# Patient Record
Sex: MALE | Race: WHITE | NOT HISPANIC OR LATINO | Employment: OTHER | URBAN - METROPOLITAN AREA
[De-identification: names, ages, dates, MRNs, and addresses within clinical notes are randomized per-mention and may not be internally consistent; named-entity substitution may affect disease eponyms.]

---

## 2021-05-28 ENCOUNTER — RECORDS - HEALTHEAST (OUTPATIENT)
Dept: ADMINISTRATIVE | Facility: CLINIC | Age: 76
End: 2021-05-28

## 2021-10-18 ENCOUNTER — APPOINTMENT (OUTPATIENT)
Dept: ULTRASOUND IMAGING | Facility: CLINIC | Age: 76
End: 2021-10-18
Attending: EMERGENCY MEDICINE
Payer: MEDICARE

## 2021-10-18 ENCOUNTER — HOSPITAL ENCOUNTER (EMERGENCY)
Facility: CLINIC | Age: 76
Discharge: HOME | End: 2021-10-19
Attending: PHYSICIAN ASSISTANT | Admitting: PHYSICIAN ASSISTANT
Payer: MEDICARE

## 2021-10-18 DIAGNOSIS — I10 HYPERTENSION, UNSPECIFIED TYPE: ICD-10-CM

## 2021-10-18 DIAGNOSIS — N43.3 RIGHT HYDROCELE: ICD-10-CM

## 2021-10-18 DIAGNOSIS — R93.5 ABNORMAL CT OF THE ABDOMEN: ICD-10-CM

## 2021-10-18 PROBLEM — R97.20 ELEVATED PSA: Status: ACTIVE | Noted: 2017-10-19

## 2021-10-18 LAB
ALBUMIN SERPL-MCNC: 3.7 G/DL (ref 3.4–5)
ALP SERPL-CCNC: 52 U/L (ref 40–150)
ALT SERPL W P-5'-P-CCNC: 40 U/L (ref 0–70)
ANION GAP SERPL CALCULATED.3IONS-SCNC: 7 MMOL/L (ref 3–14)
AST SERPL W P-5'-P-CCNC: 22 U/L (ref 0–45)
BASOPHILS # BLD AUTO: 0 10E3/UL (ref 0–0.2)
BASOPHILS NFR BLD AUTO: 1 %
BILIRUB SERPL-MCNC: 0.3 MG/DL (ref 0.2–1.3)
BUN SERPL-MCNC: 16 MG/DL (ref 7–30)
CALCIUM SERPL-MCNC: 9 MG/DL (ref 8.5–10.1)
CHLORIDE BLD-SCNC: 105 MMOL/L (ref 94–109)
CO2 SERPL-SCNC: 27 MMOL/L (ref 20–32)
CREAT SERPL-MCNC: 0.89 MG/DL (ref 0.66–1.25)
EOSINOPHIL # BLD AUTO: 0.1 10E3/UL (ref 0–0.7)
EOSINOPHIL NFR BLD AUTO: 1 %
ERYTHROCYTE [DISTWIDTH] IN BLOOD BY AUTOMATED COUNT: 13.2 % (ref 10–15)
GFR SERPL CREATININE-BSD FRML MDRD: 82 ML/MIN/1.73M2
GLUCOSE BLD-MCNC: 93 MG/DL (ref 70–99)
HCT VFR BLD AUTO: 41.2 % (ref 40–53)
HGB BLD-MCNC: 13.8 G/DL (ref 13.3–17.7)
IMM GRANULOCYTES # BLD: 0 10E3/UL
IMM GRANULOCYTES NFR BLD: 0 %
LYMPHOCYTES # BLD AUTO: 2.2 10E3/UL (ref 0.8–5.3)
LYMPHOCYTES NFR BLD AUTO: 25 %
MCH RBC QN AUTO: 29.5 PG (ref 26.5–33)
MCHC RBC AUTO-ENTMCNC: 33.5 G/DL (ref 31.5–36.5)
MCV RBC AUTO: 88 FL (ref 78–100)
MONOCYTES # BLD AUTO: 0.9 10E3/UL (ref 0–1.3)
MONOCYTES NFR BLD AUTO: 10 %
NEUTROPHILS # BLD AUTO: 5.6 10E3/UL (ref 1.6–8.3)
NEUTROPHILS NFR BLD AUTO: 63 %
NRBC # BLD AUTO: 0 10E3/UL
NRBC BLD AUTO-RTO: 0 /100
PLATELET # BLD AUTO: 267 10E3/UL (ref 150–450)
POTASSIUM BLD-SCNC: 4 MMOL/L (ref 3.4–5.3)
PROT SERPL-MCNC: 7.6 G/DL (ref 6.8–8.8)
RBC # BLD AUTO: 4.68 10E6/UL (ref 4.4–5.9)
SODIUM SERPL-SCNC: 139 MMOL/L (ref 133–144)
TROPONIN I SERPL-MCNC: <0.015 UG/L (ref 0–0.04)
WBC # BLD AUTO: 8.8 10E3/UL (ref 4–11)

## 2021-10-18 PROCEDURE — 36415 COLL VENOUS BLD VENIPUNCTURE: CPT | Performed by: PHYSICIAN ASSISTANT

## 2021-10-18 PROCEDURE — 84484 ASSAY OF TROPONIN QUANT: CPT | Performed by: PHYSICIAN ASSISTANT

## 2021-10-18 PROCEDURE — 82247 BILIRUBIN TOTAL: CPT | Performed by: PHYSICIAN ASSISTANT

## 2021-10-18 PROCEDURE — 99284 EMERGENCY DEPT VISIT MOD MDM: CPT | Performed by: EMERGENCY MEDICINE

## 2021-10-18 PROCEDURE — 99285 EMERGENCY DEPT VISIT HI MDM: CPT | Mod: 25

## 2021-10-18 PROCEDURE — 81001 URINALYSIS AUTO W/SCOPE: CPT | Performed by: PHYSICIAN ASSISTANT

## 2021-10-18 PROCEDURE — 250N000013 HC RX MED GY IP 250 OP 250 PS 637: Performed by: EMERGENCY MEDICINE

## 2021-10-18 PROCEDURE — 250N000011 HC RX IP 250 OP 636: Performed by: EMERGENCY MEDICINE

## 2021-10-18 PROCEDURE — 250N000013 HC RX MED GY IP 250 OP 250 PS 637: Performed by: PHYSICIAN ASSISTANT

## 2021-10-18 PROCEDURE — 76870 US EXAM SCROTUM: CPT

## 2021-10-18 PROCEDURE — 96374 THER/PROPH/DIAG INJ IV PUSH: CPT | Mod: 59

## 2021-10-18 PROCEDURE — 96375 TX/PRO/DX INJ NEW DRUG ADDON: CPT

## 2021-10-18 PROCEDURE — 85025 COMPLETE CBC W/AUTO DIFF WBC: CPT | Performed by: PHYSICIAN ASSISTANT

## 2021-10-18 RX ORDER — ACETAMINOPHEN 325 MG/1
975 TABLET ORAL ONCE
Status: COMPLETED | OUTPATIENT
Start: 2021-10-18 | End: 2021-10-18

## 2021-10-18 RX ORDER — LISINOPRIL 10 MG/1
20 TABLET ORAL ONCE
Status: COMPLETED | OUTPATIENT
Start: 2021-10-18 | End: 2021-10-18

## 2021-10-18 RX ORDER — IOPAMIDOL 755 MG/ML
100 INJECTION, SOLUTION INTRAVASCULAR ONCE
Status: COMPLETED | OUTPATIENT
Start: 2021-10-18 | End: 2021-10-19

## 2021-10-18 RX ORDER — DOXAZOSIN 4 MG/1
4 TABLET ORAL
COMMUNITY
Start: 2020-12-02 | End: 2021-11-08

## 2021-10-18 RX ORDER — PRAVASTATIN SODIUM 20 MG
20 TABLET ORAL
COMMUNITY
Start: 2020-12-02 | End: 2021-11-08

## 2021-10-18 RX ORDER — ICOSAPENT ETHYL 1 G/1
2 CAPSULE ORAL
COMMUNITY
Start: 2020-11-30 | End: 2021-11-08

## 2021-10-18 RX ORDER — KETOROLAC TROMETHAMINE 15 MG/ML
10 INJECTION, SOLUTION INTRAMUSCULAR; INTRAVENOUS
Status: COMPLETED | OUTPATIENT
Start: 2021-10-18 | End: 2021-10-18

## 2021-10-18 RX ORDER — LISINOPRIL 20 MG/1
20 TABLET ORAL
COMMUNITY
Start: 2020-12-02 | End: 2021-11-08

## 2021-10-18 RX ADMIN — ACETAMINOPHEN 975 MG: 325 TABLET, FILM COATED ORAL at 21:54

## 2021-10-18 RX ADMIN — KETOROLAC TROMETHAMINE 10 MG: 15 INJECTION, SOLUTION INTRAMUSCULAR; INTRAVENOUS at 22:31

## 2021-10-18 RX ADMIN — LISINOPRIL 20 MG: 10 TABLET ORAL at 22:33

## 2021-10-18 ASSESSMENT — ENCOUNTER SYMPTOMS
MUSCULOSKELETAL NEGATIVE: 1
GASTROINTESTINAL NEGATIVE: 1
NUMBNESS: 0
SEIZURES: 0
CONSTITUTIONAL NEGATIVE: 1
HEADACHES: 1
DIARRHEA: 0
NAUSEA: 0
EYES NEGATIVE: 1
SPEECH DIFFICULTY: 0
RESPIRATORY NEGATIVE: 1
WEAKNESS: 0
FACIAL ASYMMETRY: 0
CARDIOVASCULAR NEGATIVE: 1
LIGHT-HEADEDNESS: 0
RECTAL PAIN: 0
DIZZINESS: 0
TREMORS: 0

## 2021-10-18 ASSESSMENT — MIFFLIN-ST. JEOR: SCORE: 1887.92

## 2021-10-19 ENCOUNTER — APPOINTMENT (OUTPATIENT)
Dept: CT IMAGING | Facility: CLINIC | Age: 76
End: 2021-10-19
Attending: EMERGENCY MEDICINE
Payer: MEDICARE

## 2021-10-19 VITALS
BODY MASS INDEX: 33.59 KG/M2 | WEIGHT: 248 LBS | DIASTOLIC BLOOD PRESSURE: 85 MMHG | HEIGHT: 72 IN | SYSTOLIC BLOOD PRESSURE: 125 MMHG | TEMPERATURE: 98.8 F | HEART RATE: 90 BPM | RESPIRATION RATE: 16 BRPM | OXYGEN SATURATION: 92 %

## 2021-10-19 LAB
ALBUMIN UR-MCNC: NEGATIVE MG/DL
APPEARANCE UR: CLEAR
BILIRUB UR QL STRIP: NEGATIVE
COLOR UR AUTO: NORMAL
GLUCOSE UR STRIP-MCNC: NEGATIVE MG/DL
HGB UR QL STRIP: NEGATIVE
KETONES UR STRIP-MCNC: NEGATIVE MG/DL
LEUKOCYTE ESTERASE UR QL STRIP: NEGATIVE
NITRATE UR QL: NEGATIVE
PH UR STRIP: 7 [PH] (ref 5–7)
RBC URINE: 2 /HPF
SP GR UR STRIP: 1.01 (ref 1–1.03)
UROBILINOGEN UR STRIP-MCNC: NORMAL MG/DL
WBC URINE: <1 /HPF

## 2021-10-19 PROCEDURE — 70450 CT HEAD/BRAIN W/O DYE: CPT | Mod: ME,76

## 2021-10-19 PROCEDURE — 250N000009 HC RX 250: Performed by: EMERGENCY MEDICINE

## 2021-10-19 PROCEDURE — 250N000013 HC RX MED GY IP 250 OP 250 PS 637: Performed by: EMERGENCY MEDICINE

## 2021-10-19 PROCEDURE — 250N000011 HC RX IP 250 OP 636: Performed by: EMERGENCY MEDICINE

## 2021-10-19 PROCEDURE — G1004 CDSM NDSC: HCPCS

## 2021-10-19 PROCEDURE — 70450 CT HEAD/BRAIN W/O DYE: CPT | Mod: ME

## 2021-10-19 RX ORDER — ONDANSETRON 2 MG/ML
4 INJECTION INTRAMUSCULAR; INTRAVENOUS ONCE
Status: COMPLETED | OUTPATIENT
Start: 2021-10-19 | End: 2021-10-19

## 2021-10-19 RX ORDER — LISINOPRIL 20 MG/1
20 TABLET ORAL DAILY
Qty: 30 TABLET | Refills: 1 | Status: SHIPPED | OUTPATIENT
Start: 2021-10-19 | End: 2021-11-08

## 2021-10-19 RX ORDER — CLONIDINE HYDROCHLORIDE 0.1 MG/1
0.1 TABLET ORAL ONCE
Status: COMPLETED | OUTPATIENT
Start: 2021-10-19 | End: 2021-10-19

## 2021-10-19 RX ADMIN — CLONIDINE HYDROCHLORIDE 0.1 MG: 0.1 TABLET ORAL at 02:48

## 2021-10-19 RX ADMIN — SODIUM CHLORIDE 70 ML: 9 INJECTION, SOLUTION INTRAVENOUS at 02:10

## 2021-10-19 RX ADMIN — ONDANSETRON 4 MG: 2 INJECTION INTRAMUSCULAR; INTRAVENOUS at 02:32

## 2021-10-19 RX ADMIN — IOPAMIDOL 100 ML: 755 INJECTION, SOLUTION INTRAVENOUS at 02:10

## 2021-10-19 NOTE — ED PROVIDER NOTES
"     Emergency Department Patient Sign-out       Brief HPI:  This is a 76 year old male signed out to me by Darron Up PA-C at shift change at 10.15pm.  Plan of care at handoff is that patient is awaiting treatment for his headache with concern for untreated hypertension.  Patient has a known history of hypertension and has been on lisinopril 20 mg a day.  Patient has not taken his lisinopril for the last 2 years because he ran out of his prescription..  Patient expressed concern about a mass in the right lower quadrant and a testicular mass.  The abdominal mass have been present for about 5 to 6 months by report. Testicular mass have been present for about 8 to 9 months by report.  Patient requested imaging for further follow-up with a history of \"prostate cancer -presumed after an elevated PSA in June 2019 although patient never had any further follow-up work-up.        Significant Events after my assuming care: Patient was seen and examined initially at 10:30 PM and serially during his ED course..  He confirmed history as reported to the initial treating provider prior to handoff.  Patient reported  about 6 to 8 months of swelling around the right lower quadrant and is concerned about metastatic prostate cancer.  He reports he was noted to have an elevated PSA but never had surveillance follow-up or referral to oncologist or urologist. Atraumatic right testicular swelling and is concerned about prostate cancer.  Patient requested imaging and further work-up.  He was offered therapies for his headache and his blood pressure was monitored.    Medical record-PSA of 3.6 on June 7, 2019.  CT imaging of the abdomen and pelvis with contrast was ordered by the initial treating provider. Ordered a comprehensive testicular ultrasound given asymmetric testicular swelling. On my Exam -circumcised male without penile discharge or lesions.  Asymmetric testicular swelling right greater than left without tenderness  " Suspicious for hydrocele. Non-tender mass lesion in the right lower quadrant.  No inguinal pain.  Abdomen soft with protuberance.    Comprehensive ultrasound of the scrotum and testicle revealed a very large right hydrocele which contains nonspecific low-level echogenicity with a small left varicocele there is no intratesticular masses and no evidence of epididymoorchitis or testicular torsion.  See additional  Details in the radiology report.    CT abdomen and pelvis with contrast revealed complex fluid collection in the right hemipelvis abutting the medial aspect of the cecum.  One fluid collection was 8.9 x 8.4 cm in axial dimensions.  The other fluid collection measures 17 cm in length by 7.5 cm in diameter.  The appendix was not visualized separate from the structures and very small amount of free fluid was noted in the pelvis.  One of the fluid collection suspicious for an abscess that could be ruptured appendicitis the other peripheral calcifications is nonspecific but could represent a large mucocele of the appendix.  See additional details in the radiology report.      With CT findings I reviewed presentation with general surgeon on-call. I spoke with Dr HERNÁN Patterson at  2.40am .  We discussed patient's history as reported and chronicity of symptoms. Dr Patterson advised that patient would likely benefit from drain placement to see what this fluid collection is.  Dr Patterson advised that patient establish care with a primary care provider and that a referral to general surgery clinic could be placed where patient could be seen for further interval follow-up and definitive management.  I placed a referral to general surgery clinic in addition to follow-up in urology clinic for his hydrocele and to establish primary care.    After CT imaging patient reported his occipital headache was returning.  He also reported  nausea and vomiting.  His blood pressure was 166/103. Patient did confirm that he currently does not have a  primary care provider and is in the process of moving from [facility] to the [facility].  His blood pressure remains somewhat elevated although was starting to trend downward.  I elected to obtain a head CT to exclude intracranial hemorrhage versus other acute process.    I spoke with Dr YOBANI Zamora- radiology at 3.25am-who reported apparent sulcal based  hyperdensity in the right frontal lobe that could potentially reflect a subarachnoid hemorrhage but could also be due artifact from retained IV contrast from recent CT abdomen and pelvis.  Dr Zamora recommended follow-up CT in 4 hours for reevaluation.See additional details in radiology report.     Patient's blood pressure remained improved during ED course.     At shift end at 6.45am Patient signed out to Dr EMELY Cruz.  Plan of care at shift end at handoff is to have a repeat head CT at 7 AM which would be about 4 hours after patient's initial head CT imaging.  If head CT had shows nothing to suggest a subarachnoid hemorrhage patient will be discharged as outlined below.  If CT shows a subarachnoid hemorrhage patient will need further expert consultation.      Exam:   Patient Vitals for the past 24 hrs:   BP Temp Temp src Pulse Resp SpO2 Height Weight   10/19/21 0545 -- -- -- -- -- 93 % -- --   10/19/21 0530 -- -- -- -- -- 94 % -- --   10/19/21 0515 -- -- -- -- -- 91 % -- --   10/19/21 0500 (!) 153/104 -- -- 81 -- 93 % -- --   10/19/21 0445 -- -- -- -- -- 92 % -- --   10/19/21 0430 (!) 174/103 -- -- 84 -- 92 % -- --   10/19/21 0415 (!) 172/109 -- -- 81 -- 95 % -- --   10/19/21 0338 (!) 154/93 -- -- 84 -- 98 % -- --   10/19/21 0330 -- -- -- 85 -- 96 % -- --   10/19/21 0325 (!) 154/102 -- -- 84 16 93 % -- --   10/19/21 0215 (!) 190/126 -- -- -- -- 95 % -- --   10/19/21 0145 -- -- -- -- -- 95 % -- --   10/19/21 0130 -- -- -- -- -- 95 % -- --   10/19/21 0115 -- -- -- -- -- 95 % -- --   10/19/21 0100 (!) 193/114 -- -- 83 -- 97 % -- --   10/18/21  2200 (!) 203/134 -- -- 87 -- 97 % -- --   10/18/21 2145 -- -- -- -- -- 97 % -- --   10/18/21 2100 (!) 178/120 -- -- 85 -- -- -- --   10/18/21 2045 -- -- -- -- -- 97 % -- --   10/18/21 2030 (!) 190/113 -- -- 84 -- -- -- --   10/18/21 1847 -- -- -- -- -- -- 1.829 m (6') 112.5 kg (248 lb)   10/18/21 1843 (!) 174/106 98.8  F (37.1  C) Oral 95 (!) 97 -- -- --           ED RESULTS:   Results for orders placed or performed during the hospital encounter of 10/18/21 (from the past 24 hour(s))   Comprehensive metabolic panel     Status: Normal    Collection Time: 10/18/21  9:12 PM   Result Value Ref Range    Sodium 139 133 - 144 mmol/L    Potassium 4.0 3.4 - 5.3 mmol/L    Chloride 105 94 - 109 mmol/L    Carbon Dioxide (CO2) 27 20 - 32 mmol/L    Anion Gap 7 3 - 14 mmol/L    Urea Nitrogen 16 7 - 30 mg/dL    Creatinine 0.89 0.66 - 1.25 mg/dL    Calcium 9.0 8.5 - 10.1 mg/dL    Glucose 93 70 - 99 mg/dL    Alkaline Phosphatase 52 40 - 150 U/L    AST 22 0 - 45 U/L    ALT 40 0 - 70 U/L    Protein Total 7.6 6.8 - 8.8 g/dL    Albumin 3.7 3.4 - 5.0 g/dL    Bilirubin Total 0.3 0.2 - 1.3 mg/dL    GFR Estimate 82 >60 mL/min/1.73m2   Troponin I     Status: Normal    Collection Time: 10/18/21  9:12 PM   Result Value Ref Range    Troponin I <0.015 0.000 - 0.045 ug/L   CBC with platelets differential     Status: None    Collection Time: 10/18/21  9:13 PM    Narrative    The following orders were created for panel order CBC with platelets differential.  Procedure                               Abnormality         Status                     ---------                               -----------         ------                     CBC with platelets and d...[[id number]]                      Final result                 Please view results for these tests on the individual orders.   CBC with platelets and differential     Status: None    Collection Time: 10/18/21  9:13 PM   Result Value Ref Range    WBC Count 8.8 4.0 - 11.0 10e3/uL    RBC Count 4.68  4.40 - 5.90 10e6/uL    Hemoglobin 13.8 13.3 - 17.7 g/dL    Hematocrit 41.2 40.0 - 53.0 %    MCV 88 78 - 100 fL    MCH 29.5 26.5 - 33.0 pg    MCHC 33.5 31.5 - 36.5 g/dL    RDW 13.2 10.0 - 15.0 %    Platelet Count 267 150 - 450 10e3/uL    % Neutrophils 63 %    % Lymphocytes 25 %    % Monocytes 10 %    % Eosinophils 1 %    % Basophils 1 %    % Immature Granulocytes 0 %    NRBCs per 100 WBC 0 <1 /100    Absolute Neutrophils 5.6 1.6 - 8.3 10e3/uL    Absolute Lymphocytes 2.2 0.8 - 5.3 10e3/uL    Absolute Monocytes 0.9 0.0 - 1.3 10e3/uL    Absolute Eosinophils 0.1 0.0 - 0.7 10e3/uL    Absolute Basophils 0.0 0.0 - 0.2 10e3/uL    Absolute Immature Granulocytes 0.0 <=0.0 10e3/uL    Absolute NRBCs 0.0 10e3/uL   UA with Microscopic reflex to Culture     Status: Normal    Collection Time: 10/18/21 10:34 PM    Specimen: Urine, Midstream   Result Value Ref Range    Color Urine Straw Colorless, Straw, Light Yellow, Yellow    Appearance Urine Clear Clear    Glucose Urine Negative Negative mg/dL    Bilirubin Urine Negative Negative    Ketones Urine Negative Negative mg/dL    Specific Gravity Urine 1.011 1.003 - 1.035    Blood Urine Negative Negative    pH Urine 7.0 5.0 - 7.0    Protein Albumin Urine Negative Negative mg/dL    Urobilinogen Urine Normal Normal, 2.0 mg/dL    Nitrite Urine Negative Negative    Leukocyte Esterase Urine Negative Negative    RBC Urine 2 <=2 /HPF    WBC Urine <1 <=5 /HPF    Narrative    Urine Culture not indicated   US Testicular & Scrotum w Doppler Ltd     Status: None    Collection Time: 10/19/21 12:00 AM    Narrative    EXAM: ULTRASOUND SCROTUM WITH DOPPLER  LOCATION: [facility]  DATE/TIME: 10/18/2021 11:26 PM    INDICATION: Right testicular swelling.  COMPARISON: None.    TECHNIQUE: Ultrasound of scrotum with color flow and spectral Doppler with waveform analysis performed.    FINDINGS:    RIGHT: The testicle measures 4.8 x 3.0 x 2.8 cm. Homogeneous testicular echotexture.  Blood flow is visualized in the testicle. Unremarkable Doppler waveform evaluation of the testicle. Very large hydrocele containing low-level echogenicities. No   varicocele.    LEFT: The testicle measures 4.6 x 3.2 x 2.8 cm. No intratesticular masses. Blood flow is visualized in the testicle. Unremarkable Doppler waveform evaluation of the testicle. Small hydrocele. No varicocele.      Impression    IMPRESSION:   1. Very large right hydrocele which contains nonspecific low-level echogenicities.  2. Small left hydrocele.  3. No intratesticular masses.  4. No evidence of epididymoorchitis or testicular torsion.   CT Abdomen Pelvis w Contrast     Status: None (Preliminary result)    Collection Time: 10/19/21  2:10 AM    Narrative    EXAM: CT ABDOMEN AND PELVIS WITH CONTRAST  LOCATION: [facility]  DATE/TIME: 10/19/2021 1:55 AM    INDICATION: Right lower quadrant abdominal pain.  COMPARISON: None.    TECHNIQUE: CT scan of the abdomen and pelvis was performed following injection of IV contrast. Multiplanar reformats were obtained. Dose reduction techniques were used.  CONTRAST: 100 mL Isovue-370.    FINDINGS:    LOWER CHEST: Unremarkable.    HEPATOBILIARY: Mild diffuse fatty infiltration of liver. A few small low-attenuation lesions scattered within the liver, too small to characterize.    SPLEEN: Unremarkable.    PANCREAS: Unremarkable.    ADRENAL GLANDS: Unremarkable.    KIDNEYS/BLADDER: No suspicious renal lesions.    BOWEL: No dilatation of the small or large bowel. One or two large complex peripheral enhancing fluid collections in the right hemipelvis, abutting the medial aspect of the cecum. No gas within the fluid collections. The largest is superior and lateral   and measures 8.9 x 8.4 cm in axial dimensions (series 5 image 142). Slight stranding is present within the fat about this fluid collection. The other fluid collection is curvilinear in shape and has a few calcifications in its  peripheral aspect. It   measures 17 cm in length and 7.5 cm in diameter (series 5 image 165). The appendix is not visualized as separate from these structures. A very small amount of free fluid in the pelvis.    LYMPH NODES: Unremarkable.    PELVIC ORGANS: Marked enlargement of the prostate gland.    MUSCULOSKELETAL: Surgical hardware in the left acetabulum.    OTHER: Atherosclerotic calcification in the abdominal aorta.      Impression    IMPRESSION:   1. One or two peripherally enhancing fluid collections in the right hemipelvis, abutting the medial aspect of the cecum. The appendix is not visualized as separate from these structures. One of the fluid collections has calcification in its periphery.   These findings are nonspecific, but one of the fluid collections is suspicious for an abscess that could be related to ruptured appendicitis. The other with peripheral calcifications is nonspecific, but could possibly represent a large mucocele of the   appendix.  2. A very small amount of free fluid in the pelvis.  3. No other acute abnormality identified in the abdomen or pelvis.   Head CT w/o contrast     Status: None    Collection Time: 10/19/21  3:09 AM    Narrative    EXAM: CT HEAD W/O CONTRAST  LOCATION: [facility]  DATE/TIME: 10/19/2021 3:04 AM    INDICATION: Headache. Intracranial hemorrhage.  COMPARISON: None.  TECHNIQUE: Routine CT Head without IV contrast. Multiplanar reformats. Dose reduction techniques were used.    FINDINGS: Examination is degraded for evaluation of subtle hemorrhage due to retained IV contrast from the patient's recent contrast-enhanced CT abdomen/pelvis.    INTRACRANIAL CONTENTS: Apparent sulcal based hyperdensity is noted involving the high right anterior frontal lobe (image 24 series 2 and image 19 series 4 and image 15 series 6). No mass, mass effect or abnormal extra-axial fluid collection. No acute   large territory infarction. Mild presumed chronic  small vessel ischemic changes. Mild generalized volume loss. No hydrocephalus. 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    VISUALIZED ORBITS/SINUSES/MASTOIDS: No intraorbital abnormality. No paranasal sinus mucosal disease. No middle ear or mastoid effusion.    BONES/SOFT TISSUES: No acute abnormality.      Impression    IMPRESSION:  1.  Apparent sulcal based hyperdensity is identified involving the high right frontal lobe, possibly reflecting subarachnoid hemorrhage or artifact from the retained IV contrast from recent CT abdomen/pelvis. Recommend short-term imaging follow-up in   approximately 4 hours for further assessment.   2.  Chronic senescent and presumed microvascular ischemic changes, as above.          Dr. Lane Zamora discussed results with Dr. NUBIA LEWIS on 10/19/2021 at 3:25 AM.       ED MEDICATIONS:   Medications   acetaminophen (TYLENOL) tablet 975 mg (975 mg Oral Given 10/18/21 2154)   ketorolac (TORADOL) injection 10 mg (10 mg Intravenous Given 10/18/21 2231)   lisinopril (ZESTRIL) tablet 20 mg (20 mg Oral Given 10/18/21 2233)   iopamidol (ISOVUE-370) solution 100 mL (100 mLs Intravenous Given 10/19/21 0210)   sodium chloride 0.9 % bag 500mL for CT scan flush use (70 mLs As instructed Given 10/19/21 0210)   cloNIDine (CATAPRES) tablet 0.1 mg (0.1 mg Oral Given 10/19/21 0248)   ondansetron (ZOFRAN) injection 4 mg (4 mg Intravenous Given 10/19/21 0232)         Impression:    ICD-10-CM    1. Hypertension, unspecified type  I10 Primary Care Referral   2. Right hydrocele  N43.3 Adult Urology Referral    Large right hydrocele   3. Abnormal CT of the abdomen  R93.5 Adult General Surg Referral    2 large fluid collections abutting the right hemipelvis. ?  Ruptured appendix or abscess versus mucocele       Plan:    At shift end patient was awaiting repeat head CT (4 hour follow-up as advised by radiology) if no acute findings on  follow-up head CT patient may be discharged with plan for outpatient follow-up to  1) establish primary care to follow-up on hypertension with reinitiating lisinopril.  2)Follow-up with urology if desired for reported history of elevated PSA with a large right hydrocele for further definitive care.  3) Follow-up with General surgery for abnormal large fluid collection noted in the right hemipelvis that could be due to a ruptured appendix/abscess and or a mucocele for further definitive care.      MD Vance Hayes Ebenezer Tope, MD  10/19/21 0691

## 2021-10-19 NOTE — DISCHARGE INSTRUCTIONS
1) Your evaluation today confirmed that your blood pressure is not well controlled.  Your blood pressure readings were elevated and you have been given a new prescription for lisinopril to begin taking since he reported you have not taken this for the last 2 years.  Primary care clinic referral was placed as report you are moving from [facility] to [place] and they would like to live in the local area and establish primary care locally here at the hospital.    2) You quested imaging to follow-up given that you have had testicular swelling for about 8 months with a prior history of an abnormal PSA test.  He also requested CT imaging given that you have had a mass growing the right lower quadrant.  Ultrasound revealed that you have a right hydrocele but no testicular mass and CT revealed

## 2021-10-19 NOTE — ED PROVIDER NOTES
Emergency Department Patient Sign-out       Brief HPI:  This is a 76 year old male signed out to me by Dr. Mccoy. For follow up of CT scan of head. Patient presented with headache and htn .  See initial ED Provider note for details of the presentation.   CT scan of abdomen and pelvis with pelvic fluid collection; could be handled as an out patient. CT scan of head obtained and revealed hyperdensity in the R frontal lobe could potentially reflect a subarachnoid hemorrhage but could represent follow up. Patient is awaiting repeat 4 hour CT . If negative patient can be discharged.   Results for orders placed or performed during the hospital encounter of 10/18/21   CT Abdomen Pelvis w Contrast    Narrative    EXAM: CT ABDOMEN AND PELVIS WITH CONTRAST  LOCATION: [facility]  DATE/TIME: 10/19/2021 1:55 AM    INDICATION: Right lower quadrant abdominal pain.  COMPARISON: None.    TECHNIQUE: CT scan of the abdomen and pelvis was performed following injection of IV contrast. Multiplanar reformats were obtained. Dose reduction techniques were used.  CONTRAST: 100 mL Isovue-370.    FINDINGS:    LOWER CHEST: Unremarkable.    HEPATOBILIARY: Mild diffuse fatty infiltration of liver. A few small low-attenuation lesions scattered within the liver, too small to characterize.    SPLEEN: Unremarkable.    PANCREAS: Unremarkable.    ADRENAL GLANDS: Unremarkable.    KIDNEYS/BLADDER: No suspicious renal lesions.    BOWEL: No dilatation of the small or large bowel. Two large peripherally-enhancing fluid collections in the right hemipelvis, abutting the medial aspect of the cecum. No gas within the fluid collections. The superior and right lateral fluid collection   measures 8.9 x 8.4 cm in axial dimensions (series 5 image 142). Slight stranding is present within the fat about this fluid collection. This could represent an abscess. The other fluid collection is more inferior and elongated an irregular in  shape and   has a few calcifications in its peripheral aspect. It measures 17 cm in length and 7.5 cm in diameter (series 5 image 165). The appendix is not visualized as separate from these structures. A very small amount of free fluid in the pelvis.    LYMPH NODES: Unremarkable.    PELVIC ORGANS: Marked enlargement of the prostate gland.    MUSCULOSKELETAL: Surgical hardware in the left acetabulum.    OTHER: Atherosclerotic calcification in the abdominal aorta.      Impression    IMPRESSION:   1. Two peripherally-enhancing fluid collections in the right hemipelvis, abutting the medial aspect of the cecum. The appendix is not visualized as separate from these structures. One of the fluid collections has calcification in its periphery. These   findings are nonspecific, but one of the fluid collections that measures 9 cm in diameter is suspicious for an abscess and could possible be related to ruptured appendicitis. The other with peripheral calcificationsis elongated and more irregular in   shape and measures 17 x 8 cm. This is nonspecific, but could represent a large mucocele of the appendix. Neoplasm cannot be excluded.  2. A very small amount of free fluid in the pelvis.  3. No other acute abnormality identified in the abdomen or pelvis.   US Testicular & Scrotum w Doppler Ltd    Narrative    EXAM: ULTRASOUND SCROTUM WITH DOPPLER  LOCATION: [facility]  DATE/TIME: 10/18/2021 11:26 PM    INDICATION: Right testicular swelling.  COMPARISON: None.    TECHNIQUE: Ultrasound of scrotum with color flow and spectral Doppler with waveform analysis performed.    FINDINGS:    RIGHT: The testicle measures 4.8 x 3.0 x 2.8 cm. Homogeneous testicular echotexture. Blood flow is visualized in the testicle. Unremarkable Doppler waveform evaluation of the testicle. Very large hydrocele containing low-level echogenicities. No   varicocele.    LEFT: The testicle measures 4.6 x 3.2 x 2.8 cm. No intratesticular  masses. Blood flow is visualized in the testicle. Unremarkable Doppler waveform evaluation of the testicle. Small hydrocele. No varicocele.      Impression    IMPRESSION:   1. Very large right hydrocele which contains nonspecific low-level echogenicities.  2. Small left hydrocele.  3. No intratesticular masses.  4. No evidence of epididymoorchitis or testicular torsion.   Head CT w/o contrast    Narrative    EXAM: CT HEAD W/O CONTRAST  LOCATION: [facility]  DATE/TIME: 10/19/2021 3:04 AM    INDICATION: Headache. Intracranial hemorrhage.  COMPARISON: None.  TECHNIQUE: Routine CT Head without IV contrast. Multiplanar reformats. Dose reduction techniques were used.    FINDINGS: Examination is degraded for evaluation of subtle hemorrhage due to retained IV contrast from the patient's recent contrast-enhanced CT abdomen/pelvis.    INTRACRANIAL CONTENTS: Apparent sulcal based hyperdensity is noted involving the high right anterior frontal lobe (image 24 series 2 and image 19 series 4 and image 15 series 6). No mass, mass effect or abnormal extra-axial fluid collection. No acute   large territory infarction. Mild presumed chronic small vessel ischemic changes. Mild generalized volume loss. No hydrocephalus. 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    VISUALIZED ORBITS/SINUSES/MASTOIDS: No intraorbital abnormality. No paranasal sinus mucosal disease. No middle ear or mastoid effusion.    BONES/SOFT TISSUES: No acute abnormality.      Impression    IMPRESSION:  1.  Apparent sulcal based hyperdensity is identified involving the high right frontal lobe, possibly reflecting subarachnoid hemorrhage or artifact from the retained IV contrast from recent CT abdomen/pelvis. Recommend short-term imaging follow-up in   approximately 4 hours for further assessment.   2.  Chronic senescent and presumed microvascular ischemic changes, as  above.          Dr. Lane Zamora discussed results with Dr. NUBIA LEWIS on 10/19/2021 at 3:25 AM.   Head CT w/o contrast    Narrative    CT SCAN OF THE HEAD WITHOUT CONTRAST   10/19/2021 7:14 AM     HISTORY: Headache, intracranial hemorrhage suspected. Follow-up  imaging for possible subarachnoid hemorrhage.  Compared with CT  obtained at 3.09 AM.    TECHNIQUE:  Axial images of the head and coronal reformations without  IV contrast material. Radiation dose for this scan was reduced using  automated exposure control, adjustment of the mA and/or kV according  to patient size, or iterative reconstruction technique.    COMPARISON: Head CT 10/19/2021.    FINDINGS: Previous questioned sulcal hyperdensity is not definitively  appreciated on this study. No new intracranial hemorrhage identified.  No mass effect or midline shift. Ventricular size is within normal  limits without evidence of hydrocephalus. Mild patchy periventricular  white matter hypodensities are nonspecific, but most likely related to  chronic microvascular ischemic disease. No abnormal extra-axial fluid  collection.    The visualized portions of the sinuses and mastoids appear normal. The  bony calvarium and bones of the skull base appear intact.       Impression    IMPRESSION:     1. Previous questioned sulcal hyperdensity is not definitively  appreciated on this study. This could be due to resolving subarachnoid  hemorrhage versus previous artifact from intracranial contrast. No new  intracranial hemorrhage. No midline shift or herniation.  2. Mild nonspecific white matter changes most likely due to chronic  microvascular ischemic disease.    There was a marked delay in the dictation given delay in sending  images to PACS.    Results discussed with Dr. Cruz at 9:07 AM on 10/19/2021.    ESTEBAN ROGERS MD         SYSTEM ID:  [id number]          Repeat CT scan was completed and revealed previously questioned sulcal hyperdensity not definitely  appreciated on this study more than likely artifact from previous study.  No new interim cranial abnormality noted.  Case was discussed with in detail with radiologist.  Findings discussed with patient and as per Dr. Guerinmendairam patient will be discharged for close follow-up with primary care.      Exam:   Patient Vitals for the past 24 hrs:   BP Temp Temp src Pulse Resp SpO2 Height Weight   10/19/21 0545 -- -- -- -- -- 93 % -- --   10/19/21 0530 -- -- -- -- -- 94 % -- --   10/19/21 0515 -- -- -- -- -- 91 % -- --   10/19/21 0500 (!) 153/104 -- -- 81 -- 93 % -- --   10/19/21 0445 -- -- -- -- -- 92 % -- --   10/19/21 0430 (!) 174/103 -- -- 84 -- 92 % -- --   10/19/21 0415 (!) 172/109 -- -- 81 -- 95 % -- --   10/19/21 0338 (!) 154/93 -- -- 84 -- 98 % -- --   10/19/21 0330 -- -- -- 85 -- 96 % -- --   10/19/21 0325 (!) 154/102 -- -- 84 16 93 % -- --   10/19/21 0215 (!) 190/126 -- -- -- -- 95 % -- --   10/19/21 0145 -- -- -- -- -- 95 % -- --   10/19/21 0130 -- -- -- -- -- 95 % -- --   10/19/21 0115 -- -- -- -- -- 95 % -- --   10/19/21 0100 (!) 193/114 -- -- 83 -- 97 % -- --   10/18/21 2200 (!) 203/134 -- -- 87 -- 97 % -- --   10/18/21 2145 -- -- -- -- -- 97 % -- --   10/18/21 2100 (!) 178/120 -- -- 85 -- -- -- --   10/18/21 2045 -- -- -- -- -- 97 % -- --   10/18/21 2030 (!) 190/113 -- -- 84 -- -- -- --   10/18/21 1847 -- -- -- -- -- -- 1.829 m (6') 112.5 kg (248 lb)   10/18/21 1843 (!) 174/106 98.8  F (37.1  C) Oral 95 (!) 97 -- -- --           ED RESULTS:   Results for orders placed or performed during the hospital encounter of 10/18/21 (from the past 24 hour(s))   Comprehensive metabolic panel     Status: Normal    Collection Time: 10/18/21  9:12 PM   Result Value Ref Range    Sodium 139 133 - 144 mmol/L    Potassium 4.0 3.4 - 5.3 mmol/L    Chloride 105 94 - 109 mmol/L    Carbon Dioxide (CO2) 27 20 - 32 mmol/L    Anion Gap 7 3 - 14 mmol/L    Urea Nitrogen 16 7 - 30 mg/dL    Creatinine 0.89 0.66 - 1.25 mg/dL     Calcium 9.0 8.5 - 10.1 mg/dL    Glucose 93 70 - 99 mg/dL    Alkaline Phosphatase 52 40 - 150 U/L    AST 22 0 - 45 U/L    ALT 40 0 - 70 U/L    Protein Total 7.6 6.8 - 8.8 g/dL    Albumin 3.7 3.4 - 5.0 g/dL    Bilirubin Total 0.3 0.2 - 1.3 mg/dL    GFR Estimate 82 >60 mL/min/1.73m2   Troponin I     Status: Normal    Collection Time: 10/18/21  9:12 PM   Result Value Ref Range    Troponin I <0.015 0.000 - 0.045 ug/L   CBC with platelets differential     Status: None    Collection Time: 10/18/21  9:13 PM    Narrative    The following orders were created for panel order CBC with platelets differential.  Procedure                               Abnormality         Status                     ---------                               -----------         ------                     CBC with platelets and d...[[id number]]                      Final result                 Please view results for these tests on the individual orders.   CBC with platelets and differential     Status: None    Collection Time: 10/18/21  9:13 PM   Result Value Ref Range    WBC Count 8.8 4.0 - 11.0 10e3/uL    RBC Count 4.68 4.40 - 5.90 10e6/uL    Hemoglobin 13.8 13.3 - 17.7 g/dL    Hematocrit 41.2 40.0 - 53.0 %    MCV 88 78 - 100 fL    MCH 29.5 26.5 - 33.0 pg    MCHC 33.5 31.5 - 36.5 g/dL    RDW 13.2 10.0 - 15.0 %    Platelet Count 267 150 - 450 10e3/uL    % Neutrophils 63 %    % Lymphocytes 25 %    % Monocytes 10 %    % Eosinophils 1 %    % Basophils 1 %    % Immature Granulocytes 0 %    NRBCs per 100 WBC 0 <1 /100    Absolute Neutrophils 5.6 1.6 - 8.3 10e3/uL    Absolute Lymphocytes 2.2 0.8 - 5.3 10e3/uL    Absolute Monocytes 0.9 0.0 - 1.3 10e3/uL    Absolute Eosinophils 0.1 0.0 - 0.7 10e3/uL    Absolute Basophils 0.0 0.0 - 0.2 10e3/uL    Absolute Immature Granulocytes 0.0 <=0.0 10e3/uL    Absolute NRBCs 0.0 10e3/uL   UA with Microscopic reflex to Culture     Status: Normal    Collection Time: 10/18/21 10:34 PM    Specimen: Urine, Midstream   Result  Value Ref Range    Color Urine Straw Colorless, Straw, Light Yellow, Yellow    Appearance Urine Clear Clear    Glucose Urine Negative Negative mg/dL    Bilirubin Urine Negative Negative    Ketones Urine Negative Negative mg/dL    Specific Gravity Urine 1.011 1.003 - 1.035    Blood Urine Negative Negative    pH Urine 7.0 5.0 - 7.0    Protein Albumin Urine Negative Negative mg/dL    Urobilinogen Urine Normal Normal, 2.0 mg/dL    Nitrite Urine Negative Negative    Leukocyte Esterase Urine Negative Negative    RBC Urine 2 <=2 /HPF    WBC Urine <1 <=5 /HPF    Narrative    Urine Culture not indicated   US Testicular & Scrotum w Doppler Ltd     Status: None    Collection Time: 10/19/21 12:00 AM    Narrative    EXAM: ULTRASOUND SCROTUM WITH DOPPLER  LOCATION: [facility]  DATE/TIME: 10/18/2021 11:26 PM    INDICATION: Right testicular swelling.  COMPARISON: None.    TECHNIQUE: Ultrasound of scrotum with color flow and spectral Doppler with waveform analysis performed.    FINDINGS:    RIGHT: The testicle measures 4.8 x 3.0 x 2.8 cm. Homogeneous testicular echotexture. Blood flow is visualized in the testicle. Unremarkable Doppler waveform evaluation of the testicle. Very large hydrocele containing low-level echogenicities. No   varicocele.    LEFT: The testicle measures 4.6 x 3.2 x 2.8 cm. No intratesticular masses. Blood flow is visualized in the testicle. Unremarkable Doppler waveform evaluation of the testicle. Small hydrocele. No varicocele.      Impression    IMPRESSION:   1. Very large right hydrocele which contains nonspecific low-level echogenicities.  2. Small left hydrocele.  3. No intratesticular masses.  4. No evidence of epididymoorchitis or testicular torsion.   CT Abdomen Pelvis w Contrast     Status: None    Collection Time: 10/19/21  2:10 AM    Narrative    EXAM: CT ABDOMEN AND PELVIS WITH CONTRAST  LOCATION: [facility]  DATE/TIME: 10/19/2021 1:55  AM    INDICATION: Right lower quadrant abdominal pain.  COMPARISON: None.    TECHNIQUE: CT scan of the abdomen and pelvis was performed following injection of IV contrast. Multiplanar reformats were obtained. Dose reduction techniques were used.  CONTRAST: 100 mL Isovue-370.    FINDINGS:    LOWER CHEST: Unremarkable.    HEPATOBILIARY: Mild diffuse fatty infiltration of liver. A few small low-attenuation lesions scattered within the liver, too small to characterize.    SPLEEN: Unremarkable.    PANCREAS: Unremarkable.    ADRENAL GLANDS: Unremarkable.    KIDNEYS/BLADDER: No suspicious renal lesions.    BOWEL: No dilatation of the small or large bowel. Two large peripherally-enhancing fluid collections in the right hemipelvis, abutting the medial aspect of the cecum. No gas within the fluid collections. The superior and right lateral fluid collection   measures 8.9 x 8.4 cm in axial dimensions (series 5 image 142). Slight stranding is present within the fat about this fluid collection. This could represent an abscess. The other fluid collection is more inferior and elongated an irregular in shape and   has a few calcifications in its peripheral aspect. It measures 17 cm in length and 7.5 cm in diameter (series 5 image 165). The appendix is not visualized as separate from these structures. A very small amount of free fluid in the pelvis.    LYMPH NODES: Unremarkable.    PELVIC ORGANS: Marked enlargement of the prostate gland.    MUSCULOSKELETAL: Surgical hardware in the left acetabulum.    OTHER: Atherosclerotic calcification in the abdominal aorta.      Impression    IMPRESSION:   1. Two peripherally-enhancing fluid collections in the right hemipelvis, abutting the medial aspect of the cecum. The appendix is not visualized as separate from these structures. One of the fluid collections has calcification in its periphery. These   findings are nonspecific, but one of the fluid collections that measures 9 cm in diameter is  suspicious for an abscess and could possible be related to ruptured appendicitis. The other with peripheral calcificationsis elongated and more irregular in   shape and measures 17 x 8 cm. This is nonspecific, but could represent a large mucocele of the appendix. Neoplasm cannot be excluded.  2. A very small amount of free fluid in the pelvis.  3. No other acute abnormality identified in the abdomen or pelvis.   Head CT w/o contrast     Status: None    Collection Time: 10/19/21  3:09 AM    Narrative    EXAM: CT HEAD W/O CONTRAST  LOCATION: [facility]  DATE/TIME: 10/19/2021 3:04 AM    INDICATION: Headache. Intracranial hemorrhage.  COMPARISON: None.  TECHNIQUE: Routine CT Head without IV contrast. Multiplanar reformats. Dose reduction techniques were used.    FINDINGS: Examination is degraded for evaluation of subtle hemorrhage due to retained IV contrast from the patient's recent contrast-enhanced CT abdomen/pelvis.    INTRACRANIAL CONTENTS: Apparent sulcal based hyperdensity is noted involving the high right anterior frontal lobe (image 24 series 2 and image 19 series 4 and image 15 series 6). No mass, mass effect or abnormal extra-axial fluid collection. No acute   large territory infarction. Mild presumed chronic small vessel ischemic changes. Mild generalized volume loss. No hydrocephalus. 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    VISUALIZED ORBITS/SINUSES/MASTOIDS: No intraorbital abnormality. No paranasal sinus mucosal disease. No middle ear or mastoid effusion.    BONES/SOFT TISSUES: No acute abnormality.      Impression    IMPRESSION:  1.  Apparent sulcal based hyperdensity is identified involving the high right frontal lobe, possibly reflecting subarachnoid hemorrhage or artifact from the retained IV contrast from recent CT abdomen/pelvis. Recommend short-term imaging follow-up in   approximately 4 hours for  further assessment.   2.  Chronic senescent and presumed microvascular ischemic changes, as above.          Dr. Lane Zamora discussed results with Dr. NUBIA LEWIS on 10/19/2021 at 3:25 AM.       ED MEDICATIONS:   Medications   acetaminophen (TYLENOL) tablet 975 mg (975 mg Oral Given 10/18/21 2154)   ketorolac (TORADOL) injection 10 mg (10 mg Intravenous Given 10/18/21 2231)   lisinopril (ZESTRIL) tablet 20 mg (20 mg Oral Given 10/18/21 2233)   iopamidol (ISOVUE-370) solution 100 mL (100 mLs Intravenous Given 10/19/21 0210)   sodium chloride 0.9 % bag 500mL for CT scan flush use (70 mLs As instructed Given 10/19/21 0210)   cloNIDine (CATAPRES) tablet 0.1 mg (0.1 mg Oral Given 10/19/21 0248)   ondansetron (ZOFRAN) injection 4 mg (4 mg Intravenous Given 10/19/21 0232)         Impression:    ICD-10-CM    1. Hypertension, unspecified type  I10 Primary Care Referral   2. Right hydrocele  N43.3 Adult Urology Referral    Large right hydrocele   3. Abnormal CT of the abdomen  R93.5 Adult General Surg Referral    2 large fluid collections abutting the right hemipelvis. ?  Ruptured appendix or abscess versus mucocele       Plan:    Discharge to home with close follow-up.      MD Nancy Haywood David Charles, MD  10/25/21 [id number]

## 2021-10-19 NOTE — ED PROVIDER NOTES
History     Chief Complaint   Patient presents with     Hypertension     elevated Blood pressure     HPI  Rod Olson is a 76 year old male with a past medical history of elevated PSA, hypertension, hyperlipidemia who presents with elevated blood pressure which began 2 months ago. The patient has been previously diagnosed with high blood pressure and has been taking Cardura. He was previously taking lisinopril but stopped taking it about 2 years ago per his report. He has not been checking his blood pressure that often but blood pressure has been ranging 170s to 190s over 100s when checked over the past few months. States that he has had occasional tension headaches the past 2-month and worsening vision over the past 5 years. States that he has a lot of stress at work and attributes his headaches to work stress. Today he has a headache felt in the back of his neck, no nausea or vomiting. No URI symptoms today. The patient has had more incidence of fatigue over the past 2 months, especially when going up stairs. No acute vision changes, no numbness or tingling, no palpitations, no chest pain or shortness of breath, no difficulties with breathing or swallowing, no abdominal pain currently. The patient does have a lump in his right lower abdomen began 6 months ago and swelling in his right testicle which began about 8 months ago.    Allergies:  Allergies   Allergen Reactions     Clindamycin Other (See Comments)     PN: psychotic       Problem List:    Patient Active Problem List    Diagnosis Date Noted     Elevated PSA 10/19/2017     Priority: Medium     Chronic insomnia 04/22/2014     Priority: Medium     Allergic rhinitis 04/22/2014     Priority: Medium     Hyperlipidemia 04/22/2014     Priority: Medium     Formatting of this note might be different from the original.  Other and unspecified hyperlipidemia       Fatigue 04/22/2014     Priority: Medium     Chronic cough 01/10/2012     Priority: Medium     HTN  (hypertension) 01/10/2012     Priority: Medium        Past Medical History:    No past medical history on file.    Past Surgical History:    No past surgical history on file.    Family History:    No family history on file.    Social History:  Marital Status:  Single [1]  Social History     Tobacco Use     Smoking status: Not on file   Substance Use Topics     Alcohol use: Not on file     Drug use: Not on file        Medications:    doxazosin (CARDURA) 4 MG tablet  Icosapent Ethyl (VASCEPA) 1 g CAPS  lisinopril (ZESTRIL) 20 MG tablet  pravastatin (PRAVACHOL) 20 MG tablet          Review of Systems   Constitutional: Negative.    HENT: Negative.    Eyes: Negative.    Respiratory: Negative.    Cardiovascular: Negative.    Gastrointestinal: Negative.  Negative for diarrhea, nausea and rectal pain.   Genitourinary: Positive for scrotal swelling. Negative for testicular pain.   Musculoskeletal: Negative.    Neurological: Positive for headaches. Negative for dizziness, tremors, seizures, syncope, facial asymmetry, speech difficulty, weakness, light-headedness and numbness.       Physical Exam   BP: (!) 174/106  Pulse: 95  Temp: 98.8  F (37.1  C)  Resp: (!) 97  Height: 182.9 cm (6')  Weight: 112.5 kg (248 lb)      Physical Exam  Constitutional:       General: He is not in acute distress.     Appearance: Normal appearance. He is not ill-appearing, toxic-appearing or diaphoretic.   HENT:      Head: Normocephalic and atraumatic.      Nose: Nose normal. No congestion or rhinorrhea.      Mouth/Throat:      Mouth: Mucous membranes are moist.      Pharynx: Oropharynx is clear. No oropharyngeal exudate or posterior oropharyngeal erythema.   Eyes:      General:         Right eye: No discharge.         Left eye: No discharge.      Extraocular Movements: Extraocular movements intact.      Conjunctiva/sclera: Conjunctivae normal.      Pupils: Pupils are equal, round, and reactive to light.   Cardiovascular:      Rate and Rhythm: Normal  rate and regular rhythm.      Pulses: Normal pulses.      Heart sounds: Normal heart sounds. No murmur heard.     Pulmonary:      Effort: Pulmonary effort is normal. No respiratory distress.      Breath sounds: Normal breath sounds. No stridor. No wheezing, rhonchi or rales.   Chest:      Chest wall: No tenderness.   Abdominal:      General: Abdomen is flat. Bowel sounds are normal. There is no distension.      Palpations: Abdomen is soft. There is mass. There is no fluid wave, hepatomegaly or splenomegaly.      Tenderness: There is abdominal tenderness in the right lower quadrant. There is no guarding or rebound. Negative signs include Roberts's sign, McBurney's sign and psoas sign.      Comments: Has mild tenderness in the right lower quadrant. Has a mass about 5 cm in diameter in the right lower quadrant, mildly painful to palpation   Genitourinary:     Penis: Normal and circumcised.       Testes:         Right: Swelling present. Mass, tenderness, testicular hydrocele or varicocele not present. Right testis is descended. Cremasteric reflex is present.          Left: Mass, tenderness, swelling, testicular hydrocele or varicocele not present. Left testis is descended. Cremasteric reflex is present.       Epididymis:      Right: Normal.      Left: Normal.   Musculoskeletal:         General: No swelling or tenderness. Normal range of motion.      Cervical back: Normal range of motion and neck supple. No rigidity. No muscular tenderness.   Lymphadenopathy:      Cervical: No cervical adenopathy.   Skin:     General: Skin is warm and dry.      Findings: No erythema or rash.   Neurological:      General: No focal deficit present.      Mental Status: He is alert and oriented to person, place, and time.      Sensory: No sensory deficit.      Motor: No weakness.      Coordination: Coordination normal.      Gait: Gait normal.   Psychiatric:         Mood and Affect: Mood normal.         Behavior: Behavior normal.          Thought Content: Thought content normal.         Judgment: Judgment normal.         ED Course        Procedures                No results found for this or any previous visit (from the past 24 hour(s)).    Medications - No data to display    Assessments & Plan (with Medical Decision Making)   The patient is a 76 year old male with a past medical history of elevated PSA, hypertension, hyperlipidemia who presents with elevated blood pressure which began 2 months ago. The patient has been previously diagnosed with high blood pressure and has been taking Cardura. He was previously taking lisinopril but stopped taking it about 2 years ago per his report.  States that he has not been taking his lisinopril as prescribed over the past 2 years.  He has had occasional headaches over the past 2 months as well.  Also has a lower abdominal mass and scrotal swelling which began about 8 months ago, concern for cancer.    No concerns identified on CBC or CMP today.  Low concern for intracranial hemorrhage today given the patient has baseline visual acuity, no balance issues, known numbness or tingling, normal eye exam.  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  No acute concerns identified on EKG today.  Provided the patient with Tylenol and Toradol for pain control today.  Starting the patient on oral lisinopril while in the emergency room.    Completing a CT abdomen pelvis as well as a testicular ultrasound for further evaluation and management of right lower quadrant mass and scrotal swelling on the right side.    Discussed with Dr. Woodard at [facility] emergency department who will be assuming patient's care tonight.       I have reviewed the nursing notes.    I have reviewed the findings, diagnosis, plan and need for follow up with the patient.    New Prescriptions    No medications on file       Final diagnoses:   None       10/18/2021   [facility] EMERGENCY DEPT     Annel  Darron Arellano PA-C  10/18/21 0557

## 2021-11-08 ENCOUNTER — OFFICE VISIT (OUTPATIENT)
Dept: FAMILY MEDICINE | Facility: CLINIC | Age: 76
End: 2021-11-08
Payer: MEDICARE

## 2021-11-08 VITALS
BODY MASS INDEX: 35.03 KG/M2 | WEIGHT: 250.2 LBS | TEMPERATURE: 98 F | SYSTOLIC BLOOD PRESSURE: 134 MMHG | HEIGHT: 71 IN | DIASTOLIC BLOOD PRESSURE: 90 MMHG | HEART RATE: 92 BPM | OXYGEN SATURATION: 98 % | RESPIRATION RATE: 16 BRPM

## 2021-11-08 DIAGNOSIS — I10 ESSENTIAL HYPERTENSION: ICD-10-CM

## 2021-11-08 DIAGNOSIS — Z23 HIGH PRIORITY FOR 2019-NCOV VACCINE: Primary | ICD-10-CM

## 2021-11-08 DIAGNOSIS — Z13.6 SCREENING FOR CARDIOVASCULAR CONDITION: ICD-10-CM

## 2021-11-08 DIAGNOSIS — N43.3 HYDROCELE, UNSPECIFIED HYDROCELE TYPE: ICD-10-CM

## 2021-11-08 PROCEDURE — 0002A COVID-19,PF,PFIZER (12+ YRS): CPT | Performed by: NURSE PRACTITIONER

## 2021-11-08 PROCEDURE — 91300 COVID-19,PF,PFIZER (12+ YRS): CPT | Performed by: NURSE PRACTITIONER

## 2021-11-08 PROCEDURE — 99204 OFFICE O/P NEW MOD 45 MIN: CPT | Performed by: NURSE PRACTITIONER

## 2021-11-08 RX ORDER — LISINOPRIL 40 MG/1
40 TABLET ORAL DAILY
Qty: 90 TABLET | Refills: 3 | Status: SHIPPED | OUTPATIENT
Start: 2021-11-08 | End: 2022-07-19

## 2021-11-08 ASSESSMENT — MIFFLIN-ST. JEOR: SCORE: 1882.03

## 2021-11-08 NOTE — PROGRESS NOTES
"  Assessment & Plan     Essential hypertension  Uncontrolled- increase lisinopril from 20 mg to 40 mg  Schedule RN visit nausea 1 week to recheck blood pressure   - lisinopril (ZESTRIL) 40 MG tablet; Take 1 tablet (40 mg) by mouth daily    Screening for cardiovascular condition    - Lipid panel reflex to direct LDL Fasting; Future    High priority for 2019-nCoV vaccine    - COVID-19,PF,PFIZER (12+ Yrs PURPLE LABEL)    Hydrocele, unspecified hydrocele type  Patient has appointment with Surgery in 3 days.         No follow-ups on file.    BARRIE Osorio CNP  [facility] YENY Velasco is a 76 year old who presents for the following health issues  accompanied by his daughter.    Est care: history of BPH, hydrocele- patient has upcoming appointment with Urology. History of hypertension- recently was restarted on medication of lisinopril ,   History of hyperlipidemia- not sure what medication he was on.   Chief Complaint   Patient presents with     ER F/U     Pt being seen for post e/r f/u.     Establish Care     Pt moved here for [facility], records in system.       HPI     ED/UC Followup:    Facility:  [facility]  Date of visit: 10/18/21  Reason for visit: b/p was elevated  Current Status: pt had been off lisinopril for about 8-9 months, was restarted in e/r           Review of Systems   Constitutional, HEENT, cardiovascular, pulmonary, GI, , musculoskeletal, neuro, skin, endocrine and psych systems are negative, except as otherwise noted.      Objective    BP (!) 142/100   Pulse 92   Temp 98  F (36.7  C) (Tympanic)   Resp 16   Ht 1.803 m (5' 11\")   Wt 113.5 kg (250 lb 3.2 oz)   SpO2 98%   BMI 34.90 kg/m    Body mass index is 34.9 kg/m .  Physical Exam   GENERAL: healthy, alert and no distress  RESP: lungs clear to auscultation - no rales, rhonchi or wheezes  CV: regular rate and rhythm, normal S1 S2, no S3 or S4, no murmur, click or rub, no peripheral edema and " peripheral pulses strong  ABDOMEN: soft, nontender, no hepatosplenomegaly, no masses and bowel sounds normal  MS: no gross musculoskeletal defects noted, no edema

## 2021-11-12 ENCOUNTER — OFFICE VISIT (OUTPATIENT)
Dept: SURGERY | Facility: CLINIC | Age: 76
End: 2021-11-12
Payer: MEDICARE

## 2021-11-12 DIAGNOSIS — R93.5 ABNORMAL CT OF THE ABDOMEN: ICD-10-CM

## 2021-11-12 DIAGNOSIS — D12.1 MUCINOUS CYSTADENOMA OF APPENDIX: Primary | ICD-10-CM

## 2021-11-12 PROCEDURE — 99207 PR NO CHARGE LOS: CPT | Performed by: SURGERY

## 2021-11-12 NOTE — LETTER
11/12/2021         RE: Rod Olson  [address]  [place] [address]        Dear Colleague,    Thank you for referring your patient, oRd Olson, to the [facility]. Please see a copy of my visit note below.    Spoke briefly with patient.  We called up his CT scan.  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  Given its size and complexity, I recommend removal at a tertiary care facility such as the [facility].  I placed a referral to the surgical oncologist at the [facility] and he will follow up with them.  He is welcome to call this clinic at any time if he has any questions about surgery or his appointment downtown.    Octavio Patterson MD       Again, thank you for allowing me to participate in the care of your patient.        Sincerely,        Octavio Patterson MD

## 2021-11-13 NOTE — PROGRESS NOTES
Spoke briefly with patient.  We called up his CT scan.  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  Given its size and complexity, I recommend removal at a tertiary care facility such as the [facility].  I placed a referral to the surgical oncologist at the [facility] and he will follow up with them.  He is welcome to call this clinic at any time if he has any questions about surgery or his appointment downtown.    Octavio Patterson MD

## 2021-11-16 ENCOUNTER — PATIENT OUTREACH (OUTPATIENT)
Dept: SURGERY | Facility: CLINIC | Age: 76
End: 2021-11-16
Payer: MEDICARE

## 2021-11-17 NOTE — PROGRESS NOTES
New Patient Oncology Nurse Navigator Note     Referring provider: Dr. Patterson     Referring Clinic/Organization: [facility]     Referred to: Surgical Oncology - Soft Tissue      Requested provider (if applicable): Dr. Peng Donald    Referral Received: 11/16/21       Evaluation for : Appendix mass      Clinical History (per Nurse review of records provided):      See book marked documents:       Referring MD office note    CT scan      No past medical history on file.    No past surgical history on file.    Current Outpatient Medications   Medication Sig Dispense Refill     lisinopril (ZESTRIL) 40 MG tablet Take 1 tablet (40 mg) by mouth daily 90 tablet 3           Allergies   Allergen Reactions     Clindamycin Other (See Comments)     PN: psychotic          Clinical Assessment / Barriers to Care (Per Nurse):    None at this time.     Records Location:     [facility]     Records Needed:     NONE AT THIS TIME      Additional testing needed prior to consult:     NONE AT THIS TIME    Referral updates and Plan:       Consult with Surgical Oncology       aCrol Womack, RN, BSN   Surgical Oncology New Patient Nurse Navigator  [facility]  [phone]

## 2021-11-18 NOTE — TELEPHONE ENCOUNTER
RECORDS STATUS - ALL OTHER DIAGNOSIS      RECORDS RECEIVED FROM: Epic, [facility]   DATE RECEIVED: 11/18/21   NOTES STATUS DETAILS   OFFICE NOTE from referring provider Tomás 11/12/21: Dr. Octavio Patterson   OFFICE NOTE from medical oncologist     DISCHARGE SUMMARY from hospital     DISCHARGE REPORT from the ER Epic, [facility]- [facility] 10/18/21: [facility]  10/18/21: [facility]   OPERATIVE REPORT     MEDICATION LIST [facility]    CLINICAL TRIAL TREATMENTS TO DATE N/A    LABS     PATHOLOGY REPORTS     ANYTHING RELATED TO DIAGNOSIS Epic Most recent lab 11/08/21   GENONOMIC TESTING     TYPE:     IMAGING (NEED IMAGES & REPORT)     CT SCANS PACS 10/19/21: CT Head  10/19/21: CT Abd   MRI     MAMMO     ULTRASOUND PACS 10/18/21: US Testicular   PET

## 2021-11-23 ENCOUNTER — PRE VISIT (OUTPATIENT)
Dept: ONCOLOGY | Facility: CLINIC | Age: 76
End: 2021-11-23
Payer: MEDICARE

## 2021-11-23 ENCOUNTER — ONCOLOGY VISIT (OUTPATIENT)
Dept: ONCOLOGY | Facility: CLINIC | Age: 76
End: 2021-11-23
Attending: SURGERY
Payer: MEDICARE

## 2021-11-23 VITALS
DIASTOLIC BLOOD PRESSURE: 102 MMHG | TEMPERATURE: 97.7 F | OXYGEN SATURATION: 98 % | HEART RATE: 79 BPM | BODY MASS INDEX: 34.93 KG/M2 | HEIGHT: 71 IN | SYSTOLIC BLOOD PRESSURE: 182 MMHG | RESPIRATION RATE: 16 BRPM | WEIGHT: 249.5 LBS

## 2021-11-23 DIAGNOSIS — N43.3 HYDROCELE: ICD-10-CM

## 2021-11-23 DIAGNOSIS — D12.1 MUCINOUS CYSTADENOMA OF APPENDIX: ICD-10-CM

## 2021-11-23 PROCEDURE — 99203 OFFICE O/P NEW LOW 30 MIN: CPT | Performed by: SURGERY

## 2021-11-23 PROCEDURE — G0463 HOSPITAL OUTPT CLINIC VISIT: HCPCS

## 2021-11-23 ASSESSMENT — MIFFLIN-ST. JEOR: SCORE: 1878.59

## 2021-11-23 ASSESSMENT — PAIN SCALES - GENERAL: PAINLEVEL: MODERATE PAIN (4)

## 2021-11-23 NOTE — NURSING NOTE
"Oncology Rooming Note    November 23, 2021 8:46 AM   Rod Olson is a 76 year old male who presents for:    Chief Complaint   Patient presents with     Oncology Clinic Visit     MUCINOUS CYSTADENOMA OF APPENDIX     Initial Vitals: BP (!) 182/102 (BP Location: Right arm, Patient Position: Sitting, Cuff Size: Adult Large)   Pulse 79   Temp 97.7  F (36.5  C) (Oral)   Resp 16   Ht 1.803 m (5' 10.98\")   Wt 113.2 kg (249 lb 8 oz)   SpO2 98%   BMI 34.81 kg/m   Estimated body mass index is 34.81 kg/m  as calculated from the following:    Height as of this encounter: 1.803 m (5' 10.98\").    Weight as of this encounter: 113.2 kg (249 lb 8 oz). Body surface area is 2.38 meters squared.  Moderate Pain (4) Comment: Data Unavailable   No LMP for male patient.  Allergies reviewed: Yes  Medications reviewed: Yes    Medications: Medication refills not needed today.  Pharmacy name entered into [facility]:    [id number] - [place], [place] - [address] AT [id number] - [place], [place] - [address] AT [address]    Clinical concerns: None.        [facility]            "

## 2021-11-23 NOTE — PROGRESS NOTES
HISTORY OF PRESENT ILLNESS:  Rod Olson is a 76-year-old man I was asked to see at the request of Dr. Octavio Patterson for evaluation of a cystic mass in the right lower quadrant.      The patient states that he has had right lower quadrant pain for about 8 months.  This has been associated with enlargement of his right scrotum.  He underwent a CT scan of the abdomen and pelvis on 10/19/2021 for this pain that showed 2 large fluid collections in his right hemipelvis 1 was 8.9 cm and the other was 17 cm.  The appendix was not visualized.  Because of his testicular swelling, he had an ultrasound, which demonstrated a right hydrocele.  He has not had any prior abdominal surgery.  He has not had any nausea or vomiting or weight loss.  His bowel habits have been regular.    PAST MEDICAL HISTORY:  Hypertension.  He has no known cardiac disease.  His last colonoscopy was more than 5 years ago.    PHYSICAL EXAMINATION:  He is a well-appearing man in no apparent distress.  He does have a palpable mass in the right lower quadrant.  This is tender.    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    PLAN:  I have recommended Urology consultation to evaluate his presumed hydrocele.  I have also recommended preoperative colonoscopy and finally I have recommended an exploratory laparotomy, appendectomy and removal of any other abnormalities.  If urology concurs, we will do this with a combined right hydrocelectomy.     He understands and wishes to proceed.  We will schedule this procedure in the next few weeks and to give us enough time to have Urology see him and to get a colonoscopy.    Total time 40 minutes, which included in-person visit, review of his imaging and coordinating his care.    cc:    Octavio Patterson MD  [facility]  [address] [place] [address]

## 2021-11-23 NOTE — PATIENT INSTRUCTIONS
Surgical Oncology RN Care Coordination Note:     - Referral to Urology     - Pre operative colonoscopy - our office will contact you to schedule.     - we will work to schedule surgery ASAP after the additional work up and the consult with urology.     - Due to the current Covid 19 situation our options for surgery dates are limited. At this time our surgery schedulers will schedule surgery for the first available date and call you with the procedure date and information. If the date and time does not work for you they can work to schedule you for the next available date. This new date will likely be weeks later. These limitations are due to hospital bed availability, hospital staffing and availability in the operating room.     It may take up to 1 week for our  to contact you to finalized pre surgery appointments, surgery date and post op visit. If you do not hear from the schedule after 1 week please contact our office.     We appreciate your understanding and patience during these difficult times.     Carol Womack, RN, BSN  Care Coordinator

## 2021-11-23 NOTE — LETTER
11/23/2021         RE: Rod Olson  [address]  [place] [address]        Dear Colleague,    Thank you for referring your patient, Rod Olson, to the [facility]. Please see a copy of my visit note below.    HISTORY OF PRESENT ILLNESS:  Rod Olson is a 76-year-old man I was asked to see at the request of Dr. Octavio Patterson for evaluation of a cystic mass in the right lower quadrant.      The patient states that he has had right lower quadrant pain for about 8 months.  This has been associated with enlargement of his right scrotum.  He underwent a CT scan of the abdomen and pelvis on 10/19/2021 for this pain that showed 2 large fluid collections in his right hemipelvis 1 was 8.9 cm and the other was 17 cm.  The appendix was not visualized.  Because of his testicular swelling, he had an ultrasound, which demonstrated a right hydrocele.  He has not had any prior abdominal surgery.  He has not had any nausea or vomiting or weight loss.  His bowel habits have been regular.    PAST MEDICAL HISTORY:  Hypertension.  He has no known cardiac disease.  His last colonoscopy was more than 5 years ago.    PHYSICAL EXAMINATION:  He is a well-appearing man in no apparent distress.  He does have a palpable mass in the right lower quadrant.  This is tender.    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    PLAN:  I have recommended Urology consultation to evaluate his presumed hydrocele.  I have also recommended preoperative colonoscopy and finally I have recommended an exploratory laparotomy, appendectomy and removal of any other abnormalities.  If urology concurs, we will do this with a combined right hydrocelectomy.     He understands and wishes to proceed.  We will schedule this procedure in the next few weeks and to give us enough time to have Urology see him and to get a colonoscopy.    Total time 40 minutes, which included in-person  visit, review of his imaging and coordinating his care.    cc:    Octavio Patterson MD  [facility]  [address] [place] [address]       Peng Donald MD

## 2021-11-24 ENCOUNTER — TELEPHONE (OUTPATIENT)
Dept: GASTROENTEROLOGY | Facility: CLINIC | Age: 76
End: 2021-11-24
Payer: MEDICARE

## 2021-11-24 DIAGNOSIS — K59.00 CONSTIPATION, UNSPECIFIED CONSTIPATION TYPE: Primary | ICD-10-CM

## 2021-11-24 RX ORDER — BISACODYL 5 MG
TABLET, DELAYED RELEASE (ENTERIC COATED) ORAL
Qty: 2 TABLET | Refills: 0 | Status: ON HOLD | OUTPATIENT
Start: 2021-11-24 | End: 2021-12-03

## 2021-11-24 NOTE — TELEPHONE ENCOUNTER
Screening Questions  1. Are you active on mychart? no    2. What insurance is in the chart? medicare    2.  Ordering/Referring Provider: Peng Donald MD    3. BMI 34.81, If greater than 40 review exclusion criteria    4.  Respiratory Screening (If yes to any of the following Hospital setting only):     Do you use daily home oxygen? N  Do you have mod to severe Obstructive Sleep Apnea? N   Do you have Pulmonary Hypertension? N   Do you have UNCONTROLLED asthma? N    5. Have you had a heart or lung transplant (If yes, please review exclusion criteria) ? N    6. Are you currently on dialysis or have chronic kidney disease? N    7. Have you had a stroke or Transient ischemic attack (TIA) within 6 months? N    8. In the past 6 months, have you had any heart related issues including cardiomyopathy or heart attack? N                 If yes, did it require cardiac stenting or other implantable device?N      9. Do you have any implantable devices in your body (pacemaker, defib, LVAD)? N    10. Do you take nitroglycerin? If yes, how often? N    11. Are you currently taking any blood thinners?N    12. Are you a diabetic? N    13. (Females) Are you currently pregnant? N  If yes, how many weeks?      15. Are you taking any prescription pain medications on a routine schedule? N If yes, MAC sedation.    16. Do you have any chemical dependencies such as alcohol, street drugs, or methadone? N If yes, MAC sedation.    17. Do you have any history of post-traumatic stress syndrome, severe anxiety or history of psychosis? N    18. Do you transfer independently? Y    19.  Do you have any issues with constipation? Y    20. Preferred Pharmacy for Pre Prescription [id number] - [place], [place] - [address] [place] [address] RD [address] AT [facility]    Scheduling Details    Which Colonoscopy Prep was Sent?: extended  Procedure Scheduled: colon  Surgeon: carmela  Date of Procedure: 12/03 920am  Location: [facility]  Caller  (Please ask for phone number if not scheduled by patient):   SAVANA BHATT, JOSÉ MIGUEL-  [phone] [daughter will communicate to patient. Pt is [facility]]    Sedation Type: cs  Conscious Sedation- Needs  for 6 hours after the procedure  MAC/General-Needs  for 24 hours after procedure    Pre-op Required at [facility] [place], [place] and OR for MAC sedation:   (if yes advise patient they will need a pre-op prior to procedure)      Is patient on blood thinners? -n (If yes- inform patient to follow up with PCP or provider for follow up instructions)     Informed patient they will need an adult  y  Cannot take any type of public or medical transportation alone    Pre-Procedure Covid test to be completed at [facility] or Externally: josias 11/30 200pm    Confirmed Nurse will call to complete assessment y    Additional comments:       [facility]ita [facility] Endoscopy Scheduling Pool  Hello,     Can someone please assist me in assisting schedule an URGENT colonoscopy for this patient?     Have a great week!     Margarita RANGEL, Clinic Coordinator   [facility]   [phone] Option 5 Option 2             Previous Messages       ----- Message -----   From: Carol Womack, RN   Sent: 11/23/2021   9:15 AM CST   To: Carol Womack RN, [facility]   Subject: URGENT CHECK OUT ORDERS                           Hey team - please see check out notes on this patient. We need colonoscopy next week and ideally urology by next week if possible so we can plan for surgery.     Thanks!  KEMAL

## 2021-11-24 NOTE — TELEPHONE ENCOUNTER
Attempted to contact patient's daughter, Eowyn regarding upcoming colonoscopy procedure on 12.3.2021 for pre assessment questions. No answer.     Left message to return call to [phone] #2    Covid test scheduled: 11.30.2021    Arrival time: 0915    Facility location: [facility]    Sedation type: CS    Indication for procedure: screening    Bowel prep recommendation: extended prep d/t constipation    Extended prep script sent to [id number] - [place], [place] - [address] AT [facility]. Prep instructions sent via Harriet Luna RN

## 2021-11-25 DIAGNOSIS — Z11.59 ENCOUNTER FOR SCREENING FOR OTHER VIRAL DISEASES: ICD-10-CM

## 2021-11-29 ENCOUNTER — LAB (OUTPATIENT)
Dept: URGENT CARE | Facility: URGENT CARE | Age: 76
End: 2021-11-29
Attending: INTERNAL MEDICINE
Payer: MEDICARE

## 2021-11-29 DIAGNOSIS — Z11.59 ENCOUNTER FOR SCREENING FOR OTHER VIRAL DISEASES: ICD-10-CM

## 2021-11-29 PROCEDURE — U0003 INFECTIOUS AGENT DETECTION BY NUCLEIC ACID (DNA OR RNA); SEVERE ACUTE RESPIRATORY SYNDROME CORONAVIRUS 2 (SARS-COV-2) (CORONAVIRUS DISEASE [COVID-19]), AMPLIFIED PROBE TECHNIQUE, MAKING USE OF HIGH THROUGHPUT TECHNOLOGIES AS DESCRIBED BY CMS-2020-01-R: HCPCS

## 2021-11-29 PROCEDURE — U0005 INFEC AGEN DETEC AMPLI PROBE: HCPCS

## 2021-11-30 LAB — SARS-COV-2 RNA RESP QL NAA+PROBE: NEGATIVE

## 2021-11-30 NOTE — TELEPHONE ENCOUNTER
Pre assessment questions completed for upcoming colonoscopy procedure scheduled on 12/3/21 with daughter.     COVID test scheduled 11/29/21- in process    Reviewed procedural arrival time 0915 and facility location [facility].    Designated  policy reviewed. Instructed to have someone stay 6 hours post procedure.     Bowel prep recommendation: Extended prep.    Reviewed Extended prep instructions with patient's daughter.. No fiber/iron supplements or foods that contain nuts/seeds 7 days prior to procedure.     Anticoagulation/blood thinners? no    Electronic implanted devices? no    Patient's daughter verbalized understanding and had no questions or concerns at this time.    Angelica Castro RN

## 2021-12-01 ENCOUNTER — TELEPHONE (OUTPATIENT)
Dept: ONCOLOGY | Facility: CLINIC | Age: 76
End: 2021-12-01
Payer: MEDICARE

## 2021-12-01 DIAGNOSIS — Z11.59 ENCOUNTER FOR SCREENING FOR OTHER VIRAL DISEASES: ICD-10-CM

## 2021-12-01 NOTE — TELEPHONE ENCOUNTER
FUTURE VISIT INFORMATION      SURGERY INFORMATION:    H&P - DOS 12/7 - Dr. Donald    Consult: ov 11/23    RECORDS REQUESTED FROM:       Primary Care Provider: Rober Hankins MD- [facility]    Pertinent Medical History: hypertension    Most recent EKG+ Tracing: 10/18/21    Most recent Cardiac Stress Test: 9/2/10- [facility]

## 2021-12-01 NOTE — TELEPHONE ENCOUNTER
Contacted the patient to confirm the scheduled dates and provide the following information:     Surgeon/surgery date/location:  Dr. Donald on 12/7 at [facility].  Arrival:   6:30 AM   Pre-op physical with:   PAC on 12/6.  COVID-19 test:   12/4.  Post-op:   12/27.    The surgery packet was provided via [employer].

## 2021-12-02 ENCOUNTER — PRE VISIT (OUTPATIENT)
Dept: UROLOGY | Facility: CLINIC | Age: 76
End: 2021-12-02

## 2021-12-02 ENCOUNTER — VIRTUAL VISIT (OUTPATIENT)
Dept: UROLOGY | Facility: CLINIC | Age: 76
End: 2021-12-02
Payer: MEDICARE

## 2021-12-02 VITALS — BODY MASS INDEX: 32.64 KG/M2 | HEIGHT: 72 IN | WEIGHT: 241 LBS

## 2021-12-02 DIAGNOSIS — N43.2 OTHER HYDROCELE: Primary | ICD-10-CM

## 2021-12-02 PROCEDURE — 99203 OFFICE O/P NEW LOW 30 MIN: CPT | Mod: 95 | Performed by: UROLOGY

## 2021-12-02 ASSESSMENT — MIFFLIN-ST. JEOR: SCORE: 1856.17

## 2021-12-02 ASSESSMENT — PAIN SCALES - GENERAL: PAINLEVEL: MILD PAIN (2)

## 2021-12-02 NOTE — PROGRESS NOTES
Rod is a 76 year old who is being evaluated via a billable video visit.      Video Visit Technology for this patient: Kyrie not working, patient has smart device, please try Prospex Medicality Video with patient    How would you like to obtain your AVS? Mail a copy  Send MetaCertity video to: [phone]  Will anyone else be joining your video visit? No      Video Start Time: 11:55 AM  Video-Visit Details    Type of service:  Video Visit    Video End Time:12:53    Originating Location (pt. Location): Home    Distant Location (provider location):  [facility] [place]     Platform used for Video Visit: Metconnex.      HPI:  Rod Olson is a 76 year old male being seen for right hydrocele.  He also has a right lower quadrant mass which is being extirpated 12/7/21 by Dr. Donald.  Dr. Donald was hoping urology could come address his hydrocele at the time of the abdominal surgery, but that day is urology dept residency interviews so no staff are available for a joint procedure.    Exam:  General- Alert, oriented, nad.  Pleasant and conversant.  Eyes- anicteric, EOMI.  Resps- normal, non-labored.  No cough  Abdomen-  nondistended.   exam- deferred.   Neurological - no tremors  Skin - no discoloration/ lesions noted  Psychiatric - no anxiety, alert & oriented.      The rest of a comprehensive physical examination is deferred due to video visit restrictions.  Review of Imaging:  The following imaging exams were independently viewed and interpreted by me and discussed with patient:  I reviewed the scrotal ultrasound 10/18/21 - shows a simple right hydrocele.    Review of Labs:  The following labs were reviewed by me and discussed with the patient:  Recent Results (from the past 720 hour(s))   Asymptomatic COVID-19 Virus (Coronavirus) by PCR Nose    Collection Time: 11/29/21  2:21 PM    Specimen: Nose; Swab   Result Value Ref Range    SARS CoV2 PCR Negative Negative, Testing sent to reference lab.  Results will be returned via unsolicited result     Assessment & Plan   a. Right hydrocele.  Incidental finding/ may be reactive to the intra-abdominal mass.  b. Return to urology clinic Jan 2022 for aspiration and sclerosis of right hydrocele, as scheduled.  Discussed this has a ~80% fix rate for hydroceles, and we should be able to avoid an OR procedure for him.  2. Abdomen mass  a. surg next week with surgical oncology    Glenna Fung MD  [facility] [place]      ==========================      Additional Coding Information:    Problems:  3 -- one acute uncomplicated illness or injury    Data Reviewed  Review of the result(s) of each unique test - scrotal US    Tests ordered: N/A       Level of risk:  3 -- low risk (e.g., OTC medication or observation, minor surgery without risks)    Time spent:  20 minutes spent on the date of the encounter doing chart review, history and exam, documentation and further activities per the note. This includes the video call time.      Glenna PORTILLO

## 2021-12-02 NOTE — NURSING NOTE
Chief Complaint   Patient presents with     Consult     discuss surgery       Height 1.829 m (6'), weight 109.3 kg (241 lb). Body mass index is 32.69 kg/m .    Patient Active Problem List   Diagnosis     Elevated PSA     Chronic insomnia     Chronic cough     Allergic rhinitis     HTN (hypertension)     Hyperlipidemia     Fatigue       Allergies   Allergen Reactions     Clindamycin Other (See Comments)     PN: psychotic       Current Outpatient Medications   Medication Sig Dispense Refill     bisacodyl (DULCOLAX) 5 MG EC tablet Take 2 tablets by mouth at 10am the day before procedure. 2 tablet 0     lisinopril (ZESTRIL) 40 MG tablet Take 1 tablet (40 mg) by mouth daily 90 tablet 3     magnesium citrate solution Drink entire bottle at 4pm two days prior to procedure 296 mL 0     polyethylene glycol (GOLYTELY) 236 g suspension Take as directed. At 3:00pm drink one 8oz glass every 10-15 minutes until half of the first container is empty. Store the remainder in the refrigerator. At 8:00pm drink the second half of the first container until it is gone. Before you go to bed mix the second container with water and put in refrigerator. Six hours before your check in time drink one 8oz glass every 10-15 minutes until half of container is empty. Discard the remainder of solution. e. 8000 mL 0       Social History     Tobacco Use     Smoking status: Former Smoker     Years: 3.00     Types: Cigars, Cigarettes     Smokeless tobacco: Never Used   Vaping Use     Vaping Use: Never used   Substance Use Topics     Alcohol use: Not Currently     Drug use: Never       Lane Juarez EMT  12/2/2021  11:45 AM

## 2021-12-02 NOTE — LETTER
12/2/2021       RE: Rod Olson  [address]  [place] [address]     Dear Colleague,    Thank you for referring your patient, Rod Olson, to the [facility] [place] at [facility]. Please see a copy of my visit note below.    Rod is a 76 year old who is being evaluated via a billable video visit.      Video Visit Technology for this patient: AmWell not working, patient has smart device, please try Kiko Video with patient    How would you like to obtain your AVS? Mail a copy  Send Goblinworks video to: [phone]  Will anyone else be joining your video visit? No      Video Start Time: 11:55 AM  Video-Visit Details    Type of service:  Video Visit    Video End Time:12:53    Originating Location (pt. Location): Home    Distant Location (provider location):  [facility] [place]     Platform used for Video Visit: Goblinworks.      HPI:  Rod Olson is a 76 year old male being seen for right hydrocele.  He also has a right lower quadrant mass which is being extirpated 12/7/21 by Dr. Donald.  Dr. Donald was hoping urology could come address his hydrocele at the time of the abdominal surgery, but that day is urology dept residency interviews so no staff are available for a joint procedure.    Exam:  General- Alert, oriented, nad.  Pleasant and conversant.  Eyes- anicteric, EOMI.  Resps- normal, non-labored.  No cough  Abdomen-  nondistended.   exam- deferred.   Neurological - no tremors  Skin - no discoloration/ lesions noted  Psychiatric - no anxiety, alert & oriented.      The rest of a comprehensive physical examination is deferred due to video visit restrictions.  Review of Imaging:  The following imaging exams were independently viewed and interpreted by me and discussed with patient:  I reviewed the scrotal ultrasound 10/18/21 - shows a simple right hydrocele.    Review of Labs:  The  following labs were reviewed by me and discussed with the patient:  Recent Results (from the past 720 hour(s))   Asymptomatic COVID-19 Virus (Coronavirus) by PCR Nose    Collection Time: 11/29/21  2:21 PM    Specimen: Nose; Swab   Result Value Ref Range    SARS CoV2 PCR Negative Negative, Testing sent to reference lab. Results will be returned via unsolicited result     Assessment & Plan   a. Right hydrocele.  Incidental finding/ may be reactive to the intra-abdominal mass.  b. Return to urology clinic Jan 2022 for aspiration and sclerosis of right hydrocele, as scheduled.  Discussed this has a ~80% fix rate for hydroceles, and we should be able to avoid an OR procedure for him.  2. Abdomen mass  a. surg next week with surgical oncology    Glenna Fung MD  [facility] [place]      ==========================      Additional Coding Information:    Problems:  3 -- one acute uncomplicated illness or injury    Data Reviewed  Review of the result(s) of each unique test - scrotal US    Tests ordered: N/A       Level of risk:  3 -- low risk (e.g., OTC medication or observation, minor surgery without risks)    Time spent:  20 minutes spent on the date of the encounter doing chart review, history and exam, documentation and further activities per the note. This includes the video call time.      Glenna PORTILLO

## 2021-12-02 NOTE — PATIENT INSTRUCTIONS
Please follow up in January 2022 in clinic for right hydrocele aspiration and sclerosis.    It was a pleasure meeting with you today.  Thank you for allowing me and my team the privilege of caring for you today.  YOU are the reason we are here, and I truly hope we provided you with the excellent service you deserve.  Please let us know if there is anything else we can do for you so that we can be sure you are leaving completely satisfied with your care experience.

## 2021-12-02 NOTE — TELEPHONE ENCOUNTER
MEDICAL RECORDS REQUEST   [facility]  Urology Clinic  [address] [place] [address]  PHONE: [phone]  Fax: [phone]        FUTURE VISIT INFORMATION                                                   Rod Olson, : 1945 scheduled for future visit at [facility]    APPOINTMENT INFORMATION:    Date: 21    Provider:  [place]    Reason for Visit/Diagnosis: discuss surgery, Hydrocele    REFERRAL INFORMATION:    Referring provider:  Dr. Peng Donald    Specialty: Surgery    Referring providers clinic:  [facility] contact number:  NA    RECORDS REQUESTED FOR VISIT                                                     NOTES  STATUS/DETAILS   OFFICE NOTE from referring provider  yes   OFFICE NOTE from other specialist  no   DISCHARGE SUMMARY from hospital  no   DISCHARGE REPORT from the ER  no   OPERATIVE REPORT  no   MEDICATION LIST  yes   LABS     URINALYSIS (UA)  yes   URINE CYTOLOGY  no     PRE-VISIT CHECKLIST      Record collection complete Yes   Appointment appropriately scheduled           (right time/right provider) Yes   Joint diagnostic appointment coordinated correctly          (ensure right order & amount of time) Yes   MyChart activation NA   Questionnaire complete NA

## 2021-12-03 ENCOUNTER — LAB (OUTPATIENT)
Dept: LAB | Facility: CLINIC | Age: 76
End: 2021-12-03
Attending: SURGERY

## 2021-12-03 ENCOUNTER — HOSPITAL ENCOUNTER (OUTPATIENT)
Facility: CLINIC | Age: 76
Discharge: HOME | End: 2021-12-03
Attending: INTERNAL MEDICINE | Admitting: INTERNAL MEDICINE
Payer: MEDICARE

## 2021-12-03 VITALS
TEMPERATURE: 98.6 F | SYSTOLIC BLOOD PRESSURE: 132 MMHG | BODY MASS INDEX: 33.77 KG/M2 | HEART RATE: 70 BPM | DIASTOLIC BLOOD PRESSURE: 89 MMHG | HEIGHT: 72 IN | WEIGHT: 249.34 LBS | RESPIRATION RATE: 14 BRPM | OXYGEN SATURATION: 96 %

## 2021-12-03 DIAGNOSIS — R93.3 ABNORMAL FINDING ON GI TRACT IMAGING: Primary | ICD-10-CM

## 2021-12-03 DIAGNOSIS — Z11.59 ENCOUNTER FOR SCREENING FOR OTHER VIRAL DISEASES: ICD-10-CM

## 2021-12-03 LAB — COLONOSCOPY: NORMAL

## 2021-12-03 PROCEDURE — 99153 MOD SED SAME PHYS/QHP EA: CPT | Performed by: INTERNAL MEDICINE

## 2021-12-03 PROCEDURE — U0005 INFEC AGEN DETEC AMPLI PROBE: HCPCS | Performed by: SURGERY

## 2021-12-03 PROCEDURE — 250N000013 HC RX MED GY IP 250 OP 250 PS 637: Performed by: INTERNAL MEDICINE

## 2021-12-03 PROCEDURE — 250N000011 HC RX IP 250 OP 636: Performed by: INTERNAL MEDICINE

## 2021-12-03 PROCEDURE — G0500 MOD SEDAT ENDO SERVICE >5YRS: HCPCS | Performed by: INTERNAL MEDICINE

## 2021-12-03 PROCEDURE — 999N000099 HC STATISTIC MODERATE SEDATION < 10 MIN: Performed by: INTERNAL MEDICINE

## 2021-12-03 PROCEDURE — 45378 DIAGNOSTIC COLONOSCOPY: CPT | Performed by: INTERNAL MEDICINE

## 2021-12-03 RX ORDER — ONDANSETRON 4 MG/1
4 TABLET, ORALLY DISINTEGRATING ORAL EVERY 6 HOURS PRN
Status: CANCELLED | OUTPATIENT
Start: 2021-12-03

## 2021-12-03 RX ORDER — NALOXONE HYDROCHLORIDE 0.4 MG/ML
0.4 INJECTION, SOLUTION INTRAMUSCULAR; INTRAVENOUS; SUBCUTANEOUS
Status: CANCELLED | OUTPATIENT
Start: 2021-12-03

## 2021-12-03 RX ORDER — FENTANYL CITRATE 50 UG/ML
INJECTION, SOLUTION INTRAMUSCULAR; INTRAVENOUS PRN
Status: COMPLETED | OUTPATIENT
Start: 2021-12-03 | End: 2021-12-03

## 2021-12-03 RX ORDER — FLUMAZENIL 0.1 MG/ML
0.2 INJECTION, SOLUTION INTRAVENOUS
Status: CANCELLED | OUTPATIENT
Start: 2021-12-03 | End: 2021-12-03

## 2021-12-03 RX ORDER — SIMETHICONE 40MG/0.6ML
SUSPENSION, DROPS(FINAL DOSAGE FORM)(ML) ORAL PRN
Status: COMPLETED | OUTPATIENT
Start: 2021-12-03 | End: 2021-12-03

## 2021-12-03 RX ORDER — NALOXONE HYDROCHLORIDE 0.4 MG/ML
0.2 INJECTION, SOLUTION INTRAMUSCULAR; INTRAVENOUS; SUBCUTANEOUS
Status: CANCELLED | OUTPATIENT
Start: 2021-12-03

## 2021-12-03 RX ORDER — PROCHLORPERAZINE MALEATE 5 MG
5 TABLET ORAL EVERY 6 HOURS PRN
Status: CANCELLED | OUTPATIENT
Start: 2021-12-03

## 2021-12-03 RX ORDER — ONDANSETRON 2 MG/ML
4 INJECTION INTRAMUSCULAR; INTRAVENOUS EVERY 6 HOURS PRN
Status: CANCELLED | OUTPATIENT
Start: 2021-12-03

## 2021-12-03 RX ADMIN — FENTANYL CITRATE 25 MCG: 50 INJECTION, SOLUTION INTRAMUSCULAR; INTRAVENOUS at 10:39

## 2021-12-03 RX ADMIN — SIMETHICONE 133 MG: 20 SUSPENSION/ DROPS ORAL at 09:59

## 2021-12-03 RX ADMIN — MIDAZOLAM 1 MG: 1 INJECTION INTRAMUSCULAR; INTRAVENOUS at 10:48

## 2021-12-03 RX ADMIN — FENTANYL CITRATE 50 MCG: 50 INJECTION, SOLUTION INTRAMUSCULAR; INTRAVENOUS at 10:55

## 2021-12-03 RX ADMIN — MIDAZOLAM 2 MG: 1 INJECTION INTRAMUSCULAR; INTRAVENOUS at 10:33

## 2021-12-03 RX ADMIN — MIDAZOLAM 1 MG: 1 INJECTION INTRAMUSCULAR; INTRAVENOUS at 10:39

## 2021-12-03 RX ADMIN — FENTANYL CITRATE 100 MCG: 50 INJECTION, SOLUTION INTRAMUSCULAR; INTRAVENOUS at 10:33

## 2021-12-03 ASSESSMENT — MIFFLIN-ST. JEOR
SCORE: 1894
SCORE: 1856.17

## 2021-12-03 NOTE — H&P
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 No pertinent surgical history.    Past Medical History: No past medical history on file.    Social History:   Social History     Tobacco Use     Smoking status: Former Smoker     Years: 3.00     Types: Cigars, Cigarettes     Smokeless tobacco: Never Used   Substance Use Topics     Alcohol use: Not Currently       Family History:   Family History   Problem Relation Age of Onset     Cataracts Mother      Hypertension Father      Prostate Cancer Brother      Lung Cancer Brother      Myocardial Infarction Brother        Allergies:   Allergies   Allergen Reactions     Clindamycin Other (See Comments)     PN: psychotic       Active Medications:   No current outpatient medications on file.       Systemic Review:   CONSTITUTIONAL: NEGATIVE for fever, chills, change in weight  ENT/MOUTH: NEGATIVE for ear, mouth and throat problems  RESP: NEGATIVE for significant cough or SOB  CV: NEGATIVE for chest pain, palpitations or peripheral edema    Physical Examination:   Vital Signs: BP (!) 159/113   Temp 98.6  F (37  C) (Oral)   Resp 12   Ht 1.829 m (6')   Wt 113.1 kg (249 lb 5.4 oz)   SpO2 99%   BMI 33.82 kg/m    GENERAL: healthy, alert and no distress  NECK: no adenopathy, no asymmetry, masses, or scars  RESP: lungs clear to auscultation - no rales, rhonchi or wheezes  CV: regular rate and rhythm, normal S1 S2, no S3 or S4, no murmur, click or rub, no peripheral edema and peripheral pulses strong  ABDOMEN: soft, nontender, no hepatosplenomegaly, no masses and bowel sounds normal  MS: no gross musculoskeletal defects noted, no edema    Plan: Appropriate to proceed as  scheduled.      Harley Garcia MD  12/3/2021    PCP:  [place]

## 2021-12-03 NOTE — OR NURSING
Procedure: Colonoscopy without intervention  Sedation: Conscious sedation   Specimens: NA  O2: 2L NC    Patient tolerated procedure well. Patient stable on transfer to .

## 2021-12-04 LAB — SARS-COV-2 RNA RESP QL NAA+PROBE: NEGATIVE

## 2021-12-06 ENCOUNTER — LAB (OUTPATIENT)
Dept: LAB | Facility: CLINIC | Age: 76
End: 2021-12-06
Payer: MEDICARE

## 2021-12-06 ENCOUNTER — OFFICE VISIT (OUTPATIENT)
Dept: SURGERY | Facility: CLINIC | Age: 76
End: 2021-12-06
Payer: MEDICARE

## 2021-12-06 ENCOUNTER — PRE VISIT (OUTPATIENT)
Dept: SURGERY | Facility: CLINIC | Age: 76
End: 2021-12-06

## 2021-12-06 ENCOUNTER — ANESTHESIA EVENT (OUTPATIENT)
Dept: SURGERY | Facility: CLINIC | Age: 76
DRG: 330 | End: 2021-12-06
Payer: MEDICARE

## 2021-12-06 ENCOUNTER — MYC MEDICAL ADVICE (OUTPATIENT)
Dept: SURGERY | Facility: CLINIC | Age: 76
End: 2021-12-06

## 2021-12-06 VITALS
HEIGHT: 72 IN | BODY MASS INDEX: 33.72 KG/M2 | WEIGHT: 249 LBS | TEMPERATURE: 97.5 F | HEART RATE: 76 BPM | OXYGEN SATURATION: 98 % | SYSTOLIC BLOOD PRESSURE: 173 MMHG | RESPIRATION RATE: 20 BRPM | DIASTOLIC BLOOD PRESSURE: 100 MMHG

## 2021-12-06 DIAGNOSIS — D12.1 MUCINOUS CYSTADENOMA OF APPENDIX: ICD-10-CM

## 2021-12-06 DIAGNOSIS — Z01.818 PRE-OP EVALUATION: Primary | ICD-10-CM

## 2021-12-06 DIAGNOSIS — I10 PRIMARY HYPERTENSION: ICD-10-CM

## 2021-12-06 LAB
ABO/RH(D): NORMAL
ALBUMIN SERPL-MCNC: 3.8 G/DL (ref 3.4–5)
ALP SERPL-CCNC: 58 U/L (ref 40–150)
ALT SERPL W P-5'-P-CCNC: 30 U/L (ref 0–70)
ANION GAP SERPL CALCULATED.3IONS-SCNC: 6 MMOL/L (ref 3–14)
ANTIBODY SCREEN: NEGATIVE
AST SERPL W P-5'-P-CCNC: 25 U/L (ref 0–45)
BILIRUB SERPL-MCNC: 0.5 MG/DL (ref 0.2–1.3)
BUN SERPL-MCNC: 13 MG/DL (ref 7–30)
CALCIUM SERPL-MCNC: 9.4 MG/DL (ref 8.5–10.1)
CHLORIDE BLD-SCNC: 106 MMOL/L (ref 94–109)
CO2 SERPL-SCNC: 28 MMOL/L (ref 20–32)
CREAT SERPL-MCNC: 0.91 MG/DL (ref 0.66–1.25)
ERYTHROCYTE [DISTWIDTH] IN BLOOD BY AUTOMATED COUNT: 13.1 % (ref 10–15)
GFR SERPL CREATININE-BSD FRML MDRD: 81 ML/MIN/1.73M2
GLUCOSE BLD-MCNC: 105 MG/DL (ref 70–99)
HCT VFR BLD AUTO: 42 % (ref 40–53)
HGB BLD-MCNC: 13.9 G/DL (ref 13.3–17.7)
MCH RBC QN AUTO: 28.8 PG (ref 26.5–33)
MCHC RBC AUTO-ENTMCNC: 33.1 G/DL (ref 31.5–36.5)
MCV RBC AUTO: 87 FL (ref 78–100)
PLATELET # BLD AUTO: 257 10E3/UL (ref 150–450)
POTASSIUM BLD-SCNC: 4.5 MMOL/L (ref 3.4–5.3)
PROT SERPL-MCNC: 8 G/DL (ref 6.8–8.8)
RBC # BLD AUTO: 4.83 10E6/UL (ref 4.4–5.9)
SODIUM SERPL-SCNC: 140 MMOL/L (ref 133–144)
SPECIMEN EXPIRATION DATE: NORMAL
WBC # BLD AUTO: 8.2 10E3/UL (ref 4–11)

## 2021-12-06 PROCEDURE — 99204 OFFICE O/P NEW MOD 45 MIN: CPT | Performed by: NURSE PRACTITIONER

## 2021-12-06 PROCEDURE — 80053 COMPREHEN METABOLIC PANEL: CPT | Performed by: PATHOLOGY

## 2021-12-06 PROCEDURE — 93000 ELECTROCARDIOGRAM COMPLETE: CPT | Performed by: INTERNAL MEDICINE

## 2021-12-06 PROCEDURE — 36415 COLL VENOUS BLD VENIPUNCTURE: CPT | Performed by: PATHOLOGY

## 2021-12-06 PROCEDURE — 85027 COMPLETE CBC AUTOMATED: CPT | Performed by: PATHOLOGY

## 2021-12-06 PROCEDURE — 86900 BLOOD TYPING SEROLOGIC ABO: CPT | Performed by: NURSE PRACTITIONER

## 2021-12-06 ASSESSMENT — MIFFLIN-ST. JEOR: SCORE: 1892.46

## 2021-12-06 ASSESSMENT — LIFESTYLE VARIABLES: TOBACCO_USE: 1

## 2021-12-06 ASSESSMENT — PAIN SCALES - GENERAL: PAINLEVEL: MILD PAIN (2)
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
## 2021-12-06 NOTE — PATIENT INSTRUCTIONS
Preparing for Your Surgery      Name:  Rod Olson   MRN:  [id number]   :  1945   Today's Date:  2021       Arriving for surgery:  Surgery date:  21  Arrival time:  6AM    Restrictions due to COVID 19:       One visitor is allowed in the Pre Op area.       When you go into surgery, one visitor is allowed to wait in the Surgery Waiting Room       (provided there is enough space to social distance).         In hospital patients are allowed 1 visitor per day       The visitor may change daily     Visiting Hours: 8 am - 8:30 pm   No ill visitors.   All visitors must wear face mask.    [employer] parking is available for anyone with mobility limitations or disabilities.  ([place]  24 hours/ 7 days a week; [place]  7 am- 3:30 pm, Mon- Fri)    Please come to:     [facility] [address]  [address] [place] [address]    When entering the hospital you will be asked COVID screening questions, you will then be directed to Registration.  Registration will direct you to the 3rd floor Surgery waiting room. Please ask if you need an escort or a wheelchair to the Surgery Waiting Room.  Preop number- [phone]    What can I eat or drink?  -  You may eat and drink normally for up to 8 hours before your surgery. (Until Midnight)  -  You may have clear liquids until 2 1/2 hours before surgery. (Until 21, 6AM)    Examples of clear liquids:  Water  Clear broth  Juices (apple, white grape, white cranberry  and cider) without pulp  Noncarbonated, powder based beverages  (lemonade and Bryan-Aid)  Sodas (Sprite, 7-Up, ginger ale and seltzer)  Coffee or tea (without milk or cream)  Gatorade    -  No Alcohol for at least 24 hours before surgery     Which medicines can I take?    Hold Aspirin for 7 days before surgery.   Hold Multivitamins for 7 days before surgery.  Hold Supplements for 7 days before surgery.  Hold Ibuprofen (Advil, Motrin) for 1  day before surgery--unless otherwise directed by surgeon.  Hold Naproxen (Aleve) for 4 days before surgery.    -  DO NOT take these medications the day of surgery:    Lisinopril-you may take it the evening prior to surgery      How do I prepare myself?  - Please take 2 showers before surgery using Scrubcare or Hibiclens soap.    Use this soap only from the neck to your toes.     Leave the soap on your skin for one minute--then rinse thoroughly.      You may use your own shampoo and conditioner; no other hair products.   - Please remove all jewelry and body piercings.  - No lotions, deodorants or fragrance.  - Bring your ID and insurance card.    -If you have a Deep Brain Stimulator, Spinal Cord Stimulator or any neuro stimulator device---you must bring the remote control to the hospital     - All patients are required to have a Covid-19 test within 4 days of surgery/procedure.      -Patients will be contacted by the [facility] scheduling team within 1 week of surgery to make an appointment.      - Patients may call the Scheduling team at [phone] if they have not been scheduled within 4 days of  surgery.          Questions or Concerns:    - For any questions regarding the day of surgery or your hospital stay, please contact the Pre Admission Nursing Office at [phone].       - If you have health changes between today and your surgery please call your surgeon.       For questions after surgery please call your surgeons office.

## 2021-12-07 ENCOUNTER — HOSPITAL ENCOUNTER (INPATIENT)
Facility: CLINIC | Age: 76
LOS: 4 days | Discharge: HOME | DRG: 330 | End: 2021-12-11
Attending: SURGERY | Admitting: SURGERY
Payer: MEDICARE

## 2021-12-07 ENCOUNTER — ANESTHESIA (OUTPATIENT)
Dept: SURGERY | Facility: CLINIC | Age: 76
DRG: 330 | End: 2021-12-07
Payer: MEDICARE

## 2021-12-07 ENCOUNTER — APPOINTMENT (OUTPATIENT)
Dept: GENERAL RADIOLOGY | Facility: CLINIC | Age: 76
DRG: 330 | End: 2021-12-07
Attending: SURGERY
Payer: MEDICARE

## 2021-12-07 DIAGNOSIS — D37.3 LOW GRADE MUCINOUS NEOPLASM OF APPENDIX: Primary | ICD-10-CM

## 2021-12-07 LAB
ANION GAP SERPL CALCULATED.3IONS-SCNC: 9 MMOL/L (ref 3–14)
ATRIAL RATE - MUSE: 71 BPM
BUN SERPL-MCNC: 14 MG/DL (ref 7–30)
CALCIUM SERPL-MCNC: 8.2 MG/DL (ref 8.5–10.1)
CHLORIDE BLD-SCNC: 104 MMOL/L (ref 94–109)
CO2 SERPL-SCNC: 23 MMOL/L (ref 20–32)
CREAT SERPL-MCNC: 0.75 MG/DL (ref 0.66–1.25)
DIASTOLIC BLOOD PRESSURE - MUSE: NORMAL MMHG
ERYTHROCYTE [DISTWIDTH] IN BLOOD BY AUTOMATED COUNT: 13.4 % (ref 10–15)
GFR SERPL CREATININE-BSD FRML MDRD: 88 ML/MIN/1.73M2
GLUCOSE BLD-MCNC: 195 MG/DL (ref 70–99)
GLUCOSE BLDC GLUCOMTR-MCNC: 107 MG/DL (ref 70–99)
HCT VFR BLD AUTO: 37.1 % (ref 40–53)
HGB BLD-MCNC: 12.5 G/DL (ref 13.3–17.7)
INTERPRETATION ECG - MUSE: NORMAL
LACTATE SERPL-SCNC: 1.4 MMOL/L (ref 0.7–2)
MCH RBC QN AUTO: 29.3 PG (ref 26.5–33)
MCHC RBC AUTO-ENTMCNC: 33.7 G/DL (ref 31.5–36.5)
MCV RBC AUTO: 87 FL (ref 78–100)
P AXIS - MUSE: 28 DEGREES
PLATELET # BLD AUTO: 224 10E3/UL (ref 150–450)
POTASSIUM BLD-SCNC: 3.6 MMOL/L (ref 3.4–5.3)
PR INTERVAL - MUSE: 178 MS
QRS DURATION - MUSE: 94 MS
QT - MUSE: 418 MS
QTC - MUSE: 454 MS
R AXIS - MUSE: -19 DEGREES
RBC # BLD AUTO: 4.26 10E6/UL (ref 4.4–5.9)
SODIUM SERPL-SCNC: 136 MMOL/L (ref 133–144)
SYSTOLIC BLOOD PRESSURE - MUSE: NORMAL MMHG
T AXIS - MUSE: 16 DEGREES
VENTRICULAR RATE- MUSE: 71 BPM
WBC # BLD AUTO: 15.4 10E3/UL (ref 4–11)

## 2021-12-07 PROCEDURE — 250N000013 HC RX MED GY IP 250 OP 250 PS 637: Performed by: STUDENT IN AN ORGANIZED HEALTH CARE EDUCATION/TRAINING PROGRAM

## 2021-12-07 PROCEDURE — 82310 ASSAY OF CALCIUM: CPT | Performed by: STUDENT IN AN ORGANIZED HEALTH CARE EDUCATION/TRAINING PROGRAM

## 2021-12-07 PROCEDURE — 36415 COLL VENOUS BLD VENIPUNCTURE: CPT | Performed by: STUDENT IN AN ORGANIZED HEALTH CARE EDUCATION/TRAINING PROGRAM

## 2021-12-07 PROCEDURE — 999N000063 XR ABDOMEN PORT 1 VIEWS

## 2021-12-07 PROCEDURE — 74018 RADEX ABDOMEN 1 VIEW: CPT | Mod: 26 | Performed by: RADIOLOGY

## 2021-12-07 PROCEDURE — 83605 ASSAY OF LACTIC ACID: CPT | Performed by: SURGERY

## 2021-12-07 PROCEDURE — 250N000011 HC RX IP 250 OP 636: Performed by: SURGERY

## 2021-12-07 PROCEDURE — 250N000009 HC RX 250

## 2021-12-07 PROCEDURE — 250N000011 HC RX IP 250 OP 636

## 2021-12-07 PROCEDURE — 250N000011 HC RX IP 250 OP 636: Performed by: ANESTHESIOLOGY

## 2021-12-07 PROCEDURE — 250N000025 HC SEVOFLURANE, PER MIN: Performed by: SURGERY

## 2021-12-07 PROCEDURE — C9290 INJ, BUPIVACAINE LIPOSOME: HCPCS | Performed by: ANESTHESIOLOGY

## 2021-12-07 PROCEDURE — 258N000003 HC RX IP 258 OP 636

## 2021-12-07 PROCEDURE — 272N000001 HC OR GENERAL SUPPLY STERILE: Performed by: SURGERY

## 2021-12-07 PROCEDURE — 120N000002 HC R&B MED SURG/OB UMMC

## 2021-12-07 PROCEDURE — 250N000011 HC RX IP 250 OP 636: Performed by: STUDENT IN AN ORGANIZED HEALTH CARE EDUCATION/TRAINING PROGRAM

## 2021-12-07 PROCEDURE — 999N000141 HC STATISTIC PRE-PROCEDURE NURSING ASSESSMENT: Performed by: SURGERY

## 2021-12-07 PROCEDURE — P9041 ALBUMIN (HUMAN),5%, 50ML: HCPCS

## 2021-12-07 PROCEDURE — 710N000009 HC RECOVERY PHASE 1, LEVEL 1, PER MIN: Performed by: SURGERY

## 2021-12-07 PROCEDURE — 88305 TISSUE EXAM BY PATHOLOGIST: CPT | Mod: TC | Performed by: SURGERY

## 2021-12-07 PROCEDURE — 258N000003 HC RX IP 258 OP 636: Performed by: STUDENT IN AN ORGANIZED HEALTH CARE EDUCATION/TRAINING PROGRAM

## 2021-12-07 PROCEDURE — 370N000017 HC ANESTHESIA TECHNICAL FEE, PER MIN: Performed by: SURGERY

## 2021-12-07 PROCEDURE — 85027 COMPLETE CBC AUTOMATED: CPT | Performed by: STUDENT IN AN ORGANIZED HEALTH CARE EDUCATION/TRAINING PROGRAM

## 2021-12-07 PROCEDURE — 49204 PR EXCISION/DESTRUCTION OPEN ABDOMINAL TUMORS 5.1-10.0 CM: CPT | Mod: GC | Performed by: SURGERY

## 2021-12-07 PROCEDURE — 36415 COLL VENOUS BLD VENIPUNCTURE: CPT | Performed by: SURGERY

## 2021-12-07 PROCEDURE — 0DTF0ZZ RESECTION OF RIGHT LARGE INTESTINE, OPEN APPROACH: ICD-10-PCS | Performed by: SURGERY

## 2021-12-07 PROCEDURE — 360N000077 HC SURGERY LEVEL 4, PER MIN: Performed by: SURGERY

## 2021-12-07 RX ORDER — ONDANSETRON 2 MG/ML
4 INJECTION INTRAMUSCULAR; INTRAVENOUS EVERY 6 HOURS PRN
Status: DISCONTINUED | OUTPATIENT
Start: 2021-12-07 | End: 2021-12-11 | Stop reason: HOSPADM

## 2021-12-07 RX ORDER — BUPIVACAINE HYDROCHLORIDE 2.5 MG/ML
INJECTION, SOLUTION EPIDURAL; INFILTRATION; INTRACAUDAL
Status: COMPLETED | OUTPATIENT
Start: 2021-12-07 | End: 2021-12-07

## 2021-12-07 RX ORDER — NALOXONE HYDROCHLORIDE 0.4 MG/ML
0.2 INJECTION, SOLUTION INTRAMUSCULAR; INTRAVENOUS; SUBCUTANEOUS
Status: DISCONTINUED | OUTPATIENT
Start: 2021-12-07 | End: 2021-12-11 | Stop reason: HOSPADM

## 2021-12-07 RX ORDER — ACETAMINOPHEN 325 MG/1
650 TABLET ORAL EVERY 4 HOURS PRN
Status: DISCONTINUED | OUTPATIENT
Start: 2021-12-10 | End: 2021-12-11 | Stop reason: HOSPADM

## 2021-12-07 RX ORDER — ACETAMINOPHEN 325 MG/1
975 TABLET ORAL EVERY 8 HOURS
Status: DISPENSED | OUTPATIENT
Start: 2021-12-07 | End: 2021-12-10

## 2021-12-07 RX ORDER — ONDANSETRON 2 MG/ML
4 INJECTION INTRAMUSCULAR; INTRAVENOUS EVERY 30 MIN PRN
Status: DISCONTINUED | OUTPATIENT
Start: 2021-12-07 | End: 2021-12-07 | Stop reason: HOSPADM

## 2021-12-07 RX ORDER — ONDANSETRON 4 MG/1
4 TABLET, ORALLY DISINTEGRATING ORAL EVERY 6 HOURS PRN
Status: DISCONTINUED | OUTPATIENT
Start: 2021-12-07 | End: 2021-12-11 | Stop reason: HOSPADM

## 2021-12-07 RX ORDER — CALCIUM CARBONATE 500 MG/1
500 TABLET, CHEWABLE ORAL 4 TIMES DAILY PRN
Status: DISCONTINUED | OUTPATIENT
Start: 2021-12-07 | End: 2021-12-11 | Stop reason: HOSPADM

## 2021-12-07 RX ORDER — DEXAMETHASONE SODIUM PHOSPHATE 4 MG/ML
INJECTION, SOLUTION INTRA-ARTICULAR; INTRALESIONAL; INTRAMUSCULAR; INTRAVENOUS; SOFT TISSUE PRN
Status: DISCONTINUED | OUTPATIENT
Start: 2021-12-07 | End: 2021-12-07

## 2021-12-07 RX ORDER — LIDOCAINE 40 MG/G
CREAM TOPICAL
Status: DISCONTINUED | OUTPATIENT
Start: 2021-12-07 | End: 2021-12-11 | Stop reason: HOSPADM

## 2021-12-07 RX ORDER — HYDROMORPHONE HCL IN WATER/PF 6 MG/30 ML
0.2 PATIENT CONTROLLED ANALGESIA SYRINGE INTRAVENOUS EVERY 5 MIN PRN
Status: DISCONTINUED | OUTPATIENT
Start: 2021-12-07 | End: 2021-12-07 | Stop reason: HOSPADM

## 2021-12-07 RX ORDER — ONDANSETRON 2 MG/ML
INJECTION INTRAMUSCULAR; INTRAVENOUS PRN
Status: DISCONTINUED | OUTPATIENT
Start: 2021-12-07 | End: 2021-12-07

## 2021-12-07 RX ORDER — HYDRALAZINE HYDROCHLORIDE 20 MG/ML
INJECTION INTRAMUSCULAR; INTRAVENOUS PRN
Status: DISCONTINUED | OUTPATIENT
Start: 2021-12-07 | End: 2021-12-07

## 2021-12-07 RX ORDER — FENTANYL CITRATE 50 UG/ML
25 INJECTION, SOLUTION INTRAMUSCULAR; INTRAVENOUS EVERY 5 MIN PRN
Status: DISCONTINUED | OUTPATIENT
Start: 2021-12-07 | End: 2021-12-07 | Stop reason: HOSPADM

## 2021-12-07 RX ORDER — SODIUM CHLORIDE, SODIUM LACTATE, POTASSIUM CHLORIDE, CALCIUM CHLORIDE 600; 310; 30; 20 MG/100ML; MG/100ML; MG/100ML; MG/100ML
INJECTION, SOLUTION INTRAVENOUS CONTINUOUS PRN
Status: DISCONTINUED | OUTPATIENT
Start: 2021-12-07 | End: 2021-12-07

## 2021-12-07 RX ORDER — FENTANYL CITRATE 50 UG/ML
INJECTION, SOLUTION INTRAMUSCULAR; INTRAVENOUS PRN
Status: DISCONTINUED | OUTPATIENT
Start: 2021-12-07 | End: 2021-12-07

## 2021-12-07 RX ORDER — LABETALOL 20 MG/4 ML (5 MG/ML) INTRAVENOUS SYRINGE
PRN
Status: DISCONTINUED | OUTPATIENT
Start: 2021-12-07 | End: 2021-12-07

## 2021-12-07 RX ORDER — SODIUM CHLORIDE, SODIUM LACTATE, POTASSIUM CHLORIDE, CALCIUM CHLORIDE 600; 310; 30; 20 MG/100ML; MG/100ML; MG/100ML; MG/100ML
INJECTION, SOLUTION INTRAVENOUS CONTINUOUS
Status: DISCONTINUED | OUTPATIENT
Start: 2021-12-07 | End: 2021-12-07 | Stop reason: HOSPADM

## 2021-12-07 RX ORDER — LABETALOL HYDROCHLORIDE 5 MG/ML
10 INJECTION, SOLUTION INTRAVENOUS
Status: DISCONTINUED | OUTPATIENT
Start: 2021-12-07 | End: 2021-12-07 | Stop reason: HOSPADM

## 2021-12-07 RX ORDER — SODIUM CHLORIDE, SODIUM LACTATE, POTASSIUM CHLORIDE, CALCIUM CHLORIDE 600; 310; 30; 20 MG/100ML; MG/100ML; MG/100ML; MG/100ML
INJECTION, SOLUTION INTRAVENOUS CONTINUOUS
Status: DISCONTINUED | OUTPATIENT
Start: 2021-12-07 | End: 2021-12-08

## 2021-12-07 RX ORDER — PROPOFOL 10 MG/ML
INJECTION, EMULSION INTRAVENOUS PRN
Status: DISCONTINUED | OUTPATIENT
Start: 2021-12-07 | End: 2021-12-07

## 2021-12-07 RX ORDER — CEFAZOLIN SODIUM 1 G/50ML
2 SOLUTION INTRAVENOUS SEE ADMIN INSTRUCTIONS
Status: DISCONTINUED | OUTPATIENT
Start: 2021-12-07 | End: 2021-12-07 | Stop reason: HOSPADM

## 2021-12-07 RX ORDER — PROCHLORPERAZINE MALEATE 5 MG
5 TABLET ORAL EVERY 6 HOURS PRN
Status: DISCONTINUED | OUTPATIENT
Start: 2021-12-07 | End: 2021-12-11 | Stop reason: HOSPADM

## 2021-12-07 RX ORDER — FENTANYL CITRATE 50 UG/ML
25-50 INJECTION, SOLUTION INTRAMUSCULAR; INTRAVENOUS
Status: DISCONTINUED | OUTPATIENT
Start: 2021-12-07 | End: 2021-12-07 | Stop reason: HOSPADM

## 2021-12-07 RX ORDER — CEFAZOLIN SODIUM 1 G/50ML
2 SOLUTION INTRAVENOUS
Status: COMPLETED | OUTPATIENT
Start: 2021-12-07 | End: 2021-12-07

## 2021-12-07 RX ORDER — FLUMAZENIL 0.1 MG/ML
0.2 INJECTION, SOLUTION INTRAVENOUS
Status: DISCONTINUED | OUTPATIENT
Start: 2021-12-07 | End: 2021-12-07 | Stop reason: HOSPADM

## 2021-12-07 RX ORDER — LIDOCAINE HYDROCHLORIDE 20 MG/ML
INJECTION, SOLUTION INFILTRATION; PERINEURAL PRN
Status: DISCONTINUED | OUTPATIENT
Start: 2021-12-07 | End: 2021-12-07

## 2021-12-07 RX ORDER — ALBUMIN, HUMAN INJ 5% 5 %
SOLUTION INTRAVENOUS CONTINUOUS PRN
Status: DISCONTINUED | OUTPATIENT
Start: 2021-12-07 | End: 2021-12-07

## 2021-12-07 RX ORDER — NALOXONE HYDROCHLORIDE 0.4 MG/ML
0.4 INJECTION, SOLUTION INTRAMUSCULAR; INTRAVENOUS; SUBCUTANEOUS
Status: DISCONTINUED | OUTPATIENT
Start: 2021-12-07 | End: 2021-12-11 | Stop reason: HOSPADM

## 2021-12-07 RX ORDER — ONDANSETRON 4 MG/1
4 TABLET, ORALLY DISINTEGRATING ORAL EVERY 30 MIN PRN
Status: DISCONTINUED | OUTPATIENT
Start: 2021-12-07 | End: 2021-12-07 | Stop reason: HOSPADM

## 2021-12-07 RX ADMIN — SODIUM CHLORIDE, POTASSIUM CHLORIDE, SODIUM LACTATE AND CALCIUM CHLORIDE: 600; 310; 30; 20 INJECTION, SOLUTION INTRAVENOUS at 12:46

## 2021-12-07 RX ADMIN — HYDROMORPHONE HYDROCHLORIDE 0.5 MG: 1 INJECTION, SOLUTION INTRAMUSCULAR; INTRAVENOUS; SUBCUTANEOUS at 12:50

## 2021-12-07 RX ADMIN — ONDANSETRON 4 MG: 2 INJECTION INTRAMUSCULAR; INTRAVENOUS at 12:42

## 2021-12-07 RX ADMIN — LABETALOL 20 MG/4 ML (5 MG/ML) INTRAVENOUS SYRINGE 7.5 MG: at 10:19

## 2021-12-07 RX ADMIN — FENTANYL CITRATE 50 MCG: 50 INJECTION, SOLUTION INTRAMUSCULAR; INTRAVENOUS at 12:07

## 2021-12-07 RX ADMIN — FENTANYL CITRATE 50 MCG: 50 INJECTION, SOLUTION INTRAMUSCULAR; INTRAVENOUS at 11:53

## 2021-12-07 RX ADMIN — PHENYLEPHRINE HYDROCHLORIDE 200 MCG: 10 INJECTION INTRAVENOUS at 12:47

## 2021-12-07 RX ADMIN — PHENYLEPHRINE HYDROCHLORIDE 200 MCG: 10 INJECTION INTRAVENOUS at 09:24

## 2021-12-07 RX ADMIN — SODIUM CHLORIDE, POTASSIUM CHLORIDE, SODIUM LACTATE AND CALCIUM CHLORIDE: 600; 310; 30; 20 INJECTION, SOLUTION INTRAVENOUS at 08:33

## 2021-12-07 RX ADMIN — PHENYLEPHRINE HYDROCHLORIDE 150 MCG: 10 INJECTION INTRAVENOUS at 12:41

## 2021-12-07 RX ADMIN — FENTANYL CITRATE 50 MCG: 50 INJECTION, SOLUTION INTRAMUSCULAR; INTRAVENOUS at 10:59

## 2021-12-07 RX ADMIN — METRONIDAZOLE 500 MG: 500 INJECTION, SOLUTION INTRAVENOUS at 12:23

## 2021-12-07 RX ADMIN — SODIUM CHLORIDE, POTASSIUM CHLORIDE, SODIUM LACTATE AND CALCIUM CHLORIDE: 600; 310; 30; 20 INJECTION, SOLUTION INTRAVENOUS at 10:39

## 2021-12-07 RX ADMIN — PHENYLEPHRINE HYDROCHLORIDE 200 MCG: 10 INJECTION INTRAVENOUS at 10:02

## 2021-12-07 RX ADMIN — ACETAMINOPHEN 975 MG: 325 TABLET, FILM COATED ORAL at 17:50

## 2021-12-07 RX ADMIN — BUPIVACAINE 20 ML: 13.3 INJECTION, SUSPENSION, LIPOSOMAL INFILTRATION at 08:20

## 2021-12-07 RX ADMIN — FENTANYL CITRATE 25 MCG: 50 INJECTION INTRAMUSCULAR; INTRAVENOUS at 13:59

## 2021-12-07 RX ADMIN — SUGAMMADEX 200 MG: 100 INJECTION, SOLUTION INTRAVENOUS at 12:56

## 2021-12-07 RX ADMIN — LIDOCAINE HYDROCHLORIDE 40 MG: 20 INJECTION, SOLUTION INFILTRATION; PERINEURAL at 08:40

## 2021-12-07 RX ADMIN — Medication 2 G: at 09:05

## 2021-12-07 RX ADMIN — PHENYLEPHRINE HYDROCHLORIDE 100 MCG: 10 INJECTION INTRAVENOUS at 09:21

## 2021-12-07 RX ADMIN — ALBUMIN (HUMAN): 12.5 SOLUTION INTRAVENOUS at 11:54

## 2021-12-07 RX ADMIN — ALBUMIN (HUMAN): 12.5 SOLUTION INTRAVENOUS at 11:45

## 2021-12-07 RX ADMIN — PHENYLEPHRINE HYDROCHLORIDE 100 MCG: 10 INJECTION INTRAVENOUS at 09:11

## 2021-12-07 RX ADMIN — FENTANYL CITRATE 25 MCG: 50 INJECTION INTRAMUSCULAR; INTRAVENOUS at 14:22

## 2021-12-07 RX ADMIN — ROCURONIUM BROMIDE 5 MG: 50 INJECTION, SOLUTION INTRAVENOUS at 12:31

## 2021-12-07 RX ADMIN — PHENYLEPHRINE HYDROCHLORIDE 100 MCG: 10 INJECTION INTRAVENOUS at 12:36

## 2021-12-07 RX ADMIN — ROCURONIUM BROMIDE 20 MG: 50 INJECTION, SOLUTION INTRAVENOUS at 09:31

## 2021-12-07 RX ADMIN — FENTANYL CITRATE 50 MCG: 50 INJECTION, SOLUTION INTRAMUSCULAR; INTRAVENOUS at 09:47

## 2021-12-07 RX ADMIN — SODIUM CHLORIDE, POTASSIUM CHLORIDE, SODIUM LACTATE AND CALCIUM CHLORIDE: 600; 310; 30; 20 INJECTION, SOLUTION INTRAVENOUS at 14:01

## 2021-12-07 RX ADMIN — ONDANSETRON 4 MG: 2 INJECTION INTRAMUSCULAR; INTRAVENOUS at 15:12

## 2021-12-07 RX ADMIN — PHENYLEPHRINE HYDROCHLORIDE 100 MCG: 10 INJECTION INTRAVENOUS at 13:23

## 2021-12-07 RX ADMIN — FENTANYL CITRATE 50 MCG: 50 INJECTION, SOLUTION INTRAMUSCULAR; INTRAVENOUS at 09:41

## 2021-12-07 RX ADMIN — PHENYLEPHRINE HYDROCHLORIDE 100 MCG: 10 INJECTION INTRAVENOUS at 11:50

## 2021-12-07 RX ADMIN — SODIUM CHLORIDE, SODIUM LACTATE, POTASSIUM CHLORIDE, CALCIUM CHLORIDE: 600; 310; 30; 20 INJECTION, SOLUTION INTRAVENOUS at 10:35

## 2021-12-07 RX ADMIN — Medication: at 14:06

## 2021-12-07 RX ADMIN — PHENYLEPHRINE HYDROCHLORIDE 100 MCG: 10 INJECTION INTRAVENOUS at 12:26

## 2021-12-07 RX ADMIN — PHENYLEPHRINE HYDROCHLORIDE 100 MCG: 10 INJECTION INTRAVENOUS at 09:36

## 2021-12-07 RX ADMIN — PHENYLEPHRINE HYDROCHLORIDE 50 MCG: 10 INJECTION INTRAVENOUS at 12:19

## 2021-12-07 RX ADMIN — ENOXAPARIN SODIUM 40 MG: 40 INJECTION SUBCUTANEOUS at 08:24

## 2021-12-07 RX ADMIN — ROCURONIUM BROMIDE 20 MG: 50 INJECTION, SOLUTION INTRAVENOUS at 10:59

## 2021-12-07 RX ADMIN — HYDRALAZINE HYDROCHLORIDE 7.5 MG: 20 INJECTION INTRAMUSCULAR; INTRAVENOUS at 11:07

## 2021-12-07 RX ADMIN — SODIUM CHLORIDE, POTASSIUM CHLORIDE, SODIUM LACTATE AND CALCIUM CHLORIDE: 600; 310; 30; 20 INJECTION, SOLUTION INTRAVENOUS at 18:01

## 2021-12-07 RX ADMIN — FENTANYL CITRATE 150 MCG: 50 INJECTION, SOLUTION INTRAMUSCULAR; INTRAVENOUS at 08:40

## 2021-12-07 RX ADMIN — PHENYLEPHRINE HYDROCHLORIDE 100 MCG: 10 INJECTION INTRAVENOUS at 13:16

## 2021-12-07 RX ADMIN — PHENYLEPHRINE HYDROCHLORIDE 100 MCG: 10 INJECTION INTRAVENOUS at 10:15

## 2021-12-07 RX ADMIN — HYDROMORPHONE HYDROCHLORIDE 0.5 MG: 1 INJECTION, SOLUTION INTRAMUSCULAR; INTRAVENOUS; SUBCUTANEOUS at 12:48

## 2021-12-07 RX ADMIN — ROCURONIUM BROMIDE 60 MG: 50 INJECTION, SOLUTION INTRAVENOUS at 08:42

## 2021-12-07 RX ADMIN — DEXAMETHASONE SODIUM PHOSPHATE 10 MG: 4 INJECTION, SOLUTION INTRA-ARTICULAR; INTRALESIONAL; INTRAMUSCULAR; INTRAVENOUS; SOFT TISSUE at 08:42

## 2021-12-07 RX ADMIN — ROCURONIUM BROMIDE 20 MG: 50 INJECTION, SOLUTION INTRAVENOUS at 10:20

## 2021-12-07 RX ADMIN — FENTANYL CITRATE 50 MCG: 50 INJECTION, SOLUTION INTRAMUSCULAR; INTRAVENOUS at 08:24

## 2021-12-07 RX ADMIN — ROCURONIUM BROMIDE 10 MG: 50 INJECTION, SOLUTION INTRAVENOUS at 11:50

## 2021-12-07 RX ADMIN — PHENYLEPHRINE HYDROCHLORIDE 100 MCG: 10 INJECTION INTRAVENOUS at 12:54

## 2021-12-07 RX ADMIN — BUPIVACAINE HYDROCHLORIDE 20 ML: 2.5 INJECTION, SOLUTION EPIDURAL; INFILTRATION; INTRACAUDAL; PERINEURAL at 08:20

## 2021-12-07 RX ADMIN — PHENYLEPHRINE HYDROCHLORIDE 100 MCG: 10 INJECTION INTRAVENOUS at 09:15

## 2021-12-07 RX ADMIN — PROPOFOL 180 MG: 10 INJECTION, EMULSION INTRAVENOUS at 08:40

## 2021-12-07 RX ADMIN — PROCHLORPERAZINE EDISYLATE 5 MG: 5 INJECTION INTRAMUSCULAR; INTRAVENOUS at 18:00

## 2021-12-07 ASSESSMENT — ACTIVITIES OF DAILY LIVING (ADL)
ADLS_ACUITY_SCORE: 2
ADLS_ACUITY_SCORE: 6

## 2021-12-07 ASSESSMENT — MIFFLIN-ST. JEOR
SCORE: 1889
SCORE: 1914

## 2021-12-07 NOTE — ANESTHESIA CARE TRANSFER NOTE
Patient: Rod Olson    Procedure: Procedure(s):  EXPLORATORY LAPAROTOMY  Right Hemicolectomy open       Diagnosis: Mucinous cystadenoma of appendix [D12.1]  Diagnosis Additional Information: No value filed.    Anesthesia Type:   General     Note:    Oropharynx: oropharynx clear of all foreign objects  Level of Consciousness: awake  Oxygen Supplementation: face mask  Level of Supplemental Oxygen (L/min / FiO2): 6          Patient transferred to: PACU          Vitals:  Vitals Value Taken Time   BP     Temp     Pulse 93 12/07/21 1339   Resp     SpO2 99 % 12/07/21 1339   Vitals shown include unvalidated device data.    Electronically Signed By: BARRIE Villavicencio CRNA  December 7, 2021  1:40 PM

## 2021-12-07 NOTE — ANESTHESIA PROCEDURE NOTES
Airway       Patient location during procedure: OR       Procedure Start/Stop Times: 12/7/2021 8:52 AM  Staff -        Anesthesiologist:  Darlene Manuel MD       CRNA: Ricardo Hardwick APRN CRNA       Performed By: CRNAIndications and Patient Condition       Indications for airway management: heath-procedural       Induction type:intravenous       Mask difficulty assessment: 2 - vent by mask + OA or adjuvant +/- NMBA    Final Airway Details       Final airway type: endotracheal airway       Successful airway: ETT - single  Endotracheal Airway Details        ETT size (mm): 8.0       Cuffed: yes       Successful intubation technique: video laryngoscopy       VL Blade Size: MAC D Blade       Grade View of Cords: 1       Adjucts: stylet       Position: Right       Measured from: gums/teeth       Secured at (cm): 24       Bite block used: None    Post intubation assessment        Placement verified by: capnometry, equal breath sounds and chest rise        Number of attempts at approach: 1       Secured with: other (comment) (Tube Tamer)       Ease of procedure: easy       Dentition: Unchanged and Intact

## 2021-12-07 NOTE — ANESTHESIA PROCEDURE NOTES
TAP Procedure Note    Pre-Procedure   Staff -        Anesthesiologist:  Willie Green MD       Resident/Fellow: Alban Elizabeth MD       Performed By: resident       Procedure Start/Stop Times: 12/7/2021 8:10 AM and 12/7/2021 8:20 AM       Pre-Anesthestic Checklist: patient identified, IV checked, site marked, risks and benefits discussed, informed consent, monitors and equipment checked, pre-op evaluation, at physician/surgeon's request and post-op pain management  Timeout:       Correct Patient: Yes        Correct Procedure: Yes        Correct Site: Yes        Correct Position: Yes        Correct Laterality: Yes        Site Marked: Yes  Procedure Documentation  Procedure: TAP       Laterality: bilateral       Patient Position: sitting       Skin prep: Chloraprep       Needle Type: insulated       Needle Gauge: 21.        Needle Length (millimeters): 110        Ultrasound guided       1. Ultrasound was used to identify targeted nerve, plexus, vascular marker, or fascial plane and place a needle adjacent to it in real-time.       2. Ultrasound was used to visualize the spread of anesthetic in close proximity to the above referenced structure.       3. A permanent image is entered into the patient's record.    Assessment/Narrative         The placement was negative for: blood aspirated, painful injection and site bleeding       Paresthesias: No.     Bolus given via needle..        Secured via.        Insertion/Infusion Method: Single Shot       Complications: none    Medication(s) Administered   Bupivacaine 0.25% PF (Infiltration), 20 mL  Bupivacaine liposome (Exparel) 1.3% LA inj susp (Infiltration), 20 mL  Medication Administration Time: 12/7/2021 8:20 AM

## 2021-12-07 NOTE — ANESTHESIA POSTPROCEDURE EVALUATION
Patient: Rod Olson    Procedure: Procedure(s):  EXPLORATORY LAPAROTOMY  Right Hemicolectomy open       Diagnosis:Mucinous cystadenoma of appendix [D12.1]  Diagnosis Additional Information: No value filed.    Anesthesia Type:  General    Note:  Disposition: Inpatient   Postop Pain Control: Uneventful            Sign Out: Well controlled pain   PONV: No   Neuro/Psych: Uneventful            Sign Out: Acceptable/Baseline neuro status   Airway/Respiratory: Uneventful            Sign Out: Acceptable/Baseline resp. status   CV/Hemodynamics: Uneventful            Sign Out: Acceptable CV status; No obvious hypovolemia; No obvious fluid overload   Other NRE: NONE   DID A NON-ROUTINE EVENT OCCUR? No           Last vitals:  Vitals Value Taken Time   /87 12/07/21 1500   Temp 36.7  C (98.1  F) 12/07/21 1445   Pulse 103 12/07/21 1503   Resp 16 12/07/21 1445   SpO2 96 % 12/07/21 1503   Vitals shown include unvalidated device data.    Electronically Signed By: Ayse Navarro MD  December 7, 2021  3:04 PM

## 2021-12-08 ENCOUNTER — APPOINTMENT (OUTPATIENT)
Dept: PHYSICAL THERAPY | Facility: CLINIC | Age: 76
DRG: 330 | End: 2021-12-08
Attending: SURGERY
Payer: MEDICARE

## 2021-12-08 LAB
ANION GAP SERPL CALCULATED.3IONS-SCNC: 4 MMOL/L (ref 3–14)
BUN SERPL-MCNC: 13 MG/DL (ref 7–30)
CALCIUM SERPL-MCNC: 8.6 MG/DL (ref 8.5–10.1)
CHLORIDE BLD-SCNC: 106 MMOL/L (ref 94–109)
CO2 SERPL-SCNC: 28 MMOL/L (ref 20–32)
CREAT SERPL-MCNC: 0.88 MG/DL (ref 0.66–1.25)
ERYTHROCYTE [DISTWIDTH] IN BLOOD BY AUTOMATED COUNT: 13.4 % (ref 10–15)
GFR SERPL CREATININE-BSD FRML MDRD: 83 ML/MIN/1.73M2
GLUCOSE BLD-MCNC: 127 MG/DL (ref 70–99)
HCT VFR BLD AUTO: 34.6 % (ref 40–53)
HGB BLD-MCNC: 11.5 G/DL (ref 13.3–17.7)
MAGNESIUM SERPL-MCNC: 2.1 MG/DL (ref 1.6–2.3)
MCH RBC QN AUTO: 28.9 PG (ref 26.5–33)
MCHC RBC AUTO-ENTMCNC: 33.2 G/DL (ref 31.5–36.5)
MCV RBC AUTO: 87 FL (ref 78–100)
PLATELET # BLD AUTO: 232 10E3/UL (ref 150–450)
POTASSIUM BLD-SCNC: 4.1 MMOL/L (ref 3.4–5.3)
RBC # BLD AUTO: 3.98 10E6/UL (ref 4.4–5.9)
SODIUM SERPL-SCNC: 138 MMOL/L (ref 133–144)
WBC # BLD AUTO: 11 10E3/UL (ref 4–11)

## 2021-12-08 PROCEDURE — 85027 COMPLETE CBC AUTOMATED: CPT | Performed by: STUDENT IN AN ORGANIZED HEALTH CARE EDUCATION/TRAINING PROGRAM

## 2021-12-08 PROCEDURE — 97116 GAIT TRAINING THERAPY: CPT | Mod: GP

## 2021-12-08 PROCEDURE — 83735 ASSAY OF MAGNESIUM: CPT | Performed by: STUDENT IN AN ORGANIZED HEALTH CARE EDUCATION/TRAINING PROGRAM

## 2021-12-08 PROCEDURE — 250N000011 HC RX IP 250 OP 636: Performed by: PHYSICIAN ASSISTANT

## 2021-12-08 PROCEDURE — 99024 POSTOP FOLLOW-UP VISIT: CPT | Performed by: PHYSICIAN ASSISTANT

## 2021-12-08 PROCEDURE — 82310 ASSAY OF CALCIUM: CPT | Performed by: STUDENT IN AN ORGANIZED HEALTH CARE EDUCATION/TRAINING PROGRAM

## 2021-12-08 PROCEDURE — 97530 THERAPEUTIC ACTIVITIES: CPT | Mod: GP

## 2021-12-08 PROCEDURE — 36415 COLL VENOUS BLD VENIPUNCTURE: CPT | Performed by: STUDENT IN AN ORGANIZED HEALTH CARE EDUCATION/TRAINING PROGRAM

## 2021-12-08 PROCEDURE — 97161 PT EVAL LOW COMPLEX 20 MIN: CPT | Mod: GP

## 2021-12-08 PROCEDURE — 258N000003 HC RX IP 258 OP 636: Performed by: STUDENT IN AN ORGANIZED HEALTH CARE EDUCATION/TRAINING PROGRAM

## 2021-12-08 PROCEDURE — 120N000002 HC R&B MED SURG/OB UMMC

## 2021-12-08 RX ORDER — KETOROLAC TROMETHAMINE 15 MG/ML
15 INJECTION, SOLUTION INTRAMUSCULAR; INTRAVENOUS EVERY 6 HOURS PRN
Status: ACTIVE | OUTPATIENT
Start: 2021-12-08 | End: 2021-12-11

## 2021-12-08 RX ORDER — HYDRALAZINE HYDROCHLORIDE 20 MG/ML
10 INJECTION INTRAMUSCULAR; INTRAVENOUS EVERY 6 HOURS PRN
Status: DISCONTINUED | OUTPATIENT
Start: 2021-12-08 | End: 2021-12-11 | Stop reason: HOSPADM

## 2021-12-08 RX ORDER — DEXTROSE MONOHYDRATE, SODIUM CHLORIDE, AND POTASSIUM CHLORIDE 50; 1.49; 4.5 G/1000ML; G/1000ML; G/1000ML
INJECTION, SOLUTION INTRAVENOUS CONTINUOUS
Status: DISCONTINUED | OUTPATIENT
Start: 2021-12-08 | End: 2021-12-09

## 2021-12-08 RX ADMIN — ENOXAPARIN SODIUM 40 MG: 40 INJECTION SUBCUTANEOUS at 11:53

## 2021-12-08 RX ADMIN — POTASSIUM CHLORIDE, DEXTROSE MONOHYDRATE AND SODIUM CHLORIDE: 150; 5; 450 INJECTION, SOLUTION INTRAVENOUS at 08:05

## 2021-12-08 RX ADMIN — SODIUM CHLORIDE, POTASSIUM CHLORIDE, SODIUM LACTATE AND CALCIUM CHLORIDE: 600; 310; 30; 20 INJECTION, SOLUTION INTRAVENOUS at 00:07

## 2021-12-08 RX ADMIN — POTASSIUM CHLORIDE, DEXTROSE MONOHYDRATE AND SODIUM CHLORIDE: 150; 5; 450 INJECTION, SOLUTION INTRAVENOUS at 21:29

## 2021-12-08 ASSESSMENT — ACTIVITIES OF DAILY LIVING (ADL)
ADLS_ACUITY_SCORE: 6
ADLS_ACUITY_SCORE: 7
ADLS_ACUITY_SCORE: 6
ADLS_ACUITY_SCORE: 8
WALKING_OR_CLIMBING_STAIRS_DIFFICULTY: NO
DOING_ERRANDS_INDEPENDENTLY_DIFFICULTY: NO
ADLS_ACUITY_SCORE: 6
ADLS_ACUITY_SCORE: 7
ADLS_ACUITY_SCORE: 6
ADLS_ACUITY_SCORE: 6
DRESSING/BATHING_DIFFICULTY: NO
ADLS_ACUITY_SCORE: 6
ADLS_ACUITY_SCORE: 6
TOILETING_ISSUES: NO
ADLS_ACUITY_SCORE: 8
FALL_HISTORY_WITHIN_LAST_SIX_MONTHS: NO
ADLS_ACUITY_SCORE: 8
ADLS_ACUITY_SCORE: 7
ADLS_ACUITY_SCORE: 6
ADLS_ACUITY_SCORE: 7
ADLS_ACUITY_SCORE: 8
ADLS_ACUITY_SCORE: 8
ADLS_ACUITY_SCORE: 7
WEAR_GLASSES_OR_BLIND: NO
ADLS_ACUITY_SCORE: 7
ADLS_ACUITY_SCORE: 6
ADLS_ACUITY_SCORE: 8
DIFFICULTY_EATING/SWALLOWING: NO
CONCENTRATING,_REMEMBERING_OR_MAKING_DECISIONS_DIFFICULTY: NO
DIFFICULTY_COMMUNICATING: NO
ADLS_ACUITY_SCORE: 8

## 2021-12-08 ASSESSMENT — MIFFLIN-ST. JEOR: SCORE: 1854.35

## 2021-12-08 NOTE — PROGRESS NOTES
Surgical Oncology Progress Note    Interval History:  No acute events overnight. Pain controlled. Denies nausea but is burping. No flatus or bowel movement yet.     Physical Exam:   Temp:  [97.4  F (36.3  C)-99.7  F (37.6  C)] 97.4  F (36.3  C)  Pulse:  [] 89  Resp:  [15-27] 25  BP: (114-180)/() 148/88  SpO2:  [90 %-98 %] 96 %  General: Alert, oriented, appears comfortable, NAD.  Respiratory: breathing non labored  Abdomen: Abdomen is soft, non-tender, non-distended. Incision is clean, dry and intact.     Data:   All laboratory and imaging data in the past 24 hours reviewed  I/O last 3 completed shifts:  In: 3201 [I.V.:2701]  Out: 3325 [Urine:3200; Emesis/NG output:25; Blood:100]  Recent Labs   Lab Test 12/07/21  1641 12/06/21  1010 10/18/21  2113   WBC 15.4* 8.2 8.8   HGB 12.5* 13.9 13.8    257 267      Recent Labs   Lab Test 12/07/21  1641 12/07/21  0705 12/06/21  1010 10/18/21  2112     --  140 139   POTASSIUM 3.6  --  4.5 4.0   CHLORIDE 104  --  106 105   CO2 23  --  28 27   BUN 14  --  13 16   CR 0.75  --  0.91 0.89   ANIONGAP 9  --  6 7   NAMAN 8.2*  --  9.4 9.0   * 107* 105* 93      Recent Labs   Lab Test 12/06/21  1010   PROTTOTAL 8.0   ALBUMIN 3.8   BILITOTAL 0.5   ALKPHOS 58   AST 25   ALT 30     Assessment and Plan:     Rod Olson is a 76 year old male with appendiceal carcinoma POD 1 s/p ex lap, right hemicolectomy, and resection of 6 cm pelvic tumor and right sided ureterolysis.     - Available for pain is scheduled acetaminophen and dilaudid PCA  - Wean oxygen as able, IS, out of bed and ambulation.   - Advance to clear liquid diet, mIVF 75  - Discontinue wolff catheter  - Lovenox pending morning labs  - PT/OT    Seen with Chief resident who will discuss with Staff.     Nichole Lynn PAJonaC   Surgical Oncology

## 2021-12-08 NOTE — PLAN OF CARE
Assumed cares from 3135-6307: POD #1. Hypertensive, MD aware. BP improving throughout shift. Tachy in the low 100's. Otherwise vitally stable on 3L NC. IS education reinforced and encouraged.  Intermittent nausea, relieved with compazine. NPO with ice chips. Minimal pain in abdomen, PCA available and follow up education given but declined scheduled tylenol. Headache improving as BP trend down. Midline abdominal incision with primapore dressing and marked shadowing. Beauchamp with adequate urine output. No BM, -gas. CAPNO in place till today at 4pm. PIV infusing IV maintenance fluids with PCA. Pt able to dangle and walk in room this am. Pt resting between cares. Continue plan of care.

## 2021-12-08 NOTE — PROGRESS NOTES
Admitted/transferred from: PACU  2 RN full   skin assessment completed by Liliam Izquierdo RN and Irasema Liriano RN  Skin assessment finding: issues found few scratches (Abdomen), red, flat rashes (whole back), bump/scab (back of the neck)   Interventions/actions: other : No intervention needed as of this time.     Will continue to monitor.

## 2021-12-08 NOTE — PLAN OF CARE
POD# 0 XLAP, opwn appendectomy, right hydrocelectomy    Alert and oriented, lethargic. C/o HA. Stated that he usually have headache with high blood pressure. Post op VS initiated per protocol. BP was running high 140's-160's/ . Tachycardic on low 100's. O2 increased to 3 lpm via NC to keep his sats above 92%. RN to slowly wean off pt as needed overnight. The rest of the VSS, afebrile, still in capno x 24 hours per hospital policy. PIV x 3, MIVF at 125 ml/hr via PIV. Mid-abdomen surgical incision dressing with shadowing that extend upon arrival in the floor, marked by this RN. Right scrotum swollen, wolff in place with adequate urine volume. C/o of intermittent nausea, prn IV compazine given.IS teaching started needs reinforcement. PCA at 0.2 mg every 15 minutes.     PLAN: Continue to monitor post-op status. Pain and nausea management. Dangled at bedside later.     ADDENDUM: Triggered SIRS protocol. Lactic acid 1.4.

## 2021-12-08 NOTE — OP NOTE
Procedure Date: 12/07/2021    PREOPERATIVE DIAGNOSIS:  Appendiceal mass.    POSTOPERATIVE DIAGNOSIS:  Appendiceal carcinoma.    PROCEDURES PERFORMED:  Exploratory laparotomy, right hemicolectomy, resection of 6 cm pelvic tumor and right-sided ureterolysis.    ATTENDING SURGEON:  Peng Donald MD    RESIDENT SURGEON:  Keisha Pedersen MD    ANESTHESIA:  General with endotracheal tube intubation.    INDICATIONS FOR PROCEDURE:  The patient is a 76-year-old man who was diagnosed with a complex right lower quadrant fluid collection and a right-sided hernia or hydrocele.  The imaging appearance had the suggestion of a primary appendiceal malignancy.  He had a colonoscopy, which was normal.  He now presents for surgical treatment.    DESCRIPTION OF PROCEDURE:  After informed consent, the patient was brought to the operating room, given a general anesthetic, and orotracheally intubated.  He was prepped and draped in the usual fashion.  I made a lower midline incision from the umbilicus and extended above the umbilicus.  The Bovie cautery was used to incise subcutaneous tissues.  The fascia was incised and the peritoneal cavity was entered.  Self-retaining retractors were placed.  We identified a mass in the right lower quadrant.  The mass appeared to be involving the cecum and the ileum.  The mass measured approximately 10 cm.  We dissected circumferentially around the mass.  Care was taken to avoid the ureter.  We did identify the ureter proximally and dissected it down and made sure it was not injured.  When we came posterior to the mass, there was thick mucus material that came out of the mass; this was all aspirated.  I then mobilized to the small bowel and the hard mass seemed to be contiguous with the small bowel and the right colon.  We mobilized the hepatic flexure and the entire ascending colon and were able to really able to see the ureter well now.  At this point, we decided to perform a right hemicolectomy, even  though there was still tumor involvement in the retroperitoneum.  We divided the distal ileum with the KAMALJIT stapler.  Similarly, we divided the transverse colon with a KAMALJIT stapler.  The mesentery was divided with the Harmonic scalpel.  The specimen was removed and sent to Pathology.  There was still a large amount of tumor stuck to the retroperitoneum.  It was clear that this tumor that was in the appendix had ruptured posteriorly.  We excised roughly 6 cm of tumor in the retroperitoneum.  We did this off for frozen section, which was high-grade neoplasm consistent with an adenocarcinoma.  It was not feasible to excise all tumor and it would not have improved his outcome anyway, so we removed the majority of the tumor and removed all of the mucinous fluid.  He still had a fairly large right inguinal hernia, which was likely a hydrocele.  We could not get into the internal ring and drain the fluid out.  He did have Dr. Lopez evaluate the patient as well and he recommended an open repair, but would not recommend a mesh at the same time because of his right hemicolectomy, so we decided that we would wait on his hernia or hydrocele repair.  We then irrigated the peritoneal cavity well with warm saline.  The fascia was closed with running 2-0 PDS using small bites.  The dermis was closed with interrupted 3-0 Vicryl suture.  The skin was closed with running 4-0 PDS subcuticular stitch.  Dermabond was placed and the patient was taken to the recovery room in stable condition.    Peng Donald MD        D: 2021   T: 2021   MT: [id number]    Name:     ALIA SAWANTSummer  MRN:      [id number]        Account:        [id number]   :      1945           Procedure Date: 2021     Document: [id number]

## 2021-12-08 NOTE — PLAN OF CARE
POD# 1 Xlap, open appendectomy, right hydrocelectomy    Alert and oriented x 4. Ambulating  well this morning with SBA of 1. Pt stated his pain is good and is not using his PCA and decline his scheduled tylenol. MIVF at 75 ml/hr via PIV.Has 2 more PIV SL. Bowel sounds audible. Not passing gas yet. Clear liquid diet. Denies nausea. Mid incision with dried drainage, abd binder on. OVSS, afebrile, RA. Lovenox teaching started with pt and daughter. Beauchamp removed this morning and able to void adequately.    PLAN: Continue monitoring post op status. Pain management, awaiting for the return of bowel function.

## 2021-12-08 NOTE — PROGRESS NOTES
"   12/08/21 1305   Quick Adds   Type of Visit Initial PT Evaluation   Living Environment   People in home alone   Current Living Arrangements house   Home Accessibility no concerns   Living Environment Comments Patient in process of moving from apartment to home at admission. Patient currently does not know if he would discharge home versus living with daughter for additional help due to recent cancer diagnosis.    Self-Care   Usual Activity Tolerance good   Current Activity Tolerance moderate   Regular Exercise No   Equipment Currently Used at Home none   Activity/Exercise/Self-Care Comment Patient reports being retired from many active jobs and that he was highly active up to 14 years ago but does not currently regularly exercise. Patient is open to obtaining a more active lifestyle. Patient is IND in ADLs in home, recently been able to carry boxes as he is currently in the process of moving.   Disability/Function   Hearing Difficulty or Deaf yes   Patient's preferred means of communication verbal   Describe hearing loss bilateral hearing loss   Use of hearing assistive devices none   Were auxiliary aids offered? no   Walking or Climbing Stairs Difficulty no   Dressing/Bathing Difficulty no   Toileting issues no   Doing Errands Independently Difficulty (such as shopping) no   Fall history within last six months no   Change in Functional Status Since Onset of Current Illness/Injury yes   General Information   Onset of Illness/Injury or Date of Surgery 12/07/21   Referring Physician Keisha Pedersen MD   Patient/Family Therapy Goals Statement (PT) return to PLOF   Pertinent History of Current Problem (include personal factors and/or comorbidities that impact the POC) Per chart, \"Rod Olson is a 76 year old male with appendiceal carcinoma POD 1 s/p ex lap, right hemicolectomy, and resection of 6 cm pelvic tumor and right sided ureterolysis.\"   Existing Precautions/Restrictions abdominal   General " Observations Activity: up with assist   Cognition   Follows Commands (Cognition) follows multi-step commands   Pain Assessment   Patient Currently in Pain Yes, see Vital Sign flowsheet   Posture    Posture Forward head position;Protracted shoulders   Strength   Manual Muscle Testing Quick Adds Able to perform R SLR;Able to perform L SLR;No deficits observed during functional mobility   Bed Mobility   Comment (Bed Mobility) supine<>sit Mod Ax1 with LEs   Transfers   Transfer Safety Comments sit<>stand with MinAx1    Gait/Stairs (Locomotion)   Comment (Gait/Stairs) Patient amb 250 feet with signle UE support on IV pole and CGA, patient ambulates with decreased step length, heavy R LE initial contact, and decreased arm swing   Balance   Balance Comments Rhomberg stance eyes open 23 seconds, eyes closed 25 seconds   Sensory Examination   Sensory Perception patient reports no sensory changes   Coordination   Coordination no deficits were identified   Muscle Tone   Muscle Tone no deficits were identified   Clinical Impression   Criteria for Skilled Therapeutic Intervention yes, treatment indicated   PT Diagnosis (PT) Impaired functional mobility   Influenced by the following impairments pain, balance impairments   Functional limitations due to impairments bed mobility, gait   Clinical Presentation Stable/Uncomplicated   Clinical Presentation Rationale Clinical judgement   Clinical Decision Making (Complexity) low complexity   Therapy Frequency (PT) 5x/week   Predicted Duration of Therapy Intervention (days/wks) 1 week   Planned Therapy Interventions (PT) balance training;bed mobility training;gait training;home exercise program;ROM (range of motion);patient/family education;strengthening   Anticipated Equipment Needs at Discharge (PT)   (none)   Risk & Benefits of therapy have been explained evaluation/treatment results reviewed;care plan/treatment goals reviewed;risks/benefits reviewed;current/potential barriers  reviewed;patient   PT Discharge Planning    PT Discharge Recommendation (DC Rec) home with assist   PT Rationale for DC Rec Patient is mobilizing well for post-op day one, primarily limited by pain. Possible additional support from daughter upon discharge that will need to be confirmed. Pending progress in therapy, anticipate a safe discharge home with assist from daughter.   PT Brief overview of current status  Ax1 with standing and ambulation, encourage frequent ambulation   Total Evaluation Time   Total Evaluation Time (Minutes) 5

## 2021-12-08 NOTE — PROVIDER NOTIFICATION
Notified Resident at 2042 PM regarding changes in vital signs.  Pt /102. Has been fluctuating due to pt position in bed. Tachy in the low 100's. Pt just complaining of headache due to high BP.    Did not hear back from provider, BP improving. Will continue to monitor.

## 2021-12-09 ENCOUNTER — APPOINTMENT (OUTPATIENT)
Dept: PHYSICAL THERAPY | Facility: CLINIC | Age: 76
DRG: 330 | End: 2021-12-09
Attending: SURGERY
Payer: MEDICARE

## 2021-12-09 LAB
ANION GAP SERPL CALCULATED.3IONS-SCNC: 7 MMOL/L (ref 3–14)
BUN SERPL-MCNC: 10 MG/DL (ref 7–30)
CALCIUM SERPL-MCNC: 8.5 MG/DL (ref 8.5–10.1)
CHLORIDE BLD-SCNC: 104 MMOL/L (ref 94–109)
CO2 SERPL-SCNC: 27 MMOL/L (ref 20–32)
CREAT SERPL-MCNC: 0.81 MG/DL (ref 0.66–1.25)
ERYTHROCYTE [DISTWIDTH] IN BLOOD BY AUTOMATED COUNT: 13.6 % (ref 10–15)
GFR SERPL CREATININE-BSD FRML MDRD: 86 ML/MIN/1.73M2
GLUCOSE BLD-MCNC: 135 MG/DL (ref 70–99)
HCT VFR BLD AUTO: 34.4 % (ref 40–53)
HGB BLD-MCNC: 11.2 G/DL (ref 13.3–17.7)
MAGNESIUM SERPL-MCNC: 2.3 MG/DL (ref 1.6–2.3)
MCH RBC QN AUTO: 28.6 PG (ref 26.5–33)
MCHC RBC AUTO-ENTMCNC: 32.6 G/DL (ref 31.5–36.5)
MCV RBC AUTO: 88 FL (ref 78–100)
PLATELET # BLD AUTO: 221 10E3/UL (ref 150–450)
POTASSIUM BLD-SCNC: 4.1 MMOL/L (ref 3.4–5.3)
RBC # BLD AUTO: 3.91 10E6/UL (ref 4.4–5.9)
SODIUM SERPL-SCNC: 138 MMOL/L (ref 133–144)
WBC # BLD AUTO: 9.2 10E3/UL (ref 4–11)

## 2021-12-09 PROCEDURE — 36415 COLL VENOUS BLD VENIPUNCTURE: CPT | Performed by: STUDENT IN AN ORGANIZED HEALTH CARE EDUCATION/TRAINING PROGRAM

## 2021-12-09 PROCEDURE — 80048 BASIC METABOLIC PNL TOTAL CA: CPT | Performed by: STUDENT IN AN ORGANIZED HEALTH CARE EDUCATION/TRAINING PROGRAM

## 2021-12-09 PROCEDURE — 97530 THERAPEUTIC ACTIVITIES: CPT | Mod: GP

## 2021-12-09 PROCEDURE — 250N000013 HC RX MED GY IP 250 OP 250 PS 637: Performed by: STUDENT IN AN ORGANIZED HEALTH CARE EDUCATION/TRAINING PROGRAM

## 2021-12-09 PROCEDURE — 250N000011 HC RX IP 250 OP 636: Performed by: STUDENT IN AN ORGANIZED HEALTH CARE EDUCATION/TRAINING PROGRAM

## 2021-12-09 PROCEDURE — 999N000111 HC STATISTIC OT IP EVAL DEFER

## 2021-12-09 PROCEDURE — 120N000002 HC R&B MED SURG/OB UMMC

## 2021-12-09 PROCEDURE — 250N000013 HC RX MED GY IP 250 OP 250 PS 637

## 2021-12-09 PROCEDURE — 85014 HEMATOCRIT: CPT | Performed by: STUDENT IN AN ORGANIZED HEALTH CARE EDUCATION/TRAINING PROGRAM

## 2021-12-09 PROCEDURE — 83735 ASSAY OF MAGNESIUM: CPT | Performed by: STUDENT IN AN ORGANIZED HEALTH CARE EDUCATION/TRAINING PROGRAM

## 2021-12-09 PROCEDURE — 99024 POSTOP FOLLOW-UP VISIT: CPT | Performed by: PHYSICIAN ASSISTANT

## 2021-12-09 PROCEDURE — 97116 GAIT TRAINING THERAPY: CPT | Mod: GP

## 2021-12-09 PROCEDURE — 250N000011 HC RX IP 250 OP 636: Performed by: PHYSICIAN ASSISTANT

## 2021-12-09 RX ORDER — HYDROMORPHONE HCL IN WATER/PF 6 MG/30 ML
0.2 PATIENT CONTROLLED ANALGESIA SYRINGE INTRAVENOUS
Status: DISCONTINUED | OUTPATIENT
Start: 2021-12-09 | End: 2021-12-10

## 2021-12-09 RX ADMIN — BENZOCAINE, MENTHOL 1 LOZENGE: 15; 3.6 LOZENGE ORAL at 02:35

## 2021-12-09 RX ADMIN — ACETAMINOPHEN 975 MG: 325 TABLET, FILM COATED ORAL at 18:24

## 2021-12-09 RX ADMIN — BENZOCAINE, MENTHOL 1 LOZENGE: 15; 3.6 LOZENGE ORAL at 04:35

## 2021-12-09 RX ADMIN — HYDROMORPHONE HYDROCHLORIDE 0.2 MG: 0.2 INJECTION, SOLUTION INTRAMUSCULAR; INTRAVENOUS; SUBCUTANEOUS at 22:34

## 2021-12-09 RX ADMIN — ENOXAPARIN SODIUM 40 MG: 40 INJECTION SUBCUTANEOUS at 09:24

## 2021-12-09 RX ADMIN — ACETAMINOPHEN 975 MG: 325 TABLET, FILM COATED ORAL at 09:24

## 2021-12-09 ASSESSMENT — ACTIVITIES OF DAILY LIVING (ADL)
ADLS_ACUITY_SCORE: 8
ADLS_ACUITY_SCORE: 7
ADLS_ACUITY_SCORE: 8
ADLS_ACUITY_SCORE: 8

## 2021-12-09 NOTE — PLAN OF CARE
OT/7C: OT defer. Per chart review and discussion with PT, pt mobilizing well with SBA, mostly limited by activity tolerance at this time. Per PT, pt able to complete g/h at sink with SBA. Pt's daughter will be available to assist prn upon discharge. No acute OT needs at this time, PT to follow to address IP needs. OT will complete orders. Please re-consult if additional needs arise.

## 2021-12-09 NOTE — PLAN OF CARE
A&O, elevated BP and tachy with PM vitals (provider notified), afebrile. Pain managed with dilaudid PCA (0.2mg every 10 minutes), pt denying scheduled tylenol. Ambulating with SBA. Tolerating clear liquids w/o c/o nausea. Voiding spontaneously. Bowel sounds active, -flatus, -BM. Midline incision with shadow drainage, unchanged. Rash on back unchanged. IVF per MAR. Resting between cares.

## 2021-12-09 NOTE — PROGRESS NOTES
[id number] progress note. Care from: 07:00 - 15:00     BP (!) 153/90 (BP Location: Left arm, Patient Position: Sitting)   Pulse 96   Temp 98.5  F (36.9  C) (Oral)   Resp 18   Ht 1.829 m (6')   Wt 109.1 kg (240 lb 9.6 oz)   SpO2 97%   BMI 32.63 kg/m         Vitals: VSS     Neuro: WDL   o Pain: Intermittent pain, pt weaned off PCA, pain well controlled without. PRN options available.   o Mood/Behavior: Calm, cooperative     Activity: Up ambulating this AM, otherwise resting comfortably in bed. Pt's brother Brian visited this afternoon.     Cardiovascular: WDL ex intermittent htn     Respiratory: WDL, weaned off O2     GI & Nutrition: Continues on CL diet, no bowel movement. Bowel sounds active in all quadrants, and did start passing flatus this afternoon. Pt has a fair appetite and has been enjoying vegetable broth today  o Nausea: Denies   o Bowel Movement: None since surgery     : WDL     Skin, Wounds & LDAs: Skin pale, dry, intact throughout ex scrotal edema. Abdominal incision WDL with abdominal binder covering.     Events & Plan Updates: Continue to monitor post-operative status, manage pain as needed.

## 2021-12-09 NOTE — PLAN OF CARE
POD #2. Hypertensive but improving. Otherwise vitally stable on 1L nasal cannula. Clear liquid diet, tolerating well. Denies nausea. Pain managed with PCA dilaudid. Throat lozenge given for sore throat. Abdominal incision with primapore dressing. Shadowing marked and not extended. Voiding adequate urine output. -gas, -BM, + bowel sounds. PIV x2, Left saline locked, Right infusing IV maintenance fluids. Went for walk this AM. Stand by assist. Pt resting between cares. Continue plan of care.

## 2021-12-09 NOTE — PROGRESS NOTES
Bed: 17  Expected date:   Expected time:   Means of arrival:   Comments:  35F CP  124/78  98  18  96% Surgical Oncology Progress Note    Interval History:  No acute events overnight. Pain controlled. Denies nausea. No flatus or bowel movement yet. Burping some. Rash on back x3 years.     Physical Exam:   Temp:  [97.4  F (36.3  C)-98.6  F (37  C)] 98.5  F (36.9  C)  Pulse:  [89-98] 90  Resp:  [17-25] 18  BP: (123-164)/() 145/89  SpO2:  [94 %-97 %] 96 %  General: Alert, oriented, appears comfortable, NAD.  Respiratory: breathing non labored  Abdomen: Abdomen is soft, non-tender, mildly distended. Incision is clean, dry and intact.   Low extremities without edema.   Skin: 2 skin abrasions on upper back.    Data:   All laboratory and imaging data in the past 24 hours reviewed  I/O last 3 completed shifts:  In: 3075.25 [P.O.:480; I.V.:2595.25]  Out: 3155 [Urine:3155]  Recent Labs   Lab Test 12/08/21  0741 12/07/21  1641 12/06/21  1010   WBC 11.0 15.4* 8.2   HGB 11.5* 12.5* 13.9    224 257      Recent Labs   Lab Test 12/08/21  0742 12/07/21  1641 12/07/21  0705 12/06/21  1010    136  --  140   POTASSIUM 4.1 3.6  --  4.5   CHLORIDE 106 104  --  106   CO2 28 23  --  28   BUN 13 14  --  13   CR 0.88 0.75  --  0.91   ANIONGAP 4 9  --  6   NAMAN 8.6 8.2*  --  9.4   * 195* 107* 105*     Recent Labs   Lab Test 12/06/21  1010   PROTTOTAL 8.0   ALBUMIN 3.8   BILITOTAL 0.5   ALKPHOS 58   AST 25   ALT 30       Assessment and Plan:       Rod Olson is a 76 year old male with appendiceal carcinoma POD 2 s/p ex lap, right hemicolectomy, and resection of 6 cm pelvic tumor and right sided ureterolysis. Awaiting return of bowel function.      - Available for pain is scheduled acetaminophen, dilaudid PCA, Toradol prn  - Wean oxygen as able, IS, out of bed and ambulation.   - Clear liquid diet, TKO IVF  - Lovenox pending morning labs  - PT/OT     Seen with Chief resident who will discuss with Staff.     ABRAHAM Richmond-C   Surgical Oncology

## 2021-12-10 ENCOUNTER — APPOINTMENT (OUTPATIENT)
Dept: PHYSICAL THERAPY | Facility: CLINIC | Age: 76
DRG: 330 | End: 2021-12-10
Attending: SURGERY
Payer: MEDICARE

## 2021-12-10 LAB
ANION GAP SERPL CALCULATED.3IONS-SCNC: 5 MMOL/L (ref 3–14)
BUN SERPL-MCNC: 13 MG/DL (ref 7–30)
CALCIUM SERPL-MCNC: 8.5 MG/DL (ref 8.5–10.1)
CHLORIDE BLD-SCNC: 106 MMOL/L (ref 94–109)
CO2 SERPL-SCNC: 26 MMOL/L (ref 20–32)
CREAT SERPL-MCNC: 0.79 MG/DL (ref 0.66–1.25)
ERYTHROCYTE [DISTWIDTH] IN BLOOD BY AUTOMATED COUNT: 13.3 % (ref 10–15)
GFR SERPL CREATININE-BSD FRML MDRD: 87 ML/MIN/1.73M2
GLUCOSE BLD-MCNC: 107 MG/DL (ref 70–99)
HCT VFR BLD AUTO: 34.2 % (ref 40–53)
HGB BLD-MCNC: 11.1 G/DL (ref 13.3–17.7)
MAGNESIUM SERPL-MCNC: 2.3 MG/DL (ref 1.6–2.3)
MCH RBC QN AUTO: 28.6 PG (ref 26.5–33)
MCHC RBC AUTO-ENTMCNC: 32.5 G/DL (ref 31.5–36.5)
MCV RBC AUTO: 88 FL (ref 78–100)
PLATELET # BLD AUTO: 240 10E3/UL (ref 150–450)
POTASSIUM BLD-SCNC: 4 MMOL/L (ref 3.4–5.3)
RBC # BLD AUTO: 3.88 10E6/UL (ref 4.4–5.9)
SODIUM SERPL-SCNC: 137 MMOL/L (ref 133–144)
WBC # BLD AUTO: 7.9 10E3/UL (ref 4–11)

## 2021-12-10 PROCEDURE — 250N000013 HC RX MED GY IP 250 OP 250 PS 637

## 2021-12-10 PROCEDURE — 82310 ASSAY OF CALCIUM: CPT | Performed by: STUDENT IN AN ORGANIZED HEALTH CARE EDUCATION/TRAINING PROGRAM

## 2021-12-10 PROCEDURE — 36415 COLL VENOUS BLD VENIPUNCTURE: CPT | Performed by: STUDENT IN AN ORGANIZED HEALTH CARE EDUCATION/TRAINING PROGRAM

## 2021-12-10 PROCEDURE — 250N000011 HC RX IP 250 OP 636: Performed by: PHYSICIAN ASSISTANT

## 2021-12-10 PROCEDURE — 250N000013 HC RX MED GY IP 250 OP 250 PS 637: Performed by: STUDENT IN AN ORGANIZED HEALTH CARE EDUCATION/TRAINING PROGRAM

## 2021-12-10 PROCEDURE — 120N000002 HC R&B MED SURG/OB UMMC

## 2021-12-10 PROCEDURE — 83735 ASSAY OF MAGNESIUM: CPT | Performed by: STUDENT IN AN ORGANIZED HEALTH CARE EDUCATION/TRAINING PROGRAM

## 2021-12-10 PROCEDURE — 85027 COMPLETE CBC AUTOMATED: CPT | Performed by: STUDENT IN AN ORGANIZED HEALTH CARE EDUCATION/TRAINING PROGRAM

## 2021-12-10 PROCEDURE — 97116 GAIT TRAINING THERAPY: CPT | Mod: GP

## 2021-12-10 RX ORDER — POLYETHYLENE GLYCOL 3350 17 G/17G
17 POWDER, FOR SOLUTION ORAL DAILY
Status: DISCONTINUED | OUTPATIENT
Start: 2021-12-10 | End: 2021-12-10

## 2021-12-10 RX ORDER — POLYETHYLENE GLYCOL 3350 17 G/17G
17 POWDER, FOR SOLUTION ORAL DAILY
Qty: 510 G | Refills: 0 | Status: ON HOLD | OUTPATIENT
Start: 2021-12-10 | End: 2022-01-18

## 2021-12-10 RX ORDER — POLYETHYLENE GLYCOL 3350 17 G/17G
17 POWDER, FOR SOLUTION ORAL DAILY PRN
Status: DISCONTINUED | OUTPATIENT
Start: 2021-12-10 | End: 2021-12-11 | Stop reason: HOSPADM

## 2021-12-10 RX ORDER — BISACODYL 10 MG
10 SUPPOSITORY, RECTAL RECTAL DAILY PRN
Status: DISCONTINUED | OUTPATIENT
Start: 2021-12-10 | End: 2021-12-11 | Stop reason: HOSPADM

## 2021-12-10 RX ORDER — ACETAMINOPHEN 500 MG
1000 TABLET ORAL EVERY 8 HOURS
Qty: 50 TABLET | Refills: 0 | Status: SHIPPED | OUTPATIENT
Start: 2021-12-10 | End: 2022-07-19

## 2021-12-10 RX ORDER — OXYCODONE HYDROCHLORIDE 5 MG/1
5 TABLET ORAL EVERY 4 HOURS PRN
Status: DISCONTINUED | OUTPATIENT
Start: 2021-12-10 | End: 2021-12-11 | Stop reason: HOSPADM

## 2021-12-10 RX ORDER — ACETAMINOPHEN 500 MG
1000 TABLET ORAL EVERY 8 HOURS
Status: DISCONTINUED | OUTPATIENT
Start: 2021-12-10 | End: 2021-12-11 | Stop reason: HOSPADM

## 2021-12-10 RX ADMIN — ENOXAPARIN SODIUM 40 MG: 40 INJECTION SUBCUTANEOUS at 09:28

## 2021-12-10 RX ADMIN — CALCIUM CARBONATE (ANTACID) CHEW TAB 500 MG 500 MG: 500 CHEW TAB at 23:47

## 2021-12-10 RX ADMIN — BENZOCAINE, MENTHOL 1 LOZENGE: 15; 3.6 LOZENGE ORAL at 23:51

## 2021-12-10 RX ADMIN — ACETAMINOPHEN 975 MG: 325 TABLET, FILM COATED ORAL at 09:28

## 2021-12-10 ASSESSMENT — ACTIVITIES OF DAILY LIVING (ADL)
ADLS_ACUITY_SCORE: 8

## 2021-12-10 NOTE — PLAN OF CARE
Physical Therapy Discharge Summary    Reason for therapy discharge:    All goals and outcomes met, no further needs identified.    Progress towards therapy goal(s). See goals on Care Plan in [facility] electronic health record for goal details.  Goals met    Therapy recommendation(s):    Continue home exercise program.  Ambulation program.

## 2021-12-10 NOTE — PLAN OF CARE
BP (!) 141/91 (BP Location: Right arm)   Pulse 89   Temp 98.3  F (36.8  C) (Temporal)   Resp 18   Ht 1.829 m (6')   Wt 109.1 kg (240 lb 9.6 oz)   SpO2 95%   BMI 32.63 kg/m      Time: 0805-9927  Status:POD#4 s/p Exploratory laparotomy, right hemicolectomy, resection of pelvic tumor and right-sided ureterolysis for carcinoma of appendix.    Neuro/Pain:  A&Ox4, denied pain. Abdominal binder in place.   Activity: up ad job in the room.   Cardiac/vs: Stable BP, denied chest pain, afebrile.   Respiratory: room air sating > 92%, denied SOB at this time, but reported SOB at times at baseline. LS clear, but diminished to bases.   GI/: medium BM this shift, passing gas. Voiding spontaneously using bedside urinal.   Diet: regular, denied nausea.   Skin: midline abdominal incision staples and OSMAR without s/s of infection. Abdominal binder in place. Scrotum edema, no change and its not new.   LDAs: PIV saline locked.   Labs/Imaging: no lab/imaging this shift.   Change this shift: none   Plan: continue to monitor.

## 2021-12-10 NOTE — PROGRESS NOTES
Surgical Oncology Progress Note    Interval History:  NAEO. Pain adequately controlled. Has been ambulating. +gas, no bowel movement yet. No nausea or bloating. Patient reports persistent post-nasal drip which makes it difficult to take a deep breath, states is chronic issue pre-hospitalization.    Physical Exam:   Temp:  [98.2  F (36.8  C)-99.1  F (37.3  C)] 99.1  F (37.3  C)  Pulse:  [87-92] 87  Resp:  [18-20] 18  BP: (139-156)/(84-97) 139/91  SpO2:  [93 %-94 %] 93 %  General: NAD, sitting comfortable in bed  Respiratory: NLB on RA  Abdomen: Abdomen is soft, non-tender, non-distended. Incision w/ staples, c/d/i, no drainage.  No LE edema    Data:   All laboratory and imaging data in the past 24 hours reviewed  I/O last 3 completed shifts:  In: 603 [P.O.:600; I.V.:3]  Out: 1350 [Urine:1350]  Recent Labs   Lab Test 12/10/21  0727 12/09/21  0634 12/08/21  0741   WBC 7.9 9.2 11.0   HGB 11.1* 11.2* 11.5*    221 232      Recent Labs   Lab Test 12/10/21  0727 12/09/21  0634 12/08/21  0742    138 138   POTASSIUM 4.0 4.1 4.1   CHLORIDE 106 104 106   CO2 26 27 28   BUN 13 10 13   CR 0.79 0.81 0.88   ANIONGAP 5 7 4   NAMAN 8.5 8.5 8.6   * 135* 127*     Assessment and Plan:   Rod Olson is a 76 year old male with appendiceal carcinoma s/p ex lap, right hemicolectomy, and resection of 6 cm pelvic tumor and right sided ureterolysis on 12/7/21. He is doing well post-operatively. Passing gas, though still no bowel movement. He is tolerating regular diet.     - S Tylenol, PRN oxycodone, PRN Toradol  - Wean oxygen as able, IS, out of bed and ambulation.   - Regular diet + ensure  - PRN miralax and suppository  - Lovenox- will be discharged home on this, needs teaching  - PT/OT   - likely discharge tomorrow    Discussed w/ staff.    Keisha Pedersen MD   Surgical Oncology

## 2021-12-10 NOTE — PLAN OF CARE
Vitally stable on room air. Up ad job. Regular diet, tolerating well. Delvin nausea. Pain well controlled without medication. Abdominal incision with staples, open to air. Voiding adeqaute urine output. +gas, -BM. PIV x2 saline locked.  Pt resting between cares. Continue plan of care.

## 2021-12-10 NOTE — PLAN OF CARE
POD2. BPs 140-150s, but stable. All other VSS. Pt reports abdominal pain, controlled with IV Dil x1, Tylenol x1, & splinting. Incision with agueda OSMAR. Reg diet, fair appetite, BS hypoactive, pt reports passing gas. Pt up independently, amb halls x1, voiding adequately.

## 2021-12-10 NOTE — CONSULTS
"Care Management Follow Up    Length of Stay (days): 3  Expected Discharge Date: 12/10/2021     Concerns to be Addressed:  SW consult received for \"Switching services (SNAP, EBT, & housing assistance) from [facility] to [facility] - pt recently moved (Monday) - Daughter has been helping patient as of late, but feeling like she could use some help getting things switched. She said it's fine to call her for more info too.\"    Additional Information:  SW called Eowyn (Daughter) and left a vm asking for a call back to discuss the above.      If needs arise over the weekend, please have weekend SW paged.    MIKE Sesay, [facility]  Phone:  [phone]   Pager:  [phone]          "

## 2021-12-11 ENCOUNTER — HEALTH MAINTENANCE LETTER (OUTPATIENT)
Age: 76
End: 2021-12-11

## 2021-12-11 VITALS
TEMPERATURE: 97.4 F | HEIGHT: 72 IN | WEIGHT: 240.6 LBS | SYSTOLIC BLOOD PRESSURE: 158 MMHG | BODY MASS INDEX: 32.59 KG/M2 | HEART RATE: 78 BPM | RESPIRATION RATE: 20 BRPM | OXYGEN SATURATION: 93 % | DIASTOLIC BLOOD PRESSURE: 101 MMHG

## 2021-12-11 PROCEDURE — 250N000011 HC RX IP 250 OP 636: Performed by: PHYSICIAN ASSISTANT

## 2021-12-11 PROCEDURE — 250N000013 HC RX MED GY IP 250 OP 250 PS 637: Performed by: STUDENT IN AN ORGANIZED HEALTH CARE EDUCATION/TRAINING PROGRAM

## 2021-12-11 RX ORDER — OXYCODONE HYDROCHLORIDE 5 MG/1
5 TABLET ORAL EVERY 4 HOURS PRN
Qty: 10 TABLET | Refills: 0 | Status: SHIPPED | OUTPATIENT
Start: 2021-12-11 | End: 2022-01-03

## 2021-12-11 RX ADMIN — ENOXAPARIN SODIUM 40 MG: 40 INJECTION SUBCUTANEOUS at 08:42

## 2021-12-11 RX ADMIN — ACETAMINOPHEN 1000 MG: 500 TABLET, FILM COATED ORAL at 01:22

## 2021-12-11 ASSESSMENT — ACTIVITIES OF DAILY LIVING (ADL)
ADLS_ACUITY_SCORE: 8

## 2021-12-11 NOTE — PLAN OF CARE
Patient is ready to discharge, tolerating diet, minimal pain, up ad job. Demonstrated giving self lovenox injection. MD aware of elevated BP, patient will restart home lisinopril. Reviewed discharge instructions, will follow up as ordered.

## 2021-12-11 NOTE — DISCHARGE SUMMARY
[facility] Discharge Summary    Rod Olson MRN: [id number]   YOB: 1945 Age: 76 year old     Date of Admission:  12/7/2021  Date of Discharge::  12/11/2021  Admitting Physician:  Peng Donald MD  Discharge Physician:  Dr. Donald  Primary Care Physician:         [facility]          Discharge Diagnosis:   Appendiceal carcinoma         Procedures:   12/7/21- Exploratory laparotomy, right hemicolectomy, resection of 6 cm pelvic tumor and right-sided ureterolysis (Dr. Donald)          Consultations:   PHYSICAL THERAPY ADULT IP CONSULT  OCCUPATIONAL THERAPY ADULT IP CONSULT  SOCIAL WORK IP CONSULT          HPI (from H&P):   Mr. Olson reported an eight-month history of right lower quadrant pain with associated right scrotum enlargement. CT of abdomen and pelvis on 10/19/21 demonstrated two large fluid collections in his right hemipelvis one was 8.9 cm and the other was 17 cm.  The appendix was not visualized. Mr. Olson had an ultrasound of his testicle which showed a right hydrocele. He denied any associated nausea, vomiting, weight loss or change in bowel habits.  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  Also evaluated by Urology pre-operatively and plan for possible hydrocele aspiration and sclerosis of right hydrocele in Jan 2022.            Hospital Course:   The patient underwent listed procedure above. Intra-operatively called in Dr. Lopez for evaluation of right inguinal hernia, though no repair recommended due to right hemicolectomy. He tolerated procedure without complications.     Overall unremarkable hospitalization. Diet was advanced and pain controlled without IV pain medications and minimal PRNs. At the time of discharge, he was tolerating PO intake, ambulating, voiding spontaneously without difficulty. The patient was discharged home in stable and improved condition.    Social Work worked with patient to switch his services (SNAP, EBT,  housing assistance) to [facility] (from [facility]).          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         Pertinent Imaging Results:   N/A         Medications Prior to Admission:     Medications Prior to Admission   Medication Sig Dispense Refill Last Dose     lisinopril (ZESTRIL) 40 MG tablet Take 1 tablet (40 mg) by mouth daily (Patient taking differently: Take 40 mg by mouth every evening ) 90 tablet 3 12/6/2021 at 2000            Discharge Medications:     Current Discharge Medication List      START taking these medications    Details   acetaminophen (TYLENOL) 500 MG tablet Take 2 tablets (1,000 mg) by mouth every 8 hours  Qty: 50 tablet, Refills: 0    Associated Diagnoses: Low grade mucinous neoplasm of appendix      enoxaparin ANTICOAGULANT (LOVENOX) 40 MG/0.4ML syringe Inject 0.4 mLs (40 mg) Subcutaneous every 24 hours for 26 days  Qty: 10.4 mL, Refills: 0    Associated Diagnoses: Low grade mucinous neoplasm of appendix      oxyCODONE (ROXICODONE) 5 MG tablet Take 1 tablet (5 mg) by mouth every 4 hours as needed for moderate to severe pain  Qty: 10 tablet, Refills: 0    Associated Diagnoses: Low grade mucinous neoplasm of appendix      polyethylene glycol (MIRALAX) 17 GM/Dose powder Take 17 g by mouth daily  Qty: 510 g, Refills: 0    Associated Diagnoses: Low grade mucinous neoplasm of appendix         CONTINUE these medications which have NOT CHANGED    Details   lisinopril (ZESTRIL) 40 MG tablet Take 1 tablet (40 mg) by mouth daily  Qty: 90 tablet, Refills: 3    Associated Diagnoses: Essential hypertension                  Day of Discharge Physical Exam:   Temp:  [97.4  F (36.3  C)-98.3  F (36.8  C)] 97.4  F (36.3  C)  Pulse:  [78-94] 78  Resp:  [18-20] 20  BP: (136-158)/() 158/101  SpO2:  [93 %-95 %] 93 %  General: A&O, NAD, lying comfortably in bed  Chest: NLB on RA  Abd: Soft, ND, NT. Incision  closed w/ staples, c/d/i.  : R scrotal swelling- unchanged from pre-op  Extremities: WWP, No LE edema  Neuro: Moving all extremities spontaneously without apparent deficit         Discharge Instructions and Follow-Up:        Reason for your hospital stay    Right hemicolectomy, resection of 6 cm pelvic tumor.     Follow Up ([facility])    Follow up with Dr. Lopez in four weeks to discuss right sided inguinal hernia/scrotal edema.    Appointments on [facility] and/or [facility] (with [facility] or [facility] provider or service). Call [phone] if you haven't heard regarding these appointments within 7 days of discharge.     Discharge Instructions    If your travel plans upon discharge include prolonged periods of sitting (a lengthy car or plane ride), it is highly beneficial to get up and walk at least once per hour to help prevent swelling and blood clots.     You may shower after operation, let warm soapy water run over incision and pat dry. Do not submerge, soak, or scrub incision.    No heavy lifting/pushing/pulling (>15 lbs) for four weeks after surgery. Otherwise activity as tolerated, if it hurts, don't do it.     Resume prior to arrival diet.    Take incentive spirometer home for continued frequent use.    You will be discharged with narcotic pain medications. Please take them only as needed and do not operate a car or heavy machinery for 24 hours after taking them.  Narcotic pain medications like oxycodone are constipating. Please ensure that you are drinking adequate amounts of fluids and taking stool softeners while you are taking narcotics. Take Miralax as needed for constipation.   You are discharged home with Lovenox to help prevent blood clots. Please take this as directed.    Do not drive until you can with stand pressing the brake pedal quickly and fully without pain and you are not distracted by pain.     Call for fever greater than 101.5, chills, red skin around incision, drainage from incision,  "increased swelling from the incision, bleeding from the incision that is not controlled with light pressure, inability to tolerate diet,new nausea/vomiting or other new/worsening symptoms.   You may also call the surgical oncology nurse care coordinator from 8:00am-4:30pm SAMMIJonaROXANNE Carol Vu at [phone]. For after hours questions or concerns you can contact the on-call surgical oncology resident (nights and weekends [phone] and ask \"I would like to page the Surgical Oncology Resident on call.\"). In emergencies, call [phone]    Follow Up:  Follow up in clinic for staple removal within 1-2 weeks. Follow up with Dr. Donald on 12/27/21. You should be called to make an appointment within 3 business days. If you are not contacted, call [phone] to make an appointment  Follow up with Dr. Lopez in 4 weeks to for evaluation of right sided scrotal edema/hernia.       Condition at discharge: Stable    Seen w/ Dr. Donald.    Keisha Pedersen MD  Surgery, PGY3  [phone]  "

## 2021-12-13 ENCOUNTER — PATIENT OUTREACH (OUTPATIENT)
Dept: CARE COORDINATION | Facility: CLINIC | Age: 76
End: 2021-12-13
Payer: MEDICARE

## 2021-12-13 ENCOUNTER — PATIENT OUTREACH (OUTPATIENT)
Dept: SURGERY | Facility: CLINIC | Age: 76
End: 2021-12-13
Payer: MEDICARE

## 2021-12-13 DIAGNOSIS — Z71.89 OTHER SPECIFIED COUNSELING: ICD-10-CM

## 2021-12-13 LAB
PATH REPORT.COMMENTS IMP SPEC: ABNORMAL
PATH REPORT.COMMENTS IMP SPEC: YES
PATH REPORT.FINAL DX SPEC: ABNORMAL
PATH REPORT.GROSS SPEC: ABNORMAL
PATH REPORT.INTRAOP OBS SPEC DOC: ABNORMAL
PATH REPORT.MICROSCOPIC SPEC OTHER STN: ABNORMAL
PATH REPORT.RELEVANT HX SPEC: ABNORMAL
PATHOLOGY SYNOPTIC REPORT: ABNORMAL
PHOTO IMAGE: ABNORMAL

## 2021-12-13 PROCEDURE — 88307 TISSUE EXAM BY PATHOLOGIST: CPT | Mod: 26 | Performed by: PATHOLOGY

## 2021-12-13 PROCEDURE — 88332 PATH CONSLTJ SURG EA ADD BLK: CPT | Mod: 26 | Performed by: PATHOLOGY

## 2021-12-13 PROCEDURE — 88305 TISSUE EXAM BY PATHOLOGIST: CPT | Mod: 26 | Performed by: PATHOLOGY

## 2021-12-13 PROCEDURE — 88331 PATH CONSLTJ SURG 1 BLK 1SPC: CPT | Mod: 26 | Performed by: PATHOLOGY

## 2021-12-13 PROCEDURE — 88309 TISSUE EXAM BY PATHOLOGIST: CPT | Mod: 26 | Performed by: PATHOLOGY

## 2021-12-13 NOTE — PROGRESS NOTES
Clinic Care Coordination Contact  [facility]: Post-Discharge Note  SITUATION                                                      Admission:    Admission Date: 12/07/21   Reason for Admission: Right hemicolectomy, resection of 6 cm pelvic tumor.  Discharge:   Discharge Date: 12/11/21  Discharge Diagnosis: Right hemicolectomy, resection of 6 cm pelvic tumor.    BACKGROUND                                                      Mr. Olson reported an eight-month history of right lower quadrant pain with associated right scrotum enlargement. CT of abdomen and pelvis on 10/19/21 demonstrated two large fluid collections in his right hemipelvis one was 8.9 cm and the other was 17 cm.  The appendix was not visualized. Mr. Olson had an ultrasound of his testicle which showed a right hydrocele. He denied any associated nausea, vomiting, weight loss or change in bowel habits.  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  Also evaluated by Urology pre-operatively and plan for possible hydrocele aspiration and sclerosis of right hydrocele in Jan 2022.     ASSESSMENT      Enrollment  Primary Care Care Coordination Status: Declined    Discharge Assessment  How are you doing now that you are home?: Doing better, stiches hurt a little  How are your symptoms? (Red Flag symptoms escalate to triage hotline per guidelines): Unchanged  Do you feel your condition is stable enough to be safe at home until your provider visit?: Yes  Does the patient have their discharge instructions? : Yes  Does the patient have questions regarding their discharge instructions? : No  Were you started on any new medications or were there changes to any of your previous medications? : Yes  Does the patient have all of their medications?: Yes  Do you have questions regarding any of your medications? : No  Do you have all of your needed medical supplies or equipment (DME)?  (i.e. oxygen tank, CPAP, cane, etc.): Yes  Discharge follow-up  appointment scheduled within 14 calendar days? : Yes  Discharge Follow Up Appointment Date: 12/27/21  Discharge Follow Up Appointment Scheduled with?: Specialty Care Provider                  PLAN                                                      Outpatient Plan: Follow up in clinic for staple removal within 1-2 weeks. Follow up with Dr. Donald on 12/27/21. You should be  called to make an appointment within 3 business days. If you are not contacted, call [phone] to make  an appointment  Follow up with Dr. Lopez in 4 weeks to for evaluation of right sided scrotal edema/hernia.    Future Appointments   Date Time Provider Department Center   12/27/2021  5:50 PM Peng Donald MD [facility]   1/7/2022 10:00 AM Darnell Lopez MD [facility]   1/13/2022 10:40 AM Mo Fung MD [facility]         For any urgent concerns, please contact our 24 hour nurse triage line: [phone] ([phone])         JORGE Lerner  [phone]  [facility]

## 2021-12-16 ENCOUNTER — PRE VISIT (OUTPATIENT)
Dept: UROLOGY | Facility: CLINIC | Age: 76
End: 2021-12-16
Payer: MEDICARE

## 2021-12-16 NOTE — TELEPHONE ENCOUNTER
MEDICAL RECORDS REQUEST   [facility]  Urology Clinic  [address] [place] [address]  PHONE: [phone]  Fax: [phone]        FUTURE VISIT INFORMATION                                                   Rod SHEEHAN Whitney, : 1945 scheduled for future visit at [facility]    APPOINTMENT INFORMATION:    Date: 2022    Provider:  Dr. Mo Fung MD    Reason for Visit/Diagnosis: HYDROCELE, COMBO CASE WITH DR. BUTT      REFERRAL INFORMATION:    Referring provider:  Ariel Woodard MD    Specialty: N/A    Referring providers clinic:  N/A    Clinic contact number:  N/A    RECORDS REQUESTED FOR VISIT                                                     NOTES  STATUS/DETAILS   OFFICE NOTE from referring provider  no   OFFICE NOTE from other specialist   yes, 2021 -- Coretta Wiseman APRN CNP -- [facility]   more in EPIC   DISCHARGE SUMMARY from hospital   yes, 2021 -- Peng Butt MD   DISCHARGE REPORT from the ER   yes, 10/18/2021 -- Kody Cruz MD -- [facility] ED   OPERATIVE REPORT  no   MEDICATION LIST  yes   LABS     URINALYSIS (UA)  yes, 10/18/2021   URINE CYTOLOGY  no     PRE-VISIT CHECKLIST      Record collection complete Yes   Appointment appropriately scheduled           (right time/right provider) Yes   Joint diagnostic appointment coordinated correctly          (ensure right order & amount of time) Yes   MyChart activation Yes   Questionnaire complete If no, please explain pending

## 2021-12-20 ENCOUNTER — PATIENT OUTREACH (OUTPATIENT)
Dept: ONCOLOGY | Facility: CLINIC | Age: 76
End: 2021-12-20
Payer: MEDICARE

## 2021-12-20 NOTE — PROGRESS NOTES
Review of Oncology referral for patient with       12/7/21 Surgery with Dr Donald s/p R hemicolectomy & pelvic tumor resection, path confirms   Final Diagnosis   A.  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  MESENTERY, MESENTERIC MASS, EXCISION:  - Positive for mucinous adenocarcinoma    C.  RETROPERITONEAL TUMOR, EXCISION:  - Positive for mucinous adenocarcinoma    D.  RETROPERITONEAL MASS, EXCISION:  - Positive for mucinous adenocarcinoma     CT AP & CT head completed. Pt needs consult with GI Med Onc for further recommendations. Will call to offer appts to establish care.

## 2021-12-22 NOTE — TELEPHONE ENCOUNTER
RECORDS STATUS - ALL OTHER DIAGNOSIS      RECORDS RECEIVED FROM: [facility]   DATE RECEIVED: 12/22   NOTES STATUS DETAILS   OFFICE NOTE from referring provider Peng Lopez MD in  [facility]   OFFICE NOTE from medical oncologist     DISCHARGE SUMMARY from hospital [facility] 12/7/21, 12/3/21   DISCHARGE REPORT from the ER     OPERATIVE REPORT Epic 12/7/21: Hemicolectomy  12/3/21: Colonoscopy   MEDICATION LIST [facility] 12/11/21   CLINICAL TRIAL TREATMENTS TO DATE     LABS     PATHOLOGY REPORTS [facility] 12/7/21: Surg Path   ANYTHING RELATED TO DIAGNOSIS Epic 12/10/21   GENONOMIC TESTING     TYPE:     IMAGING (NEED IMAGES & REPORT)     CT SCANS PACS [facility]   MRI     MAMMO     ULTRASOUND PACS [facility]   PET

## 2021-12-27 ENCOUNTER — ONCOLOGY VISIT (OUTPATIENT)
Dept: ONCOLOGY | Facility: CLINIC | Age: 76
End: 2021-12-27
Attending: SURGERY
Payer: MEDICARE

## 2021-12-27 ENCOUNTER — PRE VISIT (OUTPATIENT)
Dept: UROLOGY | Facility: CLINIC | Age: 76
End: 2021-12-27
Payer: MEDICARE

## 2021-12-27 VITALS
SYSTOLIC BLOOD PRESSURE: 172 MMHG | HEIGHT: 72 IN | HEART RATE: 88 BPM | RESPIRATION RATE: 20 BRPM | OXYGEN SATURATION: 98 % | DIASTOLIC BLOOD PRESSURE: 90 MMHG | WEIGHT: 238.7 LBS | TEMPERATURE: 97.8 F | BODY MASS INDEX: 32.33 KG/M2

## 2021-12-27 DIAGNOSIS — C18.1 MALIGNANT NEOPLASM OF APPENDIX (H): Primary | ICD-10-CM

## 2021-12-27 PROCEDURE — G0463 HOSPITAL OUTPT CLINIC VISIT: HCPCS

## 2021-12-27 ASSESSMENT — MIFFLIN-ST. JEOR: SCORE: 1845.87

## 2021-12-27 ASSESSMENT — PAIN SCALES - GENERAL: PAINLEVEL: NO PAIN (1)

## 2021-12-27 NOTE — LETTER
12/27/2021         RE: Rod Olson  [address]  [place] [address]        Dear Colleague,    Thank you for referring your patient, Rod Olson, to the [facility]. Please see a copy of my visit note below.    HISTORY OF PRESENT ILLNESS:  Today, I had a postoperative in-person visit with Rod Olson.  He underwent an attempted cytoreductive surgery on 12/07/2021 with  an exploratory laparotomy, right hemicolectomy, partial resection of pelvic peritoneal metastasis.  His disease was not completely resectable.  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  He had 0 of 16 lymph nodes that were positive.  He has been doing relatively well since surgery.  He has been eating well and his appetite is not as good as it used to be.  His bowel habits have been regular.  He is not having any pain.    PHYSICAL EXAMINATION:  His incision is healing well with no evidence of infection.    IMPRESSION:  Postoperative check.    PLAN:  He has got an appointment next week with Oncology, an appointment later this week with Urology for his swollen testicle.  I will see him in the future if any problems arise.      Peng Donald MD

## 2021-12-27 NOTE — PROGRESS NOTES
HISTORY OF PRESENT ILLNESS:  Today, I had a postoperative in-person visit with Rod Olson.  He underwent an attempted cytoreductive surgery on 12/07/2021 with  an exploratory laparotomy, right hemicolectomy, partial resection of pelvic peritoneal metastasis.  His disease was not completely resectable.  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  He had 0 of 16 lymph nodes that were positive.  He has been doing relatively well since surgery.  He has been eating well and his appetite is not as good as it used to be.  His bowel habits have been regular.  He is not having any pain.    PHYSICAL EXAMINATION:  His incision is healing well with no evidence of infection.    IMPRESSION:  Postoperative check.    PLAN:  He has got an appointment next week with Oncology, an appointment later this week with Urology for his swollen testicle.  I will see him in the future if any problems arise.

## 2021-12-27 NOTE — NURSING NOTE
"Oncology Rooming Note    December 27, 2021 5:35 PM   Rod Olson is a 76 year old male who presents for:    Chief Complaint   Patient presents with     Oncology Clinic Visit     LOW GRADE MCINOUS NEOPLASM OF APPENDIX; POST OP     Initial Vitals: BP (!) 172/90 (BP Location: Right arm, Patient Position: Sitting, Cuff Size: Adult Large)   Pulse 88   Temp 97.8  F (36.6  C) (Oral)   Resp 20   Ht 1.829 m (6' 0.01\")   Wt 108.3 kg (238 lb 11.2 oz)   SpO2 98%   BMI 32.37 kg/m   Estimated body mass index is 32.37 kg/m  as calculated from the following:    Height as of this encounter: 1.829 m (6' 0.01\").    Weight as of this encounter: 108.3 kg (238 lb 11.2 oz). Body surface area is 2.35 meters squared.  No Pain (1) Comment: Data Unavailable   No LMP for male patient.  Allergies reviewed: Yes  Medications reviewed: Yes    Medications: Medication refills not needed today.  Pharmacy name entered into [facility]:    [id number] - [place], [place] - [address] AT [facility][id number] - [place], [place] - [address] AT [address]    Clinical concerns: None.        Verona Woodson [facility]            "

## 2021-12-27 NOTE — TELEPHONE ENCOUNTER
Reason for visit: Consult     Relevant information: hydrocele    Records/imaging/labs/orders: in EPIC    Pt called: no    At Rooming: normal

## 2021-12-28 ENCOUNTER — E-VISIT (OUTPATIENT)
Dept: URGENT CARE | Facility: URGENT CARE | Age: 76
End: 2021-12-28
Payer: MEDICARE

## 2021-12-28 DIAGNOSIS — B96.89 ACUTE BACTERIAL SINUSITIS: Primary | ICD-10-CM

## 2021-12-28 DIAGNOSIS — J01.90 ACUTE BACTERIAL SINUSITIS: Primary | ICD-10-CM

## 2021-12-28 PROCEDURE — 99207 PR NON-BILLABLE SERV PER CHARTING: CPT | Performed by: FAMILY MEDICINE

## 2021-12-29 NOTE — PATIENT INSTRUCTIONS
Dear oRd Olson    After reviewing your responses, I've been able to diagnose you with?a sinus infection caused by bacteria.?     Based on your responses and diagnosis, I have prescribed Augmentin to treat your symptoms. I have sent this to your pharmacy.?     It is also important to stay well hydrated, get lots of rest and take over-the-counter decongestants,?tylenol?or ibuprofen if you?are able to?take those medications per your primary care provider to help relieve discomfort.?     It is important that you take?all of?your prescribed medication even if your symptoms are improving after a few doses.? Taking?all of?your medicine helps prevent the symptoms from returning.?     If your symptoms worsen, you develop severe headache, vomiting, high fever (>102), or are not improving in 7 days, please contact your primary care provider for an appointment or visit any of our convenient Walk-in Care or Urgent Care Centers to be seen which can be found on our website?here.?     Thanks again for choosing?us?as your health care partner,?   ?  Guzman Damon, DO?

## 2022-01-01 ENCOUNTER — VIRTUAL VISIT (OUTPATIENT)
Dept: ONCOLOGY | Facility: CLINIC | Age: 77
End: 2022-01-01
Attending: STUDENT IN AN ORGANIZED HEALTH CARE EDUCATION/TRAINING PROGRAM
Payer: MEDICARE

## 2022-01-01 ENCOUNTER — TELEPHONE (OUTPATIENT)
Dept: ONCOLOGY | Facility: CLINIC | Age: 77
End: 2022-01-01

## 2022-01-01 ENCOUNTER — PATIENT OUTREACH (OUTPATIENT)
Dept: ONCOLOGY | Facility: CLINIC | Age: 77
End: 2022-01-01

## 2022-01-01 ENCOUNTER — LAB (OUTPATIENT)
Dept: LAB | Facility: CLINIC | Age: 77
End: 2022-01-01
Attending: STUDENT IN AN ORGANIZED HEALTH CARE EDUCATION/TRAINING PROGRAM
Payer: MEDICARE

## 2022-01-01 ENCOUNTER — LAB (OUTPATIENT)
Dept: LAB | Facility: CLINIC | Age: 77
End: 2022-01-01
Payer: MEDICARE

## 2022-01-01 ENCOUNTER — DOCUMENTATION ONLY (OUTPATIENT)
Dept: ONCOLOGY | Facility: CLINIC | Age: 77
End: 2022-01-01

## 2022-01-01 ENCOUNTER — HOSPITAL ENCOUNTER (OUTPATIENT)
Dept: CT IMAGING | Facility: CLINIC | Age: 77
Discharge: HOME | End: 2022-10-21
Attending: STUDENT IN AN ORGANIZED HEALTH CARE EDUCATION/TRAINING PROGRAM | Admitting: STUDENT IN AN ORGANIZED HEALTH CARE EDUCATION/TRAINING PROGRAM
Payer: MEDICARE

## 2022-01-01 VITALS
WEIGHT: 231 LBS | OXYGEN SATURATION: 99 % | SYSTOLIC BLOOD PRESSURE: 126 MMHG | HEART RATE: 87 BPM | TEMPERATURE: 98.1 F | DIASTOLIC BLOOD PRESSURE: 92 MMHG | RESPIRATION RATE: 16 BRPM | BODY MASS INDEX: 32.22 KG/M2

## 2022-01-01 DIAGNOSIS — C18.1 MALIGNANT NEOPLASM OF APPENDIX (H): Primary | ICD-10-CM

## 2022-01-01 DIAGNOSIS — C78.7 MALIGNANT NEOPLASM OF COLON METASTATIC TO LIVER (H): ICD-10-CM

## 2022-01-01 DIAGNOSIS — C18.1 MALIGNANT NEOPLASM OF APPENDIX (H): ICD-10-CM

## 2022-01-01 DIAGNOSIS — C18.9 MALIGNANT NEOPLASM OF COLON METASTATIC TO LIVER (H): ICD-10-CM

## 2022-01-01 LAB
ALBUMIN SERPL BCG-MCNC: 4.2 G/DL (ref 3.5–5.2)
ALBUMIN SERPL BCG-MCNC: 4.2 G/DL (ref 3.5–5.2)
ALBUMIN SERPL BCG-MCNC: 4.4 G/DL (ref 3.5–5.2)
ALP SERPL-CCNC: 76 U/L (ref 40–129)
ALP SERPL-CCNC: 81 U/L (ref 40–129)
ALP SERPL-CCNC: 87 U/L (ref 40–129)
ALT SERPL W P-5'-P-CCNC: 18 U/L (ref 10–50)
ALT SERPL W P-5'-P-CCNC: 21 U/L (ref 10–50)
ALT SERPL W P-5'-P-CCNC: 21 U/L (ref 10–50)
ANION GAP SERPL CALCULATED.3IONS-SCNC: 11 MMOL/L (ref 7–15)
ANION GAP SERPL CALCULATED.3IONS-SCNC: 7 MMOL/L (ref 7–15)
ANION GAP SERPL CALCULATED.3IONS-SCNC: 9 MMOL/L (ref 7–15)
AST SERPL W P-5'-P-CCNC: 25 U/L (ref 10–50)
BASOPHILS # BLD AUTO: 0 10E3/UL (ref 0–0.2)
BASOPHILS NFR BLD AUTO: 0 %
BILIRUB SERPL-MCNC: 0.5 MG/DL
BILIRUB SERPL-MCNC: 0.7 MG/DL
BILIRUB SERPL-MCNC: 0.7 MG/DL
BUN SERPL-MCNC: 16.2 MG/DL (ref 8–23)
BUN SERPL-MCNC: 18.2 MG/DL (ref 8–23)
BUN SERPL-MCNC: 19.7 MG/DL (ref 8–23)
CALCIUM SERPL-MCNC: 9.4 MG/DL (ref 8.8–10.2)
CALCIUM SERPL-MCNC: 9.5 MG/DL (ref 8.8–10.2)
CALCIUM SERPL-MCNC: 9.5 MG/DL (ref 8.8–10.2)
CEA SERPL-MCNC: 3.3 NG/ML
CEA SERPL-MCNC: 3.4 NG/ML
CEA SERPL-MCNC: 3.6 NG/ML
CHLORIDE SERPL-SCNC: 102 MMOL/L (ref 98–107)
CHLORIDE SERPL-SCNC: 103 MMOL/L (ref 98–107)
CHLORIDE SERPL-SCNC: 104 MMOL/L (ref 98–107)
CREAT BLD-MCNC: 0.9 MG/DL (ref 0.7–1.3)
CREAT SERPL-MCNC: 0.86 MG/DL (ref 0.67–1.17)
CREAT SERPL-MCNC: 0.9 MG/DL (ref 0.67–1.17)
CREAT SERPL-MCNC: 0.91 MG/DL (ref 0.67–1.17)
DEPRECATED HCO3 PLAS-SCNC: 24 MMOL/L (ref 22–29)
DEPRECATED HCO3 PLAS-SCNC: 27 MMOL/L (ref 22–29)
DEPRECATED HCO3 PLAS-SCNC: 29 MMOL/L (ref 22–29)
EOSINOPHIL # BLD AUTO: 0.1 10E3/UL (ref 0–0.7)
EOSINOPHIL NFR BLD AUTO: 1 %
EOSINOPHIL NFR BLD AUTO: 1 %
EOSINOPHIL NFR BLD AUTO: 2 %
ERYTHROCYTE [DISTWIDTH] IN BLOOD BY AUTOMATED COUNT: 16 % (ref 10–15)
ERYTHROCYTE [DISTWIDTH] IN BLOOD BY AUTOMATED COUNT: 16.2 % (ref 10–15)
ERYTHROCYTE [DISTWIDTH] IN BLOOD BY AUTOMATED COUNT: 16.9 % (ref 10–15)
GFR SERPL CREATININE-BSD FRML MDRD: 86 ML/MIN/1.73M2
GFR SERPL CREATININE-BSD FRML MDRD: 87 ML/MIN/1.73M2
GFR SERPL CREATININE-BSD FRML MDRD: 89 ML/MIN/1.73M2
GFR SERPL CREATININE-BSD FRML MDRD: >60 ML/MIN/1.73M2
GLUCOSE SERPL-MCNC: 105 MG/DL (ref 70–99)
GLUCOSE SERPL-MCNC: 107 MG/DL (ref 70–99)
GLUCOSE SERPL-MCNC: 95 MG/DL (ref 70–99)
HCT VFR BLD AUTO: 38 % (ref 40–53)
HCT VFR BLD AUTO: 38.7 % (ref 40–53)
HCT VFR BLD AUTO: 39.1 % (ref 40–53)
HGB BLD-MCNC: 13 G/DL (ref 13.3–17.7)
HGB BLD-MCNC: 13.2 G/DL (ref 13.3–17.7)
HGB BLD-MCNC: 13.2 G/DL (ref 13.3–17.7)
IMM GRANULOCYTES # BLD: 0 10E3/UL
IMM GRANULOCYTES NFR BLD: 1 %
LYMPHOCYTES # BLD AUTO: 1.1 10E3/UL (ref 0.8–5.3)
LYMPHOCYTES # BLD AUTO: 1.2 10E3/UL (ref 0.8–5.3)
LYMPHOCYTES # BLD AUTO: 1.2 10E3/UL (ref 0.8–5.3)
LYMPHOCYTES NFR BLD AUTO: 18 %
LYMPHOCYTES NFR BLD AUTO: 20 %
LYMPHOCYTES NFR BLD AUTO: 23 %
MAGNESIUM SERPL-MCNC: 2.1 MG/DL (ref 1.7–2.3)
MCH RBC QN AUTO: 32.3 PG (ref 26.5–33)
MCH RBC QN AUTO: 32.4 PG (ref 26.5–33)
MCH RBC QN AUTO: 33.2 PG (ref 26.5–33)
MCHC RBC AUTO-ENTMCNC: 33.6 G/DL (ref 31.5–36.5)
MCHC RBC AUTO-ENTMCNC: 33.8 G/DL (ref 31.5–36.5)
MCHC RBC AUTO-ENTMCNC: 34.7 G/DL (ref 31.5–36.5)
MCV RBC AUTO: 93 FL (ref 78–100)
MCV RBC AUTO: 96 FL (ref 78–100)
MCV RBC AUTO: 99 FL (ref 78–100)
MONOCYTES # BLD AUTO: 0.7 10E3/UL (ref 0–1.3)
MONOCYTES # BLD AUTO: 0.8 10E3/UL (ref 0–1.3)
MONOCYTES # BLD AUTO: 0.8 10E3/UL (ref 0–1.3)
MONOCYTES NFR BLD AUTO: 12 %
MONOCYTES NFR BLD AUTO: 12 %
MONOCYTES NFR BLD AUTO: 15 %
NEUTROPHILS # BLD AUTO: 3.2 10E3/UL (ref 1.6–8.3)
NEUTROPHILS # BLD AUTO: 3.7 10E3/UL (ref 1.6–8.3)
NEUTROPHILS # BLD AUTO: 4.3 10E3/UL (ref 1.6–8.3)
NEUTROPHILS NFR BLD AUTO: 61 %
NEUTROPHILS NFR BLD AUTO: 66 %
NEUTROPHILS NFR BLD AUTO: 68 %
NRBC # BLD AUTO: 0 10E3/UL
NRBC BLD AUTO-RTO: 0 /100
PLATELET # BLD AUTO: 149 10E3/UL (ref 150–450)
PLATELET # BLD AUTO: 151 10E3/UL (ref 150–450)
PLATELET # BLD AUTO: 166 10E3/UL (ref 150–450)
POTASSIUM SERPL-SCNC: 4.1 MMOL/L (ref 3.4–5.3)
POTASSIUM SERPL-SCNC: 4.3 MMOL/L (ref 3.4–5.3)
POTASSIUM SERPL-SCNC: 4.4 MMOL/L (ref 3.4–5.3)
PROT SERPL-MCNC: 7 G/DL (ref 6.4–8.3)
PROT SERPL-MCNC: 7.2 G/DL (ref 6.4–8.3)
PROT SERPL-MCNC: 7.4 G/DL (ref 6.4–8.3)
RBC # BLD AUTO: 3.97 10E6/UL (ref 4.4–5.9)
RBC # BLD AUTO: 4.02 10E6/UL (ref 4.4–5.9)
RBC # BLD AUTO: 4.08 10E6/UL (ref 4.4–5.9)
SODIUM SERPL-SCNC: 138 MMOL/L (ref 136–145)
SODIUM SERPL-SCNC: 139 MMOL/L (ref 136–145)
SODIUM SERPL-SCNC: 139 MMOL/L (ref 136–145)
WBC # BLD AUTO: 5.2 10E3/UL (ref 4–11)
WBC # BLD AUTO: 5.6 10E3/UL (ref 4–11)
WBC # BLD AUTO: 6.3 10E3/UL (ref 4–11)

## 2022-01-01 PROCEDURE — 82378 CARCINOEMBRYONIC ANTIGEN: CPT

## 2022-01-01 PROCEDURE — 250N000009 HC RX 250: Performed by: RADIOLOGY

## 2022-01-01 PROCEDURE — 85004 AUTOMATED DIFF WBC COUNT: CPT | Performed by: STUDENT IN AN ORGANIZED HEALTH CARE EDUCATION/TRAINING PROGRAM

## 2022-01-01 PROCEDURE — G0463 HOSPITAL OUTPT CLINIC VISIT: HCPCS

## 2022-01-01 PROCEDURE — 82565 ASSAY OF CREATININE: CPT

## 2022-01-01 PROCEDURE — 36415 COLL VENOUS BLD VENIPUNCTURE: CPT | Performed by: STUDENT IN AN ORGANIZED HEALTH CARE EDUCATION/TRAINING PROGRAM

## 2022-01-01 PROCEDURE — 74177 CT ABD & PELVIS W/CONTRAST: CPT | Mod: MG

## 2022-01-01 PROCEDURE — 83735 ASSAY OF MAGNESIUM: CPT | Performed by: STUDENT IN AN ORGANIZED HEALTH CARE EDUCATION/TRAINING PROGRAM

## 2022-01-01 PROCEDURE — G1010 CDSM STANSON: HCPCS

## 2022-01-01 PROCEDURE — 80053 COMPREHEN METABOLIC PANEL: CPT | Performed by: STUDENT IN AN ORGANIZED HEALTH CARE EDUCATION/TRAINING PROGRAM

## 2022-01-01 PROCEDURE — 85025 COMPLETE CBC W/AUTO DIFF WBC: CPT

## 2022-01-01 PROCEDURE — G0463 HOSPITAL OUTPT CLINIC VISIT: HCPCS | Mod: PN,GT | Performed by: PHYSICIAN ASSISTANT

## 2022-01-01 PROCEDURE — 250N000011 HC RX IP 250 OP 636: Performed by: RADIOLOGY

## 2022-01-01 PROCEDURE — 99215 OFFICE O/P EST HI 40 MIN: CPT | Mod: 95 | Performed by: PHYSICIAN ASSISTANT

## 2022-01-01 PROCEDURE — 99214 OFFICE O/P EST MOD 30 MIN: CPT | Mod: 95 | Performed by: PHYSICIAN ASSISTANT

## 2022-01-01 PROCEDURE — 85025 COMPLETE CBC W/AUTO DIFF WBC: CPT | Performed by: STUDENT IN AN ORGANIZED HEALTH CARE EDUCATION/TRAINING PROGRAM

## 2022-01-01 PROCEDURE — 82378 CARCINOEMBRYONIC ANTIGEN: CPT | Performed by: STUDENT IN AN ORGANIZED HEALTH CARE EDUCATION/TRAINING PROGRAM

## 2022-01-01 PROCEDURE — 99215 OFFICE O/P EST HI 40 MIN: CPT | Performed by: STUDENT IN AN ORGANIZED HEALTH CARE EDUCATION/TRAINING PROGRAM

## 2022-01-01 PROCEDURE — 36415 COLL VENOUS BLD VENIPUNCTURE: CPT

## 2022-01-01 PROCEDURE — 80053 COMPREHEN METABOLIC PANEL: CPT

## 2022-01-01 RX ORDER — LORAZEPAM 2 MG/ML
0.5 INJECTION INTRAMUSCULAR EVERY 4 HOURS PRN
Status: CANCELLED | OUTPATIENT
Start: 2022-01-01

## 2022-01-01 RX ORDER — ONDANSETRON 2 MG/ML
8 INJECTION INTRAMUSCULAR; INTRAVENOUS ONCE
Status: CANCELLED | OUTPATIENT
Start: 2022-01-01

## 2022-01-01 RX ORDER — METHYLPREDNISOLONE SODIUM SUCCINATE 125 MG/2ML
125 INJECTION, POWDER, LYOPHILIZED, FOR SOLUTION INTRAMUSCULAR; INTRAVENOUS
Status: CANCELLED
Start: 2022-01-01

## 2022-01-01 RX ORDER — EPINEPHRINE 1 MG/ML
0.3 INJECTION, SOLUTION INTRAMUSCULAR; SUBCUTANEOUS EVERY 5 MIN PRN
Status: CANCELLED | OUTPATIENT
Start: 2022-01-01

## 2022-01-01 RX ORDER — DIPHENHYDRAMINE HYDROCHLORIDE 50 MG/ML
50 INJECTION INTRAMUSCULAR; INTRAVENOUS
Status: CANCELLED
Start: 2022-01-01

## 2022-01-01 RX ORDER — ALBUTEROL SULFATE 90 UG/1
1-2 AEROSOL, METERED RESPIRATORY (INHALATION)
Status: CANCELLED
Start: 2022-01-01

## 2022-01-01 RX ORDER — NALOXONE HYDROCHLORIDE 0.4 MG/ML
0.2 INJECTION, SOLUTION INTRAMUSCULAR; INTRAVENOUS; SUBCUTANEOUS
Status: CANCELLED | OUTPATIENT
Start: 2022-01-01

## 2022-01-01 RX ORDER — CAPECITABINE 500 MG/1
TABLET, FILM COATED ORAL
Qty: 112 TABLET | Refills: 0 | Status: SHIPPED | OUTPATIENT
Start: 2022-01-01 | End: 2023-01-01

## 2022-01-01 RX ORDER — ALBUTEROL SULFATE 0.83 MG/ML
2.5 SOLUTION RESPIRATORY (INHALATION)
Status: CANCELLED | OUTPATIENT
Start: 2022-01-01

## 2022-01-01 RX ORDER — MEPERIDINE HYDROCHLORIDE 25 MG/ML
25 INJECTION INTRAMUSCULAR; INTRAVENOUS; SUBCUTANEOUS EVERY 30 MIN PRN
Status: CANCELLED | OUTPATIENT
Start: 2022-01-01

## 2022-01-01 RX ORDER — IOPAMIDOL 755 MG/ML
100 INJECTION, SOLUTION INTRAVASCULAR ONCE
Status: COMPLETED | OUTPATIENT
Start: 2022-01-01 | End: 2022-01-01

## 2022-01-01 RX ADMIN — SODIUM CHLORIDE 67 ML: 9 INJECTION, SOLUTION INTRAVENOUS at 16:23

## 2022-01-01 RX ADMIN — IOPAMIDOL 100 ML: 755 INJECTION, SOLUTION INTRAVENOUS at 16:23

## 2022-01-01 ASSESSMENT — PAIN SCALES - GENERAL: PAINLEVEL: NO PAIN (0)

## 2022-01-03 ENCOUNTER — LAB (OUTPATIENT)
Dept: LAB | Facility: CLINIC | Age: 77
End: 2022-01-03
Attending: STUDENT IN AN ORGANIZED HEALTH CARE EDUCATION/TRAINING PROGRAM
Payer: MEDICARE

## 2022-01-03 ENCOUNTER — PRE VISIT (OUTPATIENT)
Dept: ONCOLOGY | Facility: CLINIC | Age: 77
End: 2022-01-03
Payer: MEDICARE

## 2022-01-03 ENCOUNTER — ONCOLOGY VISIT (OUTPATIENT)
Dept: ONCOLOGY | Facility: CLINIC | Age: 77
End: 2022-01-03
Attending: SURGERY
Payer: MEDICARE

## 2022-01-03 VITALS
DIASTOLIC BLOOD PRESSURE: 82 MMHG | HEIGHT: 72 IN | SYSTOLIC BLOOD PRESSURE: 120 MMHG | HEART RATE: 94 BPM | OXYGEN SATURATION: 97 % | WEIGHT: 236.5 LBS | BODY MASS INDEX: 32.03 KG/M2

## 2022-01-03 DIAGNOSIS — C78.89 SECONDARY MALIGNANT NEOPLASM OF OTHER DIGESTIVE ORGANS (H): ICD-10-CM

## 2022-01-03 DIAGNOSIS — C78.7 MALIGNANT NEOPLASM OF COLON METASTATIC TO LIVER (H): ICD-10-CM

## 2022-01-03 DIAGNOSIS — C18.9 MALIGNANT NEOPLASM OF COLON METASTATIC TO LIVER (H): ICD-10-CM

## 2022-01-03 DIAGNOSIS — C18.1 MALIGNANT NEOPLASM OF APPENDIX (H): ICD-10-CM

## 2022-01-03 DIAGNOSIS — R39.11 URINARY HESITANCY: ICD-10-CM

## 2022-01-03 LAB
ALBUMIN SERPL-MCNC: 3.9 G/DL (ref 3.4–5)
ALP SERPL-CCNC: 51 U/L (ref 40–150)
ALT SERPL W P-5'-P-CCNC: 37 U/L (ref 0–70)
ANION GAP SERPL CALCULATED.3IONS-SCNC: 8 MMOL/L (ref 3–14)
AST SERPL W P-5'-P-CCNC: 21 U/L (ref 0–45)
BASOPHILS # BLD AUTO: 0 10E3/UL (ref 0–0.2)
BASOPHILS NFR BLD AUTO: 0 %
BILIRUB SERPL-MCNC: 0.4 MG/DL (ref 0.2–1.3)
BUN SERPL-MCNC: 16 MG/DL (ref 7–30)
CALCIUM SERPL-MCNC: 9.2 MG/DL (ref 8.5–10.1)
CEA SERPL-MCNC: 10.9 UG/L (ref 0–2.5)
CHLORIDE BLD-SCNC: 105 MMOL/L (ref 94–109)
CO2 SERPL-SCNC: 27 MMOL/L (ref 20–32)
CREAT SERPL-MCNC: 0.88 MG/DL (ref 0.66–1.25)
EOSINOPHIL # BLD AUTO: 0.1 10E3/UL (ref 0–0.7)
EOSINOPHIL NFR BLD AUTO: 1 %
ERYTHROCYTE [DISTWIDTH] IN BLOOD BY AUTOMATED COUNT: 13.3 % (ref 10–15)
GFR SERPL CREATININE-BSD FRML MDRD: 89 ML/MIN/1.73M2
GLUCOSE BLD-MCNC: 98 MG/DL (ref 70–99)
HCT VFR BLD AUTO: 39.6 % (ref 40–53)
HGB BLD-MCNC: 12.8 G/DL (ref 13.3–17.7)
IMM GRANULOCYTES # BLD: 0 10E3/UL
IMM GRANULOCYTES NFR BLD: 0 %
INR PPP: 1.02 (ref 0.85–1.15)
LYMPHOCYTES # BLD AUTO: 1.6 10E3/UL (ref 0.8–5.3)
LYMPHOCYTES NFR BLD AUTO: 25 %
MCH RBC QN AUTO: 27.6 PG (ref 26.5–33)
MCHC RBC AUTO-ENTMCNC: 32.3 G/DL (ref 31.5–36.5)
MCV RBC AUTO: 86 FL (ref 78–100)
MONOCYTES # BLD AUTO: 0.5 10E3/UL (ref 0–1.3)
MONOCYTES NFR BLD AUTO: 8 %
NEUTROPHILS # BLD AUTO: 4.2 10E3/UL (ref 1.6–8.3)
NEUTROPHILS NFR BLD AUTO: 66 %
NRBC # BLD AUTO: 0 10E3/UL
NRBC BLD AUTO-RTO: 0 /100
PLATELET # BLD AUTO: 241 10E3/UL (ref 150–450)
POTASSIUM BLD-SCNC: 4.2 MMOL/L (ref 3.4–5.3)
PROT SERPL-MCNC: 7.7 G/DL (ref 6.8–8.8)
PSA SERPL-MCNC: 5.45 UG/L (ref 0–4)
RBC # BLD AUTO: 4.63 10E6/UL (ref 4.4–5.9)
SODIUM SERPL-SCNC: 140 MMOL/L (ref 133–144)
WBC # BLD AUTO: 6.4 10E3/UL (ref 4–11)

## 2022-01-03 PROCEDURE — 85610 PROTHROMBIN TIME: CPT | Performed by: PATHOLOGY

## 2022-01-03 PROCEDURE — 99205 OFFICE O/P NEW HI 60 MIN: CPT | Performed by: STUDENT IN AN ORGANIZED HEALTH CARE EDUCATION/TRAINING PROGRAM

## 2022-01-03 PROCEDURE — G0463 HOSPITAL OUTPT CLINIC VISIT: HCPCS

## 2022-01-03 PROCEDURE — 85025 COMPLETE CBC W/AUTO DIFF WBC: CPT | Performed by: PATHOLOGY

## 2022-01-03 PROCEDURE — 86301 IMMUNOASSAY TUMOR CA 19-9: CPT | Mod: 90 | Performed by: PATHOLOGY

## 2022-01-03 PROCEDURE — 84153 ASSAY OF PSA TOTAL: CPT | Performed by: PATHOLOGY

## 2022-01-03 PROCEDURE — 36415 COLL VENOUS BLD VENIPUNCTURE: CPT | Performed by: PATHOLOGY

## 2022-01-03 PROCEDURE — 99000 SPECIMEN HANDLING OFFICE-LAB: CPT | Performed by: PATHOLOGY

## 2022-01-03 PROCEDURE — 82378 CARCINOEMBRYONIC ANTIGEN: CPT | Performed by: STUDENT IN AN ORGANIZED HEALTH CARE EDUCATION/TRAINING PROGRAM

## 2022-01-03 PROCEDURE — 80053 COMPREHEN METABOLIC PANEL: CPT | Performed by: PATHOLOGY

## 2022-01-03 ASSESSMENT — PAIN SCALES - GENERAL: PAINLEVEL: NO PAIN (1)

## 2022-01-03 ASSESSMENT — MIFFLIN-ST. JEOR: SCORE: 1835.76

## 2022-01-03 NOTE — PROGRESS NOTES
[facility] - Medical Oncology New Outpatient Consult Note  1/3/2022    Patient Identifiers     Name: Rod Olson  Preferred Address: [place]  Preferred Language: English  : 1945  Gender: male    Assessment and Plan     Mr. 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    Following multi-disciplinary discussion, we will plan for palliative chemotherapy without further surgical or intraperitoneal therapy. Optimal regimen for appendiceal signet ring adenocarcinoma is not well established and response rates in small series studies are poor, but FOLFOX (or CapeOx) is a well established standard therapy. We may consider a more 'adjuvant style' therapy with capecitabine monotherapy if pt's treatment tolerance proves to be poor. However, pt's persistent disease and aggressive pathology suggests an even poorer response with this regimen. Given these factors of poor response and sparse survival data, we recommend that pt have his urgent needs of symptomatic hydrocele and anal prolapse addressed prior to treatment initiation, as long as this does not delay treatment by more than 4-6 weeks.     Pt to be evaluated by Urology for hydrocele, which may represent mucinous deposition in testes from metastatic appendix cancer. Palliative drainage is likely appropriate with subsequent control relying primarily on cancer-directed therapy.    Plan:  appendix CA  - defer treatment initiation until acute issues re: hydrocele and anal prolapse are resolved  - c/s IR re: port placement following hydrocele resolution  - infusion appt for FOLFOX after port placement  - pre-treatment staging with CT Chest and MR Abdomen/Pelvis  - RTC in 2-3 weeks to ensure above workup is complete    anal prolapse  - CRS referral to consider reduction of anal prolapse     diarrhea  -  trial psyllium powder PRN    The patient and family asked numerous excellent questions which I answered to the best of my abilities. At the completion of our consultation, the patient and accompanying caregivers had a strong understanding of the plan. They have our contact information for any further questions or concerns, and know to reach out for any urgent matters. Patient and family aware that they should call 911 for emergencies.       60 minutes spent on the date of the encounter doing chart review, review of test results, interpretation of tests, patient visit and documentation       Larry Bal MD, PhD   of Medicine  Division of Hematology, Oncology and Transplantation  [facility]    -----------------------------------    Oncology Summary and HPI     Cancer Staging  Malignant neoplasm of appendix (H)  Staging form: Appendix Carcinoma, AJCC 8th Edition  - Clinical stage from 12/7/2021: Stage IVC (cT4b, cN1c, pM1c, G3) - Signed by Larry Bal MD on 1/5/2022    Current treatment(s):  - FOLFOX (planned)  Treatment intent:  - palliative    Oncology History Overview Note   Pt in his USOH until November 2021 when he presented to care at the [facility] of family with eight months of abdominal pain. CT showed large, mucinous collections in R hemipelvis, prompting referral to surgery. Pt thought to have likely a low-grade mucinous neoplasm and underwent cytoreductive surgery, with incomplete (R2) resection. 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    On establishment with Med Onc, pt recommended to initiate FOLFOX therapy.      Malignant neoplasm of appendix (H)   12/7/2021 Initial Diagnosis    Malignant neoplasm of appendix (H)     12/7/2021 -  Cancer Staged    Staging form: Appendix Carcinoma, AJCC 8th Edition  - Clinical stage from 12/7/2021: Stage IVC (cT4b, cN1c, pM1c,  G3)         Subjective/Interval Events     Pt has been having nearly a year of mild/moderate RLQ abdominal pain, which he initially thought was simply digestion related. Eventually was prompted by daughter to present to medical care. Since workup and resection (outlined above) pt has been feeling relatively well. He has some post-surgical pain/discomfort in RLQ. He also notes an increasingly watery stool. No nausea or vomiting.    Review of Systems: 14 point ROS negative other than the symptoms noted above in the HPI.    Physical Exam     Vital signs: /82   Pulse 94   Ht 1.829 m (6')   Wt 107.3 kg (236 lb 8 oz)   SpO2 97%   BMI 32.08 kg/m      ECOG performance status:  1  Vascular access:  none    GENERAL: Somewhat ill appearing man,  in chair, no acute distress.   HEENT: No icterus, no pallor. Moist mucous membranes.   LUNGS: Clear to ausculation bilaterally, normal work of breathing.   CARDIOVASCULAR: Regular rate and rhythm, no murmurs, gallops or rubs.   ABDOMEN: Soft, nontender and nondistended.  EXTREMITIES: No cyanosis, no clubbing, no edema.   NEUROLOGIC: No focal deficits, alert/ oriented  LYMPH NODE EXAM: No palpable adenopathy.    Objective Data     Lab data:  I have personally reviewed the lab data, notable for:    - mild anemia; no other notable abnormalities    Radiology data:  I have personally reviewed the radiology data, notable for:  - two mucinous fluid collections in the R hemipelvis measuring 17 and 9 cm    Pathology and other data:  I have personally reviewed and interpreted the pathology data, notable for:    - poorly differentiated appendiceal adenocarcinoma w/ signet ring features    Medical/Surgical History     Past medical history:  Active Ambulatory Problems     Diagnosis Date Noted     Elevated PSA 10/19/2017     Chronic insomnia 04/22/2014     Chronic cough 01/10/2012     Allergic rhinitis 04/22/2014     HTN (hypertension) 01/10/2012     Hyperlipidemia 04/22/2014     Fatigue  04/22/2014     Low grade mucinous neoplasm of appendix 12/07/2021     Resolved Ambulatory Problems     Diagnosis Date Noted     No Resolved Ambulatory Problems     Past Medical History:   Diagnosis Date     Former smoker      Hypertension      Mucinous cystadenoma of appendix      Obese      Obesity (BMI 30.0-34.9)        Past surgical history:  Past Surgical History:   Procedure Laterality Date     COLECTOMY RIGHT N/A 12/7/2021    Procedure: Right Hemicolectomy open;  Surgeon: Peng Donald MD;  Location: [facility]     COLONOSCOPY N/A 12/3/2021    Procedure: COLONOSCOPY;  Surgeon: Gia Victoria MD;  Location: [facility] GI     LAPAROTOMY EXPLORATORY N/A 12/7/2021    Procedure: EXPLORATORY LAPAROTOMY;  Surgeon: Peng Donald MD;  Location: [facility] OR        Social history:   Social History     Tobacco Use     Smoking status: Former Smoker     Years: 3.00     Types: Cigars, Cigarettes     Smokeless tobacco: Never Used   Vaping Use     Vaping Use: Never used   Substance Use Topics     Alcohol use: Not Currently     Drug use: Never       Family history:  Family History   Problem Relation Age of Onset     Cataracts Mother      Hypertension Father      Prostate Cancer Brother      Lung Cancer Brother      Myocardial Infarction Brother        Allergies:   Allergies   Allergen Reactions     Clindamycin Other (See Comments)     PN: psychotic       Outpatient medications:     Current Outpatient Medications:      acetaminophen (TYLENOL) 500 MG tablet, Take 2 tablets (1,000 mg) by mouth every 8 hours, Disp: 50 tablet, Rfl: 0     amoxicillin-clavulanate (AUGMENTIN) 875-125 MG tablet, Take 1 tablet by mouth 2 times daily for 7 days, Disp: 14 tablet, Rfl: 0     enoxaparin ANTICOAGULANT (LOVENOX) 40 MG/0.4ML syringe, Inject 0.4 mLs (40 mg) Subcutaneous every 24 hours for 26 days, Disp: 10.4 mL, Rfl: 0     lisinopril (ZESTRIL) 40 MG tablet, Take 1 tablet (40 mg) by mouth daily (Patient taking differently: Take 40 mg by mouth  every evening ), Disp: 90 tablet, Rfl: 3     polyethylene glycol (MIRALAX) 17 GM/Dose powder, Take 17 g by mouth daily (Patient not taking: Reported on 1/3/2022), Disp: 510 g, Rfl: 0

## 2022-01-03 NOTE — LETTER
1/3/2022         RE: Rod Olson  [address]  [place] [address]        Dear Colleague,    Thank you for referring your patient, Rod Olson, to the [facility]. Please see a copy of my visit note below.    [facility] - Medical Oncology New Outpatient Consult Note  1/3/2022    Patient Identifiers     Name: Rod Olson  Preferred Address: [place]  Preferred Language: English  : 1945  Gender: male    Assessment and Plan     Mr. 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    Following multi-disciplinary discussion, we will plan for palliative chemotherapy without further surgical or intraperitoneal therapy. Optimal regimen for appendiceal signet ring adenocarcinoma is not well established and response rates in small series studies are poor, but FOLFOX (or CapeOx) is a well established standard therapy. We may consider a more 'adjuvant style' therapy with capecitabine monotherapy if pt's treatment tolerance proves to be poor. However, pt's persistent disease and aggressive pathology suggests an even poorer response with this regimen. Given these factors of poor response and sparse survival data, we recommend that pt have his urgent needs of symptomatic hydrocele and anal prolapse addressed prior to treatment initiation, as long as this does not delay treatment by more than 4-6 weeks.     Pt to be evaluated by Urology for hydrocele, which may represent mucinous deposition in testes from metastatic appendix cancer. Palliative drainage is likely appropriate with subsequent control relying primarily on cancer-directed therapy.    Plan:  appendix CA  - defer treatment initiation until acute issues re: hydrocele and anal prolapse are resolved  - c/s IR re: port placement following hydrocele  resolution  - infusion appt for FOLFOX after port placement  - pre-treatment staging with CT Chest and MR Abdomen/Pelvis  - RTC in 2-3 weeks to ensure above workup is complete    anal prolapse  - CRS referral to consider reduction of anal prolapse     diarrhea  - trial psyllium powder PRN    The patient and family asked numerous excellent questions which I answered to the best of my abilities. At the completion of our consultation, the patient and accompanying caregivers had a strong understanding of the plan. They have our contact information for any further questions or concerns, and know to reach out for any urgent matters. Patient and family aware that they should call 911 for emergencies.       60 minutes spent on the date of the encounter doing chart review, review of test results, interpretation of tests, patient visit and documentation       Larry Bal MD, PhD   of Medicine  Division of Hematology, Oncology and Transplantation  [facility]    -----------------------------------    Oncology Summary and HPI     Cancer Staging  Malignant neoplasm of appendix (H)  Staging form: Appendix Carcinoma, AJCC 8th Edition  - Clinical stage from 12/7/2021: Stage IVC (cT4b, cN1c, pM1c, G3) - Signed by Larry Bal MD on 1/5/2022    Current treatment(s):  - FOLFOX (planned)  Treatment intent:  - palliative    Oncology History Overview Note   Pt in his USOH until November 2021 when he presented to care at the [facility] of family with eight months of abdominal pain. CT showed large, mucinous collections in R hemipelvis, prompting referral to surgery. Pt thought to have likely a low-grade mucinous neoplasm and underwent cytoreductive surgery, with incomplete (R2) resection. 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    On establishment with Med Onc, pt recommended to initiate FOLFOX  therapy.      Malignant neoplasm of appendix (H)   12/7/2021 Initial Diagnosis    Malignant neoplasm of appendix (H)     12/7/2021 -  Cancer Staged    Staging form: Appendix Carcinoma, AJCC 8th Edition  - Clinical stage from 12/7/2021: Stage IVC (cT4b, cN1c, pM1c, G3)         Subjective/Interval Events     Pt has been having nearly a year of mild/moderate RLQ abdominal pain, which he initially thought was simply digestion related. Eventually was prompted by daughter to present to medical care. Since workup and resection (outlined above) pt has been feeling relatively well. He has some post-surgical pain/discomfort in RLQ. He also notes an increasingly watery stool. No nausea or vomiting.    Review of Systems: 14 point ROS negative other than the symptoms noted above in the HPI.    Physical Exam     Vital signs: /82   Pulse 94   Ht 1.829 m (6')   Wt 107.3 kg (236 lb 8 oz)   SpO2 97%   BMI 32.08 kg/m      ECOG performance status:  1  Vascular access:  none    GENERAL: Somewhat ill appearing man,  in chair, no acute distress.   HEENT: No icterus, no pallor. Moist mucous membranes.   LUNGS: Clear to ausculation bilaterally, normal work of breathing.   CARDIOVASCULAR: Regular rate and rhythm, no murmurs, gallops or rubs.   ABDOMEN: Soft, nontender and nondistended.  EXTREMITIES: No cyanosis, no clubbing, no edema.   NEUROLOGIC: No focal deficits, alert/ oriented  LYMPH NODE EXAM: No palpable adenopathy.    Objective Data     Lab data:  I have personally reviewed the lab data, notable for:    - mild anemia; no other notable abnormalities    Radiology data:  I have personally reviewed the radiology data, notable for:  - two mucinous fluid collections in the R hemipelvis measuring 17 and 9 cm    Pathology and other data:  I have personally reviewed and interpreted the pathology data, notable for:    - poorly differentiated appendiceal adenocarcinoma w/ signet ring features    Medical/Surgical History     Past  medical history:  Active Ambulatory Problems     Diagnosis Date Noted     Elevated PSA 10/19/2017     Chronic insomnia 04/22/2014     Chronic cough 01/10/2012     Allergic rhinitis 04/22/2014     HTN (hypertension) 01/10/2012     Hyperlipidemia 04/22/2014     Fatigue 04/22/2014     Low grade mucinous neoplasm of appendix 12/07/2021     Resolved Ambulatory Problems     Diagnosis Date Noted     No Resolved Ambulatory Problems     Past Medical History:   Diagnosis Date     Former smoker      Hypertension      Mucinous cystadenoma of appendix      Obese      Obesity (BMI 30.0-34.9)        Past surgical history:  Past Surgical History:   Procedure Laterality Date     COLECTOMY RIGHT N/A 12/7/2021    Procedure: Right Hemicolectomy open;  Surgeon: Peng Donald MD;  Location: [facility]     COLONOSCOPY N/A 12/3/2021    Procedure: COLONOSCOPY;  Surgeon: Gia Victoria MD;  Location: [facility]     LAPAROTOMY EXPLORATORY N/A 12/7/2021    Procedure: EXPLORATORY LAPAROTOMY;  Surgeon: Peng Donald MD;  Location: [facility] OR        Social history:   Social History     Tobacco Use     Smoking status: Former Smoker     Years: 3.00     Types: Cigars, Cigarettes     Smokeless tobacco: Never Used   Vaping Use     Vaping Use: Never used   Substance Use Topics     Alcohol use: Not Currently     Drug use: Never       Family history:  Family History   Problem Relation Age of Onset     Cataracts Mother      Hypertension Father      Prostate Cancer Brother      Lung Cancer Brother      Myocardial Infarction Brother        Allergies:   Allergies   Allergen Reactions     Clindamycin Other (See Comments)     PN: psychotic     Outpatient medications:     Current Outpatient Medications:      acetaminophen (TYLENOL) 500 MG tablet, Take 2 tablets (1,000 mg) by mouth every 8 hours, Disp: 50 tablet, Rfl: 0     amoxicillin-clavulanate (AUGMENTIN) 875-125 MG tablet, Take 1 tablet by mouth 2 times daily for 7 days, Disp: 14 tablet, Rfl: 0      enoxaparin ANTICOAGULANT (LOVENOX) 40 MG/0.4ML syringe, Inject 0.4 mLs (40 mg) Subcutaneous every 24 hours for 26 days, Disp: 10.4 mL, Rfl: 0     lisinopril (ZESTRIL) 40 MG tablet, Take 1 tablet (40 mg) by mouth daily (Patient taking differently: Take 40 mg by mouth every evening ), Disp: 90 tablet, Rfl: 3     polyethylene glycol (MIRALAX) 17 GM/Dose powder, Take 17 g by mouth daily (Patient not taking: Reported on 1/3/2022), Disp: 510 g, Rfl: 0        Again, thank you for allowing me to participate in the care of your patient.      Sincerely,    Larry Bal MD

## 2022-01-03 NOTE — NURSING NOTE
Oncology Rooming Note    January 3, 2022 2:48 PM   Rod Olson is a 76 year old male who presents for:    Chief Complaint   Patient presents with     Oncology Clinic Visit     malignant neoplasm of appendix     Initial Vitals: Ht 1.829 m (6')   Wt 107.3 kg (236 lb 8 oz)   BMI 32.08 kg/m   Estimated body mass index is 32.08 kg/m  as calculated from the following:    Height as of this encounter: 1.829 m (6').    Weight as of this encounter: 107.3 kg (236 lb 8 oz). Body surface area is 2.33 meters squared.  No Pain (1) Comment: Data Unavailable   No LMP for male patient.  Allergies reviewed: Yes  Medications reviewed: Yes    Medications: Medication refills not needed today.  Pharmacy name entered into [facility]:    [id number] - [place], [place] - [address] AT [id number] - [place], [place] - [address] AT [address]    Clinical concerns: NONE       Radha Pinon CMA

## 2022-01-05 ENCOUNTER — TELEPHONE (OUTPATIENT)
Dept: SURGERY | Facility: CLINIC | Age: 77
End: 2022-01-05
Payer: MEDICARE

## 2022-01-05 ENCOUNTER — MYC MEDICAL ADVICE (OUTPATIENT)
Dept: ONCOLOGY | Facility: CLINIC | Age: 77
End: 2022-01-05
Payer: MEDICARE

## 2022-01-05 PROBLEM — C18.1 MALIGNANT NEOPLASM OF APPENDIX (H): Status: ACTIVE | Noted: 2021-12-07

## 2022-01-05 PROBLEM — D37.3 LOW GRADE MUCINOUS NEOPLASM OF APPENDIX: Status: RESOLVED | Noted: 2021-12-07 | Resolved: 2022-01-05

## 2022-01-05 LAB — CANCER AG19-9 SERPL IA-ACNC: 240 U/ML

## 2022-01-05 NOTE — TELEPHONE ENCOUNTER
Diagnostic wire inserted. [facility]    Phone Message    May a detailed message be left on voicemail: yes     Reason for Call: Appointment Intake      Referring Provider Name: Larry Bal MD in  ONCOLOGY ADULT    Diagnosis and/or Symptoms: Malignant neoplasm of appendix     Action Taken: Message routed to:  [facility] ([facility] Colorectal    Travel Screening: Not Applicable

## 2022-01-07 ENCOUNTER — OFFICE VISIT (OUTPATIENT)
Dept: SURGERY | Facility: CLINIC | Age: 77
End: 2022-01-07
Payer: MEDICARE

## 2022-01-07 VITALS
DIASTOLIC BLOOD PRESSURE: 96 MMHG | BODY MASS INDEX: 32.23 KG/M2 | WEIGHT: 238 LBS | HEART RATE: 75 BPM | SYSTOLIC BLOOD PRESSURE: 148 MMHG | HEIGHT: 72 IN | OXYGEN SATURATION: 98 %

## 2022-01-07 DIAGNOSIS — N43.3 CHRONIC HYDROCELE: Primary | ICD-10-CM

## 2022-01-07 PROCEDURE — 99024 POSTOP FOLLOW-UP VISIT: CPT | Performed by: SURGERY

## 2022-01-07 ASSESSMENT — PAIN SCALES - GENERAL: PAINLEVEL: NO PAIN (0)

## 2022-01-07 ASSESSMENT — MIFFLIN-ST. JEOR: SCORE: 1842.56

## 2022-01-07 NOTE — NURSING NOTE
Chief Complaint   Patient presents with     Consult     Consultation  Appendiceal Cancer had prvious surgery here with some abominal pain       Vitals:    01/07/22 1008   BP: (!) 148/96   BP Location: Left arm   Patient Position: Chair   Cuff Size: Adult Large   Pulse: 75   SpO2: 98%   Weight: 108 kg (238 lb)   Height: 1.829 m (6')       Body mass index is 32.28 kg/m .

## 2022-01-07 NOTE — TELEPHONE ENCOUNTER
Diagnosis, Referred by & from: Reduction of Anal Prolapse, referred by Dr. Bal   Appt date: 2022   NOTES STATUS DETAILS   OFFICE NOTE from referring provider  Internal [facility]:  1/3/22 - ONC OV with Dr. Bal   OFFICE NOTE from other specialist   Care Everywhere / Internal [facility]:  22 - GEN SURG OV with Dr. Lopez  21 - ONC OV with Dr. Donald  21 [facility]  [facility] - P[facility] OV with Dr. [facility]:  19 - PCC OV with Dr. Hankins   DISCHARGE SUMMARY from hospital  N/A    DISCHARGE REPORT from the ER Internal [facility]:  10/18/21 - ED OV with Dr. Cruz   OPERATIVE REPORT  N/A [facility]:  21 - OP Note for EXPLORATORY LAPAROTOMY, RIGHT HEMICOLECTOMY with Dr. Donald   MEDICATION LIST Internal    LABS     ANAL PAP N/A    Tumor Marker [facility]:  1/3/22 - PSA Tumor Marker   BIOPSIES/PATHOLOGY RELATED TO DIAGNOSIS [facility]:  21 - Colon Biopsy (Case: [id number])   DIAGNOSTIC PROCEDURES     PFC TESTING (from the Pelvic floor center includes Manometry, PDNL, EMG, etc.) N/A    COLONOSCOPY [facility]:  12/3/21 - Colonoscopy   UPPER ENDOSCOPY (EGD) N/A    FLEX SIGMOIDOSCOPY  N/A    ERCP N/A    IMAGING (DISC & REPORT)      CT  Internal [facility]:  10/19/21 - CT Abd/Pelvis   MRI [facility]:  ([facility]) 22 - MRI Abdomen  ([facility]) 22 - MRI Pelvis   XRAY [facility]:  21 - XR Abdomen   ULTRASOUND (ENDOANAL/ENDORECTAL) [facility]:  10/18/21 - US Testicular Scrotum

## 2022-01-07 NOTE — PATIENT INSTRUCTIONS
You have been offered surgical repair of a Right inguinal hydrocele by Dr Lopez. Please call John Mcgraw to schedule [phone]. She will also help you arrange pre-anesthesia clinic visit and COVID tests.     -hydrocele  -inguinal hernia  -open repair of inguinal hernia and hydrocelectomy

## 2022-01-07 NOTE — PROGRESS NOTES
New Hernia Consultation Note      Rod Olson  [id number]  1945    Requesting Provider: No ref. provider found    Dear Center, [facility],    I was asked by No ref. provider found to see this patient for the following problem: Rod Olson is a 76 year old male who presents to clinic today for the following health issues       CHIEF COMPLAINT:  R inguinal bulge    ASSESSMENT/PLAN:  V Inguinal hydrocele with ostensible hernia component  Hernia size is less than 5cm in size.    Hydrocelectomy will be approached with inguinal incision as a hernia. Fluid will be suctioned and hernia will be repaired. Fluid communication will be closed.     Assessment & Plan   Problem List Items Addressed This Visit     None      Visit Diagnoses     Chronic hydrocele    -  Primary    Relevant Orders    Asymptomatic COVID-19 Virus (Coronavirus) by PCR    PAC Visit Referral (For [facility] Only)    Case Request: HERNIORRHAPHY, INGUINAL, OPEN       Tier 2, as this operation may delay chemo      The risks of hernia repair were reviewed with the patient.    These risks combine the risks of abdominal surgery and the risks of hernia repair, including mesh implantation, and were described to the patient as follows:    Abdominal surgery risks:    These include, but are not limited to, death, myocardial infarction, pneumonia, urinary tract infection, deep venous thrombosis with or without pulmonary embolus, abdominal infection from bowel injury or abscess, bowel obstruction, wound infection, and bleeding.    Hernia repair risks:    Food and Drug Administration Comments on Hernia Repair Surgery and Mesh Implantation.    http://www.fda.gov/MedicalDevices/ProductsandMedicalProcedures/ImplantsandProsthetics/HerniaSurgicalMesh/default.htm      Hernia Repair Complications    Based on FDA s analysis of medical device adverse event reports and of peer-reviewed, scientific literature, the most common adverse events for all surgical  repair of hernias--with or without mesh--are pain, infection, hernia recurrence, scar-like tissue that sticks tissues together (adhesion), blockage of the large or small intestine (obstruction), bleeding, abnormal connection between organs, vessels, or intestines (fistula), fluid build-up at the surgical site (seroma), and a hole in neighboring tissues or organs (perforation).    The most common adverse events following hernia repair with mesh are pain, infection, hernia recurrence, adhesion, and bowel obstruction. Some other potential adverse events that can occur following hernia repair with mesh are mesh migration and mesh shrinkage (contraction).    Many complications related to hernia repair with surgical mesh that have been reported to the FDA have been associated with recalled mesh products that are no longer on the market. Pain, infection, recurrence, adhesion, obstruction, and perforation are the most common complications associated with recalled mesh. In the FDA s analysis of medical adverse event reports to the FDA, recalled mesh products were the main cause of bowel perforation and obstruction complications.    Please refer to the recall notices here and here for more information if you have recalled mesh. For more information on the recalled products, please visit the FDA Medical Device Recall website. Please visit the Medical & Radiation Emitting Device Database to search a specific type of surgical mesh.    If you are unsure about the specific mesh  and brand used in your surgery and have questions about your hernia repair, contact your surgeon or the facility where your surgery was performed to obtain the information from your medical record.     Specifically, risk of persistents/recurrence of hydrocele/hernia with fluid reaccumulation is possible. Risk of mesh infection also discussed. I explained the scrotal fluid may also be cancerous, and that drainage would be palliative.    30  minutes spent on the date of the encounter doing chart review, history and exam, documentation and further activities per the note    HISTORY OF PRESENT ILLNESS:  Location: right inguinal  Severity: Moderate    Pt with R inguinal hydrocele, large, symptomatic and was present prior to cytoreductive surgery (Exploratory laparotomy, right hemicolectomy, resection of 6 cm pelvic tumor and right-sided ureterolysis.- 12/21, [facility]) for signet cell HGAMN. He is pending palliative chemoTx.    I was consulted intraop but could nopt safely perform a mesh repair due to colon resection.    The fluid in scrotum may be gelatinous or simple. He is no longer pursuing further surgical Tx    NUTRITIONAL STATUS:  Lab Results   Component Value Date    ALBUMIN 3.9 01/03/2022       Body mass index is 32.28 kg/m .    Patient is not immunosuppressed.    Patient is not a current smoker.    Past Medical History:   Diagnosis Date     Former smoker      Hypertension      Mucinous cystadenoma of appendix      Obese      Obesity (BMI 30.0-34.9)        Patient Active Problem List   Diagnosis     Elevated PSA     Chronic insomnia     Chronic cough     Allergic rhinitis     HTN (hypertension)     Hyperlipidemia     Fatigue     Malignant neoplasm of appendix (H)       Past Surgical History:   Procedure Laterality Date     COLECTOMY RIGHT N/A 12/7/2021    Procedure: Right Hemicolectomy open;  Surgeon: Peng Donald MD;  Location: [facility]     COLONOSCOPY N/A 12/3/2021    Procedure: COLONOSCOPY;  Surgeon: Gia Victoria MD;  Location: [facility] GI     LAPAROTOMY EXPLORATORY N/A 12/7/2021    Procedure: EXPLORATORY LAPAROTOMY;  Surgeon: Peng Donald MD;  Location: [facility] OR       MEDICATIONS:  Current Outpatient Medications   Medication     acetaminophen (TYLENOL) 500 MG tablet     lisinopril (ZESTRIL) 40 MG tablet     polyethylene glycol (MIRALAX) 17 GM/Dose powder     No current facility-administered medications for this visit.        ALLERGIES:  Allergies   Allergen Reactions     Clindamycin Other (See Comments)     PN: psychotic       Social History     Socioeconomic History     Marital status: Single     Spouse name: Not on file     Number of children: Not on file     Years of education: Not on file     Highest education level: Not on file   Occupational History     Not on file   Tobacco Use     Smoking status: Former Smoker     Years: 3.00     Types: Cigars, Cigarettes     Smokeless tobacco: Never Used   Vaping Use     Vaping Use: Never used   Substance and Sexual Activity     Alcohol use: Not Currently     Drug use: Never     Sexual activity: Not on file   Other Topics Concern     Not on file   Social History Narrative     Not on file     Social Determinants of Health     Financial Resource Strain: Not on file   Food Insecurity: Not on file   Transportation Needs: Not on file   Physical Activity: Not on file   Stress: Not on file   Social Connections: Not on file   Intimate Partner Violence: Not on file   Housing Stability: Not on file       Family History   Problem Relation Age of Onset     Cataracts Mother      Hypertension Father      Prostate Cancer Brother      Lung Cancer Brother      Myocardial Infarction Brother        ROS    CT and US reviewed    PHYSICAL EXAM:  Objective    BP (!) 148/96 (BP Location: Left arm, Patient Position: Chair, Cuff Size: Adult Large)   Pulse 75   Ht 1.829 m (6')   Wt 108 kg (238 lb)   SpO2 98%   BMI 32.28 kg/m    BP (!) 148/96 (BP Location: Left arm, Patient Position: Chair, Cuff Size: Adult Large)   Pulse 75   Ht 1.829 m (6')   Wt 108 kg (238 lb)   SpO2 98%   BMI 32.28 kg/m    Body mass index is 32.28 kg/m .  Physical Exam   GENERAL: healthy, alert and no distress  EYES: Eyes grossly normal to inspection, PERRL and conjunctivae and sclerae normal  HENT: ear canals and TM's normal, nose and mouth without ulcers or lesions  NECK: no adenopathy, no asymmetry, masses, or scars and thyroid  normal to palpation  RESP: unlabored  CV: regular rate ABDOMEN: soft, nontender, no hepatosplenomegaly, no masses and bowel sounds normal  +midline incision, well healed laparotomy  +alarge R hydrocele (fluid in groin and scrotum)  MS: no gross musculoskeletal defects noted, no edema  NEURO: Normal strength and tone, mentation intact and speech normal  PSYCH: mentation appears normal, affect normal/bright        IMAGING:  CT scan reviewed: reviewed      DISCUSSION OF RISKS:  I discussed the alternatives, benefits, risks and possible complications of hernia repair with the patient. The risks of hernia surgery with and without mesh are described below.    Based on FDA s analysis of medical device adverse event reports and of peer-reviewed, scientific literature, the most common adverse events for all surgical repair of hernias--with or without mesh--are pain, infection, hernia recurrence, scar-like tissue that sticks tissues together (adhesion), blockage of the large or small intestine (obstruction), bleeding, abnormal connection between organs, vessels, or intestines (fistula), fluid build-up at the surgical site (seroma), and a hole in neighboring tissues or organs (perforation).  Some other potential adverse events that can occur following hernia repair with mesh are mesh migration and mesh shrinkage (contraction).    http://www.fda.gov/MedicalDevices/ProductsandMedicalProcedures/ImplantsandProsthetics/HerniaSurgicalMesh/default.htm        Sincerely,    Darnell Lopez MD

## 2022-01-07 NOTE — LETTER
1/7/2022       RE: Rod Olson  [address]  [place] [place] [facility] [address]     Dear Colleague,    Thank you for referring your patient, Rod Olson, to the [facility] [place] [facility]. Please see a copy of my visit note below.    New Hernia Consultation Note      Rod Olson  [id number]  1945      Dear Center, [facility],    I was asked to see this patient for the following problem: Rod Olson is a 76 year old male who presents to clinic today for the following health issues       CHIEF COMPLAINT:  R inguinal bulge    ASSESSMENT/PLAN:  V Inguinal hydrocele with ostensible hernia component  Hernia size is less than 5cm in size.    Hydrocelectomy will be approached with inguinal incision as a hernia. Fluid will be suctioned and hernia will be repaired. Fluid communication will be closed.     Assessment & Plan   Problem List Items Addressed This Visit     None      Visit Diagnoses     Chronic hydrocele    -  Primary    Relevant Orders    Asymptomatic COVID-19 Virus (Coronavirus) by PCR    PAC Visit Referral (For [facility] Only)    Case Request: HERNIORRHAPHY, INGUINAL, OPEN       Tier 2, as this operation may delay chemo      The risks of hernia repair were reviewed with the patient.    These risks combine the risks of abdominal surgery and the risks of hernia repair, including mesh implantation, and were described to the patient as follows:    Abdominal surgery risks:    These include, but are not limited to, death, myocardial infarction, pneumonia, urinary tract infection, deep venous thrombosis with or without pulmonary embolus, abdominal infection from bowel injury or abscess, bowel obstruction, wound infection, and bleeding.    Hernia repair risks:    Food and Drug Administration Comments on Hernia Repair Surgery and Mesh  Implantation.    http://www.fda.gov/MedicalDevices/ProductsandMedicalProcedures/ImplantsandProsthetics/HerniaSurgicalMesh/default.htm      Hernia Repair Complications    Based on FDA s analysis of medical device adverse event reports and of peer-reviewed, scientific literature, the most common adverse events for all surgical repair of hernias--with or without mesh--are pain, infection, hernia recurrence, scar-like tissue that sticks tissues together (adhesion), blockage of the large or small intestine (obstruction), bleeding, abnormal connection between organs, vessels, or intestines (fistula), fluid build-up at the surgical site (seroma), and a hole in neighboring tissues or organs (perforation).    The most common adverse events following hernia repair with mesh are pain, infection, hernia recurrence, adhesion, and bowel obstruction. Some other potential adverse events that can occur following hernia repair with mesh are mesh migration and mesh shrinkage (contraction).    Many complications related to hernia repair with surgical mesh that have been reported to the FDA have been associated with recalled mesh products that are no longer on the market. Pain, infection, recurrence, adhesion, obstruction, and perforation are the most common complications associated with recalled mesh. In the FDA s analysis of medical adverse event reports to the FDA, recalled mesh products were the main cause of bowel perforation and obstruction complications.    Please refer to the recall notices here and here for more information if you have recalled mesh. For more information on the recalled products, please visit the FDA Medical Device Recall website. Please visit the Medical & Radiation Emitting Device Database to search a specific type of surgical mesh.    If you are unsure about the specific mesh  and brand used in your surgery and have questions about your hernia repair, contact your surgeon or the facility where  your surgery was performed to obtain the information from your medical record.     Specifically, risk of persistents/recurrence of hydrocele/hernia with fluid reaccumulation is possible. Risk of mesh infection also discussed. I explained the scrotal fluid may also be cancerous, and that drainage would be palliative.    30 minutes spent on the date of the encounter doing chart review, history and exam, documentation and further activities per the note    HISTORY OF PRESENT ILLNESS:  Location: right inguinal  Severity: Moderate    Pt with R inguinal hydrocele, large, symptomatic and was present prior to cytoreductive surgery (Exploratory laparotomy, right hemicolectomy, resection of 6 cm pelvic tumor and right-sided ureterolysis.- 12/21, [facility]) for signet cell HGAMN. He is pending palliative chemoTx.    I was consulted intraop but could nopt safely perform a mesh repair due to colon resection.    The fluid in scrotum may be gelatinous or simple. He is no longer pursuing further surgical Tx    NUTRITIONAL STATUS:  Lab Results   Component Value Date    ALBUMIN 3.9 01/03/2022       Body mass index is 32.28 kg/m .    Patient is not immunosuppressed.    Patient is not a current smoker.    Past Medical History:   Diagnosis Date     Former smoker      Hypertension      Mucinous cystadenoma of appendix      Obese      Obesity (BMI 30.0-34.9)        Patient Active Problem List   Diagnosis     Elevated PSA     Chronic insomnia     Chronic cough     Allergic rhinitis     HTN (hypertension)     Hyperlipidemia     Fatigue     Malignant neoplasm of appendix (H)       Past Surgical History:   Procedure Laterality Date     COLECTOMY RIGHT N/A 12/7/2021    Procedure: Right Hemicolectomy open;  Surgeon: Peng Donald MD;  Location: [facility]     COLONOSCOPY N/A 12/3/2021    Procedure: COLONOSCOPY;  Surgeon: Gia Victoria MD;  Location: [facility]     LAPAROTOMY EXPLORATORY N/A 12/7/2021    Procedure: EXPLORATORY LAPAROTOMY;   Surgeon: Peng Donald MD;  Location:  OR       MEDICATIONS:  Current Outpatient Medications   Medication     acetaminophen (TYLENOL) 500 MG tablet     lisinopril (ZESTRIL) 40 MG tablet     polyethylene glycol (MIRALAX) 17 GM/Dose powder     No current facility-administered medications for this visit.       ALLERGIES:  Allergies   Allergen Reactions     Clindamycin Other (See Comments)     PN: psychotic       Social History     Socioeconomic History     Marital status: Single     Spouse name: Not on file     Number of children: Not on file     Years of education: Not on file     Highest education level: Not on file   Occupational History     Not on file   Tobacco Use     Smoking status: Former Smoker     Years: 3.00     Types: Cigars, Cigarettes     Smokeless tobacco: Never Used   Vaping Use     Vaping Use: Never used   Substance and Sexual Activity     Alcohol use: Not Currently     Drug use: Never     Sexual activity: Not on file   Other Topics Concern     Not on file   Social History Narrative     Not on file     Social Determinants of Health     Financial Resource Strain: Not on file   Food Insecurity: Not on file   Transportation Needs: Not on file   Physical Activity: Not on file   Stress: Not on file   Social Connections: Not on file   Intimate Partner Violence: Not on file   Housing Stability: Not on file       Family History   Problem Relation Age of Onset     Cataracts Mother      Hypertension Father      Prostate Cancer Brother      Lung Cancer Brother      Myocardial Infarction Brother        ROS    CT and US reviewed    PHYSICAL EXAM:  Objective    BP (!) 148/96 (BP Location: Left arm, Patient Position: Chair, Cuff Size: Adult Large)   Pulse 75   Ht 1.829 m (6')   Wt 108 kg (238 lb)   SpO2 98%   BMI 32.28 kg/m    BP (!) 148/96 (BP Location: Left arm, Patient Position: Chair, Cuff Size: Adult Large)   Pulse 75   Ht 1.829 m (6')   Wt 108 kg (238 lb)   SpO2 98%   BMI 32.28 kg/m    Body mass  index is 32.28 kg/m .  Physical Exam   GENERAL: healthy, alert and no distress  EYES: Eyes grossly normal to inspection, PERRL and conjunctivae and sclerae normal  HENT: ear canals and TM's normal, nose and mouth without ulcers or lesions  NECK: no adenopathy, no asymmetry, masses, or scars and thyroid normal to palpation  RESP: unlabored  CV: regular rate ABDOMEN: soft, nontender, no hepatosplenomegaly, no masses and bowel sounds normal  +midline incision, well healed laparotomy  +alarge R hydrocele (fluid in groin and scrotum)  MS: no gross musculoskeletal defects noted, no edema  NEURO: Normal strength and tone, mentation intact and speech normal  PSYCH: mentation appears normal, affect normal/bright        IMAGING:  CT scan reviewed: reviewed      DISCUSSION OF RISKS:  I discussed the alternatives, benefits, risks and possible complications of hernia repair with the patient. The risks of hernia surgery with and without mesh are described below.    Based on FDA s analysis of medical device adverse event reports and of peer-reviewed, scientific literature, the most common adverse events for all surgical repair of hernias--with or without mesh--are pain, infection, hernia recurrence, scar-like tissue that sticks tissues together (adhesion), blockage of the large or small intestine (obstruction), bleeding, abnormal connection between organs, vessels, or intestines (fistula), fluid build-up at the surgical site (seroma), and a hole in neighboring tissues or organs (perforation).  Some other potential adverse events that can occur following hernia repair with mesh are mesh migration and mesh shrinkage (contraction).    http://www.fda.gov/MedicalDevices/ProductsandMedicalProcedures/ImplantsandProsthetics/HerniaSurgicalMesh/default.htm        Darnell Lopez MD

## 2022-01-10 DIAGNOSIS — Z11.59 ENCOUNTER FOR SCREENING FOR OTHER VIRAL DISEASES: Primary | ICD-10-CM

## 2022-01-10 NOTE — PROGRESS NOTES
Colon and Rectal Surgery Clinic Note    RE: Rod SHEEHAN Whitney.  : 1945.  CHRISTIAN: 2022.    Reason for visit: Anal protrusion.       HPI: 76 year old male with recent history of mucinous adenocarcinoma of the appendix, status post attempted cytoreduction and right colectomy per Dr. Donald.  Has a longstanding history of hemorrhoids and over the past 6 months he has noticed increased anal protrusion.  Denies hematochezia.  Associated symptoms include anal pruritus, fecal seepage and incontinence to liquid stool.  He does not wear a pad for this.  He says that he has undergone at least 2 previous hemorrhoid banding sessions with good results.  He was on Lovenox after his abdominal operation but currently is not taking any NSAIDs or aspirin.      CT a/p (10/19/21):   IMPRESSION:   1. Two peripherally-enhancing fluid collections in the right hemipelvis, abutting the medial aspect of the cecum. The appendix is not visualized as separate from these structures. One of the fluid collections has calcification in its periphery. These   findings are nonspecific, but one of the fluid collections that measures 9 cm in diameter is suspicious for an abscess and could possible be related to ruptured appendicitis. The other with peripheral calcificationsis elongated and more irregular in   shape and measures 17 x 8 cm. This is nonspecific, but could represent a large mucocele of the appendix. Neoplasm cannot be excluded.  2. A very small amount of free fluid in the pelvis.  3. No other acute abnormality identified in the abdomen or pelvis      Colonoscopy (12/3/21):   Findings:        Hemorrhoids were found on perianal exam.       The entire examined colon appeared normal on direct and retroflexion        view. A few small-mouthed diverticula were found in the descending colon.        There is no endoscopic evidence of polyps in the appendiceal orifice.              On 2021 underwent exploratory laparotomy, right  hemicolectomy, partial resection of pelvic peritoneal metastasis.     Pathology:        He then saw Dr. Bal on 1/3/22 who recommended palliative chemotherapy without further surgical or intraperitoneal therapy.     CEA : 10.9    Ca 19-9: 240     CT Chest (1/14/22):  IMPRESSION:   1.  Scarlike pulmonary nodules in the left upper and lower lobes.  Recommend follow-up chest CT in three months to assess for stability.   2.  Small tree-in-bud opacity in the right lower lobe, favoring an  infectious or inflammatory bronchiolitis. Recommend attention on  follow-up.  3.  Minimal linear branching nodularity in the right middle lobe and  right lower lobe, suggestive of minimal endobronchial debris.  4.  Additional nonacute findings as above.      MRI Abdomen/Pelvis (1/14/22):          Currently planning a surgical repair of a Right inguinal hydrocele by Dr. Darnell Lopez on 1/18/22.    Medical history:  Past Medical History:   Diagnosis Date     Former smoker      Hypertension      Mucinous cystadenoma of appendix      Obese      Obesity (BMI 30.0-34.9)        Surgical history:  Past Surgical History:   Procedure Laterality Date     COLECTOMY RIGHT N/A 12/7/2021    Procedure: Right Hemicolectomy open;  Surgeon: Peng Donald MD;  Location: [facility]     COLONOSCOPY N/A 12/3/2021    Procedure: COLONOSCOPY;  Surgeon: iGa Victoria MD;  Location: [facility]     LAPAROTOMY EXPLORATORY N/A 12/7/2021    Procedure: EXPLORATORY LAPAROTOMY;  Surgeon: Peng Donald MD;  Location: [facility]       Family history:  Family History   Problem Relation Age of Onset     Cataracts Mother      Hypertension Father      Prostate Cancer Brother      Lung Cancer Brother      Myocardial Infarction Brother        Medications:  Current Outpatient Medications   Medication Sig Dispense Refill     acetaminophen (TYLENOL) 500 MG tablet Take 2 tablets (1,000 mg) by mouth every 8 hours (Patient taking differently: Take 1,000 mg by mouth every 4 hours  as needed ) 50 tablet 0     lisinopril (ZESTRIL) 40 MG tablet Take 1 tablet (40 mg) by mouth daily (Patient taking differently: Take 40 mg by mouth daily (with lunch) ) 90 tablet 3     polyethylene glycol (MIRALAX) 17 GM/Dose powder Take 17 g by mouth daily (Patient not taking: Reported on 1/14/2022) 510 g 0       Allergies:  Allergies   Allergen Reactions     Clindamycin Other (See Comments)     PN: psychotic       Social history:   Social History     Tobacco Use     Smoking status: Former Smoker     Years: 3.00     Types: Cigars, Cigarettes     Smokeless tobacco: Never Used   Substance Use Topics     Alcohol use: Not Currently     Marital status: single.    Physical Examination:  BP (!) 141/92 (BP Location: Left arm, Patient Position: Sitting, Cuff Size: Adult Large)   Pulse 73   Ht 6'   Wt 235 lb   SpO2 100%   BMI 31.87 kg/m    General: Well hydrated. No acute distress.  Perianal external examination:  Perianal skin: Moist, with superficial fissuring mainly at the left aspect.  Lesions: No.  Eversion of buttocks: There was not evidence of an anal fissure. Details: N/A.  Skin tags or external hemorrhoids: Yes: Large left lateral mixed hemorrhoid column with no evidence of thrombosis or active bleeding.  Digital rectal examination: Was performed.   Sphincter tone: Poor.  Palpable lesions: No.  Other: None.  Bimanual examination: was not performed.    Anoscopy: Was performed.   Hemorrhoids: Yes.  Large mixed left lateral hemorrhoid column.  Internal hemorrhoid grade 3.  No evidence of thrombosis or bleeding.  Grade 1-2 internal hemorrhoids at the right posterior and right anterior aspects of the anus.  Lesions: No    Investigations:  None.    Procedures:  After discussing the risks and benefits, the patient agreed to proceed with internal hemorrhoidal banding.    Prior to the start of the procedure and with procedural staff participation, I verbally confirmed the patient s identity using two indicators,  "relevant allergies, that the procedure was appropriate and matched the consent, and that the correct equipment/implants were available. Immediately prior to starting the procedure I conducted the \"time out\" with the procedural staff and re-confirmed the patient s name, procedure, and site/side. The Joint Commission universal protocol was followed:  Yes.    Sedation (Moderate or Deep): None    A suction hemorrhoidal  was used to place a total of 2 band(s) in the left posterior and left anterior position(s).    There was no significant bleeding. The patient tolerated the procedure well.    ASSESSMENT  76-year-old male with complex past medical and surgical history, mainly recently diagnosed widely metastatic and high-grade appendiceal adenocarcinoma.  Presents with anal protrusion, most likely related to prolapsing internal hemorrhoids.  No clear evidence of full-thickness rectal prolapse.    PLAN  1.  High-fiber diet.  Okay to start Metamucil or Citrucel 1 tablespoon daily.  2.  Avoid aspirin or NSAIDs for 7 days.  3.  Follow-up as needed.    30 minutes spent on the date of the encounter doing chart review, history and exam, documentation and further activities as noted above.      Jefe Dos Santos M.D., M.Sc.     Division of Colon and Rectal Surgery  [facility]    Referring Provider:  No referring provider defined for this encounter.     Primary Care Provider:  [facility]    CC:  Peng Donald MD.  Larry Bal MD.  Darnell Lopez MD.  "

## 2022-01-11 ENCOUNTER — TELEPHONE (OUTPATIENT)
Dept: SURGERY | Facility: CLINIC | Age: 77
End: 2022-01-11
Payer: MEDICARE

## 2022-01-11 NOTE — TELEPHONE ENCOUNTER
Left VM message with daughter to let her know patient's start time for hernia repair has been moved up to 1:20 p.m. from 3:30 p.m., to arrive at 11:20 a.m.  Also scheduled COVID test at [facility] on 1/14 at 4 p.m. Asked her to call me back to let me know she received my message and COVID date okay.

## 2022-01-11 NOTE — TELEPHONE ENCOUNTER
FUTURE VISIT INFORMATION      SURGERY INFORMATION:    Date: 22    [facility] or    Surgeon:  Darnell Lopez MD    Anesthesia Type:  general    Procedure: HERNIORRHAPHY, INGUINAL, OPEN    Consult: ov 22    RECORDS REQUESTED FROM:       Primary Care Provider: Rober Hankins MD - [facility]    Most recent EKG+ Tracin21    Most recent Cardiac Stress Test: 9/2/10- [facility]

## 2022-01-14 ENCOUNTER — HOSPITAL ENCOUNTER (OUTPATIENT)
Dept: MRI IMAGING | Facility: CLINIC | Age: 77
End: 2022-01-14
Attending: STUDENT IN AN ORGANIZED HEALTH CARE EDUCATION/TRAINING PROGRAM
Payer: MEDICARE

## 2022-01-14 ENCOUNTER — HOSPITAL ENCOUNTER (OUTPATIENT)
Dept: CT IMAGING | Facility: CLINIC | Age: 77
End: 2022-01-14
Attending: STUDENT IN AN ORGANIZED HEALTH CARE EDUCATION/TRAINING PROGRAM
Payer: MEDICARE

## 2022-01-14 ENCOUNTER — PRE VISIT (OUTPATIENT)
Dept: SURGERY | Facility: CLINIC | Age: 77
End: 2022-01-14

## 2022-01-14 ENCOUNTER — LAB (OUTPATIENT)
Dept: LAB | Facility: CLINIC | Age: 77
End: 2022-01-14
Attending: SURGERY

## 2022-01-14 ENCOUNTER — VIRTUAL VISIT (OUTPATIENT)
Dept: SURGERY | Facility: CLINIC | Age: 77
End: 2022-01-14
Payer: MEDICARE

## 2022-01-14 ENCOUNTER — PATIENT OUTREACH (OUTPATIENT)
Dept: ONCOLOGY | Facility: CLINIC | Age: 77
End: 2022-01-14

## 2022-01-14 ENCOUNTER — ANESTHESIA EVENT (OUTPATIENT)
Dept: SURGERY | Facility: CLINIC | Age: 77
End: 2022-01-14
Payer: MEDICARE

## 2022-01-14 DIAGNOSIS — C18.1 MALIGNANT NEOPLASM OF APPENDIX (H): Primary | ICD-10-CM

## 2022-01-14 DIAGNOSIS — Z11.59 ENCOUNTER FOR SCREENING FOR OTHER VIRAL DISEASES: ICD-10-CM

## 2022-01-14 DIAGNOSIS — Z01.818 PRE-OP EXAMINATION: Primary | ICD-10-CM

## 2022-01-14 DIAGNOSIS — C18.1 MALIGNANT NEOPLASM OF APPENDIX (H): ICD-10-CM

## 2022-01-14 DIAGNOSIS — N43.3 CHRONIC HYDROCELE: ICD-10-CM

## 2022-01-14 PROCEDURE — 74183 MRI ABD W/O CNTR FLWD CNTR: CPT | Mod: MG

## 2022-01-14 PROCEDURE — 250N000009 HC RX 250: Performed by: RADIOLOGY

## 2022-01-14 PROCEDURE — A9585 GADOBUTROL INJECTION: HCPCS | Performed by: STUDENT IN AN ORGANIZED HEALTH CARE EDUCATION/TRAINING PROGRAM

## 2022-01-14 PROCEDURE — 71260 CT THORAX DX C+: CPT

## 2022-01-14 PROCEDURE — 250N000011 HC RX IP 250 OP 636: Performed by: RADIOLOGY

## 2022-01-14 PROCEDURE — 99214 OFFICE O/P EST MOD 30 MIN: CPT | Mod: 95 | Performed by: NURSE PRACTITIONER

## 2022-01-14 PROCEDURE — U0003 INFECTIOUS AGENT DETECTION BY NUCLEIC ACID (DNA OR RNA); SEVERE ACUTE RESPIRATORY SYNDROME CORONAVIRUS 2 (SARS-COV-2) (CORONAVIRUS DISEASE [COVID-19]), AMPLIFIED PROBE TECHNIQUE, MAKING USE OF HIGH THROUGHPUT TECHNOLOGIES AS DESCRIBED BY CMS-2020-01-R: HCPCS

## 2022-01-14 PROCEDURE — 255N000002 HC RX 255 OP 636: Performed by: STUDENT IN AN ORGANIZED HEALTH CARE EDUCATION/TRAINING PROGRAM

## 2022-01-14 PROCEDURE — 72197 MRI PELVIS W/O & W/DYE: CPT

## 2022-01-14 PROCEDURE — 258N000003 HC RX IP 258 OP 636: Performed by: STUDENT IN AN ORGANIZED HEALTH CARE EDUCATION/TRAINING PROGRAM

## 2022-01-14 PROCEDURE — U0005 INFEC AGEN DETEC AMPLI PROBE: HCPCS

## 2022-01-14 RX ORDER — IOPAMIDOL 755 MG/ML
100 INJECTION, SOLUTION INTRAVASCULAR ONCE
Status: COMPLETED | OUTPATIENT
Start: 2022-01-14 | End: 2022-01-14

## 2022-01-14 RX ORDER — GADOBUTROL 604.72 MG/ML
10 INJECTION INTRAVENOUS ONCE
Status: COMPLETED | OUTPATIENT
Start: 2022-01-14 | End: 2022-01-14

## 2022-01-14 RX ADMIN — IOPAMIDOL 100 ML: 755 INJECTION, SOLUTION INTRAVENOUS at 11:43

## 2022-01-14 RX ADMIN — SODIUM CHLORIDE 50 ML: 9 INJECTION, SOLUTION INTRAVENOUS at 10:13

## 2022-01-14 RX ADMIN — SODIUM CHLORIDE 69 ML: 9 INJECTION, SOLUTION INTRAVENOUS at 11:43

## 2022-01-14 RX ADMIN — GADOBUTROL 10 ML: 604.72 INJECTION INTRAVENOUS at 10:12

## 2022-01-14 ASSESSMENT — PAIN SCALES - GENERAL: PAINLEVEL: NO PAIN (0)

## 2022-01-14 ASSESSMENT — LIFESTYLE VARIABLES: TOBACCO_USE: 1
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
## 2022-01-14 NOTE — PATIENT INSTRUCTIONS
Preparing for Your Surgery      Name:  Rod Olson   MRN:  [id number]   :  1945   Today's Date:  2022       Arriving for surgery:  Surgery date:  2022  Arrival time:  11:15 am    Restrictions due to COVID 19       Effective 22 [facility] is implementing the following visitor policy:     1 person may accompany the patient through the registration process      Then that person will be asked to leave the building.        There will be no visitors in the Surgery Waiting Room.        Inpatients are allowed 1 consistent visitor for the duration of their stay.      Visitors must wear a mask      Visitors must not be ill      Visiting hours are 8 am to 8 pm            parking is available for anyone with mobility limitations or disabilities.  ([place]  24 hours/ 7 days a week; [facility]  7 am- 3:30 pm, Mon- Fri)    Please come to:     [facility] [address]  [address] [place] [address]       -    Please proceed to [address] on the 3rd floor. [phone]?     - ?If you are in need of directions, wheelchair or escort please stop at the Information Desk in the lobby.      What can I eat or drink?  -  You may eat and drink normally for up to 8 hours before your surgery. (Until 5 am)  -  You may have clear liquids until 2 hours before surgery. (Until 11:15 am arrival time)    Examples of clear liquids:  Water  Clear broth  Juices (apple, white grape, white cranberry  and cider) without pulp  Noncarbonated, powder based beverages  (lemonade and Bryan-Aid)  Sodas (Sprite, 7-Up, ginger ale and seltzer)  Coffee or tea (without milk or cream)  Gatorade    -  No Alcohol for at least 24 hours before surgery     Which medicines can I take?    Hold Aspirin for 7 days before surgery.   Hold Multivitamins for 7 days before surgery.  Hold Supplements for 7 days before surgery.  Hold Ibuprofen (Advil, Motrin) for 1 day before  surgery.  Hold Naproxen (Aleve) for 4 days before surgery.    -  DO NOT take these medications the day of surgery:  Lisinopril      -  PLEASE TAKE these medications the day of surgery:  NONE      How do I prepare myself?  - Please take 2 showers before surgery using Scrubcare or Hibiclens soap.    Use this soap only from the neck to your toes.     Leave the soap on your skin for one minute--then rinse thoroughly.      You may use your own shampoo and conditioner; no other hair products.   - Please remove all jewelry and body piercings.  - No lotions, deodorants or fragrance.  - No makeup or fingernail polish.   - Bring your ID and insurance card.    -If you have a Deep Brain Stimulator, Spinal Cord Stimulator or any neuro stimulator device---you must bring the remote control to the hospital     - All patients are required to have a Covid-19 test within 4 days of surgery/procedure.      -Patients will be contacted by the [facility] scheduling team within 1 week of surgery to make an appointment.      - Patients may call the Scheduling team at [phone] if they have not been scheduled within 4 days of  surgery.      ALL PATIENTS GOING HOME THE SAME DAY OF SURGERY ARE REQUIRED TO HAVE A RESPONSIBLE ADULT TO DRIVE AND BE IN ATTENDANCE WITH THEM FOR 24 HOURS FOLLOWING SURGERY.      Questions or Concerns:    - For any questions regarding the day of surgery or your hospital stay, please contact the Pre Admission Nursing Office at [phone].       - If you have health changes between today and your surgery please call your surgeon.       For questions after surgery please call your surgeons office.

## 2022-01-14 NOTE — PROGRESS NOTES
Rod is a 76 year old who is being evaluated via a billable video visit.      How would you like to obtain your AVS? MyChart  HPI         Review of Systems         Objective    Vitals - Patient Reported  Pain Score: No Pain (0)        Physical Exam     SUE Ramirez LPN

## 2022-01-15 LAB — SARS-COV-2 RNA RESP QL NAA+PROBE: NEGATIVE

## 2022-01-17 ENCOUNTER — OFFICE VISIT (OUTPATIENT)
Dept: SURGERY | Facility: CLINIC | Age: 77
End: 2022-01-17
Payer: MEDICARE

## 2022-01-17 ENCOUNTER — PRE VISIT (OUTPATIENT)
Dept: SURGERY | Facility: CLINIC | Age: 77
End: 2022-01-17
Payer: MEDICARE

## 2022-01-17 VITALS
BODY MASS INDEX: 31.83 KG/M2 | DIASTOLIC BLOOD PRESSURE: 92 MMHG | WEIGHT: 235 LBS | HEIGHT: 72 IN | HEART RATE: 73 BPM | SYSTOLIC BLOOD PRESSURE: 141 MMHG | OXYGEN SATURATION: 100 %

## 2022-01-17 DIAGNOSIS — K64.8 INTERNAL HEMORRHOID: Primary | ICD-10-CM

## 2022-01-17 PROCEDURE — 99203 OFFICE O/P NEW LOW 30 MIN: CPT | Mod: 25 | Performed by: COLON & RECTAL SURGERY

## 2022-01-17 PROCEDURE — 46221 LIGATION OF HEMORRHOID(S): CPT | Performed by: COLON & RECTAL SURGERY

## 2022-01-17 ASSESSMENT — ENCOUNTER SYMPTOMS
VOMITING: 0
JAUNDICE: 0
NAUSEA: 0
HEARTBURN: 0
BLOATING: 0
BOWEL INCONTINENCE: 0
CONSTIPATION: 0
ABDOMINAL PAIN: 1
DIARRHEA: 1
BLOOD IN STOOL: 0
RECTAL PAIN: 1

## 2022-01-17 ASSESSMENT — MIFFLIN-ST. JEOR: SCORE: 1828.95

## 2022-01-17 ASSESSMENT — PAIN SCALES - GENERAL: PAINLEVEL: NO PAIN (0)

## 2022-01-17 NOTE — LETTER
2022       RE: Rod Olson  [address]  [place] [place] [address]     Dear Colleague,    Thank you for referring your patient, Rod Olson, to the [facility] COLON AND RECTAL [facility]GERY [facility]IN[facility] [place] [facility]. Please see a copy of my visit note below.    Colon and Rectal Surgery Clinic Note    RE: Rod Olson.  : 1945.  CHRISTIAN: 2022.    Reason for visit: Anal protrusion.       HPI: 76 year old male with recent history of mucinous adenocarcinoma of the appendix, status post attempted cytoreduction and right colectomy per Dr. Donald.  Has a longstanding history of hemorrhoids and over the past 6 months he has noticed increased anal protrusion.  Denies hematochezia.  Associated symptoms include anal pruritus, fecal seepage and incontinence to liquid stool.  He does not wear a pad for this.  He says that he has undergone at least 2 previous hemorrhoid banding sessions with good results.  He was on Lovenox after his abdominal operation but currently is not taking any NSAIDs or aspirin.      CT a/p (10/19/21):   IMPRESSION:   1. Two peripherally-enhancing fluid collections in the right hemipelvis, abutting the medial aspect of the cecum. The appendix is not visualized as separate from these structures. One of the fluid collections has calcification in its periphery. These   findings are nonspecific, but one of the fluid collections that measures 9 cm in diameter is suspicious for an abscess and could possible be related to ruptured appendicitis. The other with peripheral calcificationsis elongated and more irregular in   shape and measures 17 x 8 cm. This is nonspecific, but could represent a large mucocele of the appendix. Neoplasm cannot be excluded.  2. A very small amount of free fluid in the pelvis.  3. No other acute abnormality identified in the abdomen or pelvis      Colonoscopy (12/3/21):   Findings:        Hemorrhoids  were found on perianal exam.       The entire examined colon appeared normal on direct and retroflexion        view. A few small-mouthed diverticula were found in the descending colon.        There is no endoscopic evidence of polyps in the appendiceal orifice.              On 12/7/2021 underwent exploratory laparotomy, right hemicolectomy, partial resection of pelvic peritoneal metastasis.     Pathology:        He then saw Dr. Bal on 1/3/22 who recommended palliative chemotherapy without further surgical or intraperitoneal therapy.     CEA : 10.9    Ca 19-9: 240     CT Chest (1/14/22):  IMPRESSION:   1.  Scarlike pulmonary nodules in the left upper and lower lobes.  Recommend follow-up chest CT in three months to assess for stability.   2.  Small tree-in-bud opacity in the right lower lobe, favoring an  infectious or inflammatory bronchiolitis. Recommend attention on  follow-up.  3.  Minimal linear branching nodularity in the right middle lobe and  right lower lobe, suggestive of minimal endobronchial debris.  4.  Additional nonacute findings as above.      MRI Abdomen/Pelvis (1/14/22):          Currently planning a surgical repair of a Right inguinal hydrocele by Dr. Darnell Lopez on 1/18/22.    Medical history:  Past Medical History:   Diagnosis Date     Former smoker      Hypertension      Mucinous cystadenoma of appendix      Obese      Obesity (BMI 30.0-34.9)        Surgical history:  Past Surgical History:   Procedure Laterality Date     COLECTOMY RIGHT N/A 12/7/2021    Procedure: Right Hemicolectomy open;  Surgeon: Peng Donald MD;  Location: [facility] OR     COLONOSCOPY N/A 12/3/2021    Procedure: COLONOSCOPY;  Surgeon: Gia Victoria MD;  Location: [facility] GI     LAPAROTOMY EXPLORATORY N/A 12/7/2021    Procedure: EXPLORATORY LAPAROTOMY;  Surgeon: Peng Donald MD;  Location: [facility] OR       Family history:  Family History   Problem Relation Age of Onset     Cataracts Mother      Hypertension Father       Prostate Cancer Brother      Lung Cancer Brother      Myocardial Infarction Brother        Medications:  Current Outpatient Medications   Medication Sig Dispense Refill     acetaminophen (TYLENOL) 500 MG tablet Take 2 tablets (1,000 mg) by mouth every 8 hours (Patient taking differently: Take 1,000 mg by mouth every 4 hours as needed ) 50 tablet 0     lisinopril (ZESTRIL) 40 MG tablet Take 1 tablet (40 mg) by mouth daily (Patient taking differently: Take 40 mg by mouth daily (with lunch) ) 90 tablet 3     polyethylene glycol (MIRALAX) 17 GM/Dose powder Take 17 g by mouth daily (Patient not taking: Reported on 1/14/2022) 510 g 0       Allergies:  Allergies   Allergen Reactions     Clindamycin Other (See Comments)     PN: psychotic       Social history:   Social History     Tobacco Use     Smoking status: Former Smoker     Years: 3.00     Types: Cigars, Cigarettes     Smokeless tobacco: Never Used   Substance Use Topics     Alcohol use: Not Currently     Marital status: single.    Physical Examination:  BP (!) 141/92 (BP Location: Left arm, Patient Position: Sitting, Cuff Size: Adult Large)   Pulse 73   Ht 6'   Wt 235 lb   SpO2 100%   BMI 31.87 kg/m    General: Well hydrated. No acute distress.  Perianal external examination:  Perianal skin: Moist, with superficial fissuring mainly at the left aspect.  Lesions: No.  Eversion of buttocks: There was not evidence of an anal fissure. Details: N/A.  Skin tags or external hemorrhoids: Yes: Large left lateral mixed hemorrhoid column with no evidence of thrombosis or active bleeding.  Digital rectal examination: Was performed.   Sphincter tone: Poor.  Palpable lesions: No.  Other: None.  Bimanual examination: was not performed.    Anoscopy: Was performed.   Hemorrhoids: Yes.  Large mixed left lateral hemorrhoid column.  Internal hemorrhoid grade 3.  No evidence of thrombosis or bleeding.  Grade 1-2 internal hemorrhoids at the right posterior and right anterior  "aspects of the anus.  Lesions: No    Investigations:  None.    Procedures:  After discussing the risks and benefits, the patient agreed to proceed with internal hemorrhoidal banding.    Prior to the start of the procedure and with procedural staff participation, I verbally confirmed the patient s identity using two indicators, relevant allergies, that the procedure was appropriate and matched the consent, and that the correct equipment/implants were available. Immediately prior to starting the procedure I conducted the \"time out\" with the procedural staff and re-confirmed the patient s name, procedure, and site/side. The Joint Commission universal protocol was followed:  Yes.    Sedation (Moderate or Deep): None    A suction hemorrhoidal  was used to place a total of 2 band(s) in the left posterior and left anterior position(s).    There was no significant bleeding. The patient tolerated the procedure well.    ASSESSMENT  76-year-old male with complex past medical and surgical history, mainly recently diagnosed widely metastatic and high-grade appendiceal adenocarcinoma.  Presents with anal protrusion, most likely related to prolapsing internal hemorrhoids.  No clear evidence of full-thickness rectal prolapse.    PLAN  1.  High-fiber diet.  Okay to start Metamucil or Citrucel 1 tablespoon daily.  2.  Avoid aspirin or NSAIDs for 7 days.  3.  Follow-up as needed.    30 minutes spent on the date of the encounter doing chart review, history and exam, documentation and further activities as noted above.      Jefe Dos Santos M.D., M.Sc.     Division of Colon and Rectal Surgery  [facility]    Referring Provider:  No referring provider defined for this encounter.     Primary Care Provider:  [facility]    CC:  Peng Donald MD.  Larry Bal MD.  Darnell Lopez MD.            "

## 2022-01-18 ENCOUNTER — HOSPITAL ENCOUNTER (OUTPATIENT)
Facility: CLINIC | Age: 77
Discharge: HOME | End: 2022-01-18
Attending: SURGERY | Admitting: SURGERY
Payer: MEDICARE

## 2022-01-18 ENCOUNTER — ANESTHESIA (OUTPATIENT)
Dept: SURGERY | Facility: CLINIC | Age: 77
End: 2022-01-18
Payer: MEDICARE

## 2022-01-18 VITALS
SYSTOLIC BLOOD PRESSURE: 147 MMHG | BODY MASS INDEX: 32.69 KG/M2 | RESPIRATION RATE: 16 BRPM | OXYGEN SATURATION: 98 % | TEMPERATURE: 98.4 F | WEIGHT: 233.47 LBS | HEART RATE: 72 BPM | DIASTOLIC BLOOD PRESSURE: 93 MMHG | HEIGHT: 71 IN

## 2022-01-18 DIAGNOSIS — R97.20 ELEVATED PSA: ICD-10-CM

## 2022-01-18 DIAGNOSIS — J30.89 SEASONAL ALLERGIC RHINITIS DUE TO OTHER ALLERGIC TRIGGER: ICD-10-CM

## 2022-01-18 DIAGNOSIS — C18.1 MALIGNANT NEOPLASM OF APPENDIX (H): Primary | ICD-10-CM

## 2022-01-18 DIAGNOSIS — R05.3 CHRONIC COUGH: ICD-10-CM

## 2022-01-18 DIAGNOSIS — F51.04 CHRONIC INSOMNIA: ICD-10-CM

## 2022-01-18 LAB — GLUCOSE BLDC GLUCOMTR-MCNC: 88 MG/DL (ref 70–99)

## 2022-01-18 PROCEDURE — 250N000024 HC ISOFLURANE, PER MIN: Performed by: SURGERY

## 2022-01-18 PROCEDURE — 272N000001 HC OR GENERAL SUPPLY STERILE: Performed by: SURGERY

## 2022-01-18 PROCEDURE — 250N000011 HC RX IP 250 OP 636: Performed by: SURGERY

## 2022-01-18 PROCEDURE — 82962 GLUCOSE BLOOD TEST: CPT

## 2022-01-18 PROCEDURE — 250N000011 HC RX IP 250 OP 636: Performed by: NURSE ANESTHETIST, CERTIFIED REGISTERED

## 2022-01-18 PROCEDURE — 88302 TISSUE EXAM BY PATHOLOGIST: CPT | Mod: TC | Performed by: SURGERY

## 2022-01-18 PROCEDURE — 88305 TISSUE EXAM BY PATHOLOGIST: CPT | Mod: TC | Performed by: SURGERY

## 2022-01-18 PROCEDURE — 710N000012 HC RECOVERY PHASE 2, PER MINUTE: Performed by: SURGERY

## 2022-01-18 PROCEDURE — 258N000003 HC RX IP 258 OP 636: Performed by: NURSE ANESTHETIST, CERTIFIED REGISTERED

## 2022-01-18 PROCEDURE — 250N000009 HC RX 250: Performed by: NURSE ANESTHETIST, CERTIFIED REGISTERED

## 2022-01-18 PROCEDURE — 370N000017 HC ANESTHESIA TECHNICAL FEE, PER MIN: Performed by: SURGERY

## 2022-01-18 PROCEDURE — 250N000013 HC RX MED GY IP 250 OP 250 PS 637: Performed by: STUDENT IN AN ORGANIZED HEALTH CARE EDUCATION/TRAINING PROGRAM

## 2022-01-18 PROCEDURE — 250N000025 HC SEVOFLURANE, PER MIN: Performed by: SURGERY

## 2022-01-18 PROCEDURE — 250N000009 HC RX 250: Performed by: SURGERY

## 2022-01-18 PROCEDURE — 710N000010 HC RECOVERY PHASE 1, LEVEL 2, PER MIN: Performed by: SURGERY

## 2022-01-18 PROCEDURE — 258N000003 HC RX IP 258 OP 636: Performed by: STUDENT IN AN ORGANIZED HEALTH CARE EDUCATION/TRAINING PROGRAM

## 2022-01-18 PROCEDURE — 55040 REMOVAL OF HYDROCELE: CPT | Mod: GC | Performed by: SURGERY

## 2022-01-18 PROCEDURE — 999N000141 HC STATISTIC PRE-PROCEDURE NURSING ASSESSMENT: Performed by: SURGERY

## 2022-01-18 PROCEDURE — 360N000075 HC SURGERY LEVEL 2, PER MIN: Performed by: SURGERY

## 2022-01-18 PROCEDURE — 250N000011 HC RX IP 250 OP 636: Performed by: STUDENT IN AN ORGANIZED HEALTH CARE EDUCATION/TRAINING PROGRAM

## 2022-01-18 RX ORDER — FENTANYL CITRATE 50 UG/ML
25 INJECTION, SOLUTION INTRAMUSCULAR; INTRAVENOUS EVERY 5 MIN PRN
Status: DISCONTINUED | OUTPATIENT
Start: 2022-01-18 | End: 2022-01-18 | Stop reason: HOSPADM

## 2022-01-18 RX ORDER — FENTANYL CITRATE 50 UG/ML
INJECTION, SOLUTION INTRAMUSCULAR; INTRAVENOUS PRN
Status: DISCONTINUED | OUTPATIENT
Start: 2022-01-18 | End: 2022-01-18

## 2022-01-18 RX ORDER — SODIUM CHLORIDE, SODIUM LACTATE, POTASSIUM CHLORIDE, CALCIUM CHLORIDE 600; 310; 30; 20 MG/100ML; MG/100ML; MG/100ML; MG/100ML
INJECTION, SOLUTION INTRAVENOUS CONTINUOUS
Status: DISCONTINUED | OUTPATIENT
Start: 2022-01-18 | End: 2022-01-18 | Stop reason: HOSPADM

## 2022-01-18 RX ORDER — OXYCODONE HYDROCHLORIDE 5 MG/1
5 TABLET ORAL
Status: DISCONTINUED | OUTPATIENT
Start: 2022-01-18 | End: 2022-01-18 | Stop reason: HOSPADM

## 2022-01-18 RX ORDER — HYDRALAZINE HYDROCHLORIDE 20 MG/ML
2.5-5 INJECTION INTRAMUSCULAR; INTRAVENOUS EVERY 10 MIN PRN
Status: DISCONTINUED | OUTPATIENT
Start: 2022-01-18 | End: 2022-01-18 | Stop reason: HOSPADM

## 2022-01-18 RX ORDER — ONDANSETRON 4 MG/1
4 TABLET, ORALLY DISINTEGRATING ORAL EVERY 30 MIN PRN
Status: DISCONTINUED | OUTPATIENT
Start: 2022-01-18 | End: 2022-01-18 | Stop reason: HOSPADM

## 2022-01-18 RX ORDER — LIDOCAINE 40 MG/G
CREAM TOPICAL
Status: DISCONTINUED | OUTPATIENT
Start: 2022-01-18 | End: 2022-01-18 | Stop reason: HOSPADM

## 2022-01-18 RX ORDER — LIDOCAINE HYDROCHLORIDE 20 MG/ML
INJECTION, SOLUTION INFILTRATION; PERINEURAL PRN
Status: DISCONTINUED | OUTPATIENT
Start: 2022-01-18 | End: 2022-01-18

## 2022-01-18 RX ORDER — CEFAZOLIN SODIUM 2 G/100ML
2 INJECTION, SOLUTION INTRAVENOUS SEE ADMIN INSTRUCTIONS
Status: DISCONTINUED | OUTPATIENT
Start: 2022-01-18 | End: 2022-01-18 | Stop reason: HOSPADM

## 2022-01-18 RX ORDER — PROPOFOL 10 MG/ML
INJECTION, EMULSION INTRAVENOUS PRN
Status: DISCONTINUED | OUTPATIENT
Start: 2022-01-18 | End: 2022-01-18

## 2022-01-18 RX ORDER — ACETAMINOPHEN 325 MG/1
650 TABLET ORAL
Status: DISCONTINUED | OUTPATIENT
Start: 2022-01-18 | End: 2022-01-18 | Stop reason: HOSPADM

## 2022-01-18 RX ORDER — ACETAMINOPHEN 325 MG/1
975 TABLET ORAL ONCE
Status: COMPLETED | OUTPATIENT
Start: 2022-01-18 | End: 2022-01-18

## 2022-01-18 RX ORDER — CEFAZOLIN SODIUM 2 G/100ML
2 INJECTION, SOLUTION INTRAVENOUS
Status: DISCONTINUED | OUTPATIENT
Start: 2022-01-18 | End: 2022-01-18 | Stop reason: HOSPADM

## 2022-01-18 RX ORDER — ONDANSETRON 2 MG/ML
4 INJECTION INTRAMUSCULAR; INTRAVENOUS EVERY 30 MIN PRN
Status: DISCONTINUED | OUTPATIENT
Start: 2022-01-18 | End: 2022-01-18 | Stop reason: HOSPADM

## 2022-01-18 RX ORDER — KETOROLAC TROMETHAMINE 30 MG/ML
15 INJECTION, SOLUTION INTRAMUSCULAR; INTRAVENOUS
Status: COMPLETED | OUTPATIENT
Start: 2022-01-18 | End: 2022-01-18

## 2022-01-18 RX ORDER — ONDANSETRON 2 MG/ML
INJECTION INTRAMUSCULAR; INTRAVENOUS PRN
Status: DISCONTINUED | OUTPATIENT
Start: 2022-01-18 | End: 2022-01-18

## 2022-01-18 RX ORDER — HYDROMORPHONE HYDROCHLORIDE 1 MG/ML
0.2 INJECTION, SOLUTION INTRAMUSCULAR; INTRAVENOUS; SUBCUTANEOUS EVERY 5 MIN PRN
Status: DISCONTINUED | OUTPATIENT
Start: 2022-01-18 | End: 2022-01-18 | Stop reason: HOSPADM

## 2022-01-18 RX ORDER — ALBUTEROL SULFATE 0.83 MG/ML
2.5 SOLUTION RESPIRATORY (INHALATION) EVERY 4 HOURS PRN
Status: DISCONTINUED | OUTPATIENT
Start: 2022-01-18 | End: 2022-01-18 | Stop reason: HOSPADM

## 2022-01-18 RX ORDER — OXYCODONE HYDROCHLORIDE 5 MG/1
5 TABLET ORAL EVERY 4 HOURS PRN
Status: DISCONTINUED | OUTPATIENT
Start: 2022-01-18 | End: 2022-01-18 | Stop reason: HOSPADM

## 2022-01-18 RX ORDER — OXYCODONE HYDROCHLORIDE 5 MG/1
5 TABLET ORAL EVERY 6 HOURS PRN
Qty: 20 TABLET | Refills: 0 | Status: SHIPPED | OUTPATIENT
Start: 2022-01-18 | End: 2022-04-11

## 2022-01-18 RX ADMIN — FENTANYL CITRATE 50 MCG: 50 INJECTION, SOLUTION INTRAMUSCULAR; INTRAVENOUS at 13:31

## 2022-01-18 RX ADMIN — SODIUM CHLORIDE, POTASSIUM CHLORIDE, SODIUM LACTATE AND CALCIUM CHLORIDE: 600; 310; 30; 20 INJECTION, SOLUTION INTRAVENOUS at 13:41

## 2022-01-18 RX ADMIN — SUGAMMADEX 200 MG: 100 INJECTION, SOLUTION INTRAVENOUS at 16:09

## 2022-01-18 RX ADMIN — PHENYLEPHRINE HYDROCHLORIDE 100 MCG: 10 INJECTION INTRAVENOUS at 15:56

## 2022-01-18 RX ADMIN — ONDANSETRON 4 MG: 2 INJECTION INTRAMUSCULAR; INTRAVENOUS at 15:56

## 2022-01-18 RX ADMIN — ROCURONIUM BROMIDE 20 MG: 50 INJECTION, SOLUTION INTRAVENOUS at 14:28

## 2022-01-18 RX ADMIN — ROCURONIUM BROMIDE 50 MG: 50 INJECTION, SOLUTION INTRAVENOUS at 13:47

## 2022-01-18 RX ADMIN — PROPOFOL 160 MG: 10 INJECTION, EMULSION INTRAVENOUS at 13:47

## 2022-01-18 RX ADMIN — CEFAZOLIN 2 G: 10 INJECTION, POWDER, FOR SOLUTION INTRAVENOUS at 14:04

## 2022-01-18 RX ADMIN — PROPOFOL 40 MG: 10 INJECTION, EMULSION INTRAVENOUS at 14:28

## 2022-01-18 RX ADMIN — ONDANSETRON 4 MG: 2 INJECTION INTRAMUSCULAR; INTRAVENOUS at 16:41

## 2022-01-18 RX ADMIN — KETOROLAC TROMETHAMINE 15 MG: 30 INJECTION, SOLUTION INTRAMUSCULAR; INTRAVENOUS at 16:42

## 2022-01-18 RX ADMIN — ROCURONIUM BROMIDE 10 MG: 50 INJECTION, SOLUTION INTRAVENOUS at 15:39

## 2022-01-18 RX ADMIN — FENTANYL CITRATE 25 MCG: 50 INJECTION INTRAMUSCULAR; INTRAVENOUS at 16:43

## 2022-01-18 RX ADMIN — PHENYLEPHRINE HYDROCHLORIDE 100 MCG: 10 INJECTION INTRAVENOUS at 15:11

## 2022-01-18 RX ADMIN — LIDOCAINE HYDROCHLORIDE 100 MG: 20 INJECTION, SOLUTION INFILTRATION; PERINEURAL at 13:47

## 2022-01-18 RX ADMIN — ACETAMINOPHEN 975 MG: 325 TABLET, FILM COATED ORAL at 12:08

## 2022-01-18 RX ADMIN — PHENYLEPHRINE HYDROCHLORIDE 200 MCG: 10 INJECTION INTRAVENOUS at 15:02

## 2022-01-18 RX ADMIN — FENTANYL CITRATE 50 MCG: 50 INJECTION, SOLUTION INTRAMUSCULAR; INTRAVENOUS at 14:30

## 2022-01-18 RX ADMIN — PHENYLEPHRINE HYDROCHLORIDE 100 MCG: 10 INJECTION INTRAVENOUS at 14:54

## 2022-01-18 ASSESSMENT — MIFFLIN-ST. JEOR: SCORE: 1806.13

## 2022-01-18 NOTE — ANESTHESIA PROCEDURE NOTES
Airway       Patient location during procedure: OR       Procedure Start/Stop Times: 1/18/2022 1:51 PM  Staff -        CRNA: Angelica Melendrez APRN CRNA       Performed By: CRNAIndications and Patient Condition       Indications for airway management: heath-procedural       Induction type:intravenous       Mask difficulty assessment: 2 - vent by mask + OA or adjuvant +/- NMBA    Final Airway Details       Final airway type: endotracheal airway       Successful airway: ETT - single  Endotracheal Airway Details        ETT size (mm): 8.0       Cuffed: yes       Successful intubation technique: direct laryngoscopy       DL Blade Type: MAC 4       Grade View of Cords: 2       Adjucts: stylet       Position: Right       Measured from: lips       Bite block used: None    Post intubation assessment        Placement verified by: capnometry, equal breath sounds and chest rise        Number of attempts at approach: 1       Secured with: plastic tape       Ease of procedure: easy (bottom of arytenoids visualized.  Recommend cmac for next intubation)       Dentition: Intact and Unchanged

## 2022-01-18 NOTE — PROGRESS NOTES
I had a VERY extensive conversation with Mr Olson about this procedure.I explained his condition again, and reiterated that his condition could be treated surgically. My approach wuld e transinguinal hydrocelectomy in which the sac woud be aspirated,opened up and removed to the extent possible. I also explained that while the fluid would be sent for cytology, this is NOT an oncologic operation,and is purely symptomatic. I explained the other options, including just aspiration, but this is likely to recur even with a shortened lifespan. I also explaned any hernia component could be repaired as well with transinguinal incision,though I explained I would only use mesh if associated inguinal hernia was noted. Risks stressed include pain, bruising, loss of testicle, RECURRENCE, wound infection. I also explained that recurrence may necessitate urologic exploration transscrotally.     After full discussion,pt agreed to proceed. Informed consent obtained for hernia repair (incuding hydrocele removal)    Darnell Lopez MD

## 2022-01-18 NOTE — ANESTHESIA CARE TRANSFER NOTE
Patient: Rod Olson    Procedure: Procedure(s):  OPEN  transinguinaL  HYDROCELE REPAIR.       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    Anesthesia Type:   General     Note:    Oropharynx: oropharynx clear of all foreign objects  Level of Consciousness: awake  Oxygen Supplementation: face mask    Independent Airway: airway patency satisfactory and stable  Dentition: dentition unchanged  Vital Signs Stable: post-procedure vital signs reviewed and stable  Report to RN Given: handoff report given  Patient transferred to: PACU    Handoff Report: Identifed the Patient, Identified the Reponsible Provider, Reviewed the pertinent medical history, Discussed the surgical course, Reviewed Intra-OP anesthesia mangement and issues during anesthesia, Set expectations for post-procedure period and Allowed opportunity for questions and acknowledgement of understanding      Vitals:  Vitals Value Taken Time   BP     Temp     Pulse 77 01/18/22 1626   Resp     SpO2 100 % 01/18/22 1626   Vitals shown include unvalidated device data.    Electronically Signed By: BARRIE Larios CRNA  January 18, 2022  4:27 PM

## 2022-01-18 NOTE — ANESTHESIA POSTPROCEDURE EVALUATION
Patient: Rod Olson    Procedure: Procedure(s):  OPEN  transinguinaL  HYDROCELE REPAIR.       Diagnosis:Chronic hydrocele [N43.3]  Diagnosis Additional Information: No value filed.    Anesthesia Type:  General    Note:  Disposition: Outpatient   Postop Pain Control: Uneventful            Sign Out: Well controlled pain   PONV: No   Neuro/Psych: Uneventful            Sign Out: Acceptable/Baseline neuro status   Airway/Respiratory: Uneventful            Sign Out: Acceptable/Baseline resp. status   CV/Hemodynamics: Uneventful            Sign Out: Acceptable CV status; No obvious hypovolemia; No obvious fluid overload   Other NRE: NONE   DID A NON-ROUTINE EVENT OCCUR? No           Last vitals:  Vitals Value Taken Time   /100 01/18/22 1700   Temp 35.6  C (96.1  F) 01/18/22 1630   Pulse 64 01/18/22 1709   Resp 20 01/18/22 1645   SpO2 99 % 01/18/22 1709   Vitals shown include unvalidated device data.    Electronically Signed By: Jaelyn Siddiqui MD  January 18, 2022  5:10 PM

## 2022-01-18 NOTE — OP NOTE
[facility]    Operative Note    Pre-operative diagnosis: Chronic hydrocele [N43.3]   Post-operative diagnosis R hydrocele   Procedure: Procedure(s):  OPEN  transinguinaL  HYDROCELE REPAIR.   Surgeon: Surgeon(s) and Role:     * Darnell Lopez MD - Primary     * Sari Singletary MD - Assisting     * Arlette Barcenas MD - Resident - Assisting     * Berry Gamble MD - Resident - Assisting   Anesthesia: General    Estimated blood loss: Less than 10 ml   Drains: None   Specimens: ID Type Source Tests Collected by Time Destination   1 : Right hydrocele fluid Body fluid, unsp Other NON-GYNECOLOGIC CYTOLOGY Darnell Lopez MD 1/18/2022  2:50 PM    2 : Right hydrocele fluid #2 Body fluid, unsp Other NON-GYNECOLOGIC CYTOLOGY Darnell Lopez MD 1/18/2022  2:58 PM    3 : Hydrocele Sac Tissue Other SURGICAL PATHOLOGY EXAM Darnell Lopez MD 1/18/2022  3:10 PM       Findings: 1) very large R hydrocele, noncommunicating with over 600cc straw colored fluid        Complications: None.   Implants: None.           OPERATIVE INDICATIONS:  Rod Olson is a 76 year old year old M with a history of mucinous appendiceal neoplasm s/p debulking, and pending chemo. Due to concern about mucin-containing hydrocele that was very symptomatic, he was offered a transinguinal repair purely for symptoms.    After understanding the risks and benefits of proceeding with surgery, the patient has an indication for open R repair of hydrocele with any associated hernia (inguinal hernia repair).    I reviewed the risks of surgery with Rod Olson .    These include, but are not limited to, death, myocardial infarction, pneumonia, urinary tract infection, deep venous thrombosis with or without pulmonary embolus, abdominal infection from bowel injury or abscess, bowel obstruction, wound infection, and bleeding.    OPERATIVE DETAILS:  The patient was brought to the operating room and  prepared in a routine fashion. Penis and scrotum was prepped in. Beauchamp was placed sterilely. A timeout was performed prior to surgery and documented by the nursing team.    Under the benefits of general anesthesia,  the patient was positioned supine. A field block was produced by raising skin wheals along the proposed skin incision, along a vertical line lateral to it, and along a horizontal line superior to it. Additional local anesthesia was injected during the procedure under the external oblique aponeurosis, at the internal ring, and as needed. A total of 40 mL local (1/1half percent bupivicaine/one percent lido with epi)) was used.    The skin crease incision was made with a knife ~1 fingerbreadth above and parallel to the inguinal ligament.  The incision was carried down to the aponeurosis of the external oblique, and the cord structures were reached below the muscular layers without violating fascia. The ilioinguinal nerve was identified and sacrificed.     The cord was identified. It was gently dissected free at the pubic tubercle and encircled with a Penrose drain. The cord was examined and no associated hernia was found, hence no mesh was used. Cord lipoma was excised.    Next, the testicle with hydrocele sac was delivered upwards. Aspiration revealed straw colored fluid. This was sent for cytology. Once enough tissue was aspirated, the testicle was bluntly dissected circumferentially except for gubernaculum, and delivered into the incision. The hydrocele sac of tunica was incised, and the remainder of the fluid was suctioned. Redundant sac was excised ensuring preservation of cord structures.       Sac was closed with 3-0 vicryl in a running fashion.      I consulted Dr Singletary to determine whether to pexy the testicle. He briefly scrubbed and determined a pexy would not be necessary.    The wound was irrigated and hemostasis was achieved.    Wound was closed in many layers of 3-0 vicryl running and deep  dermal interrupteds.    Skin closed with 4-0 running subcuticular.    Beauchamp was removed at the end of the case      The patient tolerated the procedure well and was taken to the postanesthesia care unit in stable condition.    I was present for all critical components of the operation and all needle and sponge counts were correct x2 at the end of the procedure.    Darnell Lopez MD  Surgery  [phone] (hospital )  [phone] (clinic nurses)

## 2022-01-19 ENCOUNTER — NURSE TRIAGE (OUTPATIENT)
Dept: NURSING | Facility: CLINIC | Age: 77
End: 2022-01-19
Payer: MEDICARE

## 2022-01-19 ENCOUNTER — TELEPHONE (OUTPATIENT)
Dept: SURGERY | Facility: CLINIC | Age: 77
End: 2022-01-19

## 2022-01-19 ENCOUNTER — HOSPITAL ENCOUNTER (EMERGENCY)
Facility: CLINIC | Age: 77
Discharge: HOME | End: 2022-01-19
Attending: EMERGENCY MEDICINE | Admitting: EMERGENCY MEDICINE
Payer: MEDICARE

## 2022-01-19 ENCOUNTER — APPOINTMENT (OUTPATIENT)
Dept: CT IMAGING | Facility: CLINIC | Age: 77
End: 2022-01-19
Attending: EMERGENCY MEDICINE
Payer: MEDICARE

## 2022-01-19 VITALS
SYSTOLIC BLOOD PRESSURE: 121 MMHG | WEIGHT: 233 LBS | RESPIRATION RATE: 25 BRPM | BODY MASS INDEX: 32.5 KG/M2 | TEMPERATURE: 98.8 F | DIASTOLIC BLOOD PRESSURE: 81 MMHG | HEART RATE: 99 BPM | OXYGEN SATURATION: 95 %

## 2022-01-19 DIAGNOSIS — Z98.890 S/P REPAIR OF HYDROCELE: ICD-10-CM

## 2022-01-19 DIAGNOSIS — R55 SYNCOPE, UNSPECIFIED SYNCOPE TYPE: ICD-10-CM

## 2022-01-19 DIAGNOSIS — R00.0 SINUS TACHYCARDIA: ICD-10-CM

## 2022-01-19 DIAGNOSIS — S30.1XXA GROIN HEMATOMA, INITIAL ENCOUNTER: ICD-10-CM

## 2022-01-19 DIAGNOSIS — Z87.438 S/P REPAIR OF HYDROCELE: ICD-10-CM

## 2022-01-19 DIAGNOSIS — Z85.9 HISTORY OF CANCER: ICD-10-CM

## 2022-01-19 LAB
ABO/RH(D): NORMAL
ANION GAP SERPL CALCULATED.3IONS-SCNC: 3 MMOL/L (ref 3–14)
ANTIBODY SCREEN: NEGATIVE
BASOPHILS # BLD AUTO: 0 10E3/UL (ref 0–0.2)
BASOPHILS # BLD AUTO: 0 10E3/UL (ref 0–0.2)
BASOPHILS NFR BLD AUTO: 0 %
BASOPHILS NFR BLD AUTO: 0 %
BUN SERPL-MCNC: 15 MG/DL (ref 7–30)
CALCIUM SERPL-MCNC: 8.7 MG/DL (ref 8.5–10.1)
CHLORIDE BLD-SCNC: 107 MMOL/L (ref 94–109)
CO2 SERPL-SCNC: 29 MMOL/L (ref 20–32)
CREAT SERPL-MCNC: 0.89 MG/DL (ref 0.66–1.25)
EOSINOPHIL # BLD AUTO: 0 10E3/UL (ref 0–0.7)
EOSINOPHIL # BLD AUTO: 0 10E3/UL (ref 0–0.7)
EOSINOPHIL NFR BLD AUTO: 0 %
EOSINOPHIL NFR BLD AUTO: 0 %
ERYTHROCYTE [DISTWIDTH] IN BLOOD BY AUTOMATED COUNT: 13.6 % (ref 10–15)
ERYTHROCYTE [DISTWIDTH] IN BLOOD BY AUTOMATED COUNT: 13.7 % (ref 10–15)
GFR SERPL CREATININE-BSD FRML MDRD: 88 ML/MIN/1.73M2
GLUCOSE BLD-MCNC: 110 MG/DL (ref 70–99)
HCT VFR BLD AUTO: 33.2 % (ref 40–53)
HCT VFR BLD AUTO: 37.5 % (ref 40–53)
HGB BLD-MCNC: 10.7 G/DL (ref 13.3–17.7)
HGB BLD-MCNC: 10.9 G/DL (ref 13.3–17.7)
HGB BLD-MCNC: 12.1 G/DL (ref 13.3–17.7)
HOLD SPECIMEN: NORMAL
IMM GRANULOCYTES # BLD: 0 10E3/UL
IMM GRANULOCYTES # BLD: 0 10E3/UL
IMM GRANULOCYTES NFR BLD: 0 %
IMM GRANULOCYTES NFR BLD: 0 %
INR PPP: 1.14 (ref 0.85–1.15)
LACTATE SERPL-SCNC: 1.3 MMOL/L (ref 0.7–2)
LYMPHOCYTES # BLD AUTO: 0.7 10E3/UL (ref 0.8–5.3)
LYMPHOCYTES # BLD AUTO: 1.1 10E3/UL (ref 0.8–5.3)
LYMPHOCYTES NFR BLD AUTO: 13 %
LYMPHOCYTES NFR BLD AUTO: 6 %
MCH RBC QN AUTO: 28.4 PG (ref 26.5–33)
MCH RBC QN AUTO: 28.5 PG (ref 26.5–33)
MCHC RBC AUTO-ENTMCNC: 32.3 G/DL (ref 31.5–36.5)
MCHC RBC AUTO-ENTMCNC: 32.8 G/DL (ref 31.5–36.5)
MCV RBC AUTO: 87 FL (ref 78–100)
MCV RBC AUTO: 88 FL (ref 78–100)
MONOCYTES # BLD AUTO: 0.8 10E3/UL (ref 0–1.3)
MONOCYTES # BLD AUTO: 0.9 10E3/UL (ref 0–1.3)
MONOCYTES NFR BLD AUTO: 11 %
MONOCYTES NFR BLD AUTO: 7 %
NEUTROPHILS # BLD AUTO: 6.1 10E3/UL (ref 1.6–8.3)
NEUTROPHILS # BLD AUTO: 9.6 10E3/UL (ref 1.6–8.3)
NEUTROPHILS NFR BLD AUTO: 76 %
NEUTROPHILS NFR BLD AUTO: 87 %
NRBC # BLD AUTO: 0 10E3/UL
NRBC # BLD AUTO: 0 10E3/UL
NRBC BLD AUTO-RTO: 0 /100
NRBC BLD AUTO-RTO: 0 /100
PLATELET # BLD AUTO: 189 10E3/UL (ref 150–450)
PLATELET # BLD AUTO: 212 10E3/UL (ref 150–450)
POTASSIUM BLD-SCNC: 4.3 MMOL/L (ref 3.4–5.3)
RBC # BLD AUTO: 3.82 10E6/UL (ref 4.4–5.9)
RBC # BLD AUTO: 4.26 10E6/UL (ref 4.4–5.9)
SODIUM SERPL-SCNC: 139 MMOL/L (ref 133–144)
SPECIMEN EXPIRATION DATE: NORMAL
WBC # BLD AUTO: 11.1 10E3/UL (ref 4–11)
WBC # BLD AUTO: 8.2 10E3/UL (ref 4–11)

## 2022-01-19 PROCEDURE — 80048 BASIC METABOLIC PNL TOTAL CA: CPT | Performed by: EMERGENCY MEDICINE

## 2022-01-19 PROCEDURE — 96360 HYDRATION IV INFUSION INIT: CPT | Mod: 59 | Performed by: EMERGENCY MEDICINE

## 2022-01-19 PROCEDURE — 74177 CT ABD & PELVIS W/CONTRAST: CPT | Mod: MG

## 2022-01-19 PROCEDURE — 85018 HEMOGLOBIN: CPT | Performed by: EMERGENCY MEDICINE

## 2022-01-19 PROCEDURE — 76705 ECHO EXAM OF ABDOMEN: CPT | Mod: 26 | Performed by: EMERGENCY MEDICINE

## 2022-01-19 PROCEDURE — 250N000011 HC RX IP 250 OP 636: Performed by: EMERGENCY MEDICINE

## 2022-01-19 PROCEDURE — 85610 PROTHROMBIN TIME: CPT | Performed by: SURGERY

## 2022-01-19 PROCEDURE — 93308 TTE F-UP OR LMTD: CPT | Mod: 26 | Performed by: EMERGENCY MEDICINE

## 2022-01-19 PROCEDURE — 99285 EMERGENCY DEPT VISIT HI MDM: CPT | Mod: 25 | Performed by: EMERGENCY MEDICINE

## 2022-01-19 PROCEDURE — 85025 COMPLETE CBC W/AUTO DIFF WBC: CPT | Performed by: EMERGENCY MEDICINE

## 2022-01-19 PROCEDURE — 250N000009 HC RX 250: Performed by: EMERGENCY MEDICINE

## 2022-01-19 PROCEDURE — 93308 TTE F-UP OR LMTD: CPT | Performed by: EMERGENCY MEDICINE

## 2022-01-19 PROCEDURE — 83605 ASSAY OF LACTIC ACID: CPT | Performed by: EMERGENCY MEDICINE

## 2022-01-19 PROCEDURE — 250N000013 HC RX MED GY IP 250 OP 250 PS 637: Performed by: EMERGENCY MEDICINE

## 2022-01-19 PROCEDURE — 96361 HYDRATE IV INFUSION ADD-ON: CPT | Performed by: EMERGENCY MEDICINE

## 2022-01-19 PROCEDURE — 258N000003 HC RX IP 258 OP 636: Performed by: EMERGENCY MEDICINE

## 2022-01-19 PROCEDURE — 76705 ECHO EXAM OF ABDOMEN: CPT | Performed by: EMERGENCY MEDICINE

## 2022-01-19 PROCEDURE — 93010 ELECTROCARDIOGRAM REPORT: CPT | Performed by: EMERGENCY MEDICINE

## 2022-01-19 PROCEDURE — 93005 ELECTROCARDIOGRAM TRACING: CPT | Performed by: EMERGENCY MEDICINE

## 2022-01-19 PROCEDURE — 36415 COLL VENOUS BLD VENIPUNCTURE: CPT | Performed by: EMERGENCY MEDICINE

## 2022-01-19 PROCEDURE — 86901 BLOOD TYPING SEROLOGIC RH(D): CPT | Performed by: EMERGENCY MEDICINE

## 2022-01-19 RX ORDER — SODIUM CHLORIDE 9 MG/ML
1000 INJECTION, SOLUTION INTRAVENOUS CONTINUOUS
Status: DISCONTINUED | OUTPATIENT
Start: 2022-01-19 | End: 2022-01-19 | Stop reason: HOSPADM

## 2022-01-19 RX ORDER — IOPAMIDOL 755 MG/ML
100 INJECTION, SOLUTION INTRAVASCULAR ONCE
Status: COMPLETED | OUTPATIENT
Start: 2022-01-19 | End: 2022-01-19

## 2022-01-19 RX ORDER — ACETAMINOPHEN 325 MG/1
975 TABLET ORAL ONCE
Status: COMPLETED | OUTPATIENT
Start: 2022-01-19 | End: 2022-01-19

## 2022-01-19 RX ADMIN — SODIUM CHLORIDE 69 ML: 9 INJECTION, SOLUTION INTRAVENOUS at 02:17

## 2022-01-19 RX ADMIN — IOPAMIDOL 100 ML: 755 INJECTION, SOLUTION INTRAVENOUS at 02:17

## 2022-01-19 RX ADMIN — ACETAMINOPHEN 975 MG: 325 TABLET, FILM COATED ORAL at 09:36

## 2022-01-19 RX ADMIN — SODIUM CHLORIDE 1000 ML: 9 INJECTION, SOLUTION INTRAVENOUS at 02:50

## 2022-01-19 ASSESSMENT — ENCOUNTER SYMPTOMS
MUSCULOSKELETAL NEGATIVE: 1
HEMATOLOGIC/LYMPHATIC NEGATIVE: 1
PSYCHIATRIC NEGATIVE: 1
RESPIRATORY NEGATIVE: 1
ALLERGIC/IMMUNOLOGIC NEGATIVE: 1
COLOR CHANGE: 1
CONSTITUTIONAL NEGATIVE: 1
GASTROINTESTINAL NEGATIVE: 1
ENDOCRINE NEGATIVE: 1
EYES NEGATIVE: 1

## 2022-01-19 NOTE — TELEPHONE ENCOUNTER
Had surgery earlier today   -hydrocele repair    Thinks he is having a reaction   -having a lot of swelling in the scrotum    Cannot find the aftercare summary.     Has not been icing or elevating    No increased pain   No fever  No incisional symptoms  No difficulty with urination  No chest pain  No abdominal pain  No difficulty breathing  No difficulty swallowing    Triaged to a disposition of home care. Home care advice given. Went over AVS with caregiver. Also gave clinic number that was in AVS as the number to call. Pt and caregiver are agreeable.     COVID 19 Nurse Triage Plan/Patient Instructions    Please be aware that novel coronavirus (COVID-19) may be circulating in the community. If you develop symptoms such as fever, cough, or SOB or if you have concerns about the presence of another infection including coronavirus (COVID-19), please contact your health care provider or visit [email or web address].     Disposition/Instructions    Home care recommended. Follow home care protocol based instructions.    Thank you for taking steps to prevent the spread of this virus.  o Limit your contact with others.  o Wear a simple mask to cover your cough.  o Wash your hands well and often.    Resources    [facility]: About COVID-19: [email or web address]    CDC: What to Do If You're Sick: www.cdc.gov/coronavirus/2019-ncov/about/steps-when-sick.html    CDC: Ending Home Isolation: www.cdc.gov/coronavirus/2019-ncov/hcp/disposition-in-home-patients.html     CDC: Caring for Someone: www.cdc.gov/coronavirus/2019-ncov/if-you-are-sick/care-for-someone.html     [facility]: Interim Guidance for Hospital Discharge to Home: www.health.[email or web address].[place]/diseases/coronavirus/hcp/hospdischarge.pdf    [facility] clinical trials (COVID-19 research studies): [email or web address]     Below are the COVID-19 hotlines at the [place] Department of [facility] ([facility]). Interpreters are available.    o For health questions: Call 273-111-6738 or 1-403.420.7887 (7 a.m. to 7 p.m.)  o For questions about schools and childcare: Call 779-890-0993 or 1-557.517.8209 (7 a.m. to 7 p.m.)       Reason for Disposition    Other post-op symptom or question    Additional Information    Negative: Sounds like a life-threatening emergency to the triager    Negative: Chest pain    Negative: Difficulty breathing    Negative: Acting confused (e.g., disoriented, slurred speech) or excessively sleepy    Negative: Surgical incision symptoms and questions    Negative: [1] Discomfort (pain, burning or stinging) when passing urine AND [2] male    Negative: [1] Discomfort (pain, burning or stinging) when passing urine AND [2] female    Negative: Constipation    Negative: New or worsening leg (calf, thigh) pain    Negative: New or worsening leg swelling    Negative: Dizziness is severe, or persists > 24 hours after surgery    Negative: Pain, redness, swelling, or pus at IV Site    Negative: Symptoms arising from use of a urinary catheter (Beauchamp or Coude)    Negative: Cast problems or questions    Negative: Medication question    Negative: [1] Widespread rash AND [2] bright red, sunburn-like    Negative: [1] SEVERE headache AND [2] after spinal (epidural) anesthesia    Negative: [1] Vomiting AND [2] persists > 4 hours    Negative: [1] Vomiting AND [2] abdomen looks much more swollen than usual    Negative: [1] Drinking very little AND [2] dehydration suspected (e.g., no urine > 12 hours, very dry mouth, very lightheaded)    Negative: Patient sounds very sick or weak to the triager    Negative: Sounds like a serious complication to the triager    Negative: Fever > 100.4 F (38.0 C)    Negative: [1] SEVERE post-op pain (e.g., excruciating, pain scale 8-10) AND [2] not controlled with pain medications    Negative: [1] Caller has URGENT question AND [2] triager unable to answer question    Negative: [1] Headache AND [2] after spinal (epidural)  anesthesia AND [3] not severe    Negative: Fever present > 3 days (72 hours)    Negative: [1] MILD-MODERATE post-op pain (e.g., pain scale 1-7) AND [2] not controlled with pain medications    Negative: [1] Caller has NON-URGENT question AND [2] triager unable to answer question    Negative: General activity, questions about    Negative: Resuming driving, questions about    Negative: Resuming sexual relations, questions about    Negative: Getting the incision wet, questions about    Negative: Throat pain after surgery, questions about    Negative: [1] Vomiting AND [2] present < 4 hours    Protocols used: POST-OP SYMPTOMS AND [id number]    Verona Dudley RN on 1/19/2022 at 12:25 AM

## 2022-01-19 NOTE — ED NOTES
OKAY TO TALK WITH DAUGHTER PER PATIENT, UPDATED KANWAL [phone] ON WHAT HAS HAPPENED SO FAR AND WHAT WE ARE WAITING ON FOR DISCHARGE DECISION AND I CAN LET HER KNOW THE PLAN, SON IN LAW CAN

## 2022-01-19 NOTE — ED PROVIDER NOTES
History     Chief Complaint   Patient presents with     Syncope     HPI  Rod Olson is a 76 year old male who presents from home after syncopal episode.  Pre-hospital patient was noted to be hypotensive -(fluid responsive).  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  Status post exploratory laparotomy with resection of pelvic tumor and right-sided ureterolysis (hospital course from 12/7-12/11/21).  Records show that patient underwent an open transinguinal hydrocele repair a day prior by Dr. Darnell Sebastian.  Currently followed by oncology at the [place]- (Dr Bal).  On arrival patient reports he was discharged after his procedure earlier in the day to home at 6 PM.  After getting home he had something to eat and was laying on the couch when he reports he got up and suddenly felt dizzy and collapsed and had urinary incontinence.  He did not suffer trauma was caught by his family members who witnessed the event.  Patient reports after awakening after his fainting spell he noted that his right testicle and groin was swollen which is new.  Post procedure he had no swelling by report.    Allergies:  Allergies   Allergen Reactions     Clindamycin Other (See Comments)     PN: psychotic       Problem List:    Patient Active Problem List    Diagnosis Date Noted     Malignant neoplasm of appendix (H) 12/07/2021     Priority: Medium     Elevated PSA 10/19/2017     Priority: Medium     Chronic insomnia 04/22/2014     Priority: Medium     Allergic rhinitis 04/22/2014     Priority: Medium     Hyperlipidemia 04/22/2014     Priority: Medium     Formatting of this note might be different from the original.  Other  and unspecified hyperlipidemia       Fatigue 04/22/2014     Priority: Medium     Chronic cough 01/10/2012     Priority: Medium     HTN (hypertension) 01/10/2012     Priority: Medium        Past Medical History:    Past Medical History:   Diagnosis Date     Former smoker      Hypertension      Mucinous cystadenoma of appendix      Obese      Obesity (BMI 30.0-34.9)        Past Surgical History:    Past Surgical History:   Procedure Laterality Date     COLECTOMY RIGHT N/A 12/7/2021    Procedure: Right Hemicolectomy open;  Surgeon: Peng Donald MD;  Location: [facility] OR     COLONOSCOPY N/A 12/3/2021    Procedure: COLONOSCOPY;  Surgeon: Gia Victoria MD;  Location: [facility] GI     LAPAROTOMY EXPLORATORY N/A 12/7/2021    Procedure: EXPLORATORY LAPAROTOMY;  Surgeon: Peng Donald MD;  Location: [facility] OR       Family History:    Family History   Problem Relation Age of Onset     Cataracts Mother      Hypertension Father      Prostate Cancer Brother      Lung Cancer Brother      Myocardial Infarction Brother        Social History:  Marital Status:  Single [1]  Social History     Tobacco Use     Smoking status: Former Smoker     Years: 3.00     Types: Cigars, Cigarettes     Smokeless tobacco: Never Used   Vaping Use     Vaping Use: Never used   Substance Use Topics     Alcohol use: Not Currently     Drug use: Never        Medications:    acetaminophen (TYLENOL) 500 MG tablet  lisinopril (ZESTRIL) 40 MG tablet  oxyCODONE (ROXICODONE) 5 MG tablet          Review of Systems   Constitutional: Negative.    HENT: Negative.    Eyes: Negative.    Respiratory: Negative.    Gastrointestinal: Negative.    Endocrine: Negative.    Genitourinary:        Right groin swelling   Musculoskeletal: Negative.    Skin: Positive for color change (bruising overlying right groin and testicle).   Allergic/Immunologic: Negative.    Neurological: Positive for syncope.   Hematological: Negative.    Psychiatric/Behavioral: Negative.    All other  systems reviewed and are negative.      Physical Exam   BP: 136/86  Pulse: 110  Temp: 98.8  F (37.1  C)  SpO2: 95 %      Physical Exam  Constitutional:       Appearance: He is not diaphoretic.   HENT:      Head: Normocephalic and atraumatic.      Mouth/Throat:      Mouth: Mucous membranes are moist.   Eyes:      Extraocular Movements: Extraocular movements intact.      Pupils: Pupils are equal, round, and reactive to light.   Cardiovascular:      Rate and Rhythm: Regular rhythm. Tachycardia present.   Pulmonary:      Effort: Pulmonary effort is normal. No respiratory distress.      Breath sounds: Normal breath sounds. No stridor. No wheezing, rhonchi or rales.   Chest:      Chest wall: No tenderness.   Genitourinary:     Testes:         Right: Swelling present.       Musculoskeletal:      Cervical back: Normal range of motion and neck supple.   Skin:     Capillary Refill: Capillary refill takes less than 2 seconds.      Coloration: Skin is pale.   Neurological:      General: No focal deficit present.      Mental Status: He is alert and oriented to person, place, and time.      Cranial Nerves: No cranial nerve deficit.      Sensory: No sensory deficit.      Motor: No weakness.      Coordination: Coordination normal.      Gait: Gait normal.      Deep Tendon Reflexes: Reflexes normal.   Psychiatric:         Mood and Affect: Mood normal.         Behavior: Behavior normal.         Thought Content: Thought content normal.         Judgment: Judgment normal.             ED Course             EKG Interpretation:      Interpreted by Ariel Woodard MD  Time reviewed: 0205  Symptoms at time of EKG: None   Rhythm: Normal sinus  and Sinus tachycardia  Rate: 110-120  Axis: Normal  Ectopy: None  Conduction: Normal  ST Segments/ T Waves: Non-specific ST-T wave changes  Q Waves: None and Nonspecific  Comparison to prior: When compared with EKG from December 6, 2021 sinus tachycardia is present    Clinical Impression: Sinus  tachycardia.         Procedures    Results for orders placed during the hospital encounter of 01/19/22    POC US ABDOMEN LIMITED    Impression  [facility] Procedure Note    FAST (Focused Assessment with Sonography for Trauma):    PROCEDURE: PERFORMED BY: Dr. Ariel Woodard MD  INDICATIONS/SYMPTOM:  Syncope. Right groin and scrotal swelling  PROBE: Low frequency convex probe  BODY LOCATION: The ultrasound was performed in the abdominal areas.  FINDINGS: No evidence of free fluid in hepatorenal (Morison's pouch), perisplenic, or and pelvic areas. No evidence of pericardial effusion.    INTERPRETATION: The FAST exam was normal. There was no free fluid present. There was no pericardial effusion.  IMAGE DOCUMENTATION: Images were archived to PACs system.            Critical Care time:  30 minutes.             ED medications:  Medications   sodium chloride 0.9% infusion (1,000 mLs Intravenous New Bag 1/19/22 0250)   iopamidol (ISOVUE-370) solution 100 mL (100 mLs Intravenous Given 1/19/22 0217)   sodium chloride 0.9 % bag 500mL for CT scan flush use (69 mLs Intravenous Given 1/19/22 0217)         ED Vitals:  Vitals:    01/19/22 0200 01/19/22 0214 01/19/22 0300 01/19/22 0330   BP: 122/84  (!) 142/87 138/88   Pulse: 110  107 114   Temp:  98.8  F (37.1  C)     TempSrc:  Oral     SpO2: 95%  96% 94%     Vitals:    01/19/22 0200 01/19/22 0214 01/19/22 0300 01/19/22 0330   BP: 122/84  (!) 142/87 138/88   Pulse: 110  107 114   Temp:  98.8  F (37.1  C)     TempSrc:  Oral     SpO2: 95%  96% 94%       ED Labs and imaging:  Results for orders placed or performed during the hospital encounter of 01/19/22   CT Abdomen Pelvis w Contrast     Status: None    Narrative    EXAM: CT ABDOMEN PELVIS W CONTRAST  LOCATION: [facility]  DATE/TIME: 1/19/2022 2:16 AM    INDICATION: s/p open transinguinal hydrocele repair 24 hours prior. Dizziness and syncope.  Right groin hematoma and swelling.  Evaluate  for postop hematoma/seroma versus  acute hemorrhage. 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 CT of the abdomen and pelvis 10/19/2021.  TECHNIQUE: CT scan of the abdomen and pelvis was performed following injection of IV contrast. Multiplanar reformats were obtained. Dose reduction techniques were used.  CONTRAST: 100 mL Isovue 370    FINDINGS:   LOWER CHEST: Mild bibasilar atelectasis. Small hiatal hernia.    HEPATOBILIARY: A few tiny probable benign hepatic cysts. Normal gallbladder and bile ducts.    PANCREAS: Normal.    SPLEEN: Normal.    ADRENAL GLANDS: Normal.    KIDNEYS/BLADDER: Tiny probable benign cyst of the right kidney. Normal left kidney and bladder.    BOWEL: Postoperative changes of partial right colectomy and appendectomy. Complex multiloculated cystic mass in the right pelvis inferior to the surgical staple line measures up to 10.4 x 5.8 cm on axial images and has craniocaudal extent of 4.8 cm. The   mass is notable for a few small peripheral calcifications. The configuration of the mass is similar to a portion of the mass demonstrated on 10/19/2021. Findings are evidence for incomplete interval resection of the mass.    LYMPH NODES: Prominent retroperitoneal abdominal lymph node measures up to 12 mm short axis (image 105 of series 5).    VASCULATURE: Unremarkable.    PELVIC ORGANS: Postoperative changes of the right groin. Ill-defined postoperative hemorrhage and small amount of gas in the subcutaneous fat of the right groin extends inferiorly into the right scrotum and slightly cephalad along the superficial aspect   of the right rectus abdominis (images 138-236 of axial series 5). The extent of scrotal involvement is not fully imaged. Moderate enlargement of the prostate gland.    MUSCULOSKELETAL: Degenerative changes of the spine. Fixation pins in the left acetabulum. Demineralized skeleton.       Impression    IMPRESSION:   1.  Postoperative changes of the right groin with ill-defined hemorrhage and small amount of gas in the subcutaneous fat extending inferiorly to the right scrotum and cephalad along the superficial aspect of the right rectus abdominis. The extent of   scrotal involvement is not fully imaged.  2.  10.4 x 5.8 cm complex cystic mass of the right pelvis inferior to the surgical staple line has a configuration similar to a portion of the mass demonstrated on prior CT of 10/19/2021. Findings are concerning for residual neoplasm.   POC US ABDOMEN LIMITED     Status: None    Impression    [facility] Procedure Note      FAST (Focused Assessment with Sonography for Trauma):    PROCEDURE: PERFORMED BY: Dr. Ariel Woodard MD  INDICATIONS/SYMPTOM:  Syncope. Right groin and scrotal swelling  PROBE: Low frequency convex probe  BODY LOCATION: The ultrasound was performed in the abdominal areas.  FINDINGS: No evidence of free fluid in hepatorenal (Morison's pouch), perisplenic, or and pelvic areas. No evidence of pericardial effusion.      INTERPRETATION: The FAST exam was normal. There was no free fluid present. There was no pericardial effusion.  IMAGE DOCUMENTATION: Images were archived to PACs system.   Basic metabolic panel     Status: Abnormal   Result Value Ref Range    Sodium 139 133 - 144 mmol/L    Potassium 4.3 3.4 - 5.3 mmol/L    Chloride 107 94 - 109 mmol/L    Carbon Dioxide (CO2) 29 20 - 32 mmol/L    Anion Gap 3 3 - 14 mmol/L    Urea Nitrogen 15 7 - 30 mg/dL    Creatinine 0.89 0.66 - 1.25 mg/dL    Calcium 8.7 8.5 - 10.1 mg/dL    Glucose 110 (H) 70 - 99 mg/dL    GFR Estimate 88 >60 mL/min/1.73m2   Lactic acid whole blood     Status: Normal   Result Value Ref Range    Lactic Acid 1.3 0.7 - 2.0 mmol/L   CBC with platelets and differential     Status: Abnormal   Result Value Ref Range    WBC Count 11.1 (H) 4.0 - 11.0 10e3/uL    RBC Count 4.26 (L) 4.40 - 5.90 10e6/uL    Hemoglobin  12.1 (L) 13.3 - 17.7 g/dL    Hematocrit 37.5 (L) 40.0 - 53.0 %    MCV 88 78 - 100 fL    MCH 28.4 26.5 - 33.0 pg    MCHC 32.3 31.5 - 36.5 g/dL    RDW 13.7 10.0 - 15.0 %    Platelet Count 212 150 - 450 10e3/uL    % Neutrophils 87 %    % Lymphocytes 6 %    % Monocytes 7 %    % Eosinophils 0 %    % Basophils 0 %    % Immature Granulocytes 0 %    NRBCs per 100 WBC 0 <1 /100    Absolute Neutrophils 9.6 (H) 1.6 - 8.3 10e3/uL    Absolute Lymphocytes 0.7 (L) 0.8 - 5.3 10e3/uL    Absolute Monocytes 0.8 0.0 - 1.3 10e3/uL    Absolute Eosinophils 0.0 0.0 - 0.7 10e3/uL    Absolute Basophils 0.0 0.0 - 0.2 10e3/uL    Absolute Immature Granulocytes 0.0 <=0.4 10e3/uL    Absolute NRBCs 0.0 10e3/uL   CBC with platelets differential     Status: Abnormal    Narrative    The following orders were created for panel order CBC with platelets differential.  Procedure                               Abnormality         Status                     ---------                               -----------         ------                     CBC with platelets and d...[[id number]]  Abnormal            Final result                 Please view results for these tests on the individual orders.   ABO/Rh type and screen     Status: None (In process)    Narrative    The following orders were created for panel order ABO/Rh type and screen.  Procedure                               Abnormality         Status                     ---------                               -----------         ------                     Adult Type and Screen[[id number]]                            In process                   Please view results for these tests on the individual orders.   Extra Tube ([place] Draw)     Status: None (In process)    Narrative    The following orders were created for panel order Extra Tube ([place] Draw).  Procedure                               Abnormality         Status                     ---------                               -----------         ------                      Extra Blue Top Tube[[id number]]                              In process                 Extra Red Top Tube[[id number]]                               In process                 Extra Serum Separator Tu...[[id number]]                      In process                   Please view results for these tests on the individual orders.         Assessments & Plan (with Medical Decision Making)   Assessment Summary and Clinical Impression: Pleasant 76-year-old male who presented by EMS from home after syncopal episode.  Syncope may be related to postop hematoma overlying the surgical site that extends superiorly to the rectus and into the right scrotum.  Initial imaging does not suggest active extravasation however patient is monitored with plan to trend his hemoglobin and consider repeat imaging if necessary with surgery on-call locally at [facility] and at the [place] to follow.    Patient was recently diagnosed with metastatic high-grade appendiceal mucinous adenocarcinoma with plan for palliative chemotherapy without further surgical control peritoneal therapy after undergoing exploratory laparotomy with resection of the pelvic tumor right-sided ureterolysis (hospital course 12/7-12/11/21)..  Patient underwent an open transluminal hydrocele repair a day prior and was discharged home at around 6 PM by his report.  He was laying on the couch and reports he got up and suddenly felt dizzy and fainted.  He had urinary incontinence and noticed a bulge with swelling around the right groin and testicle.  Patient reports no abdominal pain and no groin pain. EMS providers noted he was hypotensive prehospital systolic blood pressure in the 80s on their arrival that was fluid responsive. (after 1L SBP ~100's).  Patient arrival was pleasant in no acute distress he had no pain around the abdomen or groin.  Blood pressure on arrival was 136/96    There was appreciable bulge or bruising about the right groin.  Patient was  afebrile. See photo images in the physical exam section -skin and soft tissue changes around the right groin and scrotum.  Photo images were obtained after informed verbal consent from the patient.  There was no obvious thrill appreciated.  Due to concern for postop hematoma versus wound dehiscence and other vascular process that would contribute to syncope with bulging advanced imaging contrast was obtained.  See detailed radiology report.  After discussion with surgery at the [place], here at [facility] and the reading radiologist plan is to board in the ED to trend hemoglobin and further follow-up depending on hemodynamics and ED course.    At shift end patient is awaiting a repeat CBC at 7 AM with surgery to follow.  May require admission to the local [facility] the [place] for ongoing care      ED course and Plan:  Reviewed the medical record.  Reviewed note from January 14, 2022, op note from January 18, 2022.  Reviewed hospital course from December 7 through 11, 2022.  Patient was seen upon arrival to rapid assessment due to concern for prehospital hypotension with syncope.  Bedside point-of-care FAST exam was completed.  Poor acoustic window around the hepatorenal renal junction however no overt findings to suggest large intra-abdominal free fluid.  No large pericardial effusion or tamponade. See images in PACS. CT imaging with contrast was obtained for further detail. EKG on arrival revealed sinus tachycardia with nonspecific T wave changes.  When compared with EKG dated 12/6/2021-sinus tachycardia is present.  No other arrhythmia appreciated    Hemogram from January 3, 2022- Hgb-12.8, hematocrit-39.6.    CT imaging revealed postoperative changes in the right groin with ill-defined postop hemorrhage and small amount of gas and subcutaneous fat ofthe right groin extending inferiorly into the right scrotum the postop hematoma appears to measure 10.4 x 5.8cm, craniocaudal extension of 4.8 cm.  Cephalad  along the superficial aspect of the right rectus abdominis.The extent of scrotal involvement is not fully imaged. See additional findings were described in the radiology report.  See details in the medical record.    With concern for postop hematoma , pre-arrival syncope I reviewed patient's presentation with general surgery on-call given patient is on postop day 0 (by Dr. SISSY Lopez).    I spoke with Dr. Dailey- surgery on-call the [facility] at 3.09am. We reviewed presentation, CT findings and imaging and reviewed next steps for care.  We agreed that patient is currently stable after fluid resuscitation (1L) and no overt findings to suggest acute hemorrhage and that trending his hemoglobin every 4-6 hours would be reasonable with plan to touch base with Dr ELLIS Lopez-the treating surgeon with updates on hemoglobin and that if transfer is required this may be undertaken depending on ED course.    I spoke with Dr. SAMMI Wade- surgery on-call at [facility] at 3.25am to review pending plan to trend hemoglobin (CBC), my discussion with Dr Dailey- surgery on-call at the [facility] and pending hand-off at shift change. Dr Wade plans to evaluate the patient in the a.m. and then neck steps for care will depend on patient's trend in hemoglobin, clinical exam and hemodynamics.    I spoke with Dr. SAMMI Robertson at 3.43am-the reading radiologist.  We discussed if there was active extravasation to suggest active hemorrhage. We reviewed the role of repeating CT pelvis imaging to further capture scrotal involvement versus a comprehensive scrotal ultrasound.  After much discussion we agreed that physical examination and trending patient's hemogram to ensure no interval change on exam would be most useful.  Dr. Robertson noted that he did not see any findings to suggest active extravasation.    With recent diagnosis of metastatic high-grade appendiceal mucinous adenocarcinoma and surgery I consider the possibility of pulmonary embolism as  source for syncope.  Though this is not definitively excluded without chest CT imaging.  Consider chest PE protocol if persistent sinus tachycardia with stable hemoglobin.    At shift end at 6am. Patient signed out to Dr. EMELY Boone with plans to follow-up on repeat CBC at 7 AM. We discussed phone consulation with general surgery both at [facility] and at the [place]. Urology is on-site at [facility] today (I spoke with Dr Villa by phone).  If progressive anemia and concern for active hemorrhage plan to touch base with primary treating surgeon- Dr ELLIS Lopez at [place]. Surgery at [facility] expressed willingness to follow. Consider chest PE protocol if persistent sinus tachycardia with stable hemoglobin.    Hand-off and plan of care reviewed with patient.      Disclaimer: This note consists of symbols derived from keyboarding, dictation and/or voice recognition software. As a result, there may be errors in the script that have gone undetected. Please consider this when interpreting information found in this chart.  I have reviewed the nursing notes.    I have reviewed the findings, diagnosis, plan and need for follow up with the patient.       New Prescriptions    No medications on file       Final diagnoses:   Groin hematoma, initial encounter - atraumatic, post-op-extends inferior to the right scrotum and slightly cephalad along the superior aspect of the right rectus abdominis   S/P repair of hydrocele - s/p open transinguinal repair on 1/18/2022   Sinus tachycardia   History of cancer - metastatic high grade  appendiceal mucinous adenocarcinoma       1/19/2022   [facility] EMERGENCY DEPT     Ariel Woodard MD  01/19/22 0659

## 2022-01-19 NOTE — ED NOTES
Patient has been resting comfortably on cart with his eyes closed. Repositioning himself independently on cart without difficulties, utilizing urinal independently without difficulties. ABC's WNL. Fluids infusing, labs ordered for the AM, will continue to monitor.

## 2022-01-19 NOTE — ED TRIAGE NOTES
Presents via EMS with complaint of syncopal episode, hypotensive upon arrival per EMS. Patient alert and oriented x3. Surgery yesterday for hydrocele.

## 2022-01-19 NOTE — TELEPHONE ENCOUNTER
SPoke w daughter on phone    PResentation c/w postop bleed. Reassured her that this is common, bruising to be expected, and the blood may take awhile to resorb. Not a hydrocele, however, so this should indeed resorb over time.    Scrotal elevation  Ice packs  Will see in clinic this Friday or next    Darnell Lopez MD

## 2022-01-19 NOTE — DISCHARGE INSTRUCTIONS
Continue to use compression underwear.  Blood in the scrotum will absorb over time.    Follow-up in clinic as needed.

## 2022-01-19 NOTE — ED NOTES
ASSISTED TO STAND AND WALK, STEADY ON FEET, C/O SLIGHT DIZZINESS, SCROTUM VERY SWOLLEN, STATES IS TWICE THE SIZE SINCE ARRIVAL, MD UPDATED, GIVEN MESH PANTIES TO TRY TO SUPPORT BETTER FOR COMFORT, AWAITING REDRAW LABS FOR DISCHARGE DECISION

## 2022-01-19 NOTE — ED PROVIDER NOTES
Emergency Department Patient Sign-out       Brief HPI:  This is a 76 year old male signed out to me by Dr. Woodard .  See initial ED Provider note for details of the presentation.            Significant Events prior to my assuming care:   Patient with open transinguinal hydrocele repair yesterday at [facility].  Has h/o mucinous appendiceal neoplasm s/p debulking and patient is pending chemotherapy.  Findings during surgery included very large right hydrocel, noncommunicating with >600cc straw colored fluid.      Patient presented yesterday with syncopal episode, hypotensive on arrival to ED.   Has right groin post-op hematoma extending into scrotum.  New swelling of the right groin.      Dr. Lopez was treating surgeon yesterday.      Serial rechecks of hemoglobin performed.  This did drop slightly from 12.1 down to 10.7.  Patient had been evaluated by both general surgery, in addition to urology.  Feel that this is likely normal postoperative course.  No significant pain has been noted.    I did have discussion with Dr. Lopez, at [facility].  I discussed patient's findings, with significant scrotal swelling even since ED arrival.  Likely postoperative hematoma, and rather not the repeat accumulation of hydrocele.  Patient reassured.  Recommendations for scrotal support, compression underwear, and follow-up in clinic as needed.  I discussed with patient that this may take time, significant number of days if not weeks to resorb.    I also discussed on the phone with the patient's daughter.  They are comfortable with plan for discharge home.  Patient ambulatory without significant difficulty.        Exam:   Patient Vitals for the past 24 hrs:   BP Temp Temp src Pulse SpO2   01/19/22 0330 138/88 -- -- 114 94 %   01/19/22 0300 (!) 142/87 -- -- 107 96 %   01/19/22 0214 -- 98.8  F (37.1  C) Oral -- --   01/19/22 0200 122/84 -- -- 110 95 %   01/19/22 0141 136/86 -- -- -- --           ED RESULTS:    Results for orders placed or performed during the hospital encounter of 01/19/22 (from the past 24 hour(s))   POC US ABDOMEN LIMITED     Status: None    Collection Time: 01/19/22  2:03 AM    [facility] Procedure Note      FAST (Focused Assessment with Sonography for Trauma):    PROCEDURE: PERFORMED BY: Dr. Ariel Woodard MD  INDICATIONS/SYMPTOM:  Syncope. Right groin and scrotal swelling  PROBE: Low frequency convex probe  BODY LOCATION: The ultrasound was performed in the abdominal areas.  FINDINGS: No evidence of free fluid in hepatorenal (Morison's pouch), perisplenic, or and pelvic areas. No evidence of pericardial effusion.      INTERPRETATION: The FAST exam was normal. There was no free fluid present. There was no pericardial effusion.  IMAGE DOCUMENTATION: Images were archived to PACs system.   CT Abdomen Pelvis w Contrast     Status: None    Collection Time: 01/19/22  2:33 AM    Narrative    EXAM: CT ABDOMEN PELVIS W CONTRAST  LOCATION: [facility]  DATE/TIME: 1/19/2022 2:16 AM    INDICATION: s/p open transinguinal hydrocele repair 24 hours prior. Dizziness and syncope.  Right groin hematoma and swelling.  Evaluate for postop hematoma/seroma versus  acute hemorrhage. 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 CT of the abdomen and pelvis 10/19/2021.  TECHNIQUE: CT scan of the abdomen and pelvis was performed following injection of IV contrast. Multiplanar reformats were obtained. Dose reduction techniques were used.  CONTRAST: 100 mL Isovue 370    FINDINGS:   LOWER CHEST: Mild bibasilar atelectasis. Small hiatal hernia.    HEPATOBILIARY: A few tiny probable benign hepatic cysts. Normal gallbladder and bile ducts.    PANCREAS: Normal.    SPLEEN: Normal.    ADRENAL GLANDS: Normal.    KIDNEYS/BLADDER: Tiny probable benign cyst of the right kidney. Normal left kidney  and bladder.    BOWEL: Postoperative changes of partial right colectomy and appendectomy. Complex multiloculated cystic mass in the right pelvis inferior to the surgical staple line measures up to 10.4 x 5.8 cm on axial images and has craniocaudal extent of 4.8 cm. The   mass is notable for a few small peripheral calcifications. The configuration of the mass is similar to a portion of the mass demonstrated on 10/19/2021. Findings are evidence for incomplete interval resection of the mass.    LYMPH NODES: Prominent retroperitoneal abdominal lymph node measures up to 12 mm short axis (image 105 of series 5).    VASCULATURE: Unremarkable.    PELVIC ORGANS: Postoperative changes of the right groin. Ill-defined postoperative hemorrhage and small amount of gas in the subcutaneous fat of the right groin extends inferiorly into the right scrotum and slightly cephalad along the superficial aspect   of the right rectus abdominis (images 138-236 of axial series 5). The extent of scrotal involvement is not fully imaged. Moderate enlargement of the prostate gland.    MUSCULOSKELETAL: Degenerative changes of the spine. Fixation pins in the left acetabulum. Demineralized skeleton.      Impression    IMPRESSION:   1.  Postoperative changes of the right groin with ill-defined hemorrhage and small amount of gas in the subcutaneous fat extending inferiorly to the right scrotum and cephalad along the superficial aspect of the right rectus abdominis. The extent of   scrotal involvement is not fully imaged.  2.  10.4 x 5.8 cm complex cystic mass of the right pelvis inferior to the surgical staple line has a configuration similar to a portion of the mass demonstrated on prior CT of 10/19/2021. Findings are concerning for residual neoplasm.   CBC with platelets differential     Status: Abnormal    Collection Time: 01/19/22  2:57 AM    Narrative    The following orders were created for panel order CBC with platelets differential.  Procedure                                Abnormality         Status                     ---------                               -----------         ------                     CBC with platelets and d...[[id number]]  Abnormal            Final result                 Please view results for these tests on the individual orders.   Basic metabolic panel     Status: Abnormal    Collection Time: 01/19/22  2:57 AM   Result Value Ref Range    Sodium 139 133 - 144 mmol/L    Potassium 4.3 3.4 - 5.3 mmol/L    Chloride 107 94 - 109 mmol/L    Carbon Dioxide (CO2) 29 20 - 32 mmol/L    Anion Gap 3 3 - 14 mmol/L    Urea Nitrogen 15 7 - 30 mg/dL    Creatinine 0.89 0.66 - 1.25 mg/dL    Calcium 8.7 8.5 - 10.1 mg/dL    Glucose 110 (H) 70 - 99 mg/dL    GFR Estimate 88 >60 mL/min/1.73m2   ABO/Rh type and screen     Status: None (In process)    Collection Time: 01/19/22  2:57 AM    Narrative    The following orders were created for panel order ABO/Rh type and screen.  Procedure                               Abnormality         Status                     ---------                               -----------         ------                     Adult Type and Screen[[id number]]                            In process                   Please view results for these tests on the individual orders.   Lactic acid whole blood     Status: Normal    Collection Time: 01/19/22  2:57 AM   Result Value Ref Range    Lactic Acid 1.3 0.7 - 2.0 mmol/L   CBC with platelets and differential     Status: Abnormal    Collection Time: 01/19/22  2:57 AM   Result Value Ref Range    WBC Count 11.1 (H) 4.0 - 11.0 10e3/uL    RBC Count 4.26 (L) 4.40 - 5.90 10e6/uL    Hemoglobin 12.1 (L) 13.3 - 17.7 g/dL    Hematocrit 37.5 (L) 40.0 - 53.0 %    MCV 88 78 - 100 fL    MCH 28.4 26.5 - 33.0 pg    MCHC 32.3 31.5 - 36.5 g/dL    RDW 13.7 10.0 - 15.0 %    Platelet Count 212 150 - 450 10e3/uL    % Neutrophils 87 %    % Lymphocytes 6 %    % Monocytes 7 %    % Eosinophils 0 %    % Basophils 0 %    %  Immature Granulocytes 0 %    NRBCs per 100 WBC 0 <1 /100    Absolute Neutrophils 9.6 (H) 1.6 - 8.3 10e3/uL    Absolute Lymphocytes 0.7 (L) 0.8 - 5.3 10e3/uL    Absolute Monocytes 0.8 0.0 - 1.3 10e3/uL    Absolute Eosinophils 0.0 0.0 - 0.7 10e3/uL    Absolute Basophils 0.0 0.0 - 0.2 10e3/uL    Absolute Immature Granulocytes 0.0 <=0.4 10e3/uL    Absolute NRBCs 0.0 10e3/uL   Extra Tube ([place] Draw)     Status: None    Collection Time: 01/19/22  2:57 AM    Narrative    The following orders were created for panel order Extra Tube ([place] Draw).  Procedure                               Abnormality         Status                     ---------                               -----------         ------                     Extra Blue Top Tube[[id number]]                              Final result               Extra Red Top Tube[[id number]]                               Final result               Extra Serum Separator Tu...[[id number]]                      Final result                 Please view results for these tests on the individual orders.   Extra Blue Top Tube     Status: None    Collection Time: 01/19/22  2:57 AM   Result Value Ref Range    Hold Specimen JIC    Extra Red Top Tube     Status: None    Collection Time: 01/19/22  2:57 AM   Result Value Ref Range    Hold Specimen JIC    Extra Serum Separator Tube (SST)     Status: None    Collection Time: 01/19/22  2:57 AM   Result Value Ref Range    Hold Specimen JIC    INR     Status: Normal    Collection Time: 01/19/22  2:57 AM   Result Value Ref Range    INR 1.14 0.85 - 1.15   Extra Tube ([place] Draw)     Status: None ()    Collection Time: 01/19/22  5:22 AM    Narrative    The following orders were created for panel order Extra Tube ([place] Draw).  Procedure                               Abnormality         Status                     ---------                               -----------         ------                     Extra Blue Top Tube[[id number]]                                                          Extra Green Top (Lithium...[[id number]]                                                   Please view results for these tests on the individual orders.       ED MEDICATIONS:   Medications   sodium chloride 0.9% infusion (1,000 mLs Intravenous New Bag 1/19/22 0250)   iopamidol (ISOVUE-370) solution 100 mL (100 mLs Intravenous Given 1/19/22 0217)   sodium chloride 0.9 % bag 500mL for CT scan flush use (69 mLs Intravenous Given 1/19/22 0217)         Impression:    ICD-10-CM    1. Groin hematoma, initial encounter  S30.1XXA     atraumatic, post-op-extends inferior to the right scrotum and slightly cephalad along the superior aspect of the right rectus abdominis   2. S/P repair of hydrocele  Z98.890     Z87.438     s/p open transinguinal repair on 1/18/2022   3. Sinus tachycardia  R00.0    4. History of cancer  Z85.9     metastatic high grade  appendiceal mucinous adenocarcinoma               MD Paolo Carson, Kody Cunningham MD  01/19/22 1110

## 2022-01-19 NOTE — OR NURSING
Patient's daughter called [phone]: Reviewed discharge instructions and answered all questions. Patient reviewed as well. Daughter will stay with him overnight.     Patient was able to void prior to leaving, pressures assessed after ambulating, stable. Pain under control. Picked up medications upon leaving. IV removed and all belongings returned.

## 2022-01-19 NOTE — DISCHARGE INSTRUCTIONS
[facility]  Same-Day Surgery   Adult Discharge Orders & Instructions     For 24 hours after surgery    1. Get plenty of rest.  A responsible adult must stay with you for at least 24 hours after you leave the hospital.   2. Do not drive or use heavy equipment.  If you have weakness or tingling, don't drive or use heavy equipment until this feeling goes away.  3. Do not drink alcohol.  4. Avoid strenuous or risky activities.  Ask for help when climbing stairs.   5. You may feel lightheaded.  IF so, sit for a few minutes before standing.  Have someone help you get up.   6. If you have nausea (feel sick to your stomach): Drink only clear liquids such as apple juice, ginger ale, broth or 7-Up.  Rest may also help.  Be sure to drink enough fluids.  Move to a regular diet as you feel able.  7. You may have a slight fever. Call the doctor if your fever is over 100 F (37.7 C) (taken under the tongue) or lasts longer than 24 hours.  8. You may have a dry mouth, a sore throat, muscle aches or trouble sleeping.  These should go away after 24 hours.  9. Do not make important or legal decisions.   Call your doctor for any of the followin.  Signs of infection (fever, growing tenderness at the surgery site, a large amount of drainage or bleeding, severe pain, foul-smelling drainage, redness, swelling).    2. It has been over 8 to 10 hours since surgery and you are still not able to urinate (pass water).    3.  Headache for over 24 hours.    4.  Numbness, tingling or weakness the day after surgery (if you had spinal anesthesia).   To contact a doctor, call _____Dr. John Mireles [phone] or:        [phone] and ask for the resident on call for   _________General surgery________ (answered 24 hours a day)      Emergency Department:    [facility]: [phone]       (TTY for hearing impaired: [phone])    [facility]: [phone]       (TTY for hearing impaired:  [phone])

## 2022-01-21 ENCOUNTER — TELEPHONE (OUTPATIENT)
Dept: SURGERY | Facility: CLINIC | Age: 77
End: 2022-01-21

## 2022-01-21 ENCOUNTER — VIRTUAL VISIT (OUTPATIENT)
Dept: SURGERY | Facility: CLINIC | Age: 77
End: 2022-01-21
Payer: MEDICARE

## 2022-01-21 VITALS — WEIGHT: 233 LBS | HEIGHT: 71 IN | BODY MASS INDEX: 32.62 KG/M2

## 2022-01-21 DIAGNOSIS — T14.8XXA HEMATOMA OF SKIN: Primary | ICD-10-CM

## 2022-01-21 DIAGNOSIS — Z11.59 ENCOUNTER FOR SCREENING FOR OTHER VIRAL DISEASES: Primary | ICD-10-CM

## 2022-01-21 LAB
PATH REPORT.COMMENTS IMP SPEC: NORMAL
PATH REPORT.FINAL DX SPEC: NORMAL
PATH REPORT.GROSS SPEC: NORMAL
PATH REPORT.MICROSCOPIC SPEC OTHER STN: NORMAL
PATH REPORT.RELEVANT HX SPEC: NORMAL
PHOTO IMAGE: NORMAL

## 2022-01-21 PROCEDURE — 99024 POSTOP FOLLOW-UP VISIT: CPT | Mod: 95 | Performed by: SURGERY

## 2022-01-21 PROCEDURE — 88302 TISSUE EXAM BY PATHOLOGIST: CPT | Mod: 26 | Performed by: PATHOLOGY

## 2022-01-21 ASSESSMENT — PAIN SCALES - GENERAL: PAINLEVEL: MODERATE PAIN (5)

## 2022-01-21 ASSESSMENT — MIFFLIN-ST. JEOR: SCORE: 1804.01

## 2022-01-21 NOTE — PROGRESS NOTES
"Rod is a 76 year old who is being evaluated via a billable video visit.      How would you like to obtain your AVS? MyChart  If the video visit is dropped, the invitation should be resent by: [phone]  Will anyone else be joining your video visit? No    During this virtual visit the patient is located in [place], patient verifies this as the location during the entirety of this visit.     Video Start Time: 0945 AM  Video-Visit Details    Type of service:  Video Visit    Video End Time:1000    Originating Location (pt. Location): Home    Distant Location (provider location):  [facility] GENERAL SURGERY [facility]     Platform used for Video Visit: [facility]      Patient underwent prior open R inguinal hydrocele  surgery and this occurred 0.5 weeks ago.    Rod Olson overall has the following complaints: large R scrotal hematoma postop due to deadspace    On exam:  Ht 1.803 m (5' 11\")   Wt 105.7 kg (233 lb)   BMI 32.50 kg/m    6cm bruised scrotum    Plan:  Discussed that these resorb, but take awehile. For symptomatic relief, I offered OR evacuation and drain placement    COVID test  Case request placed  John to reach out        Darnell Lopez MD  Surgery  [phone] (hospital )  [phone] (clinic nurses)                "

## 2022-01-21 NOTE — NURSING NOTE
"Chief Complaint   Patient presents with     RECHECK     Follow up groin hematoma        Vitals:    01/21/22 0940   Weight: 105.7 kg (233 lb)   Height: 1.803 m (5' 11\")       Body mass index is 32.5 kg/m .                          Halie Celis, EMT    "

## 2022-01-21 NOTE — LETTER
Date:February 9, 2022      Patient was self referred, no letter generated. Do not send.        [facility] Information

## 2022-01-21 NOTE — TELEPHONE ENCOUNTER
Called patient's daughter, Thuan, [phone] to discuss her father's surgery which has been scheduled on Thursday, January 27 at 10:40 a.m., to arrive [facility] 8:40 a.m., plan on observation status. Scheduled COVID testing at [facility] on Tuesday, January 25 at 12:30 p.m., daughter will call to see if she can schedule test on Monday instead.

## 2022-01-21 NOTE — LETTER
"1/21/2022       RE: Rod Olson  [address]  [place] [address]     Dear Colleague,    Thank you for referring your patient, Rod Olson, to the [facility] GENERAL SURGERY [facility]. Please see a copy of my visit note below.    Rod is a 76 year old who is being evaluated via a billable video visit.      How would you like to obtain your AVS? MyChart  If the video visit is dropped, the invitation should be resent by: [phone]  Will anyone else be joining your video visit? No    During this virtual visit the patient is located in [place], patient verifies this as the location during the entirety of this visit.     Video Start Time: 0945 AM  Video-Visit Details    Type of service:  Video Visit    Video End Time:1000    Originating Location (pt. Location): Home    Distant Location (provider location):  [facility] SURGERY [facility]     [facility]      Patient underwent prior open R inguinal hydrocele  surgery and this occurred 0.5 weeks ago.    Rod Olson overall has the following complaints: large R scrotal hematoma postop due to deadspace    On exam:  Ht 1.803 m (5' 11\")   Wt 105.7 kg (233 lb)   BMI 32.50 kg/m    6cm bruised scrotum    Plan:  Discussed that these resorb, but take awehile. For symptomatic relief, I offered OR evacuation and drain placement    COVID test  Case request placed  John to reach out        Darnell Lopez MD  Surgery  [phone] (hospital )  [phone] (clinic nurses)                    Again, thank you for allowing me to participate in the care of your patient.      Sincerely,    Darnell Lopez MD      "

## 2022-01-22 LAB
PATH REPORT.COMMENTS IMP SPEC: ABNORMAL
PATH REPORT.COMMENTS IMP SPEC: ABNORMAL
PATH REPORT.COMMENTS IMP SPEC: YES
PATH REPORT.FINAL DX SPEC: ABNORMAL
PATH REPORT.GROSS SPEC: ABNORMAL
PATH REPORT.RELEVANT HX SPEC: ABNORMAL

## 2022-01-22 PROCEDURE — 88112 CYTOPATH CELL ENHANCE TECH: CPT | Mod: 26 | Performed by: PATHOLOGY

## 2022-01-22 PROCEDURE — 88305 TISSUE EXAM BY PATHOLOGIST: CPT | Mod: 26 | Performed by: PATHOLOGY

## 2022-01-22 PROCEDURE — 88341 IMHCHEM/IMCYTCHM EA ADD ANTB: CPT | Mod: 26 | Performed by: PATHOLOGY

## 2022-01-22 PROCEDURE — 88342 IMHCHEM/IMCYTCHM 1ST ANTB: CPT | Mod: 26 | Performed by: PATHOLOGY

## 2022-01-24 ENCOUNTER — NURSE TRIAGE (OUTPATIENT)
Dept: NURSING | Facility: CLINIC | Age: 77
End: 2022-01-24

## 2022-01-24 ENCOUNTER — HOSPITAL ENCOUNTER (EMERGENCY)
Facility: CLINIC | Age: 77
Discharge: LEFT BEFORE DISCHARGE | End: 2022-01-25
Admitting: STUDENT IN AN ORGANIZED HEALTH CARE EDUCATION/TRAINING PROGRAM
Payer: MEDICARE

## 2022-01-24 VITALS
SYSTOLIC BLOOD PRESSURE: 127 MMHG | RESPIRATION RATE: 18 BRPM | TEMPERATURE: 97.4 F | HEART RATE: 91 BPM | OXYGEN SATURATION: 97 % | DIASTOLIC BLOOD PRESSURE: 79 MMHG

## 2022-01-24 LAB
ALBUMIN SERPL-MCNC: 3.4 G/DL (ref 3.4–5)
ALP SERPL-CCNC: 76 U/L (ref 40–150)
ALT SERPL W P-5'-P-CCNC: 82 U/L (ref 0–70)
ANION GAP SERPL CALCULATED.3IONS-SCNC: 6 MMOL/L (ref 3–14)
APTT PPP: 34 SECONDS (ref 22–38)
AST SERPL W P-5'-P-CCNC: 38 U/L (ref 0–45)
BASOPHILS # BLD AUTO: 0 10E3/UL (ref 0–0.2)
BASOPHILS NFR BLD AUTO: 0 %
BILIRUB SERPL-MCNC: 0.8 MG/DL (ref 0.2–1.3)
BUN SERPL-MCNC: 12 MG/DL (ref 7–30)
CALCIUM SERPL-MCNC: 9.6 MG/DL (ref 8.5–10.1)
CHLORIDE BLD-SCNC: 101 MMOL/L (ref 94–109)
CO2 SERPL-SCNC: 28 MMOL/L (ref 20–32)
CREAT SERPL-MCNC: 0.81 MG/DL (ref 0.66–1.25)
EOSINOPHIL # BLD AUTO: 0.1 10E3/UL (ref 0–0.7)
EOSINOPHIL NFR BLD AUTO: 1 %
ERYTHROCYTE [DISTWIDTH] IN BLOOD BY AUTOMATED COUNT: 13.5 % (ref 10–15)
GFR SERPL CREATININE-BSD FRML MDRD: >90 ML/MIN/1.73M2
GLUCOSE BLD-MCNC: 103 MG/DL (ref 70–99)
HCT VFR BLD AUTO: 34.9 % (ref 40–53)
HGB BLD-MCNC: 11.3 G/DL (ref 13.3–17.7)
IMM GRANULOCYTES # BLD: 0.1 10E3/UL
IMM GRANULOCYTES NFR BLD: 1 %
INR PPP: 1.07 (ref 0.85–1.15)
LYMPHOCYTES # BLD AUTO: 1.8 10E3/UL (ref 0.8–5.3)
LYMPHOCYTES NFR BLD AUTO: 19 %
MCH RBC QN AUTO: 28 PG (ref 26.5–33)
MCHC RBC AUTO-ENTMCNC: 32.4 G/DL (ref 31.5–36.5)
MCV RBC AUTO: 86 FL (ref 78–100)
MONOCYTES # BLD AUTO: 1 10E3/UL (ref 0–1.3)
MONOCYTES NFR BLD AUTO: 10 %
NEUTROPHILS # BLD AUTO: 6.2 10E3/UL (ref 1.6–8.3)
NEUTROPHILS NFR BLD AUTO: 69 %
NRBC # BLD AUTO: 0 10E3/UL
NRBC BLD AUTO-RTO: 0 /100
PLATELET # BLD AUTO: 340 10E3/UL (ref 150–450)
POTASSIUM BLD-SCNC: 4.1 MMOL/L (ref 3.4–5.3)
PROT SERPL-MCNC: 8.2 G/DL (ref 6.8–8.8)
RBC # BLD AUTO: 4.04 10E6/UL (ref 4.4–5.9)
SARS-COV-2 RNA RESP QL NAA+PROBE: NEGATIVE
SODIUM SERPL-SCNC: 135 MMOL/L (ref 133–144)
WBC # BLD AUTO: 9.2 10E3/UL (ref 4–11)

## 2022-01-24 PROCEDURE — C9803 HOPD COVID-19 SPEC COLLECT: HCPCS

## 2022-01-24 PROCEDURE — 80053 COMPREHEN METABOLIC PANEL: CPT | Performed by: EMERGENCY MEDICINE

## 2022-01-24 PROCEDURE — 85730 THROMBOPLASTIN TIME PARTIAL: CPT | Mod: GZ | Performed by: STUDENT IN AN ORGANIZED HEALTH CARE EDUCATION/TRAINING PROGRAM

## 2022-01-24 PROCEDURE — 999N000104 HC STATISTIC NO CHARGE

## 2022-01-24 PROCEDURE — U0005 INFEC AGEN DETEC AMPLI PROBE: HCPCS | Performed by: EMERGENCY MEDICINE

## 2022-01-24 PROCEDURE — 36415 COLL VENOUS BLD VENIPUNCTURE: CPT | Performed by: EMERGENCY MEDICINE

## 2022-01-24 PROCEDURE — 85025 COMPLETE CBC W/AUTO DIFF WBC: CPT | Performed by: EMERGENCY MEDICINE

## 2022-01-24 PROCEDURE — 85610 PROTHROMBIN TIME: CPT | Mod: GZ | Performed by: EMERGENCY MEDICINE

## 2022-01-24 NOTE — TELEPHONE ENCOUNTER
Call from daughter, she is now with Rod, asking if she should bring him to the clinic or proceed to ER.  Advised to follow directions from triage nurse a few minutes ago and take him to [place] ER or to call 911 if not able to transport.  Agrees

## 2022-01-24 NOTE — ED PROVIDER NOTES
I did not assess the patient, I was informed by the triage nurse that he was no longer willing to wait, given the highly concerning hx of new CA with tarry stool, I made sure labs were pending. Nurse did inform patient and his family they should stay, but could return at any time. They would not wait to speak with a physician.     Geovani Wei MD  3:54 PM       Geovani Wei MD  01/24/22 0691

## 2022-01-24 NOTE — TELEPHONE ENCOUNTER
Spoke with daughter and instructed her to take her dad to the [place] ED.  States he just had some cereal and she is getting him dressed to take him to hospital.    Dr. Lopez notified.

## 2022-01-24 NOTE — ED TRIAGE NOTES
Pt presents to triage from home experiencing tarry stools and increasing fatigue. Pt states that he has a surgery scheduled for this Thursday to repair a hematoma on his testicle. Pt has pain in his scrotum which keeps him from his ADLs.    Significant hx includes appendix cancer which spread. Has not started chemo yet.    Christina Pierson RN on 1/24/2022 at 1:56 PM

## 2022-01-24 NOTE — TELEPHONE ENCOUNTER
[facility]: Nurse Triage Note  SITUATION/BACKGROUND                                                      Rod Olson is a 76 year old male who's daughter calls because her dad has become very weak and fatigued and has black tarry stools.      Allergies:   Allergies   Allergen Reactions     Clindamycin Other (See Comments)     PN: psychotic       ASSESSMENT      76 year old year old M with a history of mucinous appendiceal neoplasm s/p debulking, and pending chemo. Developed a hydrocele that was repaired on 1/18/22   His daughter calls today because her father has gotten very weak and fatigued and light-headed  She states he is unable to get out of bed now.  Up until now he has been independent   She is also reporting black tarry stools  Denies chest pain,abdominal pain or shortness of breath  Unsure if he has a fever  She states he is pale.  Daughter very upset -not sure how to care for him or get him out of bed       Recommend bring him to [place] ER if she can get assistance to get him in her car otherwise call 911   Will forward to Dr. Lopez team to follow-up   RECOMMENDATION/PLAN                                                      RECOMMENDED DISPOSITION:  See above  Will comply with recommendation: Yes    If further questions/concerns or if symptoms do not improve, worsen or new symptoms develop, call your PCP or [phone] to talk with the Resident on call, as soon as possible.    Guideline used: weakness   Telephone Triage Protocols for Nurses, Fifth Edition, Roula Macdonald, RN, RN

## 2022-01-25 DIAGNOSIS — Z11.59 ENCOUNTER FOR SCREENING FOR OTHER VIRAL DISEASES: Primary | ICD-10-CM

## 2022-01-26 ENCOUNTER — PATIENT OUTREACH (OUTPATIENT)
Dept: ENDOCRINOLOGY | Facility: CLINIC | Age: 77
End: 2022-01-26
Payer: MEDICARE

## 2022-01-26 ENCOUNTER — ANESTHESIA EVENT (OUTPATIENT)
Dept: SURGERY | Facility: CLINIC | Age: 77
End: 2022-01-26
Payer: MEDICARE

## 2022-01-26 ENCOUNTER — MYC MEDICAL ADVICE (OUTPATIENT)
Dept: ONCOLOGY | Facility: CLINIC | Age: 77
End: 2022-01-26
Payer: MEDICARE

## 2022-01-26 NOTE — PROGRESS NOTES
Daughter took dad to ED.  He had blood work done and a Covid test but did not stay for evaluation.  States stools are not as tarry.  Poor appetite.  Still in quite a bit of pain.  Swelling has decreased some.  Hematoma area still is about the size of a grapefruit.  Still planning on the procedure for next week.    12/7 - had a hemicolectomy/appy for CA of the appendix.

## 2022-01-26 NOTE — TELEPHONE ENCOUNTER
[facility] Triage    Souravhart Message: Daughter    Same Day Surgery - Daughter has questions as she missed the call from the surgery center.  They returned her call today and all her questions were answered.

## 2022-01-27 ENCOUNTER — HOSPITAL ENCOUNTER (OUTPATIENT)
Facility: CLINIC | Age: 77
Discharge: HOME | End: 2022-01-28
Attending: SURGERY | Admitting: SURGERY
Payer: MEDICARE

## 2022-01-27 ENCOUNTER — ANESTHESIA (OUTPATIENT)
Dept: SURGERY | Facility: CLINIC | Age: 77
End: 2022-01-27
Payer: MEDICARE

## 2022-01-27 DIAGNOSIS — S30.22XA SCROTAL HEMATOMA: Primary | ICD-10-CM

## 2022-01-27 DIAGNOSIS — D37.3 LOW GRADE MUCINOUS NEOPLASM OF APPENDIX: ICD-10-CM

## 2022-01-27 LAB
CREAT SERPL-MCNC: 0.93 MG/DL (ref 0.66–1.25)
GFR SERPL CREATININE-BSD FRML MDRD: 85 ML/MIN/1.73M2
GLUCOSE BLDC GLUCOMTR-MCNC: 108 MG/DL (ref 70–99)
HGB BLD-MCNC: 11.2 G/DL (ref 13.3–17.7)
PLATELET # BLD AUTO: 371 10E3/UL (ref 150–450)
POTASSIUM BLD-SCNC: 4.7 MMOL/L (ref 3.4–5.3)

## 2022-01-27 PROCEDURE — 250N000013 HC RX MED GY IP 250 OP 250 PS 637: Performed by: SURGERY

## 2022-01-27 PROCEDURE — 272N000001 HC OR GENERAL SUPPLY STERILE: Performed by: SURGERY

## 2022-01-27 PROCEDURE — 85018 HEMOGLOBIN: CPT | Performed by: STUDENT IN AN ORGANIZED HEALTH CARE EDUCATION/TRAINING PROGRAM

## 2022-01-27 PROCEDURE — 360N000076 HC SURGERY LEVEL 3, PER MIN: Performed by: SURGERY

## 2022-01-27 PROCEDURE — 370N000017 HC ANESTHESIA TECHNICAL FEE, PER MIN: Performed by: SURGERY

## 2022-01-27 PROCEDURE — 85049 AUTOMATED PLATELET COUNT: CPT | Performed by: STUDENT IN AN ORGANIZED HEALTH CARE EDUCATION/TRAINING PROGRAM

## 2022-01-27 PROCEDURE — 82565 ASSAY OF CREATININE: CPT | Performed by: STUDENT IN AN ORGANIZED HEALTH CARE EDUCATION/TRAINING PROGRAM

## 2022-01-27 PROCEDURE — 258N000003 HC RX IP 258 OP 636: Performed by: NURSE ANESTHETIST, CERTIFIED REGISTERED

## 2022-01-27 PROCEDURE — 54700 I&D EPDIDYMS TSTIS&/SCROT SP: CPT | Mod: 78 | Performed by: SURGERY

## 2022-01-27 PROCEDURE — 999N000141 HC STATISTIC PRE-PROCEDURE NURSING ASSESSMENT: Performed by: SURGERY

## 2022-01-27 PROCEDURE — 82962 GLUCOSE BLOOD TEST: CPT

## 2022-01-27 PROCEDURE — 250N000011 HC RX IP 250 OP 636: Performed by: SURGERY

## 2022-01-27 PROCEDURE — 250N000013 HC RX MED GY IP 250 OP 250 PS 637: Performed by: STUDENT IN AN ORGANIZED HEALTH CARE EDUCATION/TRAINING PROGRAM

## 2022-01-27 PROCEDURE — 710N000010 HC RECOVERY PHASE 1, LEVEL 2, PER MIN: Performed by: SURGERY

## 2022-01-27 PROCEDURE — 250N000011 HC RX IP 250 OP 636: Performed by: NURSE ANESTHETIST, CERTIFIED REGISTERED

## 2022-01-27 PROCEDURE — 258N000003 HC RX IP 258 OP 636: Performed by: STUDENT IN AN ORGANIZED HEALTH CARE EDUCATION/TRAINING PROGRAM

## 2022-01-27 PROCEDURE — 36415 COLL VENOUS BLD VENIPUNCTURE: CPT | Performed by: STUDENT IN AN ORGANIZED HEALTH CARE EDUCATION/TRAINING PROGRAM

## 2022-01-27 PROCEDURE — 250N000009 HC RX 250: Performed by: SURGERY

## 2022-01-27 PROCEDURE — 84132 ASSAY OF SERUM POTASSIUM: CPT | Performed by: STUDENT IN AN ORGANIZED HEALTH CARE EDUCATION/TRAINING PROGRAM

## 2022-01-27 PROCEDURE — 250N000025 HC SEVOFLURANE, PER MIN: Performed by: SURGERY

## 2022-01-27 PROCEDURE — 250N000009 HC RX 250: Performed by: NURSE ANESTHETIST, CERTIFIED REGISTERED

## 2022-01-27 RX ORDER — DEXAMETHASONE SODIUM PHOSPHATE 4 MG/ML
INJECTION, SOLUTION INTRA-ARTICULAR; INTRALESIONAL; INTRAMUSCULAR; INTRAVENOUS; SOFT TISSUE PRN
Status: DISCONTINUED | OUTPATIENT
Start: 2022-01-27 | End: 2022-01-27

## 2022-01-27 RX ORDER — ONDANSETRON 2 MG/ML
4 INJECTION INTRAMUSCULAR; INTRAVENOUS EVERY 6 HOURS PRN
Status: DISCONTINUED | OUTPATIENT
Start: 2022-01-27 | End: 2022-01-28 | Stop reason: HOSPADM

## 2022-01-27 RX ORDER — GABAPENTIN 100 MG/1
100 CAPSULE ORAL
Status: COMPLETED | OUTPATIENT
Start: 2022-01-27 | End: 2022-01-27

## 2022-01-27 RX ORDER — ONDANSETRON 2 MG/ML
INJECTION INTRAMUSCULAR; INTRAVENOUS PRN
Status: DISCONTINUED | OUTPATIENT
Start: 2022-01-27 | End: 2022-01-27

## 2022-01-27 RX ORDER — HYDRALAZINE HYDROCHLORIDE 20 MG/ML
10 INJECTION INTRAMUSCULAR; INTRAVENOUS EVERY 6 HOURS PRN
Status: DISCONTINUED | OUTPATIENT
Start: 2022-01-27 | End: 2022-01-28 | Stop reason: HOSPADM

## 2022-01-27 RX ORDER — HYDRALAZINE HYDROCHLORIDE 20 MG/ML
2.5-5 INJECTION INTRAMUSCULAR; INTRAVENOUS EVERY 10 MIN PRN
Status: DISCONTINUED | OUTPATIENT
Start: 2022-01-27 | End: 2022-01-27 | Stop reason: HOSPADM

## 2022-01-27 RX ORDER — AMOXICILLIN 250 MG
1 CAPSULE ORAL 2 TIMES DAILY
Status: DISCONTINUED | OUTPATIENT
Start: 2022-01-27 | End: 2022-01-28 | Stop reason: HOSPADM

## 2022-01-27 RX ORDER — OXYCODONE HYDROCHLORIDE 5 MG/1
5 TABLET ORAL EVERY 4 HOURS PRN
Status: DISCONTINUED | OUTPATIENT
Start: 2022-01-27 | End: 2022-01-27 | Stop reason: HOSPADM

## 2022-01-27 RX ORDER — METHOCARBAMOL 750 MG/1
750 TABLET, FILM COATED ORAL 4 TIMES DAILY PRN
Status: DISCONTINUED | OUTPATIENT
Start: 2022-01-27 | End: 2022-01-28 | Stop reason: HOSPADM

## 2022-01-27 RX ORDER — SODIUM CHLORIDE, SODIUM LACTATE, POTASSIUM CHLORIDE, CALCIUM CHLORIDE 600; 310; 30; 20 MG/100ML; MG/100ML; MG/100ML; MG/100ML
INJECTION, SOLUTION INTRAVENOUS CONTINUOUS
Status: DISCONTINUED | OUTPATIENT
Start: 2022-01-27 | End: 2022-01-27 | Stop reason: HOSPADM

## 2022-01-27 RX ORDER — PROCHLORPERAZINE MALEATE 5 MG
5 TABLET ORAL EVERY 6 HOURS PRN
Status: DISCONTINUED | OUTPATIENT
Start: 2022-01-27 | End: 2022-01-28 | Stop reason: HOSPADM

## 2022-01-27 RX ORDER — CEFAZOLIN SODIUM 1 G/50ML
2 SOLUTION INTRAVENOUS
Status: COMPLETED | OUTPATIENT
Start: 2022-01-27 | End: 2022-01-27

## 2022-01-27 RX ORDER — ACETAMINOPHEN 325 MG/1
975 TABLET ORAL EVERY 8 HOURS
Status: DISCONTINUED | OUTPATIENT
Start: 2022-01-27 | End: 2022-01-28 | Stop reason: HOSPADM

## 2022-01-27 RX ORDER — ONDANSETRON 4 MG/1
4 TABLET, ORALLY DISINTEGRATING ORAL EVERY 6 HOURS PRN
Status: DISCONTINUED | OUTPATIENT
Start: 2022-01-27 | End: 2022-01-28 | Stop reason: HOSPADM

## 2022-01-27 RX ORDER — LIDOCAINE HYDROCHLORIDE 20 MG/ML
INJECTION, SOLUTION INFILTRATION; PERINEURAL PRN
Status: DISCONTINUED | OUTPATIENT
Start: 2022-01-27 | End: 2022-01-27

## 2022-01-27 RX ORDER — KETAMINE HYDROCHLORIDE 10 MG/ML
INJECTION INTRAMUSCULAR; INTRAVENOUS PRN
Status: DISCONTINUED | OUTPATIENT
Start: 2022-01-27 | End: 2022-01-27

## 2022-01-27 RX ORDER — OXYCODONE HYDROCHLORIDE 5 MG/1
5 TABLET ORAL EVERY 4 HOURS PRN
Status: DISCONTINUED | OUTPATIENT
Start: 2022-01-27 | End: 2022-01-28 | Stop reason: HOSPADM

## 2022-01-27 RX ORDER — ONDANSETRON 4 MG/1
4 TABLET, ORALLY DISINTEGRATING ORAL EVERY 30 MIN PRN
Status: DISCONTINUED | OUTPATIENT
Start: 2022-01-27 | End: 2022-01-27 | Stop reason: HOSPADM

## 2022-01-27 RX ORDER — ACETAMINOPHEN 325 MG/1
975 TABLET ORAL ONCE
Status: COMPLETED | OUTPATIENT
Start: 2022-01-27 | End: 2022-01-27

## 2022-01-27 RX ORDER — HYDROMORPHONE HCL IN WATER/PF 6 MG/30 ML
0.2 PATIENT CONTROLLED ANALGESIA SYRINGE INTRAVENOUS EVERY 5 MIN PRN
Status: DISCONTINUED | OUTPATIENT
Start: 2022-01-27 | End: 2022-01-27 | Stop reason: HOSPADM

## 2022-01-27 RX ORDER — LIDOCAINE 40 MG/G
CREAM TOPICAL
Status: DISCONTINUED | OUTPATIENT
Start: 2022-01-27 | End: 2022-01-28 | Stop reason: HOSPADM

## 2022-01-27 RX ORDER — POLYETHYLENE GLYCOL 3350 17 G/17G
17 POWDER, FOR SOLUTION ORAL DAILY
Status: DISCONTINUED | OUTPATIENT
Start: 2022-01-28 | End: 2022-01-28 | Stop reason: HOSPADM

## 2022-01-27 RX ORDER — MEPERIDINE HYDROCHLORIDE 25 MG/ML
12.5 INJECTION INTRAMUSCULAR; INTRAVENOUS; SUBCUTANEOUS
Status: DISCONTINUED | OUTPATIENT
Start: 2022-01-27 | End: 2022-01-27 | Stop reason: HOSPADM

## 2022-01-27 RX ORDER — CEFAZOLIN SODIUM 1 G/50ML
2 SOLUTION INTRAVENOUS SEE ADMIN INSTRUCTIONS
Status: DISCONTINUED | OUTPATIENT
Start: 2022-01-27 | End: 2022-01-27 | Stop reason: HOSPADM

## 2022-01-27 RX ORDER — BISACODYL 10 MG
10 SUPPOSITORY, RECTAL RECTAL DAILY PRN
Status: DISCONTINUED | OUTPATIENT
Start: 2022-01-27 | End: 2022-01-28 | Stop reason: HOSPADM

## 2022-01-27 RX ORDER — LIDOCAINE 40 MG/G
CREAM TOPICAL
Status: DISCONTINUED | OUTPATIENT
Start: 2022-01-27 | End: 2022-01-27 | Stop reason: HOSPADM

## 2022-01-27 RX ORDER — ACETAMINOPHEN 325 MG/1
650 TABLET ORAL EVERY 4 HOURS PRN
Status: DISCONTINUED | OUTPATIENT
Start: 2022-01-30 | End: 2022-01-28 | Stop reason: HOSPADM

## 2022-01-27 RX ORDER — ONDANSETRON 2 MG/ML
4 INJECTION INTRAMUSCULAR; INTRAVENOUS EVERY 30 MIN PRN
Status: DISCONTINUED | OUTPATIENT
Start: 2022-01-27 | End: 2022-01-27 | Stop reason: HOSPADM

## 2022-01-27 RX ORDER — FENTANYL CITRATE 50 UG/ML
25 INJECTION, SOLUTION INTRAMUSCULAR; INTRAVENOUS
Status: DISCONTINUED | OUTPATIENT
Start: 2022-01-27 | End: 2022-01-27 | Stop reason: HOSPADM

## 2022-01-27 RX ORDER — FENTANYL CITRATE 50 UG/ML
25 INJECTION, SOLUTION INTRAMUSCULAR; INTRAVENOUS EVERY 5 MIN PRN
Status: DISCONTINUED | OUTPATIENT
Start: 2022-01-27 | End: 2022-01-27 | Stop reason: HOSPADM

## 2022-01-27 RX ORDER — OXYCODONE HYDROCHLORIDE 10 MG/1
10 TABLET ORAL EVERY 4 HOURS PRN
Status: DISCONTINUED | OUTPATIENT
Start: 2022-01-27 | End: 2022-01-28 | Stop reason: HOSPADM

## 2022-01-27 RX ORDER — LABETALOL HYDROCHLORIDE 5 MG/ML
10 INJECTION, SOLUTION INTRAVENOUS
Status: DISCONTINUED | OUTPATIENT
Start: 2022-01-27 | End: 2022-01-27 | Stop reason: HOSPADM

## 2022-01-27 RX ORDER — FENTANYL CITRATE 50 UG/ML
INJECTION, SOLUTION INTRAMUSCULAR; INTRAVENOUS PRN
Status: DISCONTINUED | OUTPATIENT
Start: 2022-01-27 | End: 2022-01-27

## 2022-01-27 RX ORDER — DIMENHYDRINATE 50 MG/ML
25 INJECTION, SOLUTION INTRAMUSCULAR; INTRAVENOUS
Status: DISCONTINUED | OUTPATIENT
Start: 2022-01-27 | End: 2022-01-27 | Stop reason: HOSPADM

## 2022-01-27 RX ORDER — PROPOFOL 10 MG/ML
INJECTION, EMULSION INTRAVENOUS PRN
Status: DISCONTINUED | OUTPATIENT
Start: 2022-01-27 | End: 2022-01-27

## 2022-01-27 RX ADMIN — LIDOCAINE HYDROCHLORIDE 100 MG: 20 INJECTION, SOLUTION INFILTRATION; PERINEURAL at 10:27

## 2022-01-27 RX ADMIN — SODIUM CHLORIDE, POTASSIUM CHLORIDE, SODIUM LACTATE AND CALCIUM CHLORIDE: 600; 310; 30; 20 INJECTION, SOLUTION INTRAVENOUS at 10:15

## 2022-01-27 RX ADMIN — PHENYLEPHRINE HYDROCHLORIDE 100 MCG: 10 INJECTION INTRAVENOUS at 11:40

## 2022-01-27 RX ADMIN — PHENYLEPHRINE HYDROCHLORIDE 150 MCG: 10 INJECTION INTRAVENOUS at 11:28

## 2022-01-27 RX ADMIN — FENTANYL CITRATE 100 MCG: 50 INJECTION, SOLUTION INTRAMUSCULAR; INTRAVENOUS at 10:26

## 2022-01-27 RX ADMIN — ONDANSETRON 4 MG: 2 INJECTION INTRAMUSCULAR; INTRAVENOUS at 11:41

## 2022-01-27 RX ADMIN — OXYCODONE HYDROCHLORIDE 5 MG: 5 TABLET ORAL at 14:44

## 2022-01-27 RX ADMIN — ACETAMINOPHEN 975 MG: 325 TABLET, FILM COATED ORAL at 09:53

## 2022-01-27 RX ADMIN — PHENYLEPHRINE HYDROCHLORIDE 100 MCG: 10 INJECTION INTRAVENOUS at 11:22

## 2022-01-27 RX ADMIN — PHENYLEPHRINE HYDROCHLORIDE 50 MCG: 10 INJECTION INTRAVENOUS at 10:49

## 2022-01-27 RX ADMIN — ACETAMINOPHEN 975 MG: 325 TABLET, FILM COATED ORAL at 21:49

## 2022-01-27 RX ADMIN — PROPOFOL 100 MG: 10 INJECTION, EMULSION INTRAVENOUS at 10:27

## 2022-01-27 RX ADMIN — GABAPENTIN 100 MG: 100 CAPSULE ORAL at 09:52

## 2022-01-27 RX ADMIN — FENTANYL CITRATE 50 MCG: 50 INJECTION, SOLUTION INTRAMUSCULAR; INTRAVENOUS at 10:48

## 2022-01-27 RX ADMIN — Medication 50 MG: at 10:26

## 2022-01-27 RX ADMIN — DEXAMETHASONE SODIUM PHOSPHATE 6 MG: 4 INJECTION, SOLUTION INTRA-ARTICULAR; INTRALESIONAL; INTRAMUSCULAR; INTRAVENOUS; SOFT TISSUE at 10:57

## 2022-01-27 RX ADMIN — SUGAMMADEX 200 MG: 100 INJECTION, SOLUTION INTRAVENOUS at 11:53

## 2022-01-27 RX ADMIN — ROCURONIUM BROMIDE 30 MG: 50 INJECTION, SOLUTION INTRAVENOUS at 10:44

## 2022-01-27 RX ADMIN — PHENYLEPHRINE HYDROCHLORIDE 100 MCG: 10 INJECTION INTRAVENOUS at 10:26

## 2022-01-27 RX ADMIN — PHENYLEPHRINE HYDROCHLORIDE 100 MCG: 10 INJECTION INTRAVENOUS at 11:46

## 2022-01-27 RX ADMIN — ROCURONIUM BROMIDE 50 MG: 50 INJECTION, SOLUTION INTRAVENOUS at 10:28

## 2022-01-27 RX ADMIN — HYDROMORPHONE HYDROCHLORIDE 0.5 MG: 1 INJECTION, SOLUTION INTRAMUSCULAR; INTRAVENOUS; SUBCUTANEOUS at 10:56

## 2022-01-27 RX ADMIN — FENTANYL CITRATE 50 MCG: 50 INJECTION, SOLUTION INTRAMUSCULAR; INTRAVENOUS at 11:05

## 2022-01-27 RX ADMIN — PHENYLEPHRINE HYDROCHLORIDE 100 MCG: 10 INJECTION INTRAVENOUS at 11:34

## 2022-01-27 RX ADMIN — CEFAZOLIN 2 G: 10 INJECTION, POWDER, FOR SOLUTION INTRAVENOUS at 10:36

## 2022-01-27 ASSESSMENT — MIFFLIN-ST. JEOR: SCORE: 1772.26

## 2022-01-27 ASSESSMENT — LIFESTYLE VARIABLES: TOBACCO_USE: 1

## 2022-01-27 NOTE — ANESTHESIA PROCEDURE NOTES
Airway       Patient location during procedure: OR       Procedure Start/Stop Times: 1/27/2022 10:30 AM  Staff -        Other Anesthesia Staff: Mitchell Romero       Performed By: SRNAIndications and Patient Condition       Indications for airway management: heath-procedural       Induction type:intravenous       Mask difficulty assessment: 2 - vent by mask + OA or adjuvant +/- NMBA    Final Airway Details       Final airway type: endotracheal airway       Successful airway: ETT - single  Endotracheal Airway Details        ETT size (mm): 7.5       Cuffed: yes       Cuff volume (mL): 10       Successful intubation technique: video laryngoscopy       VL Blade Size: MAC 4       Grade View of Cords: 1       Adjucts: stylet       Position: Right       Measured from: gums/teeth       Secured at (cm): 23       Bite block used: None    Post intubation assessment        Placement verified by: capnometry, equal breath sounds and chest rise        Number of attempts at approach: 1       Number of other approaches attempted: 0       Secured with: commercial tube pitts       Ease of procedure: easy       Dentition: Intact and Unchanged

## 2022-01-27 NOTE — ANESTHESIA CARE TRANSFER NOTE
Patient: Rod Olson    Procedure: Procedure(s):  Evacuation Scrotal Hematoma       Diagnosis: Hematoma of skin [T14.8XXA]  Diagnosis Additional Information: No value filed.    Anesthesia Type:   General     Note:    Oropharynx: oropharynx clear of all foreign objects and spontaneously breathing  Level of Consciousness: awake  Oxygen Supplementation: nasal cannula  Level of Supplemental Oxygen (L/min / FiO2): 3  Independent Airway: airway patency satisfactory and stable  Dentition: dentition unchanged  Vital Signs Stable: post-procedure vital signs reviewed and stable  Report to RN Given: handoff report given  Patient transferred to: PACU    Handoff Report: Identifed the Patient, Identified the Reponsible Provider, Reviewed the pertinent medical history, Discussed the surgical course, Reviewed Intra-OP anesthesia mangement and issues during anesthesia, Set expectations for post-procedure period and Allowed opportunity for questions and acknowledgement of understanding      Vitals:  Vitals Value Taken Time   BP     Temp     Pulse     Resp     SpO2 98 % 01/27/22 1207   Vitals shown include unvalidated device data.    Electronically Signed By: BARRIE Olivarez CRNA  January 27, 2022  12:08 PM

## 2022-01-27 NOTE — ANESTHESIA POSTPROCEDURE EVALUATION
Patient: Rod Olson    Procedure: Procedure(s):  Evacuation Scrotal Hematoma       Diagnosis:Hematoma of skin [T14.8XXA]  Diagnosis Additional Information: No value filed.    Anesthesia Type:  General    Note:  Disposition: Admission   Postop Pain Control: Uneventful            Sign Out: Well controlled pain   PONV: No   Neuro/Psych: Uneventful            Sign Out: Acceptable/Baseline neuro status   Airway/Respiratory: Uneventful            Sign Out: Acceptable/Baseline resp. status   CV/Hemodynamics: Uneventful            Sign Out: Acceptable CV status; No obvious hypovolemia; No obvious fluid overload   Other NRE: NONE   DID A NON-ROUTINE EVENT OCCUR? No           Last vitals:  Vitals Value Taken Time   /89 01/27/22 1230   Temp 36.4  C (97.5  F) 01/27/22 1230   Pulse 89 01/27/22 1238   Resp 15 01/27/22 1230   SpO2 97 % 01/27/22 1238   Vitals shown include unvalidated device data.    Electronically Signed By: Jaelyn Siddiqui MD  January 27, 2022  12:39 PM

## 2022-01-27 NOTE — BRIEF OP NOTE
[facility]    Brief Operative Note    Pre-operative diagnosis: Hematoma of skin [T14.8XXA]  Post-operative diagnosis Same as pre-operative diagnosis    Procedure: Procedure(s):  Evacuation Scrotal Hematoma  Surgeon: Surgeon(s) and Role:     * Darnell Lopez MD - Primary  Anesthesia: General   Estimated Blood Loss: 10 mL from 1/27/2022 10:18 AM to 1/27/2022 12:06 PM      Drains: Ronen-Cardona  Specimens: * No specimens in log *  Findings:   Scrotal hematoma, some remaining thickened skin.  Complications: None.  Implants: * No implants in log *

## 2022-01-27 NOTE — PROGRESS NOTES
I reviewed the risks of surgery with Rod Olson.    These include, but are not limited to, death, myocardial infarction, pneumonia, urinary tract infection, deep venous thrombosis with or without pulmonary embolus, abdominal infection from bowel injury or abscess, bowel obstruction, wound infection, and bleeding.    More specific risks related to procedure include hematoma recurrence, testicular injury/removal, pain, infection.    Darnell Lopez MD
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
## 2022-01-27 NOTE — OP NOTE
[facility]    Operative Note    Pre-operative diagnosis: Scrotal hematoma   Post-operative diagnosis Inguinalscrotal hematoma   Procedure: Procedure(s):  Evacuation Scrotal Hematoma   Surgeon: Surgeon(s) and Role:     * Darnell Lopez MD - Primary   Hilario Iyer MD-Resident   Anesthesia: General    Estimated blood loss: 10 ml; 300 ml old clot evacuated   Drains: Ronen-Cardona 19 [place]   Specimens: * No specimens in log *   Findings: moderate inguinoscrotal hematoma.No blood in closed hydrocele sac.Intact testicle.Very thickened scrotal skin, no infection..   Complications: None.   Implants:          OPERATIVE INDICATIONS:  Rod Olson is a 76 year old year old M with a history of a transinguinal hydrocelectomy last week. He developed postop scrotal hematoma that was quite syumptomatic. He was offered drainage.    After understanding the risks and benefits of proceeding with surgery, the patient has an indication for open right scrotal hematoma evacuatinand consented to undergo surgery.    I reviewed the risks of surgery with Rod Olson .    These include, but are not limited to, death, myocardial infarction, pneumonia, urinary tract infection, deep venous thrombosis with or without pulmonary embolus, abdominal infection from bowel injury or abscess, bowel obstruction, wound infection, and bleeding.    OPERATIVE DETAILS:  The patient was brought to the operating room and prepared in a routine fashion. The penis and scortum was prepped in and a Beauchamp was placed on the field  A timeout was performed prior to surgery and documented by the nursing team.    Under the benefits of monitored anesthesia control, the patient was positioned supine. A field block was produced by raising skin wheals along the proposed skin incision, along a vertical line lateral to it, and along a horizontal line superior to it. Additional local anesthesia was injected during the  procedure under the external oblique aponeurosis, at the internal ring, and as needed.        The previous incision was reopened and was met with blood clot. This was evacuated.  The cord was found to be in tact. Bluntly, hematoma in the scrotum was evacuated with suction. All the blood clot was removed, though some of the scrotal skin was quite thickened,given the appearance of blood clot. The testicle and gubernaculum were plapated.    The area was copiously irrigated and hemostasis was confirmed with [place]. Additionally, a 19 Fr MOY drain was placed thru the incision and into the scrotum,and attached to suction.    The subcu was closed in multiple layers of 3-0 vicryl. Skin closed with 4-0 monocryl running subcuticular.    Wound infiltrated with 30 ml local (1/1 one percent lido and quarter percent marcaine with epi). Exofin and presure dressing placed.      Discussed with daughter that there is still quite a bit scortal swelling, but testicle is healthy and there is no retained blood clot.  I was present for all critical components of the operation and all needle and sponge counts were correct x2 at the end of the procedure.    Darnell Lopez MD  Surgery  [phone] (hospital )  [phone] (clinic nurses)

## 2022-01-28 ENCOUNTER — TELEPHONE (OUTPATIENT)
Dept: ONCOLOGY | Facility: CLINIC | Age: 77
End: 2022-01-28
Payer: MEDICARE

## 2022-01-28 VITALS
HEIGHT: 71 IN | WEIGHT: 226 LBS | DIASTOLIC BLOOD PRESSURE: 91 MMHG | HEART RATE: 76 BPM | TEMPERATURE: 96.1 F | BODY MASS INDEX: 31.64 KG/M2 | RESPIRATION RATE: 16 BRPM | SYSTOLIC BLOOD PRESSURE: 137 MMHG | OXYGEN SATURATION: 94 %

## 2022-01-28 LAB — GLUCOSE BLDC GLUCOMTR-MCNC: 103 MG/DL (ref 70–99)

## 2022-01-28 PROCEDURE — 250N000013 HC RX MED GY IP 250 OP 250 PS 637: Performed by: SURGERY

## 2022-01-28 PROCEDURE — 82962 GLUCOSE BLOOD TEST: CPT

## 2022-01-28 RX ORDER — OXYCODONE HYDROCHLORIDE 5 MG/1
5 TABLET ORAL EVERY 6 HOURS PRN
Qty: 12 TABLET | Refills: 0 | Status: SHIPPED | OUTPATIENT
Start: 2022-01-28 | End: 2022-04-11

## 2022-01-28 RX ORDER — AMOXICILLIN 250 MG
1 CAPSULE ORAL DAILY PRN
Qty: 15 TABLET | Refills: 0 | Status: SHIPPED | OUTPATIENT
Start: 2022-01-28 | End: 2022-07-19

## 2022-01-28 RX ORDER — METHOCARBAMOL 750 MG/1
750 TABLET, FILM COATED ORAL 4 TIMES DAILY PRN
Qty: 15 TABLET | Refills: 0 | Status: SHIPPED | OUTPATIENT
Start: 2022-01-28 | End: 2022-07-19

## 2022-01-28 RX ORDER — ONDANSETRON 4 MG/1
4 TABLET, ORALLY DISINTEGRATING ORAL EVERY 6 HOURS PRN
Qty: 6 TABLET | Refills: 0 | Status: SHIPPED | OUTPATIENT
Start: 2022-01-28 | End: 2022-03-21

## 2022-01-28 RX ADMIN — OXYCODONE HYDROCHLORIDE 5 MG: 5 TABLET ORAL at 00:27

## 2022-01-28 RX ADMIN — OXYCODONE HYDROCHLORIDE 5 MG: 5 TABLET ORAL at 07:29

## 2022-01-28 NOTE — TELEPHONE ENCOUNTER
Patient was scheduled for 10am virtual visit with provider today. However, visit needed to be reschedule due to being inpatient. Our VF team informed patient's daughter of this and daughter understood. Pt's daughter informed VF she had a couple questions for provider regarding upcoming port placement surgery and with patient just having surgery daughter wanted to know if it would be best practice to push the port placement back a week to allow for more recovery time so patient does not have to have three surgeries in three weeks. Please review and advise.   Thank you.  Hoda Lerner, Virtual Visit Facilitator

## 2022-01-28 NOTE — DISCHARGE SUMMARY
"[facility]   MIS Discharge Summary    Date of Admission: 1/27/2022  Date of Discharge: 1/28/2022    Admission Diagnosis:  1. Scrotal hematoma    Discharge Diagnosis:  1. Same as above    Consultations:  None    Procedures:  1. Evacuation Scrotal Hematoma by Dr. John Friedman HPI:  Admitted for routine postoperative care after the above procedure.    Hospital Course:  The patient was admitted and underwent the above procedure. The patient tolerated the procedure well. There were no complications. The patient tolerated a regular diet. Pain was controlled with oral pain medication and the patient was able to ambulate and void without difficulty. The patient received appropriate education post operatively, including drain care. On POD #1 the patient was discharged to home.    Discharge Physical Exam:  /86 (BP Location: Left arm)   Pulse 81   Temp 97  F (36.1  C) (Oral)   Resp 16   Ht 1.803 m (5' 11\")   Wt 102.5 kg (226 lb)   SpO2 97%   BMI 31.52 kg/m      Gen: NAD  Lungs: non-labored breathing  CV: regular rhythm, normal rate   Abd: soft, nondistended, appropriately tender, incisions are c/d/i, MOY with serosanguinous output  Ext: no peripheral edema  Neuro: AOx3    Meds:       Review of your medicines      START taking      Dose / Directions   methocarbamol 750 MG tablet  Commonly known as: ROBAXIN      Dose: 750 mg  Take 1 tablet (750 mg) by mouth 4 times daily as needed for muscle spasms  Quantity: 15 tablet  Refills: 0     ondansetron 4 MG ODT tab  Commonly known as: ZOFRAN-ODT      Dose: 4 mg  Take 1 tablet (4 mg) by mouth every 6 hours as needed for nausea or vomiting  Quantity: 6 tablet  Refills: 0     senna-docusate 8.6-50 MG tablet  Commonly known as: SENOKOT-S/PERICOLACE      Dose: 1 tablet  Take 1 tablet by mouth daily as needed for constipation  Quantity: 15 tablet  Refills: 0        CONTINUE these medicines which may have CHANGED, or have new prescriptions. " If we are uncertain of the size of tablets/capsules you have at home, strength may be listed as something that might have changed.      Dose / Directions   acetaminophen 500 MG tablet  Commonly known as: TYLENOL  This may have changed:     when to take this    reasons to take this  Used for: Low grade mucinous neoplasm of appendix      Dose: 1,000 mg  Take 2 tablets (1,000 mg) by mouth every 8 hours  Quantity: 50 tablet  Refills: 0     lisinopril 40 MG tablet  Commonly known as: ZESTRIL  This may have changed: when to take this  Used for: Essential hypertension      Dose: 40 mg  Take 1 tablet (40 mg) by mouth daily  Quantity: 90 tablet  Refills: 3     * oxyCODONE 5 MG tablet  Commonly known as: ROXICODONE  This may have changed: Another medication with the same name was added. Make sure you understand how and when to take each.  Used for: Malignant neoplasm of appendix (H)      Dose: 5 mg  Take 1 tablet (5 mg) by mouth every 6 hours as needed for severe pain  Quantity: 20 tablet  Refills: 0     * oxyCODONE 5 MG tablet  Commonly known as: ROXICODONE  This may have changed: You were already taking a medication with the same name, and this prescription was added. Make sure you understand how and when to take each.      Dose: 5 mg  Take 1 tablet (5 mg) by mouth every 6 hours as needed for pain  Quantity: 12 tablet  Refills: 0         * This list has 2 medication(s) that are the same as other medications prescribed for you. Read the directions carefully, and ask your doctor or other care provider to review them with you.            CONTINUE these medicines which have NOT CHANGED      Dose / Directions   bismuth subsalicylate 262 MG/15ML suspension  Commonly known as: PEPTO BISMOL      Dose: 15 mL  Take 15 mLs by mouth every 6 hours as needed for indigestion  Refills: 0     ferrous sulfate 140 (45 Fe) MG Tbcr CR tablet      Dose: 96 mg  Take 96 mg by mouth daily  Refills: 0           Where to get your medicines      These  medications were sent to [facility] - [place], [place] - [address]  [address], [facility] [address]    Phone: [phone]     methocarbamol 750 MG tablet    ondansetron 4 MG ODT tab    senna-docusate 8.6-50 MG tablet     Some of these will need a paper prescription and others can be bought over the counter. Ask your nurse if you have questions.    Bring a paper prescription for each of these medications    oxyCODONE 5 MG tablet         Additional instructions:  After Care     Future Labs/Procedures    Activity     Comments:    Your activity upon discharge: activity as tolerated    Diet     Comments:    Follow this diet upon discharge: Orders Placed This Encounter      Regular Diet Adult    Discharge Instructions     Comments:    Discharge Instructions  Activity  - No activity restrictions, continue wearing the supportive underwear until seen for followup    Incisions  - You may shower and get incisions wet starting 24 hrs after surgery, no submerging incisions while drain in place    Drain Care  - You do have a drain.  Specific care/instructions:  Empty drain and record output.  Flush tubing with 5ml of saline every 8 hours.    Medications  - Do not take any additional Tylenol (acetaminophen) while using a narcotic pain medication which includes acetaminophen  - Do not take more than 4,000mg of acetaminophen in any 24 hour period, as this can cause liver damage  - Take stool softeners such as Senna or Miralax while you are using narcotics, but stop if you develop diarrhea  - Wean yourself off of narcotic pain medications    Follow-Up:  - Call your primary care provider to touch base regarding your recent admission.    - Call or return sooner than your regularly scheduled visit if you develop any of the following: fever >101.5, uncontrolled pain, uncontrolled nausea or vomiting, as well as increased redness, swelling, or drainage from your wound.   -  A nurse from the General  Surgery Clinic will contact you within 24 hours, or the next business day, after your discharge from the hospital.  If you have questions or to schedule a follow up appointment please call the General Surgery Clinic at [phone].  Call [phone] and ask to speak with the Surgery resident on call if you are having troubles in the evenings, at night, or on the weekend.  -  If you had surgery with Dr Harding or Dr Cottrell please call [phone] to schedule your follow up appointment or with any questions or concerns.  -  If you are receiving home care please inform your home care nurse of our contact number.    Monitor and record     Comments:    Record drain output daily    Tubes and drains     Comments:    You are going home with the following tubes or drains: MOY.  Follow these instructions empty drain twice daily and record drain output to care for your tube. Flush tubing with 5ml of saline every 8 hours              Follow Up:  -Follow up with Dr. Lopez in clinic in 1-2 week(s) after discharge.     GENERAL SURGERY PATIENTS: Call [phone] for scheduling needs or to reach your care team

## 2022-01-28 NOTE — PROGRESS NOTES
Admitted/transferred from:   2 RN full   skin assessment completed by Javon Proctor, ASH and Donna ROSA RN.  Skin assessment finding: (R) groin incision covered w/ gauze CDI. (R) groin and thigh bruising. Pre existing midline abd incision OSMAR.   Interventions/actions: Encourage mobility and fluids. Educated on pressure injuries.    Will continue to monitor.

## 2022-01-28 NOTE — PROGRESS NOTES
"Postop    Still swollen scrotum- reassured its mainly thickened skin. Far less tense than preop. Pain under control    -Drain put to vacuum and suctioned, and flushed x2 with saline    -Reg diet    /79 (Cuff Size: Adult Regular)   Pulse 99   Temp 97.9  F (36.6  C) (Oral)   Resp 16   Ht 1.803 m (5' 11\")   Wt 102.5 kg (226 lb)   SpO2 95%   BMI 31.52 kg/m        -Will remove bandage tomororw  -PO pain meds  -Dispo      Darnell Lopez MD    "

## 2022-01-28 NOTE — PROGRESS NOTES
"VS /86 (BP Location: Left arm)   Pulse 81   Temp 97  F (36.1  C) (Oral)   Resp 16   Ht 1.803 m (5' 11\")   Wt 102.5 kg (226 lb)   SpO2 97%   BMI 31.52 kg/m    Activity: SBA  Neuros: A&O x4. Able to make needs known. Forgetful at times.  Cardiac: WDL. Denies cardiac chest pain  Respiratory: WDL. Sating above 95% on RA. Denies SOB.  GI/: Voiding spontaneously via urinal. No BM on this shift. Refused senna.  Diet: Regular  Skin/Incisions/Drains:: (R) groin incision covered w/ gauze CDI. (R) groin and thigh bruising. Pre existing midline abd incision OSMAR. (R) MOY drain, small amount of  red, thin liquid output.  Lines: (L) PIV, SL.  Labs: Reviewed.  Pain/nausea: 6/10 pain. Managed w/ scheduled tylenol, PRN oxycodone.  Plan: Potential discharge today (1/28) per MD.      "

## 2022-01-28 NOTE — PROGRESS NOTES
Vitals: Temp: (!) 96.1  F (35.6  C) Temp src: Oral BP: (!) 137/91 Pulse: 76   Resp: 16 SpO2: 94 % O2 Device: None (Room air) Oxygen Delivery: 2 LPM     Time: 2069-1751  Reason for admission: routine postoperative care after the above procedure.  Activity:  SBA with ambulation, independent with meal ordering, in bed mobility.   Pain:  reports pain with ambulation, managed per MAR.   Neuro: A&O x4. Able to make needs known. Uses call light appropriately.   Cardiac: WDL. Denies cardiac chest pain.   Respiratory:  WDL. Denies SOB. No cough.   GI/:  Uses urinal at the bed side. Abd soft, no BM reported to writer.   Diet: Regular diet. Tolerating well.   Lines:  PIV flushed with NS and revomved prior to discharge. Patient denies pain at the site.   Incisions/Drains/Skin:  WDL. Emptied MOY drain ne time 10cc. MOY flushed with 5 ml on NS per order one time prior to discharge.   Lab:  Revived.   Electrolyte Replacements: NA  New changes this shift:   Patient discharged to home. Discharge education completed at the bed side with a patient and his daughter. Pt verbalized understanding. Hospital transport was arranged to help patient to get to the lobby.

## 2022-02-01 DIAGNOSIS — Z11.59 ENCOUNTER FOR SCREENING FOR OTHER VIRAL DISEASES: Primary | ICD-10-CM

## 2022-02-07 ENCOUNTER — VIRTUAL VISIT (OUTPATIENT)
Dept: ONCOLOGY | Facility: CLINIC | Age: 77
End: 2022-02-07
Attending: STUDENT IN AN ORGANIZED HEALTH CARE EDUCATION/TRAINING PROGRAM
Payer: MEDICARE

## 2022-02-07 ENCOUNTER — LAB (OUTPATIENT)
Dept: LAB | Facility: CLINIC | Age: 77
End: 2022-02-07
Payer: MEDICARE

## 2022-02-07 DIAGNOSIS — Z11.59 ENCOUNTER FOR SCREENING FOR OTHER VIRAL DISEASES: ICD-10-CM

## 2022-02-07 DIAGNOSIS — C18.1 MALIGNANT NEOPLASM OF APPENDIX (H): Primary | ICD-10-CM

## 2022-02-07 PROCEDURE — G0463 HOSPITAL OUTPT CLINIC VISIT: HCPCS | Mod: PN,RTG | Performed by: STUDENT IN AN ORGANIZED HEALTH CARE EDUCATION/TRAINING PROGRAM

## 2022-02-07 PROCEDURE — 99214 OFFICE O/P EST MOD 30 MIN: CPT | Mod: 95 | Performed by: STUDENT IN AN ORGANIZED HEALTH CARE EDUCATION/TRAINING PROGRAM

## 2022-02-07 PROCEDURE — U0003 INFECTIOUS AGENT DETECTION BY NUCLEIC ACID (DNA OR RNA); SEVERE ACUTE RESPIRATORY SYNDROME CORONAVIRUS 2 (SARS-COV-2) (CORONAVIRUS DISEASE [COVID-19]), AMPLIFIED PROBE TECHNIQUE, MAKING USE OF HIGH THROUGHPUT TECHNOLOGIES AS DESCRIBED BY CMS-2020-01-R: HCPCS

## 2022-02-07 PROCEDURE — U0005 INFEC AGEN DETEC AMPLI PROBE: HCPCS

## 2022-02-07 NOTE — PROGRESS NOTES
[facility] - Medical Oncology TeleHealth Consult Note  2022    Patient Identifiers     Name: Rod Olson  Preferred Address: Rod  Preferred Language: English  : 1945  Gender: male    Assessment and Plan     Mr. 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    Given continued drainage from recent surgical procedure, it would be premature to consider chemotherapy for malignancy at this time, as that would significantly impair wound healing. We will re-evaluate pt in 1mo to determine if treatment initiation at that time is appropriate. We will consider capecitabine monotherapy vs CapeOx/FOLFOX pending pt's clinical status.     We will cancel port placement given pt no longer requires treatment. Pt prefers appointments to be after 10AM if possible given difficulty in travel.     Plan:  metastatic appendix mucinous adenoCA  - cancel port appt; pt not currently ready for treatment  - RTC in 1mo for clinical evaluation to consider treatment; try to make appt after 10AM    scrotal hematoma  - f/u Urology as scheduled; ensure resolution prior to treatment initiation as above    The patient and family asked numerous excellent questions which I answered to the best of my abilities. At the completion of our consultation, the patient and accompanying caregivers had a strong understanding of the plan. They have our contact information for any further questions or concerns, and know to reach out for any urgent matters. Patient and family aware that they should call 911 for emergencies.       30 minutes spent on the date of the encounter doing chart review, review of test results, interpretation of tests, patient visit and documentation       Larry Bal MD, PhD   of Medicine  Division of Hematology, Oncology and Transplantation  [facility]  [place]    -----------------------------------    Oncology Summary     Cancer Staging  Malignant neoplasm of appendix (H)  Staging form: Appendix Carcinoma, AJCC 8th Edition  - Clinical stage from 12/7/2021: Stage IVC (cT4b, cN1c, pM1c, G3) - Signed by Larry Bal MD on 1/5/2022    Current treatment(s):  - pending clinical stabilization  Treatment intent:  - palliative    Oncology History Overview Note   Pt in his USOH until November 2021 when he presented to care at the urging of family with eight months of abdominal pain. CT showed large, mucinous collections in R hemipelvis, prompting referral to surgery. Pt thought to have likely a low-grade mucinous neoplasm and underwent cytoreductive surgery, with incomplete (R2) resection. 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    On establishment with Med Onc, pt recommended to initiate FOLFOX therapy.      Malignant neoplasm of appendix (H)   12/7/2021 Initial Diagnosis    Malignant neoplasm of appendix (H)     12/7/2021 -  Cancer Staged    Staging form: Appendix Carcinoma, AJCC 8th Edition  - Clinical stage from 12/7/2021: Stage IVC (cT4b, cN1c, pM1c, G3)         Subjective/Interval Events     - testicular drain placed for testicular hematoma; now draining 1L  - pain recurring after trip with children yesterday; took oxycodone 5mg today (@10:15AM)   - slight nausea, no vomiting; BM's going from diarrhea to more solid. Not taking laxatives/stool softeners.     Review of Systems: 14 point ROS negative other than the symptoms noted above in the HPI.      Objective Data     Lab data:  I have personally reviewed the lab data, notable for:    - no notable abnormalities    Radiology data:  I have personally reviewed the radiology data, notable for:  - no new data    Pathology and other data:  I have personally reviewed and interpreted the pathology data,  notable for:    - no new data    Billing Barbi Velasco is a 76 year old who is being evaluated via a billable video visit.      If the video visit is dropped, the invitation should be resent by: Text to cell phone: [phone]  Will anyone else be joining your video visit? Yes: Daughter will be w/ Pt.. How would they like to receive their invitation? Text to cell phone: n/a      Video Start Time: 2:49 PM  Video-Visit Details    Type of service:  Video Visit    Video End Time:3:19 PM    Originating Location (pt. Location): Home    Distant Location (provider location):  [facility] CANCER [facility]     Platform used for Video Visit: [employer]

## 2022-02-07 NOTE — LETTER
Date:March 1, 2022      Patient was self referred, no letter generated. Do not send.        [facility] Health Information

## 2022-02-07 NOTE — LETTER
2022         RE: Rod Olson  [address]  [place] [address]        Dear Colleague,    Thank you for referring your patient, Rod Olson, to the [facility]. Please see a copy of my visit note below.    [facility] - Medical Oncology TeleHealth Consult Note  2022    Patient Identifiers     Name: Rod Olson  Preferred Address: Rod  Preferred Language: English  : 1945  Gender: male    Assessment and Plan     Mr. 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    Given continued drainage from recent surgical procedure, it would be premature to consider chemotherapy for malignancy at this time, as that would significantly impair wound healing. We will re-evaluate pt in 1mo to determine if treatment initiation at that time is appropriate. We will consider capecitabine monotherapy vs CapeOx/FOLFOX pending pt's clinical status.     We will cancel port placement given pt no longer requires treatment. Pt prefers appointments to be after 10AM if possible given difficulty in travel.     Plan:  metastatic appendix mucinous adenoCA  - cancel port appt; pt not currently ready for treatment  - RTC in 1mo for clinical evaluation to consider treatment; try to make appt after 10AM    scrotal hematoma  - f/u Urology as scheduled; ensure resolution prior to treatment initiation as above    The patient and family asked numerous excellent questions which I answered to the best of my abilities. At the completion of our consultation, the patient and accompanying caregivers had a strong understanding of the plan. They have our contact information for any further questions or concerns, and know to reach out for any urgent matters. Patient and family aware that they should call 911 for emergencies.       30 minutes spent on the date of the  encounter doing chart review, review of test results, interpretation of tests, patient visit and documentation       Larry Bal MD, PhD   of Medicine  Division of Hematology, Oncology and Transplantation  [facility]    -----------------------------------    Oncology Summary     Cancer Staging  Malignant neoplasm of appendix (H)  Staging form: Appendix Carcinoma, AJCC 8th Edition  - Clinical stage from 12/7/2021: Stage IVC (cT4b, cN1c, pM1c, G3) - Signed by Larry Bal MD on 1/5/2022    Current treatment(s):  - pending clinical stabilization  Treatment intent:  - palliative    Oncology History Overview Note   Pt in his USOH until November 2021 when he presented to care at the urging of family with eight months of abdominal pain. CT showed large, mucinous collections in R hemipelvis, prompting referral to surgery. Pt thought to have likely a low-grade mucinous neoplasm and underwent cytoreductive surgery, with incomplete (R2) resection. 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    On establishment with Med Onc, pt recommended to initiate FOLFOX therapy.      Malignant neoplasm of appendix (H)   12/7/2021 Initial Diagnosis    Malignant neoplasm of appendix (H)     12/7/2021 -  Cancer Staged    Staging form: Appendix Carcinoma, AJCC 8th Edition  - Clinical stage from 12/7/2021: Stage IVC (cT4b, cN1c, pM1c, G3)         Subjective/Interval Events     - testicular drain placed for testicular hematoma; now draining 1L  - pain recurring after trip with children yesterday; took oxycodone 5mg today (@10:15AM)   - slight nausea, no vomiting; BM's going from diarrhea to more solid. Not taking laxatives/stool softeners.     Review of Systems: 14 point ROS negative other than the symptoms noted above in the HPI.      Objective Data     Lab data:  I have personally reviewed the lab  data, notable for:    - no notable abnormalities    Radiology data:  I have personally reviewed the radiology data, notable for:  - no new data    Pathology and other data:  I have personally reviewed and interpreted the pathology data, notable for:    - no new data    Billing Stuff     Rod is a 76 year old who is being evaluated via a billable video visit.      If the video visit is dropped, the invitation should be resent by: Text to cell phone: [phone]  Will anyone else be joining your video visit? Yes: Daughter will be w/ Pt.. How would they like to receive their invitation? Text to cell phone: n/a      Video Start Time: 2:49 PM  Video-Visit Details    Type of service:  Video Visit    Video End Time:3:19 PM    Originating Location (pt. Location): Home    Distant Location (provider location):  [facility] [place] [facility]     Platform used for Video Visit: [employer]      Again, thank you for allowing me to participate in the care of your patient.        Sincerely,        Larry Bal MD

## 2022-02-08 LAB — SARS-COV-2 RNA RESP QL NAA+PROBE: NEGATIVE

## 2022-02-11 ENCOUNTER — OFFICE VISIT (OUTPATIENT)
Dept: SURGERY | Facility: CLINIC | Age: 77
End: 2022-02-11
Payer: MEDICARE

## 2022-02-11 VITALS
HEART RATE: 80 BPM | SYSTOLIC BLOOD PRESSURE: 121 MMHG | OXYGEN SATURATION: 98 % | HEIGHT: 71 IN | BODY MASS INDEX: 31.19 KG/M2 | DIASTOLIC BLOOD PRESSURE: 80 MMHG | WEIGHT: 222.8 LBS

## 2022-02-11 DIAGNOSIS — S30.22XA SCROTAL HEMATOMA: Primary | ICD-10-CM

## 2022-02-11 PROCEDURE — 99024 POSTOP FOLLOW-UP VISIT: CPT | Performed by: SURGERY

## 2022-02-11 ASSESSMENT — PAIN SCALES - GENERAL: PAINLEVEL: NO PAIN (1)

## 2022-02-11 ASSESSMENT — ENCOUNTER SYMPTOMS
HEARTBURN: 1
TASTE DISTURBANCE: 0
SYNCOPE: 1
POLYPHAGIA: 0
ABDOMINAL PAIN: 0
DECREASED APPETITE: 0
DOUBLE VISION: 0
CONSTIPATION: 1
SINUS CONGESTION: 1
EYE REDNESS: 0
EYE IRRITATION: 1
FEVER: 0
POLYDIPSIA: 0
CHILLS: 1
SLEEP DISTURBANCES DUE TO BREATHING: 0
EYE PAIN: 1
RECTAL PAIN: 1
EYE WATERING: 1
POOR WOUND HEALING: 1
WEIGHT LOSS: 1
NAIL CHANGES: 0
SKIN CHANGES: 0
HYPOTENSION: 1
HYPERTENSION: 1
ALTERED TEMPERATURE REGULATION: 1
HEMATURIA: 0
FATIGUE: 1
NIGHT SWEATS: 1
PALPITATIONS: 0
NAUSEA: 1
SINUS PAIN: 0
BLOOD IN STOOL: 0
HALLUCINATIONS: 0
DIFFICULTY URINATING: 1
BLOATING: 0
WEIGHT GAIN: 0
TROUBLE SWALLOWING: 0
LIGHT-HEADEDNESS: 1
BOWEL INCONTINENCE: 0
SORE THROAT: 1
NECK MASS: 0
HOARSE VOICE: 0
EXERCISE INTOLERANCE: 1
SMELL DISTURBANCE: 0
DIARRHEA: 1
VOMITING: 0
INCREASED ENERGY: 1
FLANK PAIN: 0
ORTHOPNEA: 0
JAUNDICE: 0
DYSURIA: 1
LEG PAIN: 0

## 2022-02-11 ASSESSMENT — MIFFLIN-ST. JEOR: SCORE: 1757.74

## 2022-02-11 NOTE — LETTER
"2/11/2022       RE: Rod Olson  [address]  [place] [address]     Dear Colleague,    Thank you for referring your patient, Rod Olson, to the [facility] [place] at [facility]. Please see a copy of my visit note below.    Patient underwent prior transinguinal hyrocelectomy surgery with hematoma evac and this occurred 3 weeks ago.    Rod Olson overall has the following complaints: drain minimall functional with scant outpu    On exam:  /80 (BP Location: Left arm, Patient Position: Sitting, Cuff Size: Adult Large)   Pulse 80   Ht 1.803 m (5' 11\")   Wt 101.1 kg (222 lb 12.8 oz)   SpO2 98%   BMI 31.07 kg/m    Lung exam: breathing unlabored  Abdominal exam: soft; nontende; nondistended; incisions c/d/i  MOY in place  R groin inc c/d/i  Scrotal swelling present but improved    Plan:  -drain removed  -elevate scrotum  -RTC prn  -OK for chemo    Darnell Lopez MD        Answers for HPI/ROS submitted by the patient on 2/11/2022  General Symptoms: Yes  Skin Symptoms: Yes  HENT Symptoms: Yes  EYE SYMPTOMS: Yes  HEART SYMPTOMS: Yes  LUNG SYMPTOMS: No  INTESTINAL SYMPTOMS: Yes  URINARY SYMPTOMS: Yes  REPRODUCTIVE SYMPTOMS: No  SKELETAL SYMPTOMS: Yes  BLOOD SYMPTOMS: Yes  NERVOUS SYSTEM SYMPTOMS: No  MENTAL HEALTH SYMPTOMS: No  Ear pain: No  Ear discharge: No  Hearing loss: No  Tinnitus: No  Nosebleeds: No  Congestion: Yes  Sinus pain: No  Trouble swallowing: No   Voice hoarseness: No  Mouth sores: No  Sore throat: Yes  Tooth pain: No  Gum tenderness: No  Bleeding gums: No  Change in taste: No  Change in sense of smell: No  Dry mouth: No  Hearing aid used: No  Neck lump: No  Fever: No  Loss of appetite: No  Weight loss: Yes  Weight gain: No  Fatigue: Yes  Night sweats: Yes  Chills: Yes  Increased stress: No  Excessive hunger: No  Excessive thirst: No  Feeling hot or cold when others believe the temperature is " normal: Yes  Loss of height: No  Post-operative complications: Yes  Surgical site pain: Yes  Hallucinations: No  Change in or Loss of Energy: Yes  Hyperactivity: No  Confusion: No  Changes in hair: No  Changes in moles/birth marks: No  Itching: Yes  Rashes: No  Changes in nails: No  Acne: No  Change in facial hair: No  Warts: No  Non-healing sores: Yes  Scarring: No  Flaking of skin: No  Color changes of hands/feet in cold : No  Sun sensitivity: No  Skin thickening: Yes  Eye pain: Yes  Vision loss: No  Dry eyes: No  Watery eyes: Yes  Eye bulging: No  Double vision: No  Flashing of lights: No  Spots: No  Floaters: Yes  Redness: No  Crossed eyes: No  Tunnel Vision: No  Yellowing of eyes: No  Eye irritation: Yes  Chest pain or pressure: Yes  Fast or irregular heartbeat: No  Pain in legs with walking: No  Trouble breathing while lying down: No  Fingers or toes appear blue: No  High blood pressure: Yes  Low blood pressure: Yes  Fainting: Yes  Murmurs: No  Pacemaker: No  Varicose veins: No  Edema or swelling: No  Wake up at night with shortness of breath: No  Light-headedness: Yes  Exercise intolerance: Yes  Heart burn or indigestion: Yes  Nausea: Yes  Vomiting: No  Abdominal pain: No  Bloating: No  Constipation: Yes  Diarrhea: Yes  Blood in stool: No  Black stools: Yes  Rectal or Anal pain: Yes  Fecal incontinence: No  Yellowing of skin or eyes: No  Vomit with blood: No  Change in stools: Yes  Trouble holding urine or incontinence: Yes  Pain or burning: Yes  Trouble starting or stopping: Yes  Increased frequency of urination: Yes  Blood in urine: No  Decreased frequency of urination: No  Frequent nighttime urination: Yes  Flank pain: No  Difficulty emptying bladder: Yes              Again, thank you for allowing me to participate in the care of your patient.      Sincerely,    Darnell Lopez MD

## 2022-02-11 NOTE — LETTER
Date:February 18, 2022      Patient was self referred, no letter generated. Do not send.        [facility] Health Information

## 2022-02-11 NOTE — NURSING NOTE
"Chief Complaint   Patient presents with     RECHECK     Post op scrotal hematoma        Vitals:    02/11/22 1218   BP: 121/80   BP Location: Left arm   Patient Position: Sitting   Cuff Size: Adult Large   Pulse: 80   SpO2: 98%   Weight: 101.1 kg (222 lb 12.8 oz)   Height: 1.803 m (5' 11\")       Body mass index is 31.07 kg/m .                          Halie Celis, EMT    "

## 2022-02-11 NOTE — PROGRESS NOTES
"Patient underwent prior transinguinal hyrocelectomy surgery with hematoma evac and this occurred 3 weeks ago.    Rod Olson overall has the following complaints: drain minimall functional with scant outpu    On exam:  /80 (BP Location: Left arm, Patient Position: Sitting, Cuff Size: Adult Large)   Pulse 80   Ht 1.803 m (5' 11\")   Wt 101.1 kg (222 lb 12.8 oz)   SpO2 98%   BMI 31.07 kg/m    Lung exam: breathing unlabored  Abdominal exam: soft; nontende; nondistended; incisions c/d/i  MOY in place  R groin inc c/d/i  Scrotal swelling present but improved    Plan:  -drain removed  -elevate scrotum  -RTC prn  -OK for chemo    Darnell Lopez MD        Answers for HPI/ROS submitted by the patient on 2/11/2022  General Symptoms: Yes  Skin Symptoms: Yes  HENT Symptoms: Yes  EYE SYMPTOMS: Yes  HEART SYMPTOMS: Yes  LUNG SYMPTOMS: No  INTESTINAL SYMPTOMS: Yes  URINARY SYMPTOMS: Yes  REPRODUCTIVE SYMPTOMS: No  SKELETAL SYMPTOMS: Yes  BLOOD SYMPTOMS: Yes  NERVOUS SYSTEM SYMPTOMS: No  MENTAL HEALTH SYMPTOMS: No  Ear pain: No  Ear discharge: No  Hearing loss: No  Tinnitus: No  Nosebleeds: No  Congestion: Yes  Sinus pain: No  Trouble swallowing: No   Voice hoarseness: No  Mouth sores: No  Sore throat: Yes  Tooth pain: No  Gum tenderness: No  Bleeding gums: No  Change in taste: No  Change in sense of smell: No  Dry mouth: No  Hearing aid used: No  Neck lump: No  Fever: No  Loss of appetite: No  Weight loss: Yes  Weight gain: No  Fatigue: Yes  Night sweats: Yes  Chills: Yes  Increased stress: No  Excessive hunger: No  Excessive thirst: No  Feeling hot or cold when others believe the temperature is normal: Yes  Loss of height: No  Post-operative complications: Yes  Surgical site pain: Yes  Hallucinations: No  Change in or Loss of Energy: Yes  Hyperactivity: No  Confusion: No  Changes in hair: No  Changes in moles/birth marks: No  Itching: Yes  Rashes: No  Changes in nails: No  Acne: No  Change in facial hair: No  Warts: " No  Non-healing sores: Yes  Scarring: No  Flaking of skin: No  Color changes of hands/feet in cold : No  Sun sensitivity: No  Skin thickening: Yes  Eye pain: Yes  Vision loss: No  Dry eyes: No  Watery eyes: Yes  Eye bulging: No  Double vision: No  Flashing of lights: No  Spots: No  Floaters: Yes  Redness: No  Crossed eyes: No  Tunnel Vision: No  Yellowing of eyes: No  Eye irritation: Yes  Chest pain or pressure: Yes  Fast or irregular heartbeat: No  Pain in legs with walking: No  Trouble breathing while lying down: No  Fingers or toes appear blue: No  High blood pressure: Yes  Low blood pressure: Yes  Fainting: Yes  Murmurs: No  Pacemaker: No  Varicose veins: No  Edema or swelling: No  Wake up at night with shortness of breath: No  Light-headedness: Yes  Exercise intolerance: Yes  Heart burn or indigestion: Yes  Nausea: Yes  Vomiting: No  Abdominal pain: No  Bloating: No  Constipation: Yes  Diarrhea: Yes  Blood in stool: No  Black stools: Yes  Rectal or Anal pain: Yes  Fecal incontinence: No  Yellowing of skin or eyes: No  Vomit with blood: No  Change in stools: Yes  Trouble holding urine or incontinence: Yes  Pain or burning: Yes  Trouble starting or stopping: Yes  Increased frequency of urination: Yes  Blood in urine: No  Decreased frequency of urination: No  Frequent nighttime urination: Yes  Flank pain: No  Difficulty emptying bladder: Yes

## 2022-03-07 ENCOUNTER — APPOINTMENT (OUTPATIENT)
Dept: LAB | Facility: CLINIC | Age: 77
End: 2022-03-07
Attending: STUDENT IN AN ORGANIZED HEALTH CARE EDUCATION/TRAINING PROGRAM
Payer: MEDICARE

## 2022-03-07 ENCOUNTER — ONCOLOGY VISIT (OUTPATIENT)
Dept: ONCOLOGY | Facility: CLINIC | Age: 77
End: 2022-03-07
Payer: MEDICARE

## 2022-03-07 VITALS
SYSTOLIC BLOOD PRESSURE: 135 MMHG | WEIGHT: 219 LBS | TEMPERATURE: 97.5 F | HEART RATE: 84 BPM | RESPIRATION RATE: 16 BRPM | BODY MASS INDEX: 30.54 KG/M2 | OXYGEN SATURATION: 96 % | DIASTOLIC BLOOD PRESSURE: 88 MMHG

## 2022-03-07 DIAGNOSIS — C18.1 MALIGNANT NEOPLASM OF APPENDIX (H): Primary | ICD-10-CM

## 2022-03-07 LAB
ALBUMIN SERPL-MCNC: 3.9 G/DL (ref 3.4–5)
ALP SERPL-CCNC: 60 U/L (ref 40–150)
ALT SERPL W P-5'-P-CCNC: 20 U/L (ref 0–70)
ANION GAP SERPL CALCULATED.3IONS-SCNC: 9 MMOL/L (ref 3–14)
AST SERPL W P-5'-P-CCNC: 15 U/L (ref 0–45)
BASOPHILS # BLD AUTO: 0 10E3/UL (ref 0–0.2)
BASOPHILS NFR BLD AUTO: 0 %
BILIRUB SERPL-MCNC: 0.4 MG/DL (ref 0.2–1.3)
BUN SERPL-MCNC: 12 MG/DL (ref 7–30)
CALCIUM SERPL-MCNC: 9 MG/DL (ref 8.5–10.1)
CEA SERPL-MCNC: 13.4 UG/L (ref 0–2.5)
CHLORIDE BLD-SCNC: 103 MMOL/L (ref 94–109)
CO2 SERPL-SCNC: 27 MMOL/L (ref 20–32)
CREAT SERPL-MCNC: 0.88 MG/DL (ref 0.66–1.25)
EOSINOPHIL # BLD AUTO: 0.1 10E3/UL (ref 0–0.7)
EOSINOPHIL NFR BLD AUTO: 1 %
ERYTHROCYTE [DISTWIDTH] IN BLOOD BY AUTOMATED COUNT: 14.5 % (ref 10–15)
GFR SERPL CREATININE-BSD FRML MDRD: 89 ML/MIN/1.73M2
GLUCOSE BLD-MCNC: 107 MG/DL (ref 70–99)
HCT VFR BLD AUTO: 39.7 % (ref 40–53)
HGB BLD-MCNC: 12.7 G/DL (ref 13.3–17.7)
IMM GRANULOCYTES # BLD: 0 10E3/UL
IMM GRANULOCYTES NFR BLD: 0 %
LYMPHOCYTES # BLD AUTO: 2.1 10E3/UL (ref 0.8–5.3)
LYMPHOCYTES NFR BLD AUTO: 27 %
MCH RBC QN AUTO: 26.2 PG (ref 26.5–33)
MCHC RBC AUTO-ENTMCNC: 32 G/DL (ref 31.5–36.5)
MCV RBC AUTO: 82 FL (ref 78–100)
MONOCYTES # BLD AUTO: 0.6 10E3/UL (ref 0–1.3)
MONOCYTES NFR BLD AUTO: 7 %
NEUTROPHILS # BLD AUTO: 5 10E3/UL (ref 1.6–8.3)
NEUTROPHILS NFR BLD AUTO: 65 %
NRBC # BLD AUTO: 0 10E3/UL
NRBC BLD AUTO-RTO: 0 /100
PLATELET # BLD AUTO: 256 10E3/UL (ref 150–450)
POTASSIUM BLD-SCNC: 4 MMOL/L (ref 3.4–5.3)
PROT SERPL-MCNC: 7.9 G/DL (ref 6.8–8.8)
RBC # BLD AUTO: 4.84 10E6/UL (ref 4.4–5.9)
SODIUM SERPL-SCNC: 139 MMOL/L (ref 133–144)
WBC # BLD AUTO: 7.8 10E3/UL (ref 4–11)

## 2022-03-07 PROCEDURE — 99214 OFFICE O/P EST MOD 30 MIN: CPT | Performed by: STUDENT IN AN ORGANIZED HEALTH CARE EDUCATION/TRAINING PROGRAM

## 2022-03-07 PROCEDURE — 82310 ASSAY OF CALCIUM: CPT | Performed by: STUDENT IN AN ORGANIZED HEALTH CARE EDUCATION/TRAINING PROGRAM

## 2022-03-07 PROCEDURE — G0463 HOSPITAL OUTPT CLINIC VISIT: HCPCS

## 2022-03-07 PROCEDURE — 85004 AUTOMATED DIFF WBC COUNT: CPT | Performed by: STUDENT IN AN ORGANIZED HEALTH CARE EDUCATION/TRAINING PROGRAM

## 2022-03-07 PROCEDURE — 82378 CARCINOEMBRYONIC ANTIGEN: CPT | Performed by: STUDENT IN AN ORGANIZED HEALTH CARE EDUCATION/TRAINING PROGRAM

## 2022-03-07 PROCEDURE — 36415 COLL VENOUS BLD VENIPUNCTURE: CPT | Performed by: STUDENT IN AN ORGANIZED HEALTH CARE EDUCATION/TRAINING PROGRAM

## 2022-03-07 RX ORDER — HEPARIN SODIUM (PORCINE) LOCK FLUSH IV SOLN 100 UNIT/ML 100 UNIT/ML
5 SOLUTION INTRAVENOUS
Status: CANCELLED | OUTPATIENT
Start: 2022-03-09

## 2022-03-07 RX ORDER — EPINEPHRINE 1 MG/ML
0.3 INJECTION, SOLUTION INTRAMUSCULAR; SUBCUTANEOUS EVERY 5 MIN PRN
Status: CANCELLED | OUTPATIENT
Start: 2022-03-23

## 2022-03-07 RX ORDER — LORAZEPAM 2 MG/ML
0.5 INJECTION INTRAMUSCULAR EVERY 4 HOURS PRN
Status: CANCELLED | OUTPATIENT
Start: 2022-03-23

## 2022-03-07 RX ORDER — DIPHENHYDRAMINE HYDROCHLORIDE 50 MG/ML
50 INJECTION INTRAMUSCULAR; INTRAVENOUS
Status: CANCELLED
Start: 2022-03-23

## 2022-03-07 RX ORDER — POLYETHYLENE GLYCOL 3350 17 G/17G
1 POWDER, FOR SOLUTION ORAL DAILY
COMMUNITY
End: 2022-01-01

## 2022-03-07 RX ORDER — NALOXONE HYDROCHLORIDE 0.4 MG/ML
0.2 INJECTION, SOLUTION INTRAMUSCULAR; INTRAVENOUS; SUBCUTANEOUS
Status: CANCELLED | OUTPATIENT
Start: 2022-03-23

## 2022-03-07 RX ORDER — MEPERIDINE HYDROCHLORIDE 25 MG/ML
25 INJECTION INTRAMUSCULAR; INTRAVENOUS; SUBCUTANEOUS EVERY 30 MIN PRN
Status: CANCELLED | OUTPATIENT
Start: 2022-03-23

## 2022-03-07 RX ORDER — ALBUTEROL SULFATE 0.83 MG/ML
2.5 SOLUTION RESPIRATORY (INHALATION)
Status: CANCELLED | OUTPATIENT
Start: 2022-03-23

## 2022-03-07 RX ORDER — ONDANSETRON 2 MG/ML
8 INJECTION INTRAMUSCULAR; INTRAVENOUS ONCE
Status: CANCELLED | OUTPATIENT
Start: 2022-03-23

## 2022-03-07 RX ORDER — HEPARIN SODIUM,PORCINE 10 UNIT/ML
5 VIAL (ML) INTRAVENOUS
Status: CANCELLED | OUTPATIENT
Start: 2022-03-09

## 2022-03-07 RX ORDER — METHYLPREDNISOLONE SODIUM SUCCINATE 125 MG/2ML
125 INJECTION, POWDER, LYOPHILIZED, FOR SOLUTION INTRAMUSCULAR; INTRAVENOUS
Status: CANCELLED
Start: 2022-03-23

## 2022-03-07 RX ORDER — ALBUTEROL SULFATE 90 UG/1
1-2 AEROSOL, METERED RESPIRATORY (INHALATION)
Status: CANCELLED
Start: 2022-03-23

## 2022-03-07 ASSESSMENT — PAIN SCALES - GENERAL: PAINLEVEL: NO PAIN (0)

## 2022-03-07 NOTE — NURSING NOTE
"Oncology Rooming Note    March 7, 2022 2:12 PM   Rod Olson is a 76 year old male who presents for:    Chief Complaint   Patient presents with     Blood Draw     Labs drawn via  by RN in lab. VS taken     Oncology Clinic Visit     Malignant neoplasm of appendix (H)     Initial Vitals: /88   Pulse 84   Temp 97.5  F (36.4  C) (Oral)   Resp 16   Wt 99.3 kg (219 lb)   SpO2 96%   BMI 30.54 kg/m   Estimated body mass index is 30.54 kg/m  as calculated from the following:    Height as of 2/11/22: 1.803 m (5' 11\").    Weight as of this encounter: 99.3 kg (219 lb). Body surface area is 2.23 meters squared.  No Pain (0) Comment: Data Unavailable   No LMP for male patient.  Allergies reviewed: Yes  Medications reviewed: Yes    Medications: Medication refills not needed today.  Pharmacy name entered into [facility]:    [id number] - [place], [place] - [address] AT [id number] - [place], [place] - [address] AT [address]    Clinical concerns: None.       Taty Lockett CMA            "
Chief Complaint   Patient presents with     Blood Draw     Labs drawn via  by RN in lab. VS taken     Labs collected from venipuncture by RN. Vitals taken. Checked in for next appointment.      Magaly Antoine RN      
No

## 2022-03-07 NOTE — PROGRESS NOTES
[facility] - Medical Oncology Follow-Up Consult Note  3/7/2022    Patient Identifiers     Name: Rod Olson  Preferred Address: Rod  Preferred Language: English  : 1945  Gender: male    Assessment and Plan     Mr. 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    Pt's scrotum is continuing to decrease in size, and his labs are otherwise stable. Anal hemorrhoids have been evaluated by CRS, and other interventions (dental and ophthalmologic surveillance, for example) do not preclude treatment initiation at this time. Thus, we will plan for CapeOx treatment initiation. We will defer port placement for now, and continue to evaluate pt's treatment tolerance. We will also plan for repeat imaging as new baseline given multiple months since prior scans.     Pt counseled on R/B/A of treatment. Pt's capecitabine should be delivered to daughter's address:  [address] [facility], [address] [phone]    Finally, pt's PSA is elevated, but within age-related normal ranges. He requires longitduinal PSA monitoring and routine surveillance. Given his multiple ongoing urological issues, he may benefit from routine follow-up with General Urology. We will provide this referral now.    Plan:  appendiceal mucinous adenoCA  - START CapeOx ASAP  - trend labs, CEA  - monitor GI sx, hand/foot  - restaging CT scans ASAP  - RTC every 3 weeks while on therapy    Scrotal swelling/elevated PSA  - referral to General Urology at [facility]     The patient and family asked numerous excellent questions which I answered to the best of my abilities. At the completion of our consultation, the patient and accompanying caregivers had a strong understanding of the plan. They have our contact information for any further questions or concerns, and know to reach out for any urgent matters. Patient and family aware  that they should call 911 for emergencies.       30 minutes spent on the date of the encounter doing chart review, review of test results, interpretation of tests, patient visit and documentation       Larry Bal MD, PhD   of Medicine  Division of Hematology, Oncology and Transplantation  [facility]    -----------------------------------    Oncology Summary     Cancer Staging  Malignant neoplasm of appendix (H)  Staging form: Appendix Carcinoma, AJCC 8th Edition  - Clinical stage from 12/7/2021: Stage IVC (cT4b, cN1c, pM1c, G3) - Signed by Larry Bal MD on 1/5/2022    Current treatment(s):  - CapeOx  Treatment intent:  - palliative    Oncology History Overview Note   Pt in his USOH until November 2021 when he presented to care at the urging of family with eight months of abdominal pain. CT showed large, mucinous collections in R hemipelvis, prompting referral to surgery. Pt thought to have likely a low-grade mucinous neoplasm and underwent cytoreductive surgery, with incomplete (R2) resection. 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    On establishment with Med Onc, pt recommended to initiate FOLFOX therapy.      Malignant neoplasm of appendix (H)   12/7/2021 Initial Diagnosis    Malignant neoplasm of appendix (H)     12/7/2021 -  Cancer Staged    Staging form: Appendix Carcinoma, AJCC 8th Edition  - Clinical stage from 12/7/2021: Stage IVC (cT4b, cN1c, pM1c, G3)         Subjective/Interval Events     - no n/v, diarrhea; slight constipation (persistent for about a week)  - mild scrotal ache, but no outright pain; size of scrotum has decreased significantly over the past week  - no issues with urination; no longer requiring bedside commode (going very frequently)    Review of Systems: 14 point ROS negative other than the symptoms noted above in the HPI.    Physical  Exam     Vital signs: /88   Pulse 84   Temp 97.5  F (36.4  C) (Oral)   Resp 16   Wt 99.3 kg (219 lb)   SpO2 96%   BMI 30.54 kg/m      ECOG performance status:  1  Vascular access:  none    GENERAL: Relatively well appearing, elderly man, sitting in chair, no acute distress.   HEENT: No icterus, no pallor. Moist mucous membranes.   LUNGS: Clear to ausculation bilaterally, normal work of breathing.   CARDIOVASCULAR: Regular rate and rhythm, no murmurs, gallops or rubs.   ABDOMEN: Soft, nontender and nondistended.  EXTREMITIES: No cyanosis, no clubbing, no edema.   NEUROLOGIC: No focal deficits, alert/ oriented  LYMPH NODE EXAM: No palpable adenopathy.    Objective Data     Lab data:  I have personally reviewed the lab data, notable for:    - stable from prior    Radiology data:  I have personally reviewed the radiology data, notable for:  - no new data    Pathology and other data:  I have personally reviewed and interpreted the pathology data, notable for:    - no new data

## 2022-03-08 ENCOUNTER — DOCUMENTATION ONLY (OUTPATIENT)
Dept: ONCOLOGY | Facility: CLINIC | Age: 77
End: 2022-03-08
Payer: MEDICARE

## 2022-03-11 ENCOUNTER — TELEPHONE (OUTPATIENT)
Dept: ONCOLOGY | Facility: CLINIC | Age: 77
End: 2022-03-11
Payer: MEDICARE

## 2022-03-11 NOTE — TELEPHONE ENCOUNTER
Free Drug Application Initiated  Medication: Xeloda  Sponsor: [employer]  Formerly [facility] Check: Grace Myhre, Brandt 3/11/22.  Xeloda 2g ([id number]) BID for 14 days on, 7 days off. #112, 1 yr of refills.        Harriet Vazquez CPhT  [facility]  Oncology Pharmacy Liaison  [email or web address]  Phone: [phone]  Fax: [phone]

## 2022-03-15 RX ORDER — CAPECITABINE 500 MG/1
2000 TABLET, FILM COATED ORAL 2 TIMES DAILY
Qty: 140 TABLET | Refills: 11 | COMMUNITY
Start: 2022-03-15 | End: 2022-05-18

## 2022-03-15 NOTE — TELEPHONE ENCOUNTER
Free Drug Application Approved  Effective Dates:  03/15/202-indefinitely  Patient notified: Yes      Harriet Vazquez CPhT  [facility]  Oncology Pharmacy Liaison  [email or web address]  Phone: [phone]  Fax: [phone]

## 2022-03-21 ENCOUNTER — HOSPITAL ENCOUNTER (OUTPATIENT)
Dept: CT IMAGING | Facility: CLINIC | Age: 77
Discharge: HOME | End: 2022-03-21
Attending: STUDENT IN AN ORGANIZED HEALTH CARE EDUCATION/TRAINING PROGRAM | Admitting: STUDENT IN AN ORGANIZED HEALTH CARE EDUCATION/TRAINING PROGRAM
Payer: MEDICARE

## 2022-03-21 ENCOUNTER — TELEPHONE (OUTPATIENT)
Dept: ONCOLOGY | Facility: CLINIC | Age: 77
End: 2022-03-21
Payer: MEDICARE

## 2022-03-21 DIAGNOSIS — C18.1 MALIGNANT NEOPLASM OF APPENDIX (H): Primary | ICD-10-CM

## 2022-03-21 DIAGNOSIS — C18.1 MALIGNANT NEOPLASM OF APPENDIX (H): ICD-10-CM

## 2022-03-21 PROCEDURE — 250N000009 HC RX 250: Performed by: RADIOLOGY

## 2022-03-21 PROCEDURE — 250N000011 HC RX IP 250 OP 636: Performed by: RADIOLOGY

## 2022-03-21 PROCEDURE — 74177 CT ABD & PELVIS W/CONTRAST: CPT | Mod: ME

## 2022-03-21 RX ORDER — LOPERAMIDE HCL 2 MG
CAPSULE ORAL
Qty: 30 CAPSULE | Refills: 0 | Status: SHIPPED | OUTPATIENT
Start: 2022-03-21 | End: 2022-04-15

## 2022-03-21 RX ORDER — IOPAMIDOL 755 MG/ML
100 INJECTION, SOLUTION INTRAVASCULAR ONCE
Status: COMPLETED | OUTPATIENT
Start: 2022-03-21 | End: 2022-03-21

## 2022-03-21 RX ORDER — PROCHLORPERAZINE MALEATE 10 MG
5 TABLET ORAL EVERY 6 HOURS PRN
Qty: 30 TABLET | Refills: 2 | Status: SHIPPED | OUTPATIENT
Start: 2022-03-21 | End: 2022-01-01

## 2022-03-21 RX ORDER — DEXAMETHASONE 4 MG/1
8 TABLET ORAL DAILY
Qty: 4 TABLET | Refills: 2 | Status: SHIPPED | OUTPATIENT
Start: 2022-03-21 | End: 2022-07-19

## 2022-03-21 RX ORDER — ONDANSETRON 8 MG/1
8 TABLET, FILM COATED ORAL EVERY 8 HOURS PRN
Qty: 30 TABLET | Refills: 2 | Status: SHIPPED | OUTPATIENT
Start: 2022-03-21 | End: 2022-01-01

## 2022-03-21 RX ADMIN — IOPAMIDOL 100 ML: 755 INJECTION, SOLUTION INTRAVENOUS at 15:27

## 2022-03-21 RX ADMIN — SODIUM CHLORIDE 67 ML: 9 INJECTION, SOLUTION INTRAVENOUS at 15:27

## 2022-03-21 NOTE — TELEPHONE ENCOUNTER
"Oral Chemotherapy Monitoring Program    Lab Monitoring Plan  [id number] with Oxaliplatin infusion   Labs drawn outside of [place]: no   Subjective/Objective:  Rod Olson is a 76 year old male contacted by phone for an initial visit for oral chemotherapy education.      ORAL CHEMOTHERAPY 3/8/2022 3/21/2022   Assessment Type Initial Work up New Teach   Diagnosis Code Appendix Cancer Appendix Cancer   Providers Dr. Valeriano Bal   [facility]   Drug Name Xeloda (capecitabine) Xeloda (capecitabine)   Dose 2,000 mg 2,000 mg   Current Schedule BID BID   Cycle Details 2 weeks on, 1 week off 2 weeks on, 1 week off   Planned next cycle start date - 3/23/2022   Any new drug interactions? Yes -   Pharmacist Intervention? Yes -   Intervention(s) Patient Education -   Is the dose as ordered appropriate for the patient? Yes -       Last PHQ-2 Score on record:   PHQ-2 ( [facility]) 1/14/2022 1/7/2022   Q1: Little interest or pleasure in doing things 0 0   Q2: Feeling down, depressed or hopeless 0 0   PHQ-2 Score 0 0   Q1: Little interest or pleasure in doing things - -   Q2: Feeling down, depressed or hopeless - -   PHQ-2 Score - -       Vitals:  BP:   BP Readings from Last 1 Encounters:   03/07/22 135/88     Wt Readings from Last 1 Encounters:   03/07/22 99.3 kg (219 lb)     Estimated body surface area is 2.23 meters squared as calculated from the following:    Height as of 2/11/22: 1.803 m (5' 11\").    Weight as of 3/7/22: 99.3 kg (219 lb).    Labs:  _  Result Component Current Result Ref Range   Sodium 139 (3/7/2022) 133 - 144 mmol/L     _  Result Component Current Result Ref Range   Potassium 4.0 (3/7/2022) 3.4 - 5.3 mmol/L     _  Result Component Current Result Ref Range   Calcium 9.0 (3/7/2022) 8.5 - 10.1 mg/dL     No results found for Mag within last 30 days.     No results found for Phos within last 30 days.     _  Result Component Current Result Ref Range   Albumin 3.9 (3/7/2022) 3.4 - " 5.0 g/dL     _  Result Component Current Result Ref Range   Urea Nitrogen 12 (3/7/2022) 7 - 30 mg/dL     _  Result Component Current Result Ref Range   Creatinine 0.88 (3/7/2022) 0.66 - 1.25 mg/dL     _  Result Component Current Result Ref Range   AST 15 (3/7/2022) 0 - 45 U/L     _  Result Component Current Result Ref Range   ALT 20 (3/7/2022) 0 - 70 U/L     _  Result Component Current Result Ref Range   Bilirubin Total 0.4 (3/7/2022) 0.2 - 1.3 mg/dL     _  Result Component Current Result Ref Range   WBC Count 7.8 (3/7/2022) 4.0 - 11.0 10e3/uL     _  Result Component Current Result Ref Range   Hemoglobin 12.7 (L) (3/7/2022) 13.3 - 17.7 g/dL     _  Result Component Current Result Ref Range   Platelet Count 256 (3/7/2022) 150 - 450 10e3/uL     No results found for ANC within last 30 days.       Assessment:  Patient is appropriate to start therapy.    Plan:  Basic chemotherapy teaching was reviewed with the patient and the patient's family including indication, start date of therapy, dose, administration, adverse effects, missed doses, food and drug interactions, monitoring, side effect management, office contact information, and safe handling. Written materials were provided and all questions answered.    Follow-Up:  1 week following initial start of therapy    Leigh Dey, PharmD, BCACP  Oral Chemotherapy Monitoring Program  [facility]  [phone]  March 21, 2022

## 2022-03-22 ENCOUNTER — TELEPHONE (OUTPATIENT)
Dept: ONCOLOGY | Facility: CLINIC | Age: 77
End: 2022-03-22
Payer: MEDICARE

## 2022-03-22 NOTE — TELEPHONE ENCOUNTER
Called - pt did not . Called to confirm appointments for chemo in [place] on 3/23/22. The pt cancelled their provider appt on that day on 3/19 via [facility] [employer] and I just rescheduled that appointment and wanted to confirm the pt was planning to attend.    -Roger

## 2022-03-23 ENCOUNTER — INFUSION THERAPY VISIT (OUTPATIENT)
Dept: INFUSION THERAPY | Facility: CLINIC | Age: 77
End: 2022-03-23
Attending: FAMILY MEDICINE
Payer: MEDICARE

## 2022-03-23 ENCOUNTER — ONCOLOGY VISIT (OUTPATIENT)
Dept: ONCOLOGY | Facility: CLINIC | Age: 77
End: 2022-03-23
Attending: NURSE PRACTITIONER
Payer: MEDICARE

## 2022-03-23 ENCOUNTER — APPOINTMENT (OUTPATIENT)
Dept: LAB | Facility: CLINIC | Age: 77
End: 2022-03-23
Payer: MEDICARE

## 2022-03-23 VITALS — HEART RATE: 62 BPM | SYSTOLIC BLOOD PRESSURE: 143 MMHG | RESPIRATION RATE: 16 BRPM | DIASTOLIC BLOOD PRESSURE: 88 MMHG

## 2022-03-23 VITALS
OXYGEN SATURATION: 98 % | RESPIRATION RATE: 12 BRPM | HEART RATE: 72 BPM | DIASTOLIC BLOOD PRESSURE: 87 MMHG | BODY MASS INDEX: 31.52 KG/M2 | SYSTOLIC BLOOD PRESSURE: 129 MMHG | WEIGHT: 226 LBS | TEMPERATURE: 97.5 F

## 2022-03-23 DIAGNOSIS — C18.1 MALIGNANT NEOPLASM OF APPENDIX (H): Primary | ICD-10-CM

## 2022-03-23 LAB
ALBUMIN SERPL-MCNC: 3.6 G/DL (ref 3.4–5)
ALP SERPL-CCNC: 59 U/L (ref 40–150)
ALT SERPL W P-5'-P-CCNC: 19 U/L (ref 0–70)
ANION GAP SERPL CALCULATED.3IONS-SCNC: 3 MMOL/L (ref 3–14)
AST SERPL W P-5'-P-CCNC: 11 U/L (ref 0–45)
BASOPHILS # BLD AUTO: 0 10E3/UL (ref 0–0.2)
BASOPHILS NFR BLD AUTO: 1 %
BILIRUB SERPL-MCNC: 0.3 MG/DL (ref 0.2–1.3)
BUN SERPL-MCNC: 14 MG/DL (ref 7–30)
CALCIUM SERPL-MCNC: 9.1 MG/DL (ref 8.5–10.1)
CHLORIDE BLD-SCNC: 109 MMOL/L (ref 94–109)
CO2 SERPL-SCNC: 29 MMOL/L (ref 20–32)
CREAT SERPL-MCNC: 0.86 MG/DL (ref 0.66–1.25)
EOSINOPHIL # BLD AUTO: 0.1 10E3/UL (ref 0–0.7)
EOSINOPHIL NFR BLD AUTO: 2 %
ERYTHROCYTE [DISTWIDTH] IN BLOOD BY AUTOMATED COUNT: 14.7 % (ref 10–15)
GFR SERPL CREATININE-BSD FRML MDRD: 89 ML/MIN/1.73M2
GLUCOSE BLD-MCNC: 106 MG/DL (ref 70–99)
HCT VFR BLD AUTO: 37.8 % (ref 40–53)
HGB BLD-MCNC: 12.5 G/DL (ref 13.3–17.7)
IMM GRANULOCYTES # BLD: 0 10E3/UL
IMM GRANULOCYTES NFR BLD: 0 %
LYMPHOCYTES # BLD AUTO: 1.7 10E3/UL (ref 0.8–5.3)
LYMPHOCYTES NFR BLD AUTO: 28 %
MCH RBC QN AUTO: 27.3 PG (ref 26.5–33)
MCHC RBC AUTO-ENTMCNC: 33.1 G/DL (ref 31.5–36.5)
MCV RBC AUTO: 83 FL (ref 78–100)
MONOCYTES # BLD AUTO: 0.6 10E3/UL (ref 0–1.3)
MONOCYTES NFR BLD AUTO: 9 %
NEUTROPHILS # BLD AUTO: 3.8 10E3/UL (ref 1.6–8.3)
NEUTROPHILS NFR BLD AUTO: 60 %
NRBC # BLD AUTO: 0 10E3/UL
NRBC BLD AUTO-RTO: 0 /100
PLATELET # BLD AUTO: 233 10E3/UL (ref 150–450)
POTASSIUM BLD-SCNC: 3.8 MMOL/L (ref 3.4–5.3)
PROT SERPL-MCNC: 7.4 G/DL (ref 6.8–8.8)
RBC # BLD AUTO: 4.58 10E6/UL (ref 4.4–5.9)
SODIUM SERPL-SCNC: 141 MMOL/L (ref 133–144)
WBC # BLD AUTO: 6.3 10E3/UL (ref 4–11)

## 2022-03-23 PROCEDURE — 36415 COLL VENOUS BLD VENIPUNCTURE: CPT | Performed by: FAMILY MEDICINE

## 2022-03-23 PROCEDURE — 99205 OFFICE O/P NEW HI 60 MIN: CPT | Performed by: NURSE PRACTITIONER

## 2022-03-23 PROCEDURE — 82040 ASSAY OF SERUM ALBUMIN: CPT | Performed by: FAMILY MEDICINE

## 2022-03-23 PROCEDURE — 85025 COMPLETE CBC W/AUTO DIFF WBC: CPT | Performed by: FAMILY MEDICINE

## 2022-03-23 PROCEDURE — 250N000011 HC RX IP 250 OP 636: Performed by: STUDENT IN AN ORGANIZED HEALTH CARE EDUCATION/TRAINING PROGRAM

## 2022-03-23 PROCEDURE — 96367 TX/PROPH/DG ADDL SEQ IV INF: CPT

## 2022-03-23 PROCEDURE — 258N000003 HC RX IP 258 OP 636: Performed by: STUDENT IN AN ORGANIZED HEALTH CARE EDUCATION/TRAINING PROGRAM

## 2022-03-23 PROCEDURE — 80053 COMPREHEN METABOLIC PANEL: CPT | Performed by: FAMILY MEDICINE

## 2022-03-23 PROCEDURE — 96415 CHEMO IV INFUSION ADDL HR: CPT

## 2022-03-23 PROCEDURE — 96375 TX/PRO/DX INJ NEW DRUG ADDON: CPT

## 2022-03-23 PROCEDURE — 96413 CHEMO IV INFUSION 1 HR: CPT

## 2022-03-23 PROCEDURE — G0463 HOSPITAL OUTPT CLINIC VISIT: HCPCS | Mod: 25

## 2022-03-23 RX ORDER — ONDANSETRON 2 MG/ML
8 INJECTION INTRAMUSCULAR; INTRAVENOUS ONCE
Status: COMPLETED | OUTPATIENT
Start: 2022-03-23 | End: 2022-03-23

## 2022-03-23 RX ADMIN — ONDANSETRON 8 MG: 2 INJECTION INTRAMUSCULAR; INTRAVENOUS at 11:00

## 2022-03-23 RX ADMIN — FAMOTIDINE 20 MG: 20 INJECTION, SOLUTION INTRAVENOUS at 11:02

## 2022-03-23 RX ADMIN — OXALIPLATIN 300 MG: 100 INJECTION, SOLUTION, CONCENTRATE INTRAVENOUS at 11:44

## 2022-03-23 RX ADMIN — DEXAMETHASONE SODIUM PHOSPHATE: 10 INJECTION, SOLUTION INTRAMUSCULAR; INTRAVENOUS at 11:16

## 2022-03-23 RX ADMIN — DEXTROSE MONOHYDRATE 250 ML: 50 INJECTION, SOLUTION INTRAVENOUS at 11:44

## 2022-03-23 ASSESSMENT — PAIN SCALES - GENERAL: PAINLEVEL: MILD PAIN (2)

## 2022-03-23 NOTE — PROGRESS NOTES
"Oncology Rooming Note    March 23, 2022 9:41 AM   Rod Olson is a 76 year old male who presents for:    Chief Complaint   Patient presents with     Oncology Clinic Visit     Malignant neoplasm of appendix - Labs provider and infusion     Initial Vitals: /87 (BP Location: Right arm, Patient Position: Sitting, Cuff Size: Adult Regular)   Pulse 72   Temp 97.5  F (36.4  C) (Tympanic)   Resp 12   Wt 102.5 kg (226 lb)   SpO2 98%   BMI 31.52 kg/m   Estimated body mass index is 31.52 kg/m  as calculated from the following:    Height as of 2/11/22: 1.803 m (5' 11\").    Weight as of this encounter: 102.5 kg (226 lb). Body surface area is 2.27 meters squared.  Mild Pain (2) Comment: Data Unavailable   No LMP for male patient.  Allergies reviewed: Yes  Medications reviewed: Yes    Medications: Medication refills not needed today.  Pharmacy name entered into [facility]:    [facility][id number] - [place], [place] - [address] AT [facility] #[id number] - [place], [place] - [address] AT [address] PHARMACY [facility] [address] [place] [address]  [facility] - [place], [place] - [address]    Clinical concerns:  None      Shazia Parker CMA            "

## 2022-03-23 NOTE — PATIENT INSTRUCTIONS
Needs Urology consult. Dr. Bal put order through - daughter needs help getting scheduled    Proceed with Oxaliplatin today. Anticipating delivery of Xeloda this Friday    Has provider visits in 3 wk + 6 wk ahead of next cycles. Infusions at [place]

## 2022-03-23 NOTE — LETTER
3/23/2022         RE: Rod Olson  [address]  [facility] [address]      [facility] Outpatient Progress Note ([place])    Patient: Rod Olson  MRN: [id number]  Date of Service: 03/23/2022        Reason for Visit    1. 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 Bal ([facility])    Assessment/Plan  1.   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   Pulmonary nodules, indeterminate  Baseline CT scan reviewed with pt/daughter: stable complex cystic mass R hemipelvis. Pulmonary nodules stable/slightly larger and indeterminate if mets.     Starting CapeOx today. Anticipating capecitabine delivery this Friday 3/25.  Reviewed regimen scheduling and potential toxicities. Reviewed palliative intent of treatment.   He will  antiemetics today to have on hand. Also recommended imodium as needed for chemo-induced diarrhea. Urea-based lotion for hand/foot symptoms.     Port placement deferred. Consider if any difficulties with PIV sites/access or patient preference.    Call Dr. Bal's office/nurses if needed. If he needs assessment in clinic, can see me at [place] site as needed.     Provider visit with labs every 3-week cycle (already scheduled for virtual visits).    Trend symptoms and CEA. (Baseline CEA 13.4)  Suspect repeat scans after 2-3 cycles, pending clinical assessment.    3.   Scrotal swelling  4.   Elevated PSA  General Urology consult was placed, awaiting visit.     Suspect PSA will require longitudinal monitoring in context of his high-grade appendiceal cancer, but given urology symptoms Urology input requested.  ______________________________________________________________________________    History of Present Illness/ Interval History    Mr. Rod Olson  is a 76 year old here to start cycle 1 chemo for appendiceal cancer.   Valeriano has recommended CapeOx.     Reports slight constipation - some days has 3-4 soft stools/day and other days no BM; using Miralax as needed; anal hemorrhoids. Abd discomfort stable. Using oxycodone a few days/week with adequate relief. Eating/drinking well. No n/v.     Scrotal hydrocele/hematoma late last year around his cancer surgery. Mild persistent scrotal ache, but no outright pain; size of scrotum has decreased with time. 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   -CT: large, mucinous collections in R hemipelvis. Indeterminate lung nodules  -surgical path: high-grade appendiceal adenocarcinoma with signet ring features. R2  -Baseline CEA 13.4 (3/7/2022)    Treatment:  11/2021: cytoreductive surgery (incomplete, R2)     3/23/2022: palliative CapeOx  -cycle 1: capeceitabine 2000 mg BID days 1-14/21 days + Oxaliplatin 130 mg/m2 every 21 days      Physical Exam    GENERAL: Alert and oriented to time place and person. Seated comfortably. In no distress. Daughter present.  HEAD: Atraumatic and normocephalic. No alopecia.  EYES: LYNN, EOMI. No erythema. No icterus.  LYMPH NODES: No palpable supraclavicular, cervical, axillary or inguinal lymphadenopathy.  CHEST: clear to auscultation bilaterally. Resonant to percussion throughout bilaterally. Symmetrical breath movements bilaterally.  CVS: S1 and S2 are heard. Regular rate and rhythm. No murmur or gallop or rub heard.  ABDOMEN: Soft. Not distended. Mildly tender RLQ/groin area. No palpable hepatomegaly or splenomegaly. No other mass palpable. Bowel sounds present. Midline abd incision healed.  EXTREMITIES: Warm. No peripheral edema.  SKIN: no rash, or bruising or purpura.   NEURO: No gross deficit noted. Non-antalgic gait.      Lab  Results    Recent Results (from the past 168 hour(s))   Comprehensive metabolic panel   Result Value Ref Range    Sodium 141 133 - 144 mmol/L    Potassium 3.8 3.4 - 5.3 mmol/L    Chloride 109 94 - 109 mmol/L    Carbon Dioxide (CO2) 29 20 - 32 mmol/L    Anion Gap 3 3 - 14 mmol/L    Urea Nitrogen 14 7 - 30 mg/dL    Creatinine 0.86 0.66 - 1.25 mg/dL    Calcium 9.1 8.5 - 10.1 mg/dL    Glucose 106 (H) 70 - 99 mg/dL    Alkaline Phosphatase 59 40 - 150 U/L    AST 11 0 - 45 U/L    ALT 19 0 - 70 U/L    Protein Total 7.4 6.8 - 8.8 g/dL    Albumin 3.6 3.4 - 5.0 g/dL    Bilirubin Total 0.3 0.2 - 1.3 mg/dL    GFR Estimate 89 >60 mL/min/1.73m2   CBC with platelets and differential   Result Value Ref Range    WBC Count 6.3 4.0 - 11.0 10e3/uL    RBC Count 4.58 4.40 - 5.90 10e6/uL    Hemoglobin 12.5 (L) 13.3 - 17.7 g/dL    Hematocrit 37.8 (L) 40.0 - 53.0 %    MCV 83 78 - 100 fL    MCH 27.3 26.5 - 33.0 pg    MCHC 33.1 31.5 - 36.5 g/dL    RDW 14.7 10.0 - 15.0 %    Platelet Count 233 150 - 450 10e3/uL    % Neutrophils 60 %    % Lymphocytes 28 %    % Monocytes 9 %    % Eosinophils 2 %    % Basophils 1 %    % Immature Granulocytes 0 %    NRBCs per 100 WBC 0 <1 /100    Absolute Neutrophils 3.8 1.6 - 8.3 10e3/uL    Absolute Lymphocytes 1.7 0.8 - 5.3 10e3/uL    Absolute Monocytes 0.6 0.0 - 1.3 10e3/uL    Absolute Eosinophils 0.1 0.0 - 0.7 10e3/uL    Absolute Basophils 0.0 0.0 - 0.2 10e3/uL    Absolute Immature Granulocytes 0.0 <=0.4 10e3/uL    Absolute NRBCs 0.0 10e3/uL       Imaging    CT Chest/Abdomen/Pelvis w Contrast    Result Date: 3/22/2022  CT CHEST/ABDOMEN/PELVIS WITH CONTRAST  3/21/2022 3:41 PM CLINICAL HISTORY: Colon cancer, monitor, stage IV. Malignant neoplasm of appendix (H). TECHNIQUE: CT scan of the chest, abdomen, and pelvis was performed following injection of IV contrast. Multiplanar reformats were obtained. Dose reduction techniques were used. CONTRAST: 100 mL Isovue-370 COMPARISON: 1/19/2022, 1/14/2022 FINDINGS: LUNGS  AND PLEURA: There is an irregular nodule in the left upper lobe near the apex measuring 13 x 9 mm (4-27), previously 11 x 6 mm. A few additional scattered subcentimeter pulmonary nodules are unchanged. One example includes a small area of clustered nodularity in the anterolateral left lower lobe with the dominant nodule measuring 9 x 4 mm (4-129), previously 10 x 5 mm. Another example includes a cluster of tree-in-bud-type nodularity in the right lower lobe (4-219), which is also unchanged. Minimal mucous plugging in the lateral right middle lobe (4-254) is also unchanged. No new pulmonary nodules. No airspace consolidation or pleural fluid. Central airways are patent. 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 Heart size normal. No pericardial effusion. Mild coronary artery atherosclerosis is present. No enlarged axillary, mediastinal, or hilar lymph nodes. HEPATOBILIARY: There are several small round low-density hepatic foci present, which are similar in size and number in comparison and most likely represent cysts. Small area of focal fatty infiltration is seen along the falciform ligament, which is unchanged to comparison. There is decreased attenuation of the liver parenchyma, favoring fatty infiltration. Liver contour is smooth. No signs of cholelithiasis, acute cholecystitis, or bowel duct dilation. PANCREAS: Normal. SPLEEN: Normal. ADRENAL GLANDS: Normal. KIDNEYS/BLADDER: Kidneys are normal-appearing. Urinary bladder is decompressed and otherwise normal-appearing. Mild urinary bladder wall thickening is likely secondary to underdistention. No gas in the urinary bladder wall or lumen. BOWEL: Mild colonic diverticulosis is present without evidence of diverticulitis. Postsurgical change involving the right hemicolon/cecum is noted. Appendix is absent. No signs of bowel obstruction or  inflammation. PELVIC ORGANS: The prostate gland is markedly enlarged and similar to comparison with mass effect on the base of the urinary bladder. ADDITIONAL FINDINGS: There is a similar-appearing complex multiloculated and multiseptated cystic mass in the right hemipelvis measuring 11.0 x 4.7 cm (3-259), previously 10.4 x 5.8 cm. This cystic mass is in close proximity to the resection margin of the right colon. MUSCULOSKELETAL: Postsurgical change in the right groin is noted with mild induration of the fat and likely scarring. There has been significant interval improvement in postsurgical changes in the right groin when compared to the prior CT. Previously seen subcutaneous edema and gas have resolved. Postsurgical changes of the left acetabulum are noted. There is a linear metallic device/pin in the inferior presacral/perirectal space on the left. Multilevel hypertrophic and degenerative changes of the spine are present. No acute osseous abnormality.     IMPRESSION: 1.  Similar-appearing complex cystic mass in the right hemipelvis, suspicious for residual/recurrent malignancy. 2.  Status post partial right colectomy/cecectomy, unchanged. 3.  Similar to slight interval enlargement of previously seen pulmonary nodules. No new pulmonary nodules. 4.  Prostatomegaly. 5.  Additional nonacute findings as above. JUVENTINO LYMAN MD   SYSTEM ID:  [id number]      Billing  Total time: 45 min; including review of EMR, reports, diagnostics and ordering/coordination of care    Signed by: Vanesa Jimenez NP    Oncology Rooming Note    March 23, 2022 9:41 AM   Rod Olson is a 76 year old male who presents for:    Chief Complaint   Patient presents with     Oncology Clinic Visit     Malignant neoplasm of appendix - Labs provider and infusion     Initial Vitals: /87 (BP Location: Right arm, Patient Position: Sitting, Cuff Size: Adult Regular)   Pulse 72   Temp 97.5  F (36.4  C) (Tympanic)   Resp 12   Wt 102.5 kg (226  "lb)   SpO2 98%   BMI 31.52 kg/m   Estimated body mass index is 31.52 kg/m  as calculated from the following:    Height as of 2/11/22: 1.803 m (5' 11\").    Weight as of this encounter: 102.5 kg (226 lb). Body surface area is 2.27 meters squared.  Mild Pain (2) Comment: Data Unavailable   No LMP for male patient.  Allergies reviewed: Yes  Medications reviewed: Yes    Medications: Medication refills not needed today.  Pharmacy name entered into [facility]:    [id number] - [place], [place] - [address] AT [facility] #[id number] - [place], [place] - [address] [facility] [address] AT [address] PHARMACY [place], [place] - [address] [place] [address]  [facility] - [place], [place] - [address]    Clinical concerns:  None      Shazia Parker, GISSEL Jimenez NP  "

## 2022-03-23 NOTE — PROGRESS NOTES
Infusion Nursing Note:  Rod SISSY Olson presents today for C1 D1 Oxaliplatin.    Patient seen by provider today: Yes: Vanesa ROSARIO   present during visit today: Not Applicable.    Note: C1 D1 Oxaliplatin. Pt given education information as well as chemo counseling prior to starting treatment.  Pt doesn't not have a port. During infusion L arm because sore from Oxali infusion. Discussed with pt the benefit to getting a port. He is considering it, but as of now prefers having an IV.      Intravenous Access:  Peripheral IV placed.    Treatment Conditions:  Lab Results   Component Value Date    HGB 12.5 (L) 03/23/2022    WBC 6.3 03/23/2022    ANEUTAUTO 3.8 03/23/2022     03/23/2022      Lab Results   Component Value Date     03/23/2022    POTASSIUM 3.8 03/23/2022    MAG 2.3 12/10/2021    CR 0.86 03/23/2022    NAMAN 9.1 03/23/2022    BILITOTAL 0.3 03/23/2022    ALBUMIN 3.6 03/23/2022    ALT 19 03/23/2022    AST 11 03/23/2022     Results reviewed, labs MET treatment parameters, ok to proceed with treatment.      Post Infusion Assessment:  Patient tolerated infusion without incident.  Blood return noted pre and post infusion.  No evidence of extravasations.  Access discontinued per protocol.       Discharge Plan:   Discharge instructions reviewed with: Patient.  Patient and/or family verbalized understanding of discharge instructions and all questions answered.  Patient discharged in stable condition accompanied by: self.  Departure Mode: Ambulatory.      Jud Awad RN

## 2022-03-23 NOTE — PATIENT INSTRUCTIONS
Patient Education     Oxaliplatin Solution for injection  What is this medicine?  OXALIPLATIN (ox AL i JANETTE tin) is a chemotherapy drug. It targets fast dividing cells, like cancer cells, and causes these cells to die. This medicine is used to treat cancers of the colon and rectum, and many other cancers.  This medicine may be used for other purposes; ask your health care provider or pharmacist if you have questions.  What should I tell my health care provider before I take this medicine?  They need to know if you have any of these conditions:    kidney disease    an unusual or allergic reaction to oxaliplatin, other chemotherapy, other medicines, foods, dyes, or preservatives    pregnant or trying to get pregnant    breast-feeding  How should I use this medicine?  This drug is given as an infusion into a vein. It is administered in a hospital or clinic by a specially trained health care professional.  Talk to your pediatrician regarding the use of this medicine in children. Special care may be needed.  Overdosage: If you think you have taken too much of this medicine contact a poison control center or emergency room at once.  NOTE: This medicine is only for you. Do not share this medicine with others.  What if I miss a dose?  It is important not to miss a dose. Call your doctor or health care professional if you are unable to keep an appointment.  What may interact with this medicine?    medicines to increase blood counts like filgrastim, pegfilgrastim, sargramostim    probenecid    some antibiotics like amikacin, gentamicin, neomycin, polymyxin B, streptomycin, tobramycin    zalcitabine  Talk to your doctor or health care professional before taking any of these medicines:    acetaminophen    aspirin    ibuprofen    ketoprofen    naproxen  This list may not describe all possible interactions. Give your health care provider a list of all the medicines, herbs, non-prescription drugs, or dietary supplements you use.  Also tell them if you smoke, drink alcohol, or use illegal drugs. Some items may interact with your medicine.  What should I watch for while using this medicine?  Your condition will be monitored carefully while you are receiving this medicine. You will need important blood work done while you are taking this medicine.  This medicine can make you more sensitive to cold. Do not drink cold drinks or use ice. Cover exposed skin before coming in contact with cold temperatures or cold objects. When out in cold weather wear warm clothing and cover your mouth and nose to warm the air that goes into your lungs. Tell your doctor if you get sensitive to the cold.  This drug may make you feel generally unwell. This is not uncommon, as chemotherapy can affect healthy cells as well as cancer cells. Report any side effects. Continue your course of treatment even though you feel ill unless your doctor tells you to stop.  In some cases, you may be given additional medicines to help with side effects. Follow all directions for their use.  Call your doctor or health care professional for advice if you get a fever, chills or sore throat, or other symptoms of a cold or flu. Do not treat yourself. This drug decreases your body's ability to fight infections. Try to avoid being around people who are sick.  This medicine may increase your risk to bruise or bleed. Call your doctor or health care professional if you notice any unusual bleeding.  Be careful brushing and flossing your teeth or using a toothpick because you may get an infection or bleed more easily. If you have any dental work done, tell your dentist you are receiving this medicine.  Avoid taking products that contain aspirin, acetaminophen, ibuprofen, naproxen, or ketoprofen unless instructed by your doctor. These medicines may hide a fever.  Do not become pregnant while taking this medicine. Women should inform their doctor if they wish to become pregnant or think they might  be pregnant. There is a potential for serious side effects to an unborn child. Talk to your health care professional or pharmacist for more information. Do not breast-feed an infant while taking this medicine.  Call your doctor or health care professional if you get diarrhea. Do not treat yourself.  What side effects may I notice from receiving this medicine?  Side effects that you should report to your doctor or health care professional as soon as possible:    allergic reactions like skin rash, itching or hives, swelling of the face, lips, or tongue    low blood counts - This drug may decrease the number of white blood cells, red blood cells and platelets. You may be at increased risk for infections and bleeding.    signs of infection - fever or chills, cough, sore throat, pain or difficulty passing urine    signs of decreased platelets or bleeding - bruising, pinpoint red spots on the skin, black, tarry stools, nosebleeds    signs of decreased red blood cells - unusually weak or tired, fainting spells, lightheadedness    breathing problems    chest pain, pressure    cough    diarrhea    jaw tightness    mouth sores    nausea and vomiting    pain, swelling, redness or irritation at the injection site    pain, tingling, numbness in the hands or feet    problems with balance, talking, walking    redness, blistering, peeling or loosening of the skin, including inside the mouth    trouble passing urine or change in the amount of urine  Side effects that usually do not require medical attention (report to your doctor or health care professional if they continue or are bothersome):    changes in vision    constipation    hair loss    loss of appetite    metallic taste in the mouth or changes in taste    stomach pain  This list may not describe all possible side effects. Call your doctor for medical advice about side effects. You may report side effects to FDA at 0-834-FDA-3344.  Where should I keep my medicine?  This  drug is given in a hospital or clinic and will not be stored at home.  NOTE:This sheet is a summary. It may not cover all possible information. If you have questions about this medicine, talk to your doctor, pharmacist, or health care provider. Copyright  2016 [employer] Standard

## 2022-03-23 NOTE — LETTER
3/23/2022         RE: Rod Olson  [address]  [facility] [address]        Dear Colleague,    Thank you for referring your patient, Rod Olson, to the [facility]. Please see a copy of my visit note below.    [facility] Outpatient Progress Note ([place])    Patient: Rod Olson  MRN: [id number]  Date of Service: 03/23/2022        Reason for Visit    1. 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 Bal ([facility])    Assessment/Plan  1.   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   Pulmonary nodules, indeterminate  Baseline CT scan reviewed with pt/daughter: stable complex cystic mass R hemipelvis. Pulmonary nodules stable/slightly larger and indeterminate if mets.     Starting CapeOx today. Anticipating capecitabine delivery this Friday 3/25.  Reviewed regimen scheduling and potential toxicities. Reviewed palliative intent of treatment.   He will  antiemetics today to have on hand. Also recommended imodium as needed for chemo-induced diarrhea. Urea-based lotion for hand/foot symptoms.     Port placement deferred. Consider if any difficulties with PIV sites/access or patient preference.    Call Dr. Bal's office/nurses if needed. If he needs assessment in clinic, can see me at [place] site as needed.     Provider visit with labs every 3-week cycle (already scheduled for virtual visits).    Trend symptoms and CEA. (Baseline CEA 13.4)  Suspect repeat scans after 2-3 cycles, pending clinical assessment.    3.   Scrotal swelling  4.   Elevated PSA  General Urology consult was placed, awaiting visit.     Suspect PSA will require longitudinal monitoring in context of his high-grade appendiceal cancer, but given urology symptoms Urology input  requested.  ______________________________________________________________________________    History of Present Illness/ Interval History    Mr. Rod Olson  is a 76 year old here to start cycle 1 chemo for appendiceal cancer. Dr. Bal has recommended CapeOx.     Reports slight constipation - some days has 3-4 soft stools/day and other days no BM; using Miralax as needed; anal hemorrhoids. Abd discomfort stable. Using oxycodone a few days/week with adequate relief. Eating/drinking well. No n/v.     Scrotal hydrocele/hematoma late last year around his cancer surgery. Mild persistent scrotal ache, but no outright pain; size of scrotum has decreased with time. 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   -CT: large, mucinous collections in R hemipelvis. Indeterminate lung nodules  -surgical path: high-grade appendiceal adenocarcinoma with signet ring features. R2  -Baseline CEA 13.4 (3/7/2022)    Treatment:  11/2021: cytoreductive surgery (incomplete, R2)     3/23/2022: palliative CapeOx  -cycle 1: capeceitabine 2000 mg BID days 1-14/21 days + Oxaliplatin 130 mg/m2 every 21 days      Physical Exam    GENERAL: Alert and oriented to time place and person. Seated comfortably. In no distress. Daughter present.  HEAD: Atraumatic and normocephalic. No alopecia.  EYES: LYNN, EOMI. No erythema. No icterus.  LYMPH NODES: No palpable supraclavicular, cervical, axillary or inguinal lymphadenopathy.  CHEST: clear to auscultation bilaterally. Resonant to percussion throughout bilaterally. Symmetrical breath movements bilaterally.  CVS: S1 and S2 are heard. Regular rate and rhythm. No murmur or gallop or rub heard.  ABDOMEN: Soft. Not distended. Mildly tender RLQ/groin area. No palpable hepatomegaly or  splenomegaly. No other mass palpable. Bowel sounds present. Midline abd incision healed.  EXTREMITIES: Warm. No peripheral edema.  SKIN: no rash, or bruising or purpura.   NEURO: No gross deficit noted. Non-antalgic gait.      Lab Results    Recent Results (from the past 168 hour(s))   Comprehensive metabolic panel   Result Value Ref Range    Sodium 141 133 - 144 mmol/L    Potassium 3.8 3.4 - 5.3 mmol/L    Chloride 109 94 - 109 mmol/L    Carbon Dioxide (CO2) 29 20 - 32 mmol/L    Anion Gap 3 3 - 14 mmol/L    Urea Nitrogen 14 7 - 30 mg/dL    Creatinine 0.86 0.66 - 1.25 mg/dL    Calcium 9.1 8.5 - 10.1 mg/dL    Glucose 106 (H) 70 - 99 mg/dL    Alkaline Phosphatase 59 40 - 150 U/L    AST 11 0 - 45 U/L    ALT 19 0 - 70 U/L    Protein Total 7.4 6.8 - 8.8 g/dL    Albumin 3.6 3.4 - 5.0 g/dL    Bilirubin Total 0.3 0.2 - 1.3 mg/dL    GFR Estimate 89 >60 mL/min/1.73m2   CBC with platelets and differential   Result Value Ref Range    WBC Count 6.3 4.0 - 11.0 10e3/uL    RBC Count 4.58 4.40 - 5.90 10e6/uL    Hemoglobin 12.5 (L) 13.3 - 17.7 g/dL    Hematocrit 37.8 (L) 40.0 - 53.0 %    MCV 83 78 - 100 fL    MCH 27.3 26.5 - 33.0 pg    MCHC 33.1 31.5 - 36.5 g/dL    RDW 14.7 10.0 - 15.0 %    Platelet Count 233 150 - 450 10e3/uL    % Neutrophils 60 %    % Lymphocytes 28 %    % Monocytes 9 %    % Eosinophils 2 %    % Basophils 1 %    % Immature Granulocytes 0 %    NRBCs per 100 WBC 0 <1 /100    Absolute Neutrophils 3.8 1.6 - 8.3 10e3/uL    Absolute Lymphocytes 1.7 0.8 - 5.3 10e3/uL    Absolute Monocytes 0.6 0.0 - 1.3 10e3/uL    Absolute Eosinophils 0.1 0.0 - 0.7 10e3/uL    Absolute Basophils 0.0 0.0 - 0.2 10e3/uL    Absolute Immature Granulocytes 0.0 <=0.4 10e3/uL    Absolute NRBCs 0.0 10e3/uL       Imaging    CT Chest/Abdomen/Pelvis w Contrast    Result Date: 3/22/2022  CT CHEST/ABDOMEN/PELVIS WITH CONTRAST  3/21/2022 3:41 PM CLINICAL HISTORY: Colon cancer, monitor, stage IV. Malignant neoplasm of appendix (H). TECHNIQUE: CT scan of the  chest, abdomen, and pelvis was performed following injection of IV contrast. Multiplanar reformats were obtained. Dose reduction techniques were used. CONTRAST: 100 mL Isovue-370 COMPARISON: 1/19/2022, 1/14/2022 FINDINGS: LUNGS AND PLEURA: There is an irregular nodule in the left upper lobe near the apex measuring 13 x 9 mm (4-27), previously 11 x 6 mm. A few additional scattered subcentimeter pulmonary nodules are unchanged. One example includes a small area of clustered nodularity in the anterolateral left lower lobe with the dominant nodule measuring 9 x 4 mm (4-129), previously 10 x 5 mm. Another example includes a cluster of tree-in-bud-type nodularity in the right lower lobe (4-219), which is also unchanged. Minimal mucous plugging in the lateral right middle lobe (4-254) is also unchanged. No new pulmonary nodules. No airspace consolidation or pleural fluid. Central airways are patent. 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 Heart size normal. No pericardial effusion. Mild coronary artery atherosclerosis is present. No enlarged axillary, mediastinal, or hilar lymph nodes. HEPATOBILIARY: There are several small round low-density hepatic foci present, which are similar in size and number in comparison and most likely represent cysts. Small area of focal fatty infiltration is seen along the falciform ligament, which is unchanged to comparison. There is decreased attenuation of the liver parenchyma, favoring fatty infiltration. Liver contour is smooth. No signs of cholelithiasis, acute cholecystitis, or bowel duct dilation. PANCREAS: Normal. SPLEEN: Normal. ADRENAL GLANDS: Normal. KIDNEYS/BLADDER: Kidneys are normal-appearing. Urinary bladder is decompressed and otherwise normal-appearing. Mild urinary bladder wall thickening is likely secondary to underdistention. No gas in the urinary  bladder wall or lumen. BOWEL: Mild colonic diverticulosis is present without evidence of diverticulitis. Postsurgical change involving the right hemicolon/cecum is noted. Appendix is absent. No signs of bowel obstruction or inflammation. PELVIC ORGANS: The prostate gland is markedly enlarged and similar to comparison with mass effect on the base of the urinary bladder. ADDITIONAL FINDINGS: There is a similar-appearing complex multiloculated and multiseptated cystic mass in the right hemipelvis measuring 11.0 x 4.7 cm (3-259), previously 10.4 x 5.8 cm. This cystic mass is in close proximity to the resection margin of the right colon. MUSCULOSKELETAL: Postsurgical change in the right groin is noted with mild induration of the fat and likely scarring. There has been significant interval improvement in postsurgical changes in the right groin when compared to the prior CT. Previously seen subcutaneous edema and gas have resolved. Postsurgical changes of the left acetabulum are noted. There is a linear metallic device/pin in the inferior presacral/perirectal space on the left. Multilevel hypertrophic and degenerative changes of the spine are present. No acute osseous abnormality.     IMPRESSION: 1.  Similar-appearing complex cystic mass in the right hemipelvis, suspicious for residual/recurrent malignancy. 2.  Status post partial right colectomy/cecectomy, unchanged. 3.  Similar to slight interval enlargement of previously seen pulmonary nodules. No new pulmonary nodules. 4.  Prostatomegaly. 5.  Additional nonacute findings as above. JUVENTINO LYMAN MD   SYSTEM ID:  [id number]      Billing  Total time: 45 min; including review of EMR, reports, diagnostics and ordering/coordination of care    Signed by: Vanesa Jimenez NP    Oncology Rooming Note    March 23, 2022 9:41 AM   Rod Olson is a 76 year old male who presents for:    Chief Complaint   Patient presents with     Oncology Clinic Visit     Malignant neoplasm of  "appendix - Labs provider and infusion     Initial Vitals: /87 (BP Location: Right arm, Patient Position: Sitting, Cuff Size: Adult Regular)   Pulse 72   Temp 97.5  F (36.4  C) (Tympanic)   Resp 12   Wt 102.5 kg (226 lb)   SpO2 98%   BMI 31.52 kg/m   Estimated body mass index is 31.52 kg/m  as calculated from the following:    Height as of 2/11/22: 1.803 m (5' 11\").    Weight as of this encounter: 102.5 kg (226 lb). Body surface area is 2.27 meters squared.  Mild Pain (2) Comment: Data Unavailable   No LMP for male patient.  Allergies reviewed: Yes  Medications reviewed: Yes    Medications: Medication refills not needed today.  Pharmacy name entered into [facility]:    [id number] - [place], [place] - [address] [facility] #[id number] - [place], [place] - [address] PHARMACY [place], [place] - [address] [place] [address]  [facility] - [place], [place] - [address]    Clinical concerns:  None      Shazia Parker CMA                Again, thank you for allowing me to participate in the care of your patient.        Sincerely,        Vanesa Jimenez NP    "

## 2022-03-23 NOTE — PROGRESS NOTES
[facility]ology [facility]cology Outpatient Progress Note ([place])    Patient: Rod Olson  MRN: [id number]  Date of Service: 03/23/2022        Reason for Visit    1. 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 Bal ([facility])    Assessment/Plan  1.   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   Pulmonary nodules, indeterminate  Baseline CT scan reviewed with pt/daughter: stable complex cystic mass R hemipelvis. Pulmonary nodules stable/slightly larger and indeterminate if mets.     Starting CapeOx today. Anticipating capecitabine delivery this Friday 3/25.  Reviewed regimen scheduling and potential toxicities. Reviewed palliative intent of treatment.   He will  antiemetics today to have on hand. Also recommended imodium as needed for chemo-induced diarrhea. Urea-based lotion for hand/foot symptoms.     Port placement deferred. Consider if any difficulties with PIV sites/access or patient preference.    Call Dr. Bal's office/nurses if needed. If he needs assessment in clinic, can see me at [place] site as needed.     Provider visit with labs every 3-week cycle (already scheduled for virtual visits).    Trend symptoms and CEA. (Baseline CEA 13.4)  Suspect repeat scans after 2-3 cycles, pending clinical assessment.    3.   Scrotal swelling  4.   Elevated PSA  General Urology consult was placed, awaiting visit.     Suspect PSA will require longitudinal monitoring in context of his high-grade appendiceal cancer, but given urology symptoms Urology input requested.  ______________________________________________________________________________    History of Present Illness/ Interval History    Mr. Rod Olson  is a 76 year old here to start cycle 1 chemo for appendiceal cancer. Dr. Bal has recommended CapeOx.     Reports slight constipation - some days has 3-4 soft  stools/day and other days no BM; using Miralax as needed; anal hemorrhoids. Abd discomfort stable. Using oxycodone a few days/week with adequate relief. Eating/drinking well. No n/v.     Scrotal hydrocele/hematoma late last year around his cancer surgery. Mild persistent scrotal ache, but no outright pain; size of scrotum has decreased with time. No issues with urination presently      ECOG Performance    0-1      Oncology History/Treatment  Diagnosis/Stage:   12/2021: stage IVC ([id number]; G3) mucinous appendiceal adenoca with signet ring cell features  -presented with several month hx abd pain.   -CT: large, mucinous collections in R hemipelvis. Indeterminate lung nodules  -surgical path: high-grade appendiceal adenocarcinoma with signet ring features. R2  -Baseline CEA 13.4 (3/7/2022)    Treatment:  11/2021: cytoreductive surgery (incomplete, R2)     3/23/2022: palliative CapeOx  -cycle 1: capeceitabine 2000 mg BID days 1-14/21 days + Oxaliplatin 130 mg/m2 every 21 days      Physical Exam    GENERAL: Alert and oriented to time place and person. Seated comfortably. In no distress. Daughter present.  HEAD: Atraumatic and normocephalic. No alopecia.  EYES: LYNN, EOMI. No erythema. No icterus.  LYMPH NODES: No palpable supraclavicular, cervical, axillary or inguinal lymphadenopathy.  CHEST: clear to auscultation bilaterally. Resonant to percussion throughout bilaterally. Symmetrical breath movements bilaterally.  CVS: S1 and S2 are heard. Regular rate and rhythm. No murmur or gallop or rub heard.  ABDOMEN: Soft. Not distended. Mildly tender RLQ/groin area. No palpable hepatomegaly or splenomegaly. No other mass palpable. Bowel sounds present. Midline abd incision healed.  EXTREMITIES: Warm. No peripheral edema.  SKIN: no rash, or bruising or purpura.   NEURO: No gross deficit noted. Non-antalgic gait.      Lab Results    Recent Results (from the past 168 hour(s))   Comprehensive metabolic panel   Result Value  Ref Range    Sodium 141 133 - 144 mmol/L    Potassium 3.8 3.4 - 5.3 mmol/L    Chloride 109 94 - 109 mmol/L    Carbon Dioxide (CO2) 29 20 - 32 mmol/L    Anion Gap 3 3 - 14 mmol/L    Urea Nitrogen 14 7 - 30 mg/dL    Creatinine 0.86 0.66 - 1.25 mg/dL    Calcium 9.1 8.5 - 10.1 mg/dL    Glucose 106 (H) 70 - 99 mg/dL    Alkaline Phosphatase 59 40 - 150 U/L    AST 11 0 - 45 U/L    ALT 19 0 - 70 U/L    Protein Total 7.4 6.8 - 8.8 g/dL    Albumin 3.6 3.4 - 5.0 g/dL    Bilirubin Total 0.3 0.2 - 1.3 mg/dL    GFR Estimate 89 >60 mL/min/1.73m2   CBC with platelets and differential   Result Value Ref Range    WBC Count 6.3 4.0 - 11.0 10e3/uL    RBC Count 4.58 4.40 - 5.90 10e6/uL    Hemoglobin 12.5 (L) 13.3 - 17.7 g/dL    Hematocrit 37.8 (L) 40.0 - 53.0 %    MCV 83 78 - 100 fL    MCH 27.3 26.5 - 33.0 pg    MCHC 33.1 31.5 - 36.5 g/dL    RDW 14.7 10.0 - 15.0 %    Platelet Count 233 150 - 450 10e3/uL    % Neutrophils 60 %    % Lymphocytes 28 %    % Monocytes 9 %    % Eosinophils 2 %    % Basophils 1 %    % Immature Granulocytes 0 %    NRBCs per 100 WBC 0 <1 /100    Absolute Neutrophils 3.8 1.6 - 8.3 10e3/uL    Absolute Lymphocytes 1.7 0.8 - 5.3 10e3/uL    Absolute Monocytes 0.6 0.0 - 1.3 10e3/uL    Absolute Eosinophils 0.1 0.0 - 0.7 10e3/uL    Absolute Basophils 0.0 0.0 - 0.2 10e3/uL    Absolute Immature Granulocytes 0.0 <=0.4 10e3/uL    Absolute NRBCs 0.0 10e3/uL       Imaging    CT Chest/Abdomen/Pelvis w Contrast    Result Date: 3/22/2022  CT CHEST/ABDOMEN/PELVIS WITH CONTRAST  3/21/2022 3:41 PM CLINICAL HISTORY: Colon cancer, monitor, stage IV. Malignant neoplasm of appendix (H). TECHNIQUE: CT scan of the chest, abdomen, and pelvis was performed following injection of IV contrast. Multiplanar reformats were obtained. Dose reduction techniques were used. CONTRAST: 100 mL Isovue-370 COMPARISON: 1/19/2022, 1/14/2022 FINDINGS: LUNGS AND PLEURA: There is an irregular nodule in the left upper lobe near the apex measuring 13 x 9 mm  (4-27), previously 11 x 6 mm. A few additional scattered subcentimeter pulmonary nodules are unchanged. One example includes a small area of clustered nodularity in the anterolateral left lower lobe with the dominant nodule measuring 9 x 4 mm (4-129), previously 10 x 5 mm. Another example includes a cluster of tree-in-bud-type nodularity in the right lower lobe (4-219), which is also unchanged. Minimal mucous plugging in the lateral right middle lobe (4-254) is also unchanged. No new pulmonary nodules. No airspace consolidation or pleural fluid. Central airways are patent. 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 Heart size normal. No pericardial effusion. Mild coronary artery atherosclerosis is present. No enlarged axillary, mediastinal, or hilar lymph nodes. HEPATOBILIARY: There are several small round low-density hepatic foci present, which are similar in size and number in comparison and most likely represent cysts. Small area of focal fatty infiltration is seen along the falciform ligament, which is unchanged to comparison. There is decreased attenuation of the liver parenchyma, favoring fatty infiltration. Liver contour is smooth. No signs of cholelithiasis, acute cholecystitis, or bowel duct dilation. PANCREAS: Normal. SPLEEN: Normal. ADRENAL GLANDS: Normal. KIDNEYS/BLADDER: Kidneys are normal-appearing. Urinary bladder is decompressed and otherwise normal-appearing. Mild urinary bladder wall thickening is likely secondary to underdistention. No gas in the urinary bladder wall or lumen. BOWEL: Mild colonic diverticulosis is present without evidence of diverticulitis. Postsurgical change involving the right hemicolon/cecum is noted. Appendix is absent. No signs of bowel obstruction or inflammation. PELVIC ORGANS: The prostate gland is markedly enlarged and similar to comparison with mass  effect on the base of the urinary bladder. ADDITIONAL FINDINGS: There is a similar-appearing complex multiloculated and multiseptated cystic mass in the right hemipelvis measuring 11.0 x 4.7 cm (3-259), previously 10.4 x 5.8 cm. This cystic mass is in close proximity to the resection margin of the right colon. MUSCULOSKELETAL: Postsurgical change in the right groin is noted with mild induration of the fat and likely scarring. There has been significant interval improvement in postsurgical changes in the right groin when compared to the prior CT. Previously seen subcutaneous edema and gas have resolved. Postsurgical changes of the left acetabulum are noted. There is a linear metallic device/pin in the inferior presacral/perirectal space on the left. Multilevel hypertrophic and degenerative changes of the spine are present. No acute osseous abnormality.     IMPRESSION: 1.  Similar-appearing complex cystic mass in the right hemipelvis, suspicious for residual/recurrent malignancy. 2.  Status post partial right colectomy/cecectomy, unchanged. 3.  Similar to slight interval enlargement of previously seen pulmonary nodules. No new pulmonary nodules. 4.  Prostatomegaly. 5.  Additional nonacute findings as above. JUVENTINO LYMAN MD   SYSTEM ID:  [id number]      Billing  Total time: 45 min; including review of EMR, reports, diagnostics and ordering/coordination of care    Signed by: Vanesa Jimenez NP

## 2022-03-30 ENCOUNTER — TELEPHONE (OUTPATIENT)
Dept: ONCOLOGY | Facility: CLINIC | Age: 77
End: 2022-03-30
Payer: MEDICARE

## 2022-03-30 NOTE — TELEPHONE ENCOUNTER
Oral Chemotherapy Monitoring Program     Placed call to Rod Olson in follow up of capecitabine (Xeloda) oral chemotherapy. This was for an initial assessment.     Left a message requesting a call back. No drug names were mentioned in the voicemail. Will update when response is received.      Leigh Dey, PharmD, BCACP  Oral Chemotherapy Monitoring Program  [facility]  [phone]  March 30, 2022

## 2022-04-01 NOTE — ORAL ONC MGMT
Oral Chemotherapy Monitoring Program     Placed call to patient for initial assessment of oral chemotherapy. Left message requesting call back. No drug names were mentioned. Will update when response received.     Vanesa Lynn, PharmD, BCOP  Hematology/Oncology Clinical Pharmacist  [facility]  [facility]

## 2022-04-05 ENCOUNTER — TELEPHONE (OUTPATIENT)
Dept: ONCOLOGY | Facility: CLINIC | Age: 77
End: 2022-04-05
Payer: MEDICARE

## 2022-04-05 NOTE — TELEPHONE ENCOUNTER
Subjective/Objective:  Rod Olson is a 76 year old male. His daughter was contacted by phone for a follow-up visit for oral chemotherapy. Daughter confirms pt is taking capecitabine (Xeloda) 2,000mg twice daily. Pt reports 1 missed dose as there was an event he wanted to go to and did not want to feel bad, other than the one day he has been adherent to the medications. Daughter reports starting cycle on 3/25/22. Nausea hasn't been too bad. Some diarrhea, doesn't leave much from the house as he is worried about not making it to the bathroom. Daughter states some issues with hands and feet. Daughter has been telling pt to give us a call to discuss these issues, but he has not done so. She provided me with his phone number and request for me to call him. Pt number - [phone]. Pt called and discussed the above information which pt did confirm. He states in regards to diarrhea, he is either constipated or has diarrhea. Watching his diet, eating a lot more veggies than he ever has. States he doesn't like going anywhere due to issues with making it to the bathroom. He does have loperamide on home, but admits he has only taking it once and it did seem to be effective; however, he prefers to take pepto-bismol and seems to be fairly effective. He also reports some issues with Headaches which is contributes to watching TV and having eye strain. I spoke to his daughter who confirmed he will have an eye appt in May that is already scheduled with some potential discussion about cataract. Discussed OTC medications for headaches, but he states nothing works. He has had some nausea and took zofran a few times which helped, and reports he has not needed to take the compazine.  He briefly mentioned his teeth are not doing well and has not seem a dentist in a long time. Some abdominal pain which is ongoing, taking oxycodone rarely, worried about running out of it and cant sleep at night if he doesn't have it. He states he has  about 15 tabs left which might last him a week. He is requesting for a refill. Of note, he is also on oxaliplatin and reports the initial infusion was rough. He stated his injection site hurt and was fairly painful for a while, but is starting to improve and feels better now. He is still having issues with cold sensitivity which is getting better which the warming of the weather. OK to touch cold things, but brushing teeth he needed so make sure hes using warm water. Pt denies any mouth sores or irritations. No other questions or concerns at this time. Daughter noted issues with upcoming schedule as she is unable to make Wednesdays work and is off on Mon and Fridays. Daughter has not yet heard back from scheduling team about upcoming appt scheduled with Deysi Reese PA-C on wed the 13th     ORAL CHEMOTHERAPY 3/8/2022 3/21/2022 3/30/2022 4/1/2022 4/5/2022   Assessment Type Initial Work up New Teach Left Voicemail Left Voicemail -   Diagnosis Code Appendix Cancer Appendix Cancer Appendix Cancer Appendix Cancer Appendix Cancer   Providers Dr. Valeriano Bal   [facility]   Drug Name Xeloda (capecitabine) Xeloda (capecitabine) Xeloda (capecitabine) Xeloda (capecitabine) Xeloda (capecitabine)   Dose 2,000 mg 2,000 mg 2,000 mg 2,000 mg 2,000 mg   Current Schedule BID BID BID BID BID   Cycle Details 2 weeks on, 1 week off 2 weeks on, 1 week off 2 weeks on, 1 week off 2 weeks on, 1 week off 2 weeks on, 1 week off   Start Date of Last Cycle - - - - 3/25/2022   Planned next cycle start date - 3/23/2022 - - -   Doses missed in last 2 weeks - - - - 1   Adherence Assessment - - - - Adherent   Adverse Effects - - - - Nausea;Diarrhea;Headache   Nausea - - - - Grade 1   Pharmacist Intervention(nausea) - - - - Yes   Intervention(s) - - - - Patient education   Diarrhea - - - - Grade 1   Pharmacist Intervention(diarrhea) - - - - Yes  "  Intervention(s) - - - - Patient education;OTC recommendation   Headache - - - - Grade 1   Pharmacist Intervention(headache) - - - - Yes   Intervention(s) - - - - Referral to PCP   Any new drug interactions? Yes - - - -   Pharmacist Intervention? Yes - - - -   Intervention(s) Patient Education - - - -   Is the dose as ordered appropriate for the patient? Yes - - - -       Vitals:  BP:   BP Readings from Last 1 Encounters:   03/23/22 (!) 143/88     Wt Readings from Last 1 Encounters:   03/23/22 102.5 kg (226 lb)     Estimated body surface area is 2.27 meters squared as calculated from the following:    Height as of 2/11/22: 1.803 m (5' 11\").    Weight as of 3/23/22: 102.5 kg (226 lb).    Labs:  _  Result Component Current Result Ref Range   Sodium 141 (3/23/2022) 133 - 144 mmol/L     _  Result Component Current Result Ref Range   Potassium 3.8 (3/23/2022) 3.4 - 5.3 mmol/L     _  Result Component Current Result Ref Range   Calcium 9.1 (3/23/2022) 8.5 - 10.1 mg/dL     No results found for Mag within last 30 days.     No results found for Phos within last 30 days.     _  Result Component Current Result Ref Range   Albumin 3.6 (3/23/2022) 3.4 - 5.0 g/dL     _  Result Component Current Result Ref Range   Urea Nitrogen 14 (3/23/2022) 7 - 30 mg/dL     _  Result Component Current Result Ref Range   Creatinine 0.86 (3/23/2022) 0.66 - 1.25 mg/dL       _  Result Component Current Result Ref Range   AST 11 (3/23/2022) 0 - 45 U/L     _  Result Component Current Result Ref Range   ALT 19 (3/23/2022) 0 - 70 U/L     _  Result Component Current Result Ref Range   Bilirubin Total 0.3 (3/23/2022) 0.2 - 1.3 mg/dL       _  Result Component Current Result Ref Range   WBC Count 6.3 (3/23/2022) 4.0 - 11.0 10e3/uL     _  Result Component Current Result Ref Range   Hemoglobin 12.5 (L) (3/23/2022) 13.3 - 17.7 g/dL     _  Result Component Current Result Ref Range   Platelet Count 233 (3/23/2022) 150 - 450 10e3/uL     No results found for ANC " within last 30 days.       Assessment/Plan:  Pt is currently tolerating therapy well. Plan to continue Xeloda 2,000mg twice daily for 2 weeks on 1 week off as prescribed. He is scheduled to end his 2nd week on this Friday. Will send IB to Dr. Bal with request to refill his oxycodone. Pt experiencing diarrhea. Discussed use of loperamide and staying well hydrated. Discussed to continue using zofran as needed for nausea and can add on compazine 30 mins later if zofran does not work. We discussed keeping hands and feet moisturized to help prevent any signs/symptoms of HFS, although the cold sensitivity is likely from oxaliplatin and discussed this as well. Pt to follow-up with ophthalmology in May as this may likely be contributing this his headaches and discussed with daughter to work on getting a dental appt scheduled. Pt verbalized understanding and agrees to plan. Encouraged pt to call with any issues or concerns.    Follow-Up:  4/13 - Deysi Reese PA-C appt however pt unable to make. Will send IB to scheduling team to follow-up with pt and daughter .     Leigh Dey, PharmD, BCACP  Oral Chemotherapy Monitoring Program  [facility]  [phone]  April 5, 2022

## 2022-04-07 ENCOUNTER — PRE VISIT (OUTPATIENT)
Dept: UROLOGY | Facility: CLINIC | Age: 77
End: 2022-04-07
Payer: MEDICARE

## 2022-04-11 ENCOUNTER — VIRTUAL VISIT (OUTPATIENT)
Dept: ONCOLOGY | Facility: CLINIC | Age: 77
End: 2022-04-11
Attending: PHYSICIAN ASSISTANT
Payer: MEDICARE

## 2022-04-11 DIAGNOSIS — C18.1 MALIGNANT NEOPLASM OF APPENDIX (H): Primary | ICD-10-CM

## 2022-04-11 PROCEDURE — 99215 OFFICE O/P EST HI 40 MIN: CPT | Mod: 95 | Performed by: PHYSICIAN ASSISTANT

## 2022-04-11 RX ORDER — ALBUTEROL SULFATE 90 UG/1
1-2 AEROSOL, METERED RESPIRATORY (INHALATION)
Status: CANCELLED
Start: 2022-04-13

## 2022-04-11 RX ORDER — ONDANSETRON 2 MG/ML
8 INJECTION INTRAMUSCULAR; INTRAVENOUS ONCE
Status: CANCELLED | OUTPATIENT
Start: 2022-04-13

## 2022-04-11 RX ORDER — ALBUTEROL SULFATE 0.83 MG/ML
2.5 SOLUTION RESPIRATORY (INHALATION)
Status: CANCELLED | OUTPATIENT
Start: 2022-04-13

## 2022-04-11 RX ORDER — EPINEPHRINE 1 MG/ML
0.3 INJECTION, SOLUTION INTRAMUSCULAR; SUBCUTANEOUS EVERY 5 MIN PRN
Status: CANCELLED | OUTPATIENT
Start: 2022-04-13

## 2022-04-11 RX ORDER — METHYLPREDNISOLONE SODIUM SUCCINATE 125 MG/2ML
125 INJECTION, POWDER, LYOPHILIZED, FOR SOLUTION INTRAMUSCULAR; INTRAVENOUS
Status: CANCELLED
Start: 2022-04-13

## 2022-04-11 RX ORDER — OXYCODONE HYDROCHLORIDE 5 MG/1
5 TABLET ORAL EVERY 6 HOURS PRN
Qty: 30 TABLET | Refills: 0 | Status: SHIPPED | OUTPATIENT
Start: 2022-04-11 | End: 2022-06-10

## 2022-04-11 RX ORDER — DIPHENHYDRAMINE HYDROCHLORIDE 50 MG/ML
50 INJECTION INTRAMUSCULAR; INTRAVENOUS
Status: CANCELLED
Start: 2022-04-13

## 2022-04-11 RX ORDER — LORAZEPAM 2 MG/ML
0.5 INJECTION INTRAMUSCULAR EVERY 4 HOURS PRN
Status: CANCELLED | OUTPATIENT
Start: 2022-04-13

## 2022-04-11 RX ORDER — NALOXONE HYDROCHLORIDE 0.4 MG/ML
0.2 INJECTION, SOLUTION INTRAMUSCULAR; INTRAVENOUS; SUBCUTANEOUS
Status: CANCELLED | OUTPATIENT
Start: 2022-04-13

## 2022-04-11 RX ORDER — MEPERIDINE HYDROCHLORIDE 25 MG/ML
25 INJECTION INTRAMUSCULAR; INTRAVENOUS; SUBCUTANEOUS EVERY 30 MIN PRN
Status: CANCELLED | OUTPATIENT
Start: 2022-04-13

## 2022-04-11 NOTE — PROGRESS NOTES
Manas is a 77 year old who is being evaluated via a billable video visit.      How would you like to obtain your AVS? [employer]  If the video visit is dropped, the invitation should be resent by: Text to cell phone: [phone]  Will anyone else be joining your video visit? Cande Tam    Video-Visit Details    Type of service:  Video Visit    Video Start Time: 2:15 PM    Video End Time: 2:36 PM    Originating Location (pt. Location): Home    Distant Location (provider location):  provider's home    Platform used for Video Visit: [facility] - Medical Oncology Follow-Up Consult Note  2022    Patient Identifiers     Name: Rod Olson  Preferred Address: [place]  Preferred Language: English  : 1945  Gender: male    Oncology Summary     Cancer Staging  Malignant neoplasm of appendix (H)  Staging form: Appendix Carcinoma, AJCC 8th Edition  - Clinical stage from 2021: Stage IVC (cT4b, cN1c, pM1c, G3) - Signed by Larry Bal MD on 2022    Current treatment(s):  - CapeOx  Treatment intent:  - palliative    Oncology History Overview Note   Pt in his USOH until 2021 when he presented to care at the [facility] of family with eight months of abdominal pain. CT showed large, mucinous collections in R hemipelvis, prompting referral to surgery. Pt thought to have likely a low-grade mucinous neoplasm and underwent cytoreductive surgery, with incomplete (R2) resection. 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    On establishment with Med Onc, pt recommended to initiate FOLFOX therapy.      Malignant neoplasm of appendix (H)   2021 Initial Diagnosis    Malignant neoplasm of appendix (H)     2021 -  Cancer Staged    Staging form: Appendix Carcinoma, AJCC 8th Edition  - Clinical stage from 2021: Stage IVC (cT4b, cN1c, pM1c, G3)     3/23/2022 -   Chemotherapy    OP ONC Colorectal Cancer - Oxaliplatin / Capecitabine (XELOX, CAPOX) - EVERY 3 WEEKS  Plan Provider: Larry Bal MD  Treatment goal: Palliative  Line of treatment: First Line         Subjective/Interval Events   -Had pain at the IV site from chemotherapy, now improved. Denies any associated swelling. Tried to keep area warm.   -Has ongoing cold sensitivity.   -Denies any neuropathy.   -Denies any skin changes on hands or feet. Feet are chronically dry. Uses O'cornelio's working hands on feet.   -Stools have been normal or diarrhea. Had diarrhea 3-4 times per day on and off. Took Peptobismal with relief. Occasionally, takes Imodium.  -Has intermittent abdominal pain and gas. Just took Gas-x today.   -Scrotal pain is improving. Has numbness in right testicle.   -Takes 5 mg oxycodone about every other day.   -Has intermittent headaches every other day with blurred vision. Has cataracts. Has follow-up in May.    -Had nausea for 1 day after chemo without vomiting.   -Denies any mouth sores. Has chronically bad teeth. Plans to see a dentist.   -Notes slow healing from a bruise.  -BP was 180/115 recently, otherwise normal.     Physical Exam     Objective:  General: patient appears well in no acute distress, alert and oriented, speech clear and fluid  Skin: no visualized rash or lesions on visualized skin  Resp: Appears to be breathing comfortably without accessory muscle usage, speaking in full sentences, no audible wheezes or cough.  Psych: Coherent speech, normal rate and volume, able to articulate logical thoughts, able to abstract reason, no tangential thoughts, no hallucinations or delusions  Patient's affect is appropriate.    Objective Data     Lab data:  Most Recent 3 CBC's:Recent Labs   Lab Test 03/23/22  0931 03/07/22  1349 01/27/22  0938 01/24/22  1606   WBC 6.3 7.8  --  9.2   HGB 12.5* 12.7* 11.2* 11.3*   MCV 83 82  --  86    256 371 340     Most Recent 3 BMP's:  Recent Labs   Lab Test  03/23/22  0931 03/07/22  1349 01/28/22  0659 01/27/22  0938 01/27/22  0854 01/24/22  1606    139  --   --   --  135   POTASSIUM 3.8 4.0  --  4.7  --  4.1   CHLORIDE 109 103  --   --   --  101   CO2 29 27  --   --   --  28   BUN 14 12  --   --   --  12   CR 0.86 0.88  --  0.93  --  0.81   ANIONGAP 3 9  --   --   --  6   NAMAN 9.1 9.0  --   --   --  9.6   * 107* 103*  --    < > 103*    < > = values in this interval not displayed.    Most Recent 2 LFT's:  Recent Labs   Lab Test 03/23/22 0931 03/07/22  1349   AST 11 15   ALT 19 20   ALKPHOS 59 60   BILITOTAL 0.3 0.4   I reviewed the above labs today.    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 Patient tolerating the first cycle of Xeloda and oxaliplatin reasonably well with mild nausea, diarrhea, and cold sensitivity. He will continue with cycle 2 later this week. I will see him back prior to cycle 3. He will call sooner for concerns.     Nausea. He receives IV Emend/Zofran/dex with chemotherapy. He will continue to use his po antiemetics prn.     Hypertension. On lisinopril 40 mg daily. Will recheck BP when he comes in for infusion. He will be establishing care with a new PCP next week.     Abdominal and scrotal pain. Likely secondary to cystic mass in right hemipelvis. Managed with oxycodone 5 mg about every other day. Refilled today.     Diarrhea. Secondary to Xeloda. Okay to continue to use Peptobismal and/or Imodium.    Dry skin. Recommend applying lotion to feet nightly, following by wearing socks.     IV access. Patient had pain for several days at prior IV site. Discussed the potential for having a port placed. He would like to hold off for now.     Poor dentition. Discussed okay to see a dentist for routine care, but should call us if he needs dental work completed.    Blurred vision. Will plan to see an eye doctor in follow-up of his cataracts. This may be contributing to headaches.      Elevated PSA. Will establish care with urology in mid-May.     Anemia. Was present prior to chemotherapy. MCV has trended down. Will check iron studies with next lab draw.     Loulou Rojas PA-C  [facility]  [address] [place] [id number] [phone] 55 minutes spent on the date of the encounter doing chart review, review of test results, interpretation of tests, patient visit and documentation

## 2022-04-11 NOTE — LETTER
2022         RE: Rod Olson  [address]  [facility] [address]        Dear Colleague,    Thank you for referring your patient, Rod Olson, to the [facility]. Please see a copy of my visit note below.    -    Manas is a 77 year old who is being evaluated via a billable video visit.      How would you like to obtain your AVS? MyChart  If the video visit is dropped, the invitation should be resent by: Text to cell phone: [phone]  Will anyone else be joining your video visit? Cande    Roberta Tam    Video-Visit Details    Type of service:  Video Visit    Video Start Time: 2:15 PM    Video End Time: 2:36 PM    Originating Location (pt. Location): Home    Distant Location (provider location):  provider's home    Platform used for Video Visit: [facility] - Medical Oncology Follow-Up Consult Note  2022    Patient Identifiers     Name: Rod Olson  Preferred Address: Rod  Preferred Language: English  : 1945  Gender: male    Oncology Summary     Cancer Staging  Malignant neoplasm of appendix (H)  Staging form: Appendix Carcinoma, AJCC 8th Edition  - Clinical stage from 2021: Stage IVC (cT4b, cN1c, pM1c, G3) - Signed by Larry Bal MD on 2022    Current treatment(s):  - CapeOx  Treatment intent:  - palliative    Oncology History Overview Note   Pt in his [facility] until [facility] months of abdominal pain. CT showed large, mucinous collections in R hemipelvis, prompting referral to surgery. Pt thought to have likely a low-grade mucinous neoplasm and underwent cytoreductive surgery, with incomplete (R2) resection. 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    On establishment with Med Onc, pt recommended to initiate FOLFOX therapy.       Malignant neoplasm of appendix (H)   12/7/2021 Initial Diagnosis    Malignant neoplasm of appendix (H)     12/7/2021 -  Cancer Staged    Staging form: Appendix Carcinoma, AJCC 8th Edition  - Clinical stage from 12/7/2021: Stage IVC (cT4b, cN1c, pM1c, G3)     3/23/2022 -  Chemotherapy    OP ONC Colorectal Cancer - Oxaliplatin / Capecitabine (XELOX, CAPOX) - EVERY 3 WEEKS  Plan Provider: Larry Bal MD  Treatment goal: Palliative  Line of treatment: First Line         Subjective/Interval Events   -Had pain at the IV site from chemotherapy, now improved. Denies any associated swelling. Tried to keep area warm.   -Has ongoing cold sensitivity.   -Denies any neuropathy.   -Denies any skin changes on hands or feet. Feet are chronically dry. Uses O'cornelio's working hands on feet.   -Stools have been normal or diarrhea. Had diarrhea 3-4 times per day on and off. Took Peptobismal with relief. Occasionally, takes Imodium.  -Has intermittent abdominal pain and gas. Just took Gas-x today.   -Scrotal pain is improving. Has numbness in right testicle.   -Takes 5 mg oxycodone about every other day.   -Has intermittent headaches every other day with blurred vision. Has cataracts. Has follow-up in May.    -Had nausea for 1 day after chemo without vomiting.   -Denies any mouth sores. Has chronically bad teeth. Plans to see a dentist.   -Notes slow healing from a bruise.  -BP was 180/115 recently, otherwise normal.     Physical Exam     Objective:  General: patient appears well in no acute distress, alert and oriented, speech clear and fluid  Skin: no visualized rash or lesions on visualized skin  Resp: Appears to be breathing comfortably without accessory muscle usage, speaking in full sentences, no audible wheezes or cough.  Psych: Coherent speech, normal rate and volume, able to articulate logical thoughts, able to abstract reason, no tangential thoughts, no hallucinations or delusions  Patient's affect is  appropriate.    Objective Data     Lab data:  Most Recent 3 CBC's:Recent Labs   Lab Test 03/23/22  0931 03/07/22  1349 01/27/22  0938 01/24/22  1606   WBC 6.3 7.8  --  9.2   HGB 12.5* 12.7* 11.2* 11.3*   MCV 83 82  --  86    256 371 340     Most Recent 3 BMP's:  Recent Labs   Lab Test 03/23/22  0931 03/07/22  1349 01/28/22  0659 01/27/22  0938 01/27/22  0854 01/24/22  1606    139  --   --   --  135   POTASSIUM 3.8 4.0  --  4.7  --  4.1   CHLORIDE 109 103  --   --   --  101   CO2 29 27  --   --   --  28   BUN 14 12  --   --   --  12   CR 0.86 0.88  --  0.93  --  0.81   ANIONGAP 3 9  --   --   --  6   NAMAN 9.1 9.0  --   --   --  9.6   * 107* 103*  --    < > 103*    < > = values in this interval not displayed.    Most Recent 2 LFT's:  Recent Labs   Lab Test 03/23/22  0931 03/07/22  1349   AST 11 15   ALT 19 20   ALKPHOS 59 60   BILITOTAL 0.3 0.4   I reviewed the above labs today.    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 Patient tolerating the first cycle of Xeloda and oxaliplatin reasonably well with mild nausea, diarrhea, and cold sensitivity. He will continue with cycle 2 later this week. I will see him back prior to cycle 3. He will call sooner for concerns.     Nausea. He receives IV Emend/Zofran/dex with chemotherapy. He will continue to use his po antiemetics prn.     Hypertension. On lisinopril 40 mg daily. Will recheck BP when he comes in for infusion. He will be establishing care with a new PCP next week.     Abdominal and scrotal pain. Likely secondary to cystic mass in right hemipelvis. Managed with oxycodone 5 mg about every other day. Refilled today.     Diarrhea. Secondary to Xeloda. Okay to continue to use Peptobismal and/or Imodium.    Dry skin. Recommend applying lotion to feet nightly, following by wearing socks.     IV access. Patient had pain for several days at prior IV site. Discussed the potential for having a port  placed. He would like to hold off for now.     Poor dentition. Discussed okay to see a dentist for routine care, but should call us if he needs dental work completed.    Blurred vision. Will plan to see an eye doctor in follow-up of his cataracts. This may be contributing to headaches.     Elevated PSA. Will establish care with urology in mid-May.     Anemia. Was present prior to chemotherapy. MCV has trended down. Will check iron studies with next lab draw.     55 minutes spent on the date of the encounter doing chart review, review of test results, interpretation of tests, patient visit and documentation         Again, thank you for allowing me to participate in the care of your patient.      Sincerely,    Loulou Rojas PA-C

## 2022-04-12 ENCOUNTER — TELEPHONE (OUTPATIENT)
Dept: ONCOLOGY | Facility: CLINIC | Age: 77
End: 2022-04-12
Payer: MEDICARE

## 2022-04-12 NOTE — TELEPHONE ENCOUNTER
Writer reached out to patient/patients daughter to let them know how to order the next shipment of Xeloda. Writer provided [facility]'s phone number and let them know they need to reach out to the pharmacy 10 days before the start of the next cycle to schedule shipment and not rely on the pharmacy contacting them first. The stated that they understood and would be reaching out to set up the delivery. No further questions.    Harriet Vazquez CPhT  [facility]  Oncology Pharmacy Liaison  [email or web address]  Phone: [phone]  Fax: [phone]

## 2022-04-13 ENCOUNTER — MYC MEDICAL ADVICE (OUTPATIENT)
Dept: ONCOLOGY | Facility: CLINIC | Age: 77
End: 2022-04-13
Payer: MEDICARE

## 2022-04-13 NOTE — ORAL ONC MGMT
Oral Chemotherapy Monitoring Program     Placed call to patient's daughter Eowyn in follow up of oral chemotherapy.  Received MyChart message from her & pt stating frustration with getting delivery of Xeloda set-up; [facility] had a long hold time and nobody was returning their voicemail messages.  Confirmed address for delivery: [id number] [place] [address].  Called [facility] and sat on hold for a considerable amount of time until the call routed to a voicemail message.      Harriet Vazquez (finance liaison) was able to get in touch with an actual person at [facility].  They would not let her set up delivery but said they would call Eowyn right away.  They also said there is nothing open for delivery this week.  Harriet told representative to check with their manager because patient needs it delivered to home by Friday 4/15.      Livier Villatoro, PharmD  Oral Chemotherapy Monitoring Program  [facility]  [phone]

## 2022-04-15 ENCOUNTER — NURSE TRIAGE (OUTPATIENT)
Dept: NURSING | Facility: CLINIC | Age: 77
End: 2022-04-15

## 2022-04-15 ENCOUNTER — APPOINTMENT (OUTPATIENT)
Dept: CT IMAGING | Facility: CLINIC | Age: 77
End: 2022-04-15
Attending: EMERGENCY MEDICINE
Payer: MEDICARE

## 2022-04-15 ENCOUNTER — HOSPITAL ENCOUNTER (EMERGENCY)
Facility: CLINIC | Age: 77
Discharge: HOME | End: 2022-04-16
Attending: EMERGENCY MEDICINE | Admitting: EMERGENCY MEDICINE
Payer: MEDICARE

## 2022-04-15 ENCOUNTER — INFUSION THERAPY VISIT (OUTPATIENT)
Dept: INFUSION THERAPY | Facility: CLINIC | Age: 77
End: 2022-04-15
Attending: STUDENT IN AN ORGANIZED HEALTH CARE EDUCATION/TRAINING PROGRAM
Payer: MEDICARE

## 2022-04-15 ENCOUNTER — APPOINTMENT (OUTPATIENT)
Dept: LAB | Facility: CLINIC | Age: 77
End: 2022-04-15
Payer: MEDICARE

## 2022-04-15 VITALS
DIASTOLIC BLOOD PRESSURE: 80 MMHG | TEMPERATURE: 98 F | BODY MASS INDEX: 30.35 KG/M2 | WEIGHT: 217.6 LBS | SYSTOLIC BLOOD PRESSURE: 120 MMHG | HEART RATE: 72 BPM

## 2022-04-15 DIAGNOSIS — I10 HYPERTENSION, UNSPECIFIED TYPE: ICD-10-CM

## 2022-04-15 DIAGNOSIS — C18.1 MALIGNANT NEOPLASM OF APPENDIX (H): Primary | ICD-10-CM

## 2022-04-15 DIAGNOSIS — C18.1 MALIGNANT NEOPLASM OF APPENDIX (H): ICD-10-CM

## 2022-04-15 DIAGNOSIS — R25.2 LEG CRAMPING: ICD-10-CM

## 2022-04-15 DIAGNOSIS — R42 DIZZINESS: ICD-10-CM

## 2022-04-15 LAB
ALBUMIN SERPL-MCNC: 3.5 G/DL (ref 3.4–5)
ALBUMIN SERPL-MCNC: 3.7 G/DL (ref 3.4–5)
ALP SERPL-CCNC: 70 U/L (ref 40–150)
ALP SERPL-CCNC: 71 U/L (ref 40–150)
ALT SERPL W P-5'-P-CCNC: 29 U/L (ref 0–70)
ALT SERPL W P-5'-P-CCNC: 32 U/L (ref 0–70)
ANION GAP SERPL CALCULATED.3IONS-SCNC: 4 MMOL/L (ref 3–14)
ANION GAP SERPL CALCULATED.3IONS-SCNC: 8 MMOL/L (ref 3–14)
AST SERPL W P-5'-P-CCNC: 23 U/L (ref 0–45)
AST SERPL W P-5'-P-CCNC: 24 U/L (ref 0–45)
BASOPHILS # BLD AUTO: 0 10E3/UL (ref 0–0.2)
BASOPHILS # BLD MANUAL: 0 10E3/UL (ref 0–0.2)
BASOPHILS NFR BLD AUTO: 0 %
BASOPHILS NFR BLD MANUAL: 0 %
BILIRUB SERPL-MCNC: 0.6 MG/DL (ref 0.2–1.3)
BILIRUB SERPL-MCNC: 0.6 MG/DL (ref 0.2–1.3)
BUN SERPL-MCNC: 16 MG/DL (ref 7–30)
BUN SERPL-MCNC: 17 MG/DL (ref 7–30)
CALCIUM SERPL-MCNC: 8.8 MG/DL (ref 8.5–10.1)
CALCIUM SERPL-MCNC: 9 MG/DL (ref 8.5–10.1)
CEA SERPL-MCNC: 7.1 UG/L (ref 0–2.5)
CHLORIDE BLD-SCNC: 104 MMOL/L (ref 94–109)
CHLORIDE BLD-SCNC: 104 MMOL/L (ref 94–109)
CO2 SERPL-SCNC: 25 MMOL/L (ref 20–32)
CO2 SERPL-SCNC: 30 MMOL/L (ref 20–32)
CREAT SERPL-MCNC: 0.78 MG/DL (ref 0.66–1.25)
CREAT SERPL-MCNC: 0.84 MG/DL (ref 0.66–1.25)
EOSINOPHIL # BLD AUTO: 0.1 10E3/UL (ref 0–0.7)
EOSINOPHIL # BLD MANUAL: 0 10E3/UL (ref 0–0.7)
EOSINOPHIL NFR BLD AUTO: 1 %
EOSINOPHIL NFR BLD MANUAL: 0 %
ERYTHROCYTE [DISTWIDTH] IN BLOOD BY AUTOMATED COUNT: 18 % (ref 10–15)
ERYTHROCYTE [DISTWIDTH] IN BLOOD BY AUTOMATED COUNT: 18.5 % (ref 10–15)
FERRITIN SERPL-MCNC: 235 NG/ML (ref 26–388)
GFR SERPL CREATININE-BSD FRML MDRD: 89 ML/MIN/1.73M2
GFR SERPL CREATININE-BSD FRML MDRD: >90 ML/MIN/1.73M2
GLUCOSE BLD-MCNC: 103 MG/DL (ref 70–99)
GLUCOSE BLD-MCNC: 147 MG/DL (ref 70–99)
HCT VFR BLD AUTO: 37 % (ref 40–53)
HCT VFR BLD AUTO: 39.4 % (ref 40–53)
HGB BLD-MCNC: 12.5 G/DL (ref 13.3–17.7)
HGB BLD-MCNC: 12.9 G/DL (ref 13.3–17.7)
IMM GRANULOCYTES # BLD: 0 10E3/UL
IMM GRANULOCYTES NFR BLD: 0 %
IRON SATN MFR SERPL: 14 % (ref 15–46)
IRON SERPL-MCNC: 59 UG/DL (ref 35–180)
LYMPHOCYTES # BLD AUTO: 1.6 10E3/UL (ref 0.8–5.3)
LYMPHOCYTES # BLD MANUAL: 0.9 10E3/UL (ref 0.8–5.3)
LYMPHOCYTES NFR BLD AUTO: 27 %
LYMPHOCYTES NFR BLD MANUAL: 19 %
MCH RBC QN AUTO: 27.6 PG (ref 26.5–33)
MCH RBC QN AUTO: 27.7 PG (ref 26.5–33)
MCHC RBC AUTO-ENTMCNC: 32.7 G/DL (ref 31.5–36.5)
MCHC RBC AUTO-ENTMCNC: 33.8 G/DL (ref 31.5–36.5)
MCV RBC AUTO: 82 FL (ref 78–100)
MCV RBC AUTO: 84 FL (ref 78–100)
MONOCYTES # BLD AUTO: 0.8 10E3/UL (ref 0–1.3)
MONOCYTES # BLD MANUAL: 0 10E3/UL (ref 0–1.3)
MONOCYTES NFR BLD AUTO: 14 %
MONOCYTES NFR BLD MANUAL: 0 %
NEUTROPHILS # BLD AUTO: 3.5 10E3/UL (ref 1.6–8.3)
NEUTROPHILS # BLD MANUAL: 3.7 10E3/UL (ref 1.6–8.3)
NEUTROPHILS NFR BLD AUTO: 58 %
NEUTROPHILS NFR BLD MANUAL: 81 %
NRBC # BLD AUTO: 0 10E3/UL
NRBC BLD AUTO-RTO: 0 /100
NT-PROBNP SERPL-MCNC: 57 PG/ML (ref 0–1800)
PLAT MORPH BLD: ABNORMAL
PLATELET # BLD AUTO: 192 10E3/UL (ref 150–450)
PLATELET # BLD AUTO: 200 10E3/UL (ref 150–450)
POTASSIUM BLD-SCNC: 3.8 MMOL/L (ref 3.4–5.3)
POTASSIUM BLD-SCNC: 4 MMOL/L (ref 3.4–5.3)
PROT SERPL-MCNC: 7.2 G/DL (ref 6.8–8.8)
PROT SERPL-MCNC: 7.5 G/DL (ref 6.8–8.8)
RBC # BLD AUTO: 4.52 10E6/UL (ref 4.4–5.9)
RBC # BLD AUTO: 4.68 10E6/UL (ref 4.4–5.9)
RBC MORPH BLD: ABNORMAL
SODIUM SERPL-SCNC: 137 MMOL/L (ref 133–144)
SODIUM SERPL-SCNC: 138 MMOL/L (ref 133–144)
TIBC SERPL-MCNC: 413 UG/DL (ref 240–430)
TROPONIN I SERPL HS-MCNC: 6 NG/L
VARIANT LYMPHS BLD QL SMEAR: PRESENT
WBC # BLD AUTO: 4.6 10E3/UL (ref 4–11)
WBC # BLD AUTO: 6.1 10E3/UL (ref 4–11)

## 2022-04-15 PROCEDURE — 96361 HYDRATE IV INFUSION ADD-ON: CPT

## 2022-04-15 PROCEDURE — 96367 TX/PROPH/DG ADDL SEQ IV INF: CPT

## 2022-04-15 PROCEDURE — 96360 HYDRATION IV INFUSION INIT: CPT | Mod: 59

## 2022-04-15 PROCEDURE — 99284 EMERGENCY DEPT VISIT MOD MDM: CPT | Mod: 25 | Performed by: EMERGENCY MEDICINE

## 2022-04-15 PROCEDURE — 96415 CHEMO IV INFUSION ADDL HR: CPT

## 2022-04-15 PROCEDURE — 83550 IRON BINDING TEST: CPT | Performed by: PHYSICIAN ASSISTANT

## 2022-04-15 PROCEDURE — 85027 COMPLETE CBC AUTOMATED: CPT | Performed by: EMERGENCY MEDICINE

## 2022-04-15 PROCEDURE — 250N000009 HC RX 250: Performed by: EMERGENCY MEDICINE

## 2022-04-15 PROCEDURE — 250N000011 HC RX IP 250 OP 636: Performed by: EMERGENCY MEDICINE

## 2022-04-15 PROCEDURE — 82378 CARCINOEMBRYONIC ANTIGEN: CPT | Performed by: PHYSICIAN ASSISTANT

## 2022-04-15 PROCEDURE — 80053 COMPREHEN METABOLIC PANEL: CPT | Performed by: PHYSICIAN ASSISTANT

## 2022-04-15 PROCEDURE — 74177 CT ABD & PELVIS W/CONTRAST: CPT | Mod: MG

## 2022-04-15 PROCEDURE — 258N000003 HC RX IP 258 OP 636: Performed by: PHYSICIAN ASSISTANT

## 2022-04-15 PROCEDURE — 93010 ELECTROCARDIOGRAM REPORT: CPT | Performed by: EMERGENCY MEDICINE

## 2022-04-15 PROCEDURE — 82728 ASSAY OF FERRITIN: CPT | Performed by: PHYSICIAN ASSISTANT

## 2022-04-15 PROCEDURE — 96375 TX/PRO/DX INJ NEW DRUG ADDON: CPT

## 2022-04-15 PROCEDURE — 93005 ELECTROCARDIOGRAM TRACING: CPT

## 2022-04-15 PROCEDURE — 83880 ASSAY OF NATRIURETIC PEPTIDE: CPT | Performed by: EMERGENCY MEDICINE

## 2022-04-15 PROCEDURE — 84484 ASSAY OF TROPONIN QUANT: CPT | Performed by: EMERGENCY MEDICINE

## 2022-04-15 PROCEDURE — 84155 ASSAY OF PROTEIN SERUM: CPT | Performed by: EMERGENCY MEDICINE

## 2022-04-15 PROCEDURE — 36415 COLL VENOUS BLD VENIPUNCTURE: CPT

## 2022-04-15 PROCEDURE — 85025 COMPLETE CBC W/AUTO DIFF WBC: CPT | Performed by: PHYSICIAN ASSISTANT

## 2022-04-15 PROCEDURE — 99285 EMERGENCY DEPT VISIT HI MDM: CPT | Mod: 25

## 2022-04-15 PROCEDURE — 258N000003 HC RX IP 258 OP 636: Performed by: EMERGENCY MEDICINE

## 2022-04-15 PROCEDURE — 36415 COLL VENOUS BLD VENIPUNCTURE: CPT | Performed by: EMERGENCY MEDICINE

## 2022-04-15 PROCEDURE — 250N000011 HC RX IP 250 OP 636: Performed by: PHYSICIAN ASSISTANT

## 2022-04-15 PROCEDURE — 96413 CHEMO IV INFUSION 1 HR: CPT

## 2022-04-15 PROCEDURE — G1004 CDSM NDSC: HCPCS

## 2022-04-15 RX ORDER — LOPERAMIDE HCL 2 MG
CAPSULE ORAL
Qty: 60 CAPSULE | Refills: 3 | Status: SHIPPED | OUTPATIENT
Start: 2022-04-15 | End: 2022-07-19

## 2022-04-15 RX ORDER — ONDANSETRON 2 MG/ML
8 INJECTION INTRAMUSCULAR; INTRAVENOUS ONCE
Status: COMPLETED | OUTPATIENT
Start: 2022-04-15 | End: 2022-04-15

## 2022-04-15 RX ORDER — IOPAMIDOL 755 MG/ML
88 INJECTION, SOLUTION INTRAVASCULAR ONCE
Status: COMPLETED | OUTPATIENT
Start: 2022-04-15 | End: 2022-04-15

## 2022-04-15 RX ORDER — ONDANSETRON 2 MG/ML
4 INJECTION INTRAMUSCULAR; INTRAVENOUS EVERY 30 MIN PRN
Status: DISCONTINUED | OUTPATIENT
Start: 2022-04-15 | End: 2022-04-16 | Stop reason: HOSPADM

## 2022-04-15 RX ADMIN — OXALIPLATIN 300 MG: 100 INJECTION, SOLUTION, CONCENTRATE INTRAVENOUS at 14:18

## 2022-04-15 RX ADMIN — SODIUM CHLORIDE, POTASSIUM CHLORIDE, SODIUM LACTATE AND CALCIUM CHLORIDE 1000 ML: 600; 310; 30; 20 INJECTION, SOLUTION INTRAVENOUS at 22:09

## 2022-04-15 RX ADMIN — ONDANSETRON 8 MG: 2 INJECTION INTRAMUSCULAR; INTRAVENOUS at 13:43

## 2022-04-15 RX ADMIN — SODIUM CHLORIDE 100 ML: 9 INJECTION, SOLUTION INTRAVENOUS at 23:21

## 2022-04-15 RX ADMIN — DEXAMETHASONE SODIUM PHOSPHATE: 10 INJECTION, SOLUTION INTRAMUSCULAR; INTRAVENOUS at 13:40

## 2022-04-15 RX ADMIN — DEXTROSE MONOHYDRATE 250 ML: 50 INJECTION, SOLUTION INTRAVENOUS at 14:18

## 2022-04-15 RX ADMIN — IOPAMIDOL 88 ML: 755 INJECTION, SOLUTION INTRAVENOUS at 23:00

## 2022-04-15 ASSESSMENT — ENCOUNTER SYMPTOMS
DIZZINESS: 1
DIARRHEA: 1
SHORTNESS OF BREATH: 0
FEVER: 0
ABDOMINAL PAIN: 0
NAUSEA: 1

## 2022-04-15 NOTE — PROGRESS NOTES
Infusion Nursing Note:  Rod Olson presents today for Oxaliplatin.    Patient seen by provider today: No   present during visit today: Not Applicable.    Note: N/A.      Intravenous Access:  Peripheral IV placed.    Treatment Conditions:  Lab Results   Component Value Date    HGB 12.9 (L) 04/15/2022    WBC 6.1 04/15/2022    ANEUTAUTO 3.5 04/15/2022     04/15/2022      Results reviewed, labs MET treatment parameters, ok to proceed with treatment.      Post Infusion Assessment:  Patient tolerated infusion without incident.  Blood return noted pre and post infusion.  Site patent and intact, free from redness, edema or discomfort.  No evidence of extravasations.  Access discontinued per protocol.       Discharge Plan:   Copy of AVS reviewed with patient and/or family.  Patient will return 5/6/22 for next appointment.  Patient discharged in stable condition accompanied by: self.  Departure Mode: Ambulatory.      Adeline Jones RN

## 2022-04-16 VITALS
DIASTOLIC BLOOD PRESSURE: 99 MMHG | BODY MASS INDEX: 30.35 KG/M2 | TEMPERATURE: 97.6 F | OXYGEN SATURATION: 93 % | WEIGHT: 217.59 LBS | SYSTOLIC BLOOD PRESSURE: 121 MMHG | RESPIRATION RATE: 18 BRPM | HEART RATE: 94 BPM

## 2022-04-16 NOTE — ED PROVIDER NOTES
"  History     Chief Complaint   Patient presents with     Dizziness     Patient had 2nd round of IV chemo today, feeling \"woozy\" since     HPI  Rod Olson is a 77 year old male who has past medical history significant for malignant neoplasm of the appendix, hypertension, hyperlipidemia, presenting the emergency department with concerns regarding nausea, in addition to dizziness.  Symptoms present over the past few weeks.  He had his second round of IV chemotherapy during the day today.  His prior round was 3 weeks ago.  He had similar symptoms 3 weeks ago, however today it is more severe.  He complains of cramping in the hands, in addition to the legs.  He also has had nausea, without any vomiting.  Patient has had dizziness, persistent, and feeling unsteady on his feet.  No known metastatic disease according to the patient.  No fevers.  No cough.  Mild abdominal discomfort and has had diarrhea ever since October.  Denies urinary symptoms.  No lower extremity swelling.  No weakness of one side of the body compared to the other.  Does not use cane, or walker.    Allergies:  Allergies   Allergen Reactions     Clindamycin Other (See Comments)     PN: psychotic       Problem List:    Patient Active Problem List    Diagnosis Date Noted     Scrotal hematoma 01/27/2022     Priority: Medium     Malignant neoplasm of appendix (H) 12/07/2021     Priority: Medium     Elevated PSA 10/19/2017     Priority: Medium     Chronic insomnia 04/22/2014     Priority: Medium     Allergic rhinitis 04/22/2014     Priority: Medium     Hyperlipidemia 04/22/2014     Priority: Medium     Formatting of this note might be different from the original.  Other and unspecified hyperlipidemia       Fatigue 04/22/2014     Priority: Medium     Chronic cough 01/10/2012     Priority: Medium     HTN (hypertension) 01/10/2012     Priority: Medium        Past Medical History:    Past Medical History:   Diagnosis Date     Former smoker      Hypertension "      Mucinous cystadenoma of appendix      Obese      Obesity (BMI 30.0-34.9)        Past Surgical History:    Past Surgical History:   Procedure Laterality Date     COLECTOMY RIGHT N/A 12/7/2021    Procedure: Right Hemicolectomy open;  Surgeon: Peng Donald MD;  Location: [facility] OR     COLONOSCOPY N/A 12/3/2021    Procedure: COLONOSCOPY;  Surgeon: Gia Victoria MD;  Location: [facility] GI     EXPLORE GROIN N/A 1/27/2022    Procedure: Evacuation Scrotal Hematoma;  Surgeon: Darnell Lopez MD;  Location: [facility] OR     HERNIORRHAPHY INGUINAL Right 1/18/2022    Procedure: OPEN  transinguinaL  HYDROCELE REPAIR.;  Surgeon: Darnell Lopez MD;  Location: [facility] OR     LAPAROTOMY EXPLORATORY N/A 12/7/2021    Procedure: EXPLORATORY LAPAROTOMY;  Surgeon: Peng Donald MD;  Location: [facility] OR       Family History:    Family History   Problem Relation Age of Onset     Cataracts Mother      Hypertension Father      Prostate Cancer Brother      Lung Cancer Brother      Myocardial Infarction Brother        Social History:  Marital Status:  Single [1]  Social History     Tobacco Use     Smoking status: Former Smoker     Years: 3.00     Types: Cigars, Cigarettes     Smokeless tobacco: Never Used   Vaping Use     Vaping Use: Never used   Substance Use Topics     Alcohol use: Not Currently     Drug use: Never        Medications:    acetaminophen (TYLENOL) 500 MG tablet  bismuth subsalicylate (PEPTO BISMOL) 262 MG/15ML suspension  capecitabine (XELODA) 500 MG tablet  dexamethasone (DECADRON) 4 MG tablet  ferrous sulfate 140 (45 Fe) MG TBCR CR tablet  lisinopril (ZESTRIL) 40 MG tablet  loperamide (IMODIUM) 2 MG capsule  methocarbamol (ROBAXIN) 750 MG tablet  ondansetron (ZOFRAN) 8 MG tablet  oxyCODONE (ROXICODONE) 5 MG tablet  polyethylene glycol (MIRALAX) 17 GM/Dose powder  prochlorperazine (COMPAZINE) 10 MG tablet  senna-docusate (SENOKOT-S/PERICOLACE) 8.6-50 MG tablet          Review of Systems   Constitutional: Negative for  fever.   Respiratory: Negative for shortness of breath.    Cardiovascular: Negative for chest pain.   Gastrointestinal: Positive for diarrhea and nausea. Negative for abdominal pain.   Neurological: Positive for dizziness.   All other systems reviewed and are negative.      Physical Exam   BP: (!) 137/93  Pulse: 81  Temp: 97.6  F (36.4  C)  Resp: 18  Weight: 98.7 kg (217 lb 9.5 oz)  SpO2: 96 %      Physical Exam  BP (!) 140/92   Pulse 99   Temp 97.6  F (36.4  C) (Oral)   Resp 18   Wt 98.7 kg (217 lb 9.5 oz)   SpO2 91%   BMI 30.35 kg/m    General: alert, interactive, in no apparent distress  Head: atraumatic  Nose: no rhinorrhea or epistaxis  Ears: no external auditory canal discharge or bleeding.    Eyes: Sclera nonicteric. Conjunctiva noninjected. PERRL, EOMI  Mouth: no tonsillar erythema, edema, or exudate  Neck: supple, no palp LAD  Lungs: CTAB  CV: RRR, S1/S2; peripheral pulses palpable and symmetric  Abdomen: soft, nt, nd, no guarding or rebound. Positive bowel sounds  Extremities: no cyanosis or edema  Skin: no rash or diaphoresis  Neuro: CN III-XII grossly intact, strength 5/5 in UE and LEs bilaterally, sensation intact to light touch in UE and LEs bilaterally;       ED Course                 Procedures         EKG, reviewed by myself shows sinus rhythm.  Rate 99 bpm.  Normal intervals.  No acute ischemic appearing changes.    Critical Care time:  none               Results for orders placed or performed during the hospital encounter of 04/15/22 (from the past 24 hour(s))   CBC with platelets differential    Narrative    The following orders were created for panel order CBC with platelets differential.  Procedure                               Abnormality         Status                     ---------                               -----------         ------                     CBC with platelets and d...[[id number]]  Abnormal            Final result               Manual Differential[[id number]]           Abnormal            Final result                 Please view results for these tests on the individual orders.   Comprehensive metabolic panel   Result Value Ref Range    Sodium 137 133 - 144 mmol/L    Potassium 3.8 3.4 - 5.3 mmol/L    Chloride 104 94 - 109 mmol/L    Carbon Dioxide (CO2) 25 20 - 32 mmol/L    Anion Gap 8 3 - 14 mmol/L    Urea Nitrogen 16 7 - 30 mg/dL    Creatinine 0.78 0.66 - 1.25 mg/dL    Calcium 9.0 8.5 - 10.1 mg/dL    Glucose 147 (H) 70 - 99 mg/dL    Alkaline Phosphatase 70 40 - 150 U/L    AST 23 0 - 45 U/L    ALT 29 0 - 70 U/L    Protein Total 7.2 6.8 - 8.8 g/dL    Albumin 3.5 3.4 - 5.0 g/dL    Bilirubin Total 0.6 0.2 - 1.3 mg/dL    GFR Estimate >90 >60 mL/min/1.73m2   Troponin I   Result Value Ref Range    Troponin I High Sensitivity 6 <79 ng/L   CBC with platelets and differential   Result Value Ref Range    WBC Count 4.6 4.0 - 11.0 10e3/uL    RBC Count 4.52 4.40 - 5.90 10e6/uL    Hemoglobin 12.5 (L) 13.3 - 17.7 g/dL    Hematocrit 37.0 (L) 40.0 - 53.0 %    MCV 82 78 - 100 fL    MCH 27.7 26.5 - 33.0 pg    MCHC 33.8 31.5 - 36.5 g/dL    RDW 18.0 (H) 10.0 - 15.0 %    Platelet Count 192 150 - 450 10e3/uL   NT pro BNP   Result Value Ref Range    N terminal Pro BNP Inpatient 57 0 - 1,800 pg/mL   Manual Differential   Result Value Ref Range    % Neutrophils 81 %    % Lymphocytes 19 %    % Monocytes 0 %    % Eosinophils 0 %    % Basophils 0 %    Absolute Neutrophils 3.7 1.6 - 8.3 10e3/uL    Absolute Lymphocytes 0.9 0.8 - 5.3 10e3/uL    Absolute Monocytes 0.0 0.0 - 1.3 10e3/uL    Absolute Eosinophils 0.0 0.0 - 0.7 10e3/uL    Absolute Basophils 0.0 0.0 - 0.2 10e3/uL    RBC Morphology Confirmed RBC Indices     Platelet Assessment  Automated Count Confirmed. Platelet morphology is normal.     Automated Count Confirmed. Platelet morphology is normal.    Reactive Lymphocytes Present (A) None Seen   Head CT w/o contrast    Narrative    EXAM: CT HEAD W/O CONTRAST  LOCATION: [facility]  CENTER  DATE/TIME: 4/15/2022 10:55 PM    INDICATION: Dizziness, non-specific  COMPARISON: 10/19/2021  TECHNIQUE: Routine CT Head without IV contrast. Multiplanar reformats. Dose reduction techniques were used.    FINDINGS:  INTRACRANIAL CONTENTS: No intracranial hemorrhage, extraaxial collection, or mass effect.  No CT evidence of acute infarct. Mild presumed chronic small vessel ischemic changes. Mild generalized volume loss. No hydrocephalus.     VISUALIZED ORBITS/SINUSES/MASTOIDS: No intraorbital abnormality. No paranasal sinus mucosal disease. No middle ear or mastoid effusion.    BONES/SOFT TISSUES: No acute abnormality.      Impression    IMPRESSION:  1.  No acute intracranial pathology identified.  2.  Mild volume loss and presumed chronic small vessel ischemic changes.   CT Chest (PE) Abdomen Pelvis w Contrast    Narrative    EXAM: CT CHEST PE ABDOMEN PELVIS W CONTRAST  LOCATION: [facility]  DATE/TIME: 4/15/2022 10:56 PM    INDICATION: Hypoxia.  Dizziness.  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  Known pulmonary nodules.  COMPARISON: 03/21/2022  TECHNIQUE: CT chest pulmonary angiogram and routine CT abdomen pelvis with IV contrast. Arterial phase through the chest and venous phase through the abdomen and pelvis. Multiplanar reformats and MIP reconstructions were performed. Dose reduction   techniques were used.   CONTRAST: 88ml Isovue 370    FINDINGS:  ANGIOGRAM CHEST: Pulmonary arteries are normal caliber and negative for pulmonary emboli. Thoracic aorta is negative for dissection. There is aneurysmal dilatation of the ascending aorta reaching 4.4 cm, unchanged. No CT evidence of right heart strain.     LUNGS AND PLEURA: Multilobulated 13 mm left upper lobe pulmonary nodule, image 54 series 7 is unchanged. Cluster of smaller nodules in the left lower lobe centered on image 145 is unchanged. Mild basilar atelectasis. No central airway  lesions.    MEDIASTINUM/AXILLAE: New azygoesophageal recess adenopathy measuring 2.2 x 1.6 cm image 154. Small but increasing pericardial effusion.    CORONARY ARTERY CALCIFICATION: Mild.    HEPATOBILIARY: Scattered hepatic low-attenuation lesions, unchanged. No biliary dilatation    PANCREAS: Scattered pancreatic calcifications, unchanged. No ductal dilatation.    SPLEEN: Unremarkable    ADRENAL GLANDS: Unremarkable    KIDNEYS/BLADDER: No hydronephrosis. Bladder is normal in contour.    BOWEL: Status post appendectomy and partial colectomy. Complex cystic mass in the right hemipelvis abutting the resection site, similar in size at 11.0 x 4.5 cm, axial image 171 series 13. This abuts the sigmoid colon, portions of the bladder, and cecum   as well as anterior margin of the right ureter.    LYMPH NODES: Scattered subcentimeter mesenteric and retroperitoneal nodes.    VASCULATURE: No abdominal aortic aneurysm. Patent portal vein.    PELVIC ORGANS: Marked prostatic hypertrophy. No free fluid.    MUSCULOSKELETAL: Postoperative changes about the left hip with pins in the acetabulum and adjacent to the left ischium. Mid thoracic spine hemangioma.      Impression    IMPRESSION:  1.  No evidence of pulmonary embolus.  2.  Stable 4.4 cm ascending aortic aneurysm.  3.  Stable left upper lobe pulmonary nodule. Stable cystic mass in the right hemipelvis compatible with local recurrence.  4.  New azygoesophageal recess adenopathy, nonspecific.       Medications   ondansetron (ZOFRAN) injection 4 mg (has no administration in time range)   lactated ringers BOLUS 1,000 mL (0 mLs Intravenous Stopped 4/16/22 0039)   iopamidol (ISOVUE-370) solution 88 mL (88 mLs Intravenous Given 4/15/22 2300)   sodium chloride 0.9 % bag 500mL for CT scan flush use (100 mLs As instructed Given 4/15/22 2321)       Assessments & Plan (with Medical Decision Making)  77 year old male presenting to the emergency department with concerns regarding dizziness,  cramping, in the context of mucinous appendiceal cancer.  Patient had a second round of chemotherapy during the day today.  Nausea without vomiting.  Mild abdominal discomfort, not significantly changed compared to prior.    IV established, labs drawn.  Patient was noted to have some transient decreased oxygen saturations.  With repositioning, his oxygen saturations were normal.  Feel that patient likely has component of sleep apnea.  However, given the hypoxia, with cancer, concern for possible pulmonary embolism, and CT scan is ordered.    CT scan of the chest showing no evidence of PE.  Stable ascending aortic aneurysm present, and stable pulmonary nodule.  There is also new adenopathy that is nonspecific in the azygoesophageal area.    Electrolytes normal.  Laboratory work-up otherwise unremarkable.  Patient did receive 1 L IV lactated ringer.  He felt improved.  I feel that many of patient's symptoms are likely chemotherapy related, and deconditioning.  Patient encouraged follow-up with his oncologist.  No indication for further testing, or treatment at this point.  Patient feels comfortable for discharge home.  He is encouraged close monitoring, and be seen if new or worsening symptoms develop.     I have reviewed the nursing notes.    I have reviewed the findings, diagnosis, plan and need for follow up with the patient.       New Prescriptions    No medications on file       Final diagnoses:   Dizziness   Leg cramping   Malignant neoplasm of appendix (H)   Hypertension, unspecified type       4/15/2022   [facility] EMERGENCY DEPT     Kody Boone MD  04/16/22 0042

## 2022-04-16 NOTE — TELEPHONE ENCOUNTER
"Triage Call:    Caller: Daughter calling for patient and consent is on file.      Patient had chemo today and he is having side effects.  He is fatigued and dizzy, wobbly when walking, his legs are cramping in calves and toes.  He had some of it prior to the chemo, but the infusion today has made them worse.      Daughter reports his skin is feeling pretty clammy and he has had a few instances today where speech is garbled.  He is also reporting that his throat has been feeling a little dry and swollen.  Patient also reported earlier that he felt like his B/P was \"too high\", no cuff at home to check.  When got home, had trouble getting out of the car, which is very far from his baseline.      No fever.       ED recommended disposition.  Daughter verbalized understanding about recommendations and disposition.  Encouraged to call back with any further questions.      Angelica Montanez RN on 4/15/2022 at 8:38 PM      COVID 19 Nurse Triage Plan/Patient Instructions    Please be aware that novel coronavirus (COVID-19) may be circulating in the community. If you develop symptoms such as fever, cough, or SOB or if you have concerns about the presence of another infection including coronavirus (COVID-19), please contact your health care provider or visit [email or web address].     Disposition/Instructions    ED Visit recommended. Follow protocol based instructions.     Bring Your Own Device:  Please also bring your smart device(s) (smart phones, tablets, laptops) and their charging cables for your personal use and to communicate with your care team during your visit.    Thank you for taking steps to prevent the spread of this virus.  o Limit your contact with others.  o Wear a simple mask to cover your cough.  o Wash your hands well and often.    Resources    [facility]: About COVID-19: [email or web address]    CDC: What to Do If You're Sick: www.cdc.gov/coronavirus/2019-ncov/about/steps-when-sick.html    CDC: " Ending Home Isolation: www.cdc.gov/coronavirus/2019-ncov/hcp/disposition-in-home-patients.html     CDC: Caring for Someone: www.cdc.gov/coronavirus/2019-ncov/if-you-are-sick/care-for-someone.html     [facility]: Interim Guidance for Hospital Discharge to Home: www.health.[facility].[place]/diseases/coronavirus/hcp/hospdischarge.pdf    [facility] clinical trials (COVID-19 research studies): [email or web address].[email or web address]/umn-clinical-trials     Below are the COVID-19 hotlines at the [place] Department of Health ([facility]). Interpreters are available.   o For health questions: Call 165-146-3958 or 1-815.176.5414 (7 a.m. to 7 p.m.)  o For questions about schools and childcare: Call 615-995-8499 or 1-422.561.9283 (7 a.m. to 7 p.m.)                   Reason for Disposition    Patient sounds very sick or weak to the triager    Additional Information    Negative: Severe difficulty breathing (e.g., struggling for each breath, speaks in single words)    Negative: [1] Difficulty breathing or swallowing AND [2] started suddenly after medicine, an allergic food or bee sting    Negative: Shock suspected (e.g., cold/pale/clammy skin, too weak to stand, low BP, rapid pulse)    Negative: Difficult to awaken or acting confused (e.g., disoriented, slurred speech)    Negative: [1] Weakness (i.e., paralysis, loss of muscle strength) of the face, arm or leg on one side of the body AND [2] sudden onset AND [3] present now    Negative: [1] Numbness (i.e., loss of sensation) of the face, arm or leg on one side of the body AND [2] sudden onset AND [3] present now    Negative: [1] Loss of speech or garbled speech AND [2] sudden onset AND [3] present now    Negative: Overdose (accidental or intentional) of medications    Negative: [1] Fainted > 15 minutes ago AND [2] still feels too weak or dizzy to stand    Negative: Heart beating < 50 beats per minute OR > 140 beats per minute    Negative: Sounds like a life-threatening emergency to the triager     "Negative: Difficulty breathing    Negative: SEVERE dizziness (e.g., unable to stand, requires support to walk, feels like passing out now)    Negative: Extra heart beats OR irregular heart beating  (i.e., \"palpitations\")    Negative: [1] Drinking very little AND [2] dehydration suspected (e.g., no urine > 12 hours, very dry mouth, very lightheaded)    Protocols used: DIZZINESS - [id number]      "

## 2022-04-19 ENCOUNTER — MYC MEDICAL ADVICE (OUTPATIENT)
Dept: ONCOLOGY | Facility: CLINIC | Age: 77
End: 2022-04-19
Payer: MEDICARE

## 2022-04-19 DIAGNOSIS — C18.9 MALIGNANT NEOPLASM OF COLON METASTATIC TO LIVER (H): Primary | ICD-10-CM

## 2022-04-19 DIAGNOSIS — C78.7 MALIGNANT NEOPLASM OF COLON METASTATIC TO LIVER (H): Primary | ICD-10-CM

## 2022-04-20 NOTE — TELEPHONE ENCOUNTER
Daughter called back and left vm that she is unable to bring Bill to clinic on 4/22 in the afternoon, requesting a morning appointment. Slot held for 10 am with Kristina ROSARIO, labs prior, possible infusion afterward. Also, apt with Dr. Bal may need to be changed to 9 am (virtual) on 4/29. Asked her to call back to confirm all dates and times before resending to scheduling.

## 2022-04-20 NOTE — TELEPHONE ENCOUNTER
Per Loulou Rojas PA: labs CBC, CMP, Mg, possible IVF apt after Kristina's visit. Scheduling messaged, lm for daughter Eowyn with the above details and dates.

## 2022-04-21 NOTE — TELEPHONE ENCOUNTER
Appointments confirmed with Eowyn: Friday 4/22 starting at 9:30 for labs, Kristina ROSARIO at 10 am, IVF at 11:30 am. Virtual visit with Dr. Bal on 4/29 at 9am.

## 2022-04-21 NOTE — PROGRESS NOTES
[facility] - Medical Oncology Follow-Up Consult Note  2022    Patient Identifiers     Name: Rod Olson  Preferred Address: Rod  Preferred Language: English  : 1945  Gender: male    Oncology Summary     Cancer Staging  Malignant neoplasm of appendix (H)  Staging form: Appendix Carcinoma, AJCC 8th Edition  - Clinical stage from 2021: Stage IVC (cT4b, cN1c, pM1c, G3) - Signed by Larry Bal MD on 2022    Current treatment(s):  - CapeOx  Treatment intent:  - palliative    Oncology History Overview Note   Pt in his USOH until 2021 when he presented to care at the urging of family with eight months of abdominal pain. CT showed large, mucinous collections in R hemipelvis, prompting referral to surgery. Pt thought to have likely a low-grade mucinous neoplasm and underwent cytoreductive surgery, with incomplete (R2) resection. 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    On establishment with Med Onc, pt recommended to initiate FOLFOX therapy.      Malignant neoplasm of appendix (H)   2021 Initial Diagnosis    Malignant neoplasm of appendix (H)     2021 -  Cancer Staged    Staging form: Appendix Carcinoma, AJCC 8th Edition  - Clinical stage from 2021: Stage IVC (cT4b, cN1c, pM1c, G3)     3/23/2022 -  Chemotherapy    OP ONC Colorectal Cancer - Oxaliplatin / Capecitabine (XELOX, CAPOX) - EVERY 3 WEEKS  Plan Provider: Larry Bal MD  Treatment goal: Palliative  Line of treatment: First Line         Subjective/Interval Events   Bill notes after the infusion of oxaliplatin last week, he felt nauseated and dizzy as well as had weakness. He presented to ED and had negative w/u. Was ultimately given 1 L of IVF and was discharged. He did not take xeloda on Saturday because of this. He took both doses on  but then stopped again on Monday because  of nausea and diarrhea. He had up to 3 episodes of diarrhea per day. He has used compazine primarily for nausea with some relief. He continues to feel dizzy and a sense of imbalance on and off. He denies vertigo rather feels somewhat lightheaded when he first stands up and walks. His fluid intake is about 3 cups per day. This does not seem limited by nausea rather by lack of desire to drink water. He prefers tea which he has been trying to drink. Urine output has been stable but urine is more concentrated. No vomiting.     His R lower quadrant pain has overall been stable. He takes oxycodone PRN for this. He has some questions about where his disease is and has questions about how viruses affect cancer today.     Physical Exam     PHYSICAL EXAM:  /70   Pulse 92   Temp 98.1  F (36.7  C)   Resp 18   Wt 100 kg (220 lb 8 oz)   SpO2 100%   BMI 30.75 kg/m    Wt Readings from Last 4 Encounters:   04/22/22 100 kg (220 lb 8 oz)   04/15/22 98.7 kg (217 lb 9.5 oz)   04/15/22 98.7 kg (217 lb 9.6 oz)   03/23/22 102.5 kg (226 lb)   General: Alert, oriented, pleasant, NAD. Somewhat of a poor historian.   HEENT: Normocephalic, atraumatic, no icterus. Slightly dry mucus membranes. No lesions or thrush   Lungs: CTA bilaterally, normal work of breathing  Cardiac: RRR  Abdomen: Soft, nontender, nondistended. Normoactive bowel sounds. Tender in RLQ.   MSK: No spinal tenderness  Neuro: CNII-XII grossly intact. Appears overall steady on his feet and moves to exam table unassisted without difficulty   Extremities: No pedal edema        Objective Data     Lab data:  Most Recent 3 CBC's:  Recent Labs   Lab Test 04/22/22  1011 04/15/22  2206 04/15/22  1246   WBC 9.9 4.6 6.1   HGB 13.2* 12.5* 12.9*   MCV 81 82 84   PLT 69* 192 200     Most Recent 3 BMP's:  Recent Labs   Lab Test 04/22/22  1011 04/15/22  2206 04/15/22  1246    137 138   POTASSIUM 3.9 3.8 4.0   CHLORIDE 106 104 104   CO2 24 25 30   BUN 17 16 17   CR 0.95 0.78  0.84   ANIONGAP 9 8 4   NAMAN 9.5 9.0 8.8   * 147* 103*    Most Recent 2 LFT's:  Recent Labs   Lab Test 04/22/22  1011 04/15/22  2206   AST 36 23   ALT 42 29   ALKPHOS 81 70   BILITOTAL 0.7 0.6   I reviewed the above labs today.    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 He is currently on palliative intent XELOX. He has been tolerating somewhat poorly with nausea, diarrhea, and poor intake leading to what I presume is orthostatic dizziness.     I think these symptoms can be managed by more aggressive supportive management but I think a dose reduction could be prudent to improve tolerance and then increase dose as tolerated in the future. He requested a meeting with Dr. Bal to review this and this was made earlier this week for 4/29. Okay to hold off on taking more xeloda for now.     Nausea. He receives IV Emend/Zofran/dex with chemotherapy. Continue antiemetics PRN for now. If he resumes Xeloda in the future, I would recommend trying zofran 1 hour prior to each chemotherapy dose.     Diarrhea: I am not sure he is utilizing loperamide. This seems to have resolved now. Reviewed taking this. Could use it proactively in the future as well.     Hypertension. On lisinopril 40 mg daily. Has orthostatic lightheadedness and lower intake. Will reduce dose to 20 mg daily for now. I asked him to monitor BP at home for now.     Abdominal and scrotal pain. Likely secondary to cystic mass in right hemipelvis. Managed with oxycodone 5 mg about every other day.     Greater than 40 minutes was spent with this patient with greater than 20 minutes spent in counseling and coordination of care.      Kristina Hong PA-C

## 2022-04-22 ENCOUNTER — ONCOLOGY VISIT (OUTPATIENT)
Dept: ONCOLOGY | Facility: CLINIC | Age: 77
End: 2022-04-22
Attending: STUDENT IN AN ORGANIZED HEALTH CARE EDUCATION/TRAINING PROGRAM
Payer: MEDICARE

## 2022-04-22 ENCOUNTER — APPOINTMENT (OUTPATIENT)
Dept: LAB | Facility: CLINIC | Age: 77
End: 2022-04-22
Attending: STUDENT IN AN ORGANIZED HEALTH CARE EDUCATION/TRAINING PROGRAM
Payer: MEDICARE

## 2022-04-22 VITALS
SYSTOLIC BLOOD PRESSURE: 102 MMHG | RESPIRATION RATE: 18 BRPM | WEIGHT: 220.5 LBS | DIASTOLIC BLOOD PRESSURE: 70 MMHG | OXYGEN SATURATION: 100 % | HEART RATE: 92 BPM | TEMPERATURE: 98.1 F | BODY MASS INDEX: 30.75 KG/M2

## 2022-04-22 DIAGNOSIS — E86.0 DEHYDRATION: ICD-10-CM

## 2022-04-22 DIAGNOSIS — C18.1 MALIGNANT NEOPLASM OF APPENDIX (H): Primary | ICD-10-CM

## 2022-04-22 DIAGNOSIS — I10 ESSENTIAL HYPERTENSION: ICD-10-CM

## 2022-04-22 DIAGNOSIS — C18.9 MALIGNANT NEOPLASM OF COLON METASTATIC TO LIVER (H): Primary | ICD-10-CM

## 2022-04-22 DIAGNOSIS — C78.7 MALIGNANT NEOPLASM OF COLON METASTATIC TO LIVER (H): Primary | ICD-10-CM

## 2022-04-22 LAB
ALBUMIN SERPL-MCNC: 3.4 G/DL (ref 3.4–5)
ALP SERPL-CCNC: 81 U/L (ref 40–150)
ALT SERPL W P-5'-P-CCNC: 42 U/L (ref 0–70)
ANION GAP SERPL CALCULATED.3IONS-SCNC: 9 MMOL/L (ref 3–14)
AST SERPL W P-5'-P-CCNC: 36 U/L (ref 0–45)
BASOPHILS # BLD AUTO: 0.1 10E3/UL (ref 0–0.2)
BASOPHILS NFR BLD AUTO: 1 %
BILIRUB SERPL-MCNC: 0.7 MG/DL (ref 0.2–1.3)
BUN SERPL-MCNC: 17 MG/DL (ref 7–30)
CALCIUM SERPL-MCNC: 9.5 MG/DL (ref 8.5–10.1)
CHLORIDE BLD-SCNC: 106 MMOL/L (ref 94–109)
CO2 SERPL-SCNC: 24 MMOL/L (ref 20–32)
CREAT SERPL-MCNC: 0.95 MG/DL (ref 0.66–1.25)
EOSINOPHIL # BLD AUTO: 0.1 10E3/UL (ref 0–0.7)
EOSINOPHIL NFR BLD AUTO: 1 %
ERYTHROCYTE [DISTWIDTH] IN BLOOD BY AUTOMATED COUNT: 19 % (ref 10–15)
GFR SERPL CREATININE-BSD FRML MDRD: 82 ML/MIN/1.73M2
GLUCOSE BLD-MCNC: 138 MG/DL (ref 70–99)
HCT VFR BLD AUTO: 40.1 % (ref 40–53)
HGB BLD-MCNC: 13.2 G/DL (ref 13.3–17.7)
IMM GRANULOCYTES # BLD: 0.2 10E3/UL
IMM GRANULOCYTES NFR BLD: 2 %
LYMPHOCYTES # BLD AUTO: 2.2 10E3/UL (ref 0.8–5.3)
LYMPHOCYTES NFR BLD AUTO: 22 %
MAGNESIUM SERPL-MCNC: 2 MG/DL (ref 1.6–2.3)
MCH RBC QN AUTO: 26.7 PG (ref 26.5–33)
MCHC RBC AUTO-ENTMCNC: 32.9 G/DL (ref 31.5–36.5)
MCV RBC AUTO: 81 FL (ref 78–100)
MONOCYTES # BLD AUTO: 1.4 10E3/UL (ref 0–1.3)
MONOCYTES NFR BLD AUTO: 14 %
NEUTROPHILS # BLD AUTO: 5.9 10E3/UL (ref 1.6–8.3)
NEUTROPHILS NFR BLD AUTO: 60 %
NRBC # BLD AUTO: 0 10E3/UL
NRBC BLD AUTO-RTO: 0 /100
PLATELET # BLD AUTO: 69 10E3/UL (ref 150–450)
POTASSIUM BLD-SCNC: 3.9 MMOL/L (ref 3.4–5.3)
PROT SERPL-MCNC: 7.4 G/DL (ref 6.8–8.8)
RBC # BLD AUTO: 4.94 10E6/UL (ref 4.4–5.9)
SODIUM SERPL-SCNC: 139 MMOL/L (ref 133–144)
WBC # BLD AUTO: 9.9 10E3/UL (ref 4–11)

## 2022-04-22 PROCEDURE — 36415 COLL VENOUS BLD VENIPUNCTURE: CPT

## 2022-04-22 PROCEDURE — 999N000128 HC STATISTIC PERIPHERAL IV START W/O US GUIDANCE

## 2022-04-22 PROCEDURE — 258N000003 HC RX IP 258 OP 636: Performed by: PHYSICIAN ASSISTANT

## 2022-04-22 PROCEDURE — 82040 ASSAY OF SERUM ALBUMIN: CPT

## 2022-04-22 PROCEDURE — 83735 ASSAY OF MAGNESIUM: CPT

## 2022-04-22 PROCEDURE — 99215 OFFICE O/P EST HI 40 MIN: CPT | Performed by: PHYSICIAN ASSISTANT

## 2022-04-22 PROCEDURE — 96361 HYDRATE IV INFUSION ADD-ON: CPT

## 2022-04-22 PROCEDURE — 85025 COMPLETE CBC W/AUTO DIFF WBC: CPT

## 2022-04-22 PROCEDURE — 80053 COMPREHEN METABOLIC PANEL: CPT

## 2022-04-22 PROCEDURE — G0463 HOSPITAL OUTPT CLINIC VISIT: HCPCS

## 2022-04-22 PROCEDURE — 96360 HYDRATION IV INFUSION INIT: CPT

## 2022-04-22 RX ORDER — METHYLPREDNISOLONE SODIUM SUCCINATE 125 MG/2ML
125 INJECTION, POWDER, LYOPHILIZED, FOR SOLUTION INTRAMUSCULAR; INTRAVENOUS
Status: CANCELLED
Start: 2022-04-22

## 2022-04-22 RX ORDER — NALOXONE HYDROCHLORIDE 0.4 MG/ML
0.2 INJECTION, SOLUTION INTRAMUSCULAR; INTRAVENOUS; SUBCUTANEOUS
Status: CANCELLED | OUTPATIENT
Start: 2022-04-22

## 2022-04-22 RX ORDER — MEPERIDINE HYDROCHLORIDE 25 MG/ML
25 INJECTION INTRAMUSCULAR; INTRAVENOUS; SUBCUTANEOUS EVERY 30 MIN PRN
Status: CANCELLED | OUTPATIENT
Start: 2022-04-22

## 2022-04-22 RX ORDER — ALBUTEROL SULFATE 0.83 MG/ML
2.5 SOLUTION RESPIRATORY (INHALATION)
Status: CANCELLED | OUTPATIENT
Start: 2022-04-22

## 2022-04-22 RX ORDER — ALBUTEROL SULFATE 90 UG/1
1-2 AEROSOL, METERED RESPIRATORY (INHALATION)
Status: CANCELLED
Start: 2022-04-22

## 2022-04-22 RX ORDER — EPINEPHRINE 1 MG/ML
0.3 INJECTION, SOLUTION INTRAMUSCULAR; SUBCUTANEOUS EVERY 5 MIN PRN
Status: CANCELLED | OUTPATIENT
Start: 2022-04-22

## 2022-04-22 RX ORDER — DIPHENHYDRAMINE HYDROCHLORIDE 50 MG/ML
50 INJECTION INTRAMUSCULAR; INTRAVENOUS
Status: CANCELLED
Start: 2022-04-22

## 2022-04-22 RX ADMIN — SODIUM CHLORIDE 1000 ML: 9 INJECTION, SOLUTION INTRAVENOUS at 11:28

## 2022-04-22 NOTE — PROGRESS NOTES
Infusion Nursing Note:  Rod SISSY DuttonWhitney presents today for IVF.    Patient seen by provider today: Yes: Kristina ROSARIO   present during visit today: Not Applicable.    Note: No complaints made.      Intravenous Access:  Peripheral IV placed by Vascular Access.    Treatment Conditions:  Lab Results   Component Value Date    HGB 13.2 (L) 04/22/2022    WBC 9.9 04/22/2022    ANEU 3.7 04/15/2022    ANEUTAUTO 5.9 04/22/2022    PLT 69 (L) 04/22/2022      Lab Results   Component Value Date     04/22/2022    POTASSIUM 3.9 04/22/2022    MAG 2.0 04/22/2022    CR 0.95 04/22/2022    NAMAN 9.5 04/22/2022    BILITOTAL 0.7 04/22/2022    ALBUMIN 3.4 04/22/2022    ALT 42 04/22/2022    AST 36 04/22/2022     Results reviewed, labs MET treatment parameters, ok to proceed with treatment.      Post Infusion Assessment:  Patient tolerated infusion without incident.  Blood return noted pre and post infusion.  Site patent and intact, free from redness, edema or discomfort.  No evidence of extravasations.  Access discontinued per protocol.       Discharge Plan:   Patient declined prescription refills.  Discharge instructions reviewed with: Patient.  Patient and/or family verbalized understanding of discharge instructions and all questions answered.  Copy of AVS reviewed with patient and/or family.  Patient will return 4/29/22 for next appointment.  Patient discharged in stable condition accompanied by: self.  Departure Mode: Ambulatory.      TUCKER NAVA RN

## 2022-04-22 NOTE — NURSING NOTE
Chief Complaint   Patient presents with     Blood Draw     Labs drawn by RN via , vitals taken.     Labs collected from venipuncture by RN. Vitals taken. Checked in for appointment(s).    Isabelle Shepard RN

## 2022-04-22 NOTE — PATIENT INSTRUCTIONS
Contact Numbers  [facility]: [phone]    After Hours:  [phone]  Triage: [phone]    Please call the [facility] Triage line if you experience a temperature greater than or equal to 100.5, shaking chills, have uncontrolled nausea, vomiting and/or diarrhea, dizziness, shortness of breath, chest pain, bleeding, unexplained bruising, or if you have any other new/concerning symptoms, questions or concerns.     If it is after hours, weekends, or holidays, please call the main hospital  at  [phone] and ask to speak to the Oncology doctor on call.     If you are having any concerning symptoms or wish to speak to a provider before your next infusion visit, please call your care coordinator or triage to notify them so we can adequately serve you.     If you need a refill on a narcotic prescription or other medication, please call triage before your infusion appointment.       April 2022 Sunday Monday Tuesday Wednesday Thursday Friday Saturday                            1     2       3     4     5     6     7  Happy Birthday!     8     9       10     11    VIDEO VISIT RETURN   2:00 PM   (45 min.)   Loulou Rojas PA-C   [facility] 12     13     14     15    LAB  12:30 PM   (10 min.)   [facility]    INFUSION 3 HR (180 MIN)   1:00 PM   (180 min.)   [place] CAN[place]ER INFUSION NURSE   [facility]    Admission   9:48 PM   Kody Boone MD   [facility]   (Discharge: 4/16/2022)    CT HEAD WO  10:10 PM   (20 min.)   [place]CT1   [facility]    CT CHEST (PE) ABD PELV W  10:45 PM   (30 min.)   [id number] [place] [address] Imaging 16       17     18     19     20     21     22    LAB PERIPHERAL   9:30 AM   (15 min.)   [facility] LAB DRAW   [facility]    RETURN   9:45 AM   (45 min.)   Kristina Hong  CECILLE Feng   [facility]    ONC INFUSION 2 HR (120 MIN)  11:30 AM   (120 min.)   [facility] ONC INFUSION NURSE   [facility] [place] 23       24     25     26     27     28     29    VIDEO VISIT RETURN   8:45 AM   (30 min.)   Larry Bal MD   [facility] 30                   May 2022      Billy Monday Tuesday Wednesday Thursday Friday Saturday   1     2     3     4     5     6    VIDEO VISIT RETURN   9:00 AM   (45 min.)   Loulou Rojas PA-C   [facility]    LAB  12:30 PM   (10 min.)   [facility]    INFUSION 3 HR (180 MIN)   1:00 PM   (180 min.)   [address] [id number]       8     9     10     11     12    NEW   2:15 PM   (30 min.)   Josette Hummel PA   [facility] [place] [facility] 13     14       15     16     17     18     19     20     21       22     23     24     25     26     27     28       29     30     31                                           Lab Results:  Recent Results (from the past 12 hour(s))   Magnesium    Collection Time: 04/22/22 10:11 AM   Result Value Ref Range    Magnesium 2.0 1.6 - 2.3 mg/dL   Comprehensive metabolic panel    Collection Time: 04/22/22 10:11 AM   Result Value Ref Range    Sodium 139 133 - 144 mmol/L    Potassium 3.9 3.4 - 5.3 mmol/L    Chloride 106 94 - 109 mmol/L    Carbon Dioxide (CO2) 24 20 - 32 mmol/L    Anion Gap 9 3 - 14 mmol/L    Urea Nitrogen 17 7 - 30 mg/dL    Creatinine 0.95 0.66 - 1.25 mg/dL    Calcium 9.5 8.5 - 10.1 mg/dL    Glucose 138 (H) 70 - 99 mg/dL    Alkaline Phosphatase 81 40 - 150 U/L    AST 36 0 - 45 U/L    ALT 42 0 - 70 U/L    Protein Total 7.4 6.8 - 8.8 g/dL    Albumin 3.4 3.4 - 5.0 g/dL    Bilirubin Total 0.7 0.2 - 1.3 mg/dL    GFR Estimate 82 >60 mL/min/1.73m2   CBC with platelets and differential    Collection Time: 04/22/22 10:11 AM   Result Value Ref  Range    WBC Count 9.9 4.0 - 11.0 10e3/uL    RBC Count 4.94 4.40 - 5.90 10e6/uL    Hemoglobin 13.2 (L) 13.3 - 17.7 g/dL    Hematocrit 40.1 40.0 - 53.0 %    MCV 81 78 - 100 fL    MCH 26.7 26.5 - 33.0 pg    MCHC 32.9 31.5 - 36.5 g/dL    RDW 19.0 (H) 10.0 - 15.0 %    Platelet Count 69 (L) 150 - 450 10e3/uL    % Neutrophils 60 %    % Lymphocytes 22 %    % Monocytes 14 %    % Eosinophils 1 %    % Basophils 1 %    % Immature Granulocytes 2 %    NRBCs per 100 WBC 0 <1 /100    Absolute Neutrophils 5.9 1.6 - 8.3 10e3/uL    Absolute Lymphocytes 2.2 0.8 - 5.3 10e3/uL    Absolute Monocytes 1.4 (H) 0.0 - 1.3 10e3/uL    Absolute Eosinophils 0.1 0.0 - 0.7 10e3/uL    Absolute Basophils 0.1 0.0 - 0.2 10e3/uL    Absolute Immature Granulocytes 0.2 <=0.4 10e3/uL    Absolute NRBCs 0.0 10e3/uL

## 2022-04-22 NOTE — NURSING NOTE
"Oncology Rooming Note    April 22, 2022 10:23 AM   Rod Olson is a 77 year old male who presents for:    Chief Complaint   Patient presents with     Blood Draw     Labs drawn by RN via , vitals taken.     Oncology Clinic Visit     Malignant neoplasm of appendix     Initial Vitals: /70   Pulse 92   Temp 98.1  F (36.7  C)   Resp 18   Wt 100 kg (220 lb 8 oz)   SpO2 100%   BMI 30.75 kg/m   Estimated body mass index is 30.75 kg/m  as calculated from the following:    Height as of 2/11/22: 1.803 m (5' 11\").    Weight as of this encounter: 100 kg (220 lb 8 oz). Body surface area is 2.24 meters squared.  Data Unavailable Comment: unable to answer   No LMP for male patient.  Allergies reviewed: Yes  Medications reviewed: Yes    Medications: Medication refills not needed today. Lisinopril   Pharmacy name entered into [facility]:    [id number] - [place], [place] - [address] [id number] - [place], [place] - [address] PHARMACY [place], [place] - [address]  [facility] - [place], [place] - [address]    Clinical concerns: Pt has been feeling dizzy (may have to do with cardiac issues) but has no concerns for their provider on April 22, 2022 and would like to discuss the original chief complaint of the appointment.       Yeni Amado, EMT on April 22, 2022 at 10:25 AM           "

## 2022-04-25 ENCOUNTER — DOCUMENTATION ONLY (OUTPATIENT)
Dept: ONCOLOGY | Facility: CLINIC | Age: 77
End: 2022-04-25
Payer: MEDICARE

## 2022-04-25 NOTE — PROGRESS NOTES
Oral Chemotherapy Monitoring Program  Lab Follow Up    Reviewed lab results from 4/22/22.    ORAL CHEMOTHERAPY 3/8/2022 3/21/2022 3/30/2022 4/1/2022 4/5/2022 4/15/2022 4/25/2022   Assessment Type Initial Work up New Teach Left Voicemail Left Voicemail - Lab Monitoring Lab Monitoring   Diagnosis Code Appendix Cancer Appendix Cancer Appendix Cancer Appendix Cancer Appendix Cancer Appendix Cancer Appendix Cancer   Providers Dr. Valeriano Bal   [facility]   Drug Name Xeloda (capecitabine) Xeloda (capecitabine) Xeloda (capecitabine) Xeloda (capecitabine) Xeloda (capecitabine) Xeloda (capecitabine) Xeloda (capecitabine)   Dose 2,000 mg 2,000 mg 2,000 mg 2,000 mg 2,000 mg 2,000 mg 2,000 mg   Current Schedule BID BID BID BID BID BID BID   Cycle Details 2 weeks on, 1 week off 2 weeks on, 1 week off 2 weeks on, 1 week off 2 weeks on, 1 week off 2 weeks on, 1 week off 2 weeks on, 1 week off Drug on Hold   Start Date of Last Cycle - - - - 3/25/2022 4/15/2022 -   Planned next cycle start date - 3/23/2022 - - - 5/6/2022 -   Doses missed in last 2 weeks - - - - 1 - -   Adherence Assessment - - - - Adherent - -   Adverse Effects - - - - Nausea;Diarrhea;Headache - -   Nausea - - - - Grade 1 - -   Pharmacist Intervention(nausea) - - - - Yes - -   Intervention(s) - - - - Patient education - -   Diarrhea - - - - Grade 1 - -   Pharmacist Intervention(diarrhea) - - - - Yes - -   Intervention(s) - - - - Patient education;OTC recommendation - -   Headache - - - - Grade 1 - -   Pharmacist Intervention(headache) - - - - Yes - -   Intervention(s) - - - - Referral to PCP - -   Any new drug interactions? Yes - - - - - -   Pharmacist Intervention? Yes - - - - - -   Intervention(s) Patient Education - - - - - -   Is the dose as ordered appropriate for the patient? Yes - - - - - -       Labs:  _  Result Component  "Current Result Ref Range   Sodium 139 (4/22/2022) 133 - 144 mmol/L     _  Result Component Current Result Ref Range   Potassium 3.9 (4/22/2022) 3.4 - 5.3 mmol/L     _  Result Component Current Result Ref Range   Calcium 9.5 (4/22/2022) 8.5 - 10.1 mg/dL     _  Result Component Current Result Ref Range   Magnesium 2.0 (4/22/2022) 1.6 - 2.3 mg/dL     No results found for Phos within last 30 days.     _  Result Component Current Result Ref Range   Albumin 3.4 (4/22/2022) 3.4 - 5.0 g/dL     _  Result Component Current Result Ref Range   Urea Nitrogen 17 (4/22/2022) 7 - 30 mg/dL     _  Result Component Current Result Ref Range   Creatinine 0.95 (4/22/2022) 0.66 - 1.25 mg/dL     _  Result Component Current Result Ref Range   AST 36 (4/22/2022) 0 - 45 U/L     _  Result Component Current Result Ref Range   ALT 42 (4/22/2022) 0 - 70 U/L     _  Result Component Current Result Ref Range   Bilirubin Total 0.7 (4/22/2022) 0.2 - 1.3 mg/dL     _  Result Component Current Result Ref Range   WBC Count 9.9 (4/22/2022) 4.0 - 11.0 10e3/uL     _  Result Component Current Result Ref Range   Hemoglobin 13.2 (L) (4/22/2022) 13.3 - 17.7 g/dL     _  Result Component Current Result Ref Range   Platelet Count 69 (L) (4/22/2022) 150 - 450 10e3/uL     _  Result Component Current Result Ref Range   Absolute Neutrophils 3.7 (4/15/2022) 1.6 - 8.3 10e3/uL     _  Result Component Current Result Ref Range   Absolute Neutrophils 5.9 (4/22/2022) 1.6 - 8.3 10e3/uL        Assessment & Plan:  Results are concerning for thrombocytopenia & grade 1 anemia.      Per Kristina Hong PAC on 4/22 - \"He has been tolerating somewhat poorly with nausea, diarrhea, and poor intake leading to what I presume is orthostatic dizziness.      I think these symptoms can be managed by more aggressive supportive management but I think a dose reduction could be prudent to improve tolerance and then increase dose as tolerated in the future. He requested a meeting with Dr." "Valeriano to review this and this was made earlier this week for 4/29. Okay to hold off on taking more xeloda for now.\"     Follow-Up:  4/29 appointment with Valeriano to see what next steps are with Xeloda (Decrease dose?  When to resume?  )    Zachariah CroweD  Oral Chemotherapy Monitoring Program  [facility]  [phone]        "

## 2022-04-28 ENCOUNTER — PRE VISIT (OUTPATIENT)
Dept: UROLOGY | Facility: CLINIC | Age: 77
End: 2022-04-28
Payer: MEDICARE

## 2022-04-28 NOTE — TELEPHONE ENCOUNTER
Reason for visit: consult     Relevant information: elevated PSA    Records/imaging/labs/orders: in epic    Pt called: no    At Rooming: paper rubin

## 2022-04-29 ENCOUNTER — VIRTUAL VISIT (OUTPATIENT)
Dept: ONCOLOGY | Facility: CLINIC | Age: 77
End: 2022-04-29
Attending: STUDENT IN AN ORGANIZED HEALTH CARE EDUCATION/TRAINING PROGRAM
Payer: MEDICARE

## 2022-04-29 DIAGNOSIS — C18.1 MALIGNANT NEOPLASM OF APPENDIX (H): Primary | ICD-10-CM

## 2022-04-29 PROCEDURE — 99214 OFFICE O/P EST MOD 30 MIN: CPT | Mod: 95 | Performed by: STUDENT IN AN ORGANIZED HEALTH CARE EDUCATION/TRAINING PROGRAM

## 2022-04-29 PROCEDURE — G0463 HOSPITAL OUTPT CLINIC VISIT: HCPCS | Mod: PN,RTG | Performed by: STUDENT IN AN ORGANIZED HEALTH CARE EDUCATION/TRAINING PROGRAM

## 2022-04-29 RX ORDER — ONDANSETRON 2 MG/ML
8 INJECTION INTRAMUSCULAR; INTRAVENOUS ONCE
Status: CANCELLED | OUTPATIENT
Start: 2022-05-04

## 2022-04-29 RX ORDER — EPINEPHRINE 1 MG/ML
0.3 INJECTION, SOLUTION INTRAMUSCULAR; SUBCUTANEOUS EVERY 5 MIN PRN
Status: CANCELLED | OUTPATIENT
Start: 2022-05-04

## 2022-04-29 RX ORDER — NALOXONE HYDROCHLORIDE 0.4 MG/ML
0.2 INJECTION, SOLUTION INTRAMUSCULAR; INTRAVENOUS; SUBCUTANEOUS
Status: CANCELLED | OUTPATIENT
Start: 2022-05-04

## 2022-04-29 RX ORDER — ALBUTEROL SULFATE 0.83 MG/ML
2.5 SOLUTION RESPIRATORY (INHALATION)
Status: CANCELLED | OUTPATIENT
Start: 2022-05-04

## 2022-04-29 RX ORDER — METHYLPREDNISOLONE SODIUM SUCCINATE 125 MG/2ML
125 INJECTION, POWDER, LYOPHILIZED, FOR SOLUTION INTRAMUSCULAR; INTRAVENOUS
Status: CANCELLED
Start: 2022-05-04

## 2022-04-29 RX ORDER — LORAZEPAM 2 MG/ML
0.5 INJECTION INTRAMUSCULAR EVERY 4 HOURS PRN
Status: CANCELLED | OUTPATIENT
Start: 2022-05-04

## 2022-04-29 RX ORDER — ALBUTEROL SULFATE 90 UG/1
1-2 AEROSOL, METERED RESPIRATORY (INHALATION)
Status: CANCELLED
Start: 2022-05-04

## 2022-04-29 RX ORDER — LOPERAMIDE HYDROCHLORIDE 2 MG/1
2 TABLET ORAL 4 TIMES DAILY PRN
Qty: 60 TABLET | Refills: 1 | Status: SHIPPED | OUTPATIENT
Start: 2022-04-29 | End: 2022-09-14

## 2022-04-29 RX ORDER — MEPERIDINE HYDROCHLORIDE 25 MG/ML
25 INJECTION INTRAMUSCULAR; INTRAVENOUS; SUBCUTANEOUS EVERY 30 MIN PRN
Status: CANCELLED | OUTPATIENT
Start: 2022-05-04

## 2022-04-29 RX ORDER — DIPHENHYDRAMINE HYDROCHLORIDE 50 MG/ML
50 INJECTION INTRAMUSCULAR; INTRAVENOUS
Status: CANCELLED
Start: 2022-05-04

## 2022-04-29 NOTE — PROGRESS NOTES
[facility] - Medical Oncology TeleHealth Consult Note  2022    Patient Identifiers     Name: Rod Olson  Preferred Address: [place]  Preferred Language: English  : 1945  Gender: male    Assessment and Plan     Mr. 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    Pt's treatment tolerance has been unfortunately poor, so we will dose reduce capecitabine by 25% to 1500mg BID. We will also hold oxaliplatin infusion next week in an effort to further reduce treatment side effects. For now we will plan to resume oxaliplatin on C4, but will reevaluate if pt's symptoms don't improve.    Plan:  colon CA  - DECREASE capecitabine to 1500mg BID  - HOLD oxaliplatin for at least 1 cycle  - trend CEA, labs  - monitor for diarrhea, HFSR  - RTC every 3 weeks while on treatment; pt may be seen at [place] between in-person visits with me  - plan for repeat imaging monitoring in July    The patient and family asked numerous excellent questions which I answered to the best of my abilities. At the completion of our consultation, the patient and accompanying caregivers had a strong understanding of the plan. They have our contact information for any further questions or concerns, and know to reach out for any urgent matters. Patient and family aware that they should call 911 for emergencies.       30 minutes spent on the date of the encounter doing chart review, review of test results, interpretation of tests, patient visit and documentation     Larry Bal MD, PhD   of Medicine  Division of Hematology, Oncology and Transplantation  [facility]    -----------------------------------    Oncology Summary     Cancer Staging  Malignant neoplasm of appendix (H)  Staging form: Appendix Carcinoma, AJCC 8th Edition  - Clinical stage from 2021: Stage IVC (cT4b, cN1c,  pM1c, G3) - Signed by Larry Bal MD on 1/5/2022    Current treatment(s):  - CapeOx  Treatment intent:  - palliative    Oncology History Overview Note   Pt in his USOH until [facility] 2021 [facility] months of [facility] pain. CT showed large, mucinous collections in R hemipelvis, prompting referral to surgery. Pt thought to have likely a low-grade mucinous neoplasm and underwent cytoreductive surgery, with incomplete (R2) resection. 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    On establishment with Med Onc, pt recommended to initiate FOLFOX therapy.      Malignant neoplasm of appendix (H)   12/7/2021 Initial Diagnosis    Malignant neoplasm of appendix (H)     12/7/2021 -  Cancer Staged    Staging form: Appendix Carcinoma, AJCC 8th Edition  - Clinical stage from 12/7/2021: Stage IVC (cT4b, cN1c, pM1c, G3)     3/23/2022 -  Chemotherapy    OP ONC Colorectal Cancer - Oxaliplatin / Capecitabine (XELOX, CAPOX) - EVERY 3 WEEKS  Plan Provider: Larry Bal MD  Treatment goal: Palliative  Line of treatment: First Line         Subjective/Interval Events     - significant nausea, and dizziness associated with 2 weeks of capecitabine treatment with each cycle  - abdominal bloating and flatulence w/ associated pain, headache  - diarrhea 3-4x daily; consistency variable. Occurred throughout capecitabine treatment in C1.       Review of Systems: 14 point ROS negative other than the symptoms noted above in the HPI.      Objective Data     Lab data:  I have personally reviewed the lab data, notable for:    - thrombocytopenia to 69; other labs wnl    Radiology data:  I have personally reviewed the radiology data, notable for:  - colonic mass is similar in size to prior    Pathology and other data:  I have personally reviewed and interpreted the pathology data, notable  for:    - no new data    Billing Stuff         [place] is a 77 year old who is being evaluated via a billable video visit.      How would you like to obtain your AVS? MyChart  If the video visit is dropped, the invitation should be resent by: Text to cell phone: [phone]   Will anyone else be joining your video visit? No      Video Start Time: 8:53 AM  Video-Visit Details    Type of service:  Video Visit    Video End Time:9:24 AM    Originating Location (pt. Location): Home    Distant Location (provider location):  [facility]     Platform used for Video Visit: Kyrie Kennedy

## 2022-04-29 NOTE — LETTER
2022         RE: Rod Olson  [address]  [place] [address]        Dear Colleague,    Thank you for referring your patient, Rod Olson, to the [facility]. Please see a copy of my visit note below.    [facility] - Medical Oncology TeleHealth Consult Note  2022    Patient Identifiers     Name: Rod Olson  Preferred Address: [place]  Preferred Language: English  : 1945  Gender: male    Assessment and Plan     Mr. 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    Pt's treatment tolerance has been unfortunately poor, so we will dose reduce capecitabine by 25% to 1500mg BID. We will also hold oxaliplatin infusion next week in an effort to further reduce treatment side effects. For now we will plan to resume oxaliplatin on C4, but will reevaluate if pt's symptoms don't improve.    Plan:  colon CA  - DECREASE capecitabine to 1500mg BID  - HOLD oxaliplatin for at least 1 cycle  - trend CEA, labs  - monitor for diarrhea, HFSR  - RTC every 3 weeks while on treatment; pt may be seen at [place] between in-person visits with me  - plan for repeat imaging monitoring in July    The patient and family asked numerous excellent questions which I answered to the best of my abilities. At the completion of our consultation, the patient and accompanying caregivers had a strong understanding of the plan. They have our contact information for any further questions or concerns, and know to reach out for any urgent matters. Patient and family aware that they should call 911 for emergencies.       30 minutes spent on the date of the encounter doing chart review, review of test results, interpretation of tests, patient visit and documentation     Larry Bal MD, PhD   of Medicine  Division of Hematology, Oncology and  Transplantation  [facility]    -----------------------------------    Oncology Summary     Cancer Staging  Malignant neoplasm of appendix (H)  Staging form: Appendix Carcinoma, AJCC 8th Edition  - Clinical stage from 12/7/2021: Stage IVC (cT4b, cN1c, pM1c, G3) - Signed by Larry Bal MD on 1/5/2022    Current treatment(s):  - CapeOx  Treatment intent:  - palliative    Oncology History Overview Note   Pt in his USOH until November 2021 when he presented to care at the urging of family with eight months of abdominal pain. CT showed large, mucinous collections in R hemipelvis, prompting referral to surgery. Pt thought to have likely a low-grade mucinous neoplasm and underwent cytoreductive surgery, with incomplete (R2) resection. 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    On establishment with Med Onc, pt recommended to initiate FOLFOX therapy.      Malignant neoplasm of appendix (H)   12/7/2021 Initial Diagnosis    Malignant neoplasm of appendix (H)     12/7/2021 -  Cancer Staged    Staging form: Appendix Carcinoma, AJCC 8th Edition  - Clinical stage from 12/7/2021: Stage IVC (cT4b, cN1c, pM1c, G3)     3/23/2022 -  Chemotherapy    OP ONC Colorectal Cancer - Oxaliplatin / Capecitabine (XELOX, CAPOX) - EVERY 3 WEEKS  Plan Provider: Larry Bal MD  Treatment goal: Palliative  Line of treatment: First Line         Subjective/Interval Events     - significant nausea, and dizziness associated with 2 weeks of capecitabine treatment with each cycle  - abdominal bloating and flatulence w/ associated pain, headache  - diarrhea 3-4x daily; consistency variable. Occurred throughout capecitabine treatment in C1.     Review of Systems: 14 point ROS negative other than the symptoms noted above in the HPI.    Objective Data     Lab data:  I have personally reviewed the lab data, notable for:    -  thrombocytopenia to 69; other labs wnl    Radiology data:  I have personally reviewed the radiology data, notable for:  - colonic mass is similar in size to prior    Pathology and other data:  I have personally reviewed and interpreted the pathology data, notable for:    - no new data    Billing Stuff     [place] is a 77 year old who is being evaluated via a billable video visit.      How would you like to obtain your AVS? MyChart  If the video visit is dropped, the invitation should be resent by: Text to cell phone: [phone]   Will anyone else be joining your video visit? No        Again, thank you for allowing me to participate in the care of your patient.      Sincerely,    Larry Bal MD

## 2022-05-02 DIAGNOSIS — C18.1 MALIGNANT NEOPLASM OF APPENDIX (H): Primary | ICD-10-CM

## 2022-05-02 RX ORDER — CAPECITABINE 500 MG/1
1500 TABLET, FILM COATED ORAL 2 TIMES DAILY
Qty: 140 TABLET | Refills: 0 | Status: SHIPPED | OUTPATIENT
Start: 2022-05-04 | End: 2022-05-18

## 2022-05-04 ENCOUNTER — PATIENT OUTREACH (OUTPATIENT)
Dept: ONCOLOGY | Facility: CLINIC | Age: 77
End: 2022-05-04
Payer: MEDICARE

## 2022-05-04 NOTE — PROGRESS NOTES
Received call from Representative Squires with [employer] to clarify quantity ordered on Capecitabine 500 mg tablets (take 1500 mg 2 times daily Days 1-14 then off for 7 days), sent 5/4/22 for #140 tablets. This is a dose decrease from 2000 mg BID daily Days 1-14 then off for 7 days. Reference #[id number]. Stated will release one cycle quantity of #84 if appropriate please send updated Erx, requires a call back if provider wants all #140 tablets released.    Writer called [employer] to authorize #84 tablets for this cycle, will forward to oral chemo team to update chart. Daughter Eowyn notified to call [employer] to set up delivery for 5/6, she stated pt has enough left over from previous cycle he held but will call pharmacy anyway.

## 2022-05-12 ENCOUNTER — LAB (OUTPATIENT)
Dept: LAB | Facility: CLINIC | Age: 77
End: 2022-05-12

## 2022-05-12 ENCOUNTER — TELEPHONE (OUTPATIENT)
Dept: ONCOLOGY | Facility: CLINIC | Age: 77
End: 2022-05-12

## 2022-05-12 ENCOUNTER — OFFICE VISIT (OUTPATIENT)
Dept: UROLOGY | Facility: CLINIC | Age: 77
End: 2022-05-12
Attending: STUDENT IN AN ORGANIZED HEALTH CARE EDUCATION/TRAINING PROGRAM
Payer: MEDICARE

## 2022-05-12 VITALS — DIASTOLIC BLOOD PRESSURE: 77 MMHG | SYSTOLIC BLOOD PRESSURE: 135 MMHG | HEART RATE: 97 BPM

## 2022-05-12 DIAGNOSIS — C18.1 MALIGNANT NEOPLASM OF APPENDIX (H): ICD-10-CM

## 2022-05-12 DIAGNOSIS — R97.20 ELEVATED PROSTATE SPECIFIC ANTIGEN (PSA): ICD-10-CM

## 2022-05-12 DIAGNOSIS — R97.20 ELEVATED PROSTATE SPECIFIC ANTIGEN (PSA): Primary | ICD-10-CM

## 2022-05-12 LAB — PSA SERPL-MCNC: 4.95 UG/L (ref 0–4)

## 2022-05-12 PROCEDURE — 36415 COLL VENOUS BLD VENIPUNCTURE: CPT | Performed by: PATHOLOGY

## 2022-05-12 PROCEDURE — 99213 OFFICE O/P EST LOW 20 MIN: CPT | Mod: 25 | Performed by: PHYSICIAN ASSISTANT

## 2022-05-12 PROCEDURE — 84153 ASSAY OF PSA TOTAL: CPT | Performed by: PATHOLOGY

## 2022-05-12 PROCEDURE — 51798 US URINE CAPACITY MEASURE: CPT | Performed by: PHYSICIAN ASSISTANT

## 2022-05-12 ASSESSMENT — PAIN SCALES - GENERAL: PAINLEVEL: NO PAIN (0)

## 2022-05-12 NOTE — PROGRESS NOTES
It was my pleasure to meet Mr. Rod Olson, a 77 year old year old male seen in consultation today for chief complaint: Consult (Elevated PSA)  Today he is unaccompanied     HPI: Mr. 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 Donald on 12/7/21.  He subsequently underwent a R hydrocele repair with Emely Abdi / Hayder on 1/18/22 then Evacuation of a scrotal hematoma on 1/27/22.      Currently he is being followed by Medical Oncology for palliative treatment - Dr. Bal, last seen 4/29/22.  Notes state the patient had been tolerating chemotherapy poorly.  Plan on that date was to DECREASE capecitabine to 1500mg BID and HOLD oxaliplatin for at least 1 cycle.  CEA was being trended.      The patient was referred today to Urology for elevated PSA.  Review of records shows the PSA in January was 5.45ug/L.  This was in the setting of recent abdominal surgery on 12/7/21.        Urinary symptoms:  Previously was on doxazosin. But has been off it for 3 years and noticed no change.  Urinary frequency.  Nocturia every 60-90 minutes.  Does have urgency but no urge incontinence.  With urinating, feels he may not be fully emptying.  No nocturnal enuresis  No UTis or gross hematuria.  No stones.      Today he presents for elevated PSA (1/3/22 - 5.45ug/L).  Previously PSA was 3.6 on 6/7/19  Has had pretty consistent constipation or diarrhea    REVIEW OF DIAGNOSTICS:  5/12/22 - PSA 4.95ug/L  1/3/22 - 5.45 PSA  6/7/19 - 3.6 PSA  10/18/17 - 5.0 PSA    Past Medical History:   Diagnosis Date     Former smoker      Hypertension      Mucinous cystadenoma of appendix      Obese      Obesity (BMI 30.0-34.9)        Past Surgical History:   Procedure Laterality Date     COLECTOMY RIGHT N/A 12/7/2021    Procedure: Right Hemicolectomy open;  Surgeon: Peng Donald MD;  Location:   OR     COLONOSCOPY N/A 12/3/2021    Procedure: COLONOSCOPY;  Surgeon: Gia Victoria MD;  Location: [facility]     EXPLORE GROIN N/A 1/27/2022    Procedure: Evacuation Scrotal Hematoma;  Surgeon: Darnell Lopez MD;  Location: [facility] OR     HERNIORRHAPHY INGUINAL Right 1/18/2022    Procedure: OPEN  transinguinaL  HYDROCELE REPAIR.;  Surgeon: Darnell Lopez MD;  Location: [facility] OR     LAPAROTOMY EXPLORATORY N/A 12/7/2021    Procedure: EXPLORATORY LAPAROTOMY;  Surgeon: Peng Donald MD;  Location: [facility] OR       FAMILY HISTORY: Denies family history of urologic cancer.   - Brother - prostate cancer around age 63 - possible treatment with XRT  Also lung cancer s/p resection.    - Father - asbestosis  - Mother - skin cancer    SOCIAL HISTORY:    reports that he has quit smoking. His smoking use included cigars and cigarettes. He quit after 3.00 years of use. He has never used smokeless tobacco.    Current Outpatient Medications   Medication Sig Dispense Refill     acetaminophen (TYLENOL) 500 MG tablet Take 2 tablets (1,000 mg) by mouth every 8 hours (Patient not taking: Reported on 4/22/2022) 50 tablet 0     bismuth subsalicylate (PEPTO BISMOL) 262 MG/15ML suspension Take 15 mLs by mouth every 6 hours as needed for indigestion       capecitabine (XELODA) 500 MG tablet Take 3 tablets (1,500 mg) by mouth 2 times daily Take on Days 1 through 14, then off for 7 days. 140 tablet 0     capecitabine (XELODA) 500 MG tablet Take 4 tablets (2,000 mg) by mouth 2 times daily For 14 days on then 7 days off (Patient not taking: Reported on 4/22/2022) 140 tablet 11     dexamethasone (DECADRON) 4 MG tablet Take 2 tablets (8 mg) by mouth daily Take for 2 days, starting the day after chemo. Take with food. 4 tablet 2     ferrous sulfate 140 (45 Fe) MG TBCR CR tablet Take 96 mg by mouth daily       lisinopril (ZESTRIL) 40 MG tablet Take 1 tablet (40 mg) by mouth daily (Patient taking differently: Take 40 mg by mouth daily (with  lunch)) 90 tablet 3     loperamide (IMODIUM A-D) 2 MG tablet Take 1 tablet (2 mg) by mouth 4 times daily as needed for diarrhea 60 tablet 1     loperamide (IMODIUM) 2 MG capsule Start with 2 caps (4 mg), then take one cap (2 mg) after each diarrheal stool as needed. Do not use more than 8 caps (16 mg) per day. 60 capsule 3     methocarbamol (ROBAXIN) 750 MG tablet Take 1 tablet (750 mg) by mouth 4 times daily as needed for muscle spasms 15 tablet 0     ondansetron (ZOFRAN) 8 MG tablet Take 1 tablet (8 mg) by mouth every 8 hours as needed for nausea (vomiting) (Patient not taking: Reported on 4/22/2022) 30 tablet 2     oxyCODONE (ROXICODONE) 5 MG tablet Take 1 tablet (5 mg) by mouth every 6 hours as needed for severe pain 30 tablet 0     polyethylene glycol (MIRALAX) 17 GM/Dose powder Take 1 capful by mouth daily       prochlorperazine (COMPAZINE) 10 MG tablet Take 0.5 tablets (5 mg) by mouth every 6 hours as needed for nausea or vomiting 30 tablet 2     senna-docusate (SENOKOT-S/PERICOLACE) 8.6-50 MG tablet Take 1 tablet by mouth daily as needed for constipation 15 tablet 0       ALLERGIES: Clindamycin      REVIEW OF SYSTEMS:  As above in HPI    GENERAL PHYSICAL EXAM:   Vitals: /77   Pulse 97   There is no height or weight on file to calculate BMI.    GENERAL: Well groomed, well developed, well nourished male in NAD.  NEURO: Alert and oriented x 3.  PSYCH: Normal mood and affect, pleasant and agreeable during interview and exam.     :      Inguinal:   Right incision well healed.   No tenderness.    Right testicle smaller than left, firm, nontender  Left testicle normal  No significant hydrocele  Scrotum normal     [place]:       rectal tone, small soft amount of stool in the rectal vault.      large sized prostate, without tenderness, nodules or asymmetry.    PVR: Residual urine by ultrasound was 40 ml.      RADIOLOGY: The following tests were reviewed:   CT CHEST/ABDOMEN/PELVIS WITH CONTRAST  3/21/2022 3:41  PM     CLINICAL HISTORY: Colon cancer, monitor, stage IV. Malignant neoplasm  of appendix (H).     TECHNIQUE: CT scan of the chest, abdomen, and pelvis was performed  following injection of IV contrast. Multiplanar reformats were  obtained. Dose reduction techniques were used.   CONTRAST: 100 mL [id number]     COMPARISON: 1/19/2022, 1/14/2022     FINDINGS:   LUNGS AND PLEURA: There is an irregular nodule in the left upper lobe  near the apex measuring 13 x 9 mm (4-27), previously 11 x 6 mm. A few  additional scattered subcentimeter pulmonary nodules are unchanged.  One example includes a small area of clustered nodularity in the  anterolateral left lower lobe with the dominant nodule measuring 9 x 4  mm (4-129), previously 10 x 5 mm. Another example includes a cluster  of tree-in-bud-type nodularity in the right lower lobe (4-219), which  is also unchanged. Minimal mucous plugging in the lateral right middle  lobe (4-254) is also unchanged. No new pulmonary nodules. No airspace  consolidation or pleural fluid. Central airways are patent.     MEDIASTINUM/AXILLAE: There is fusiform aneurysmal dilation of the  ascending thoracic aorta measuring 4.7 cm in AP dimension (3-69),  which is similar to comparison. Heart size normal. No pericardial  effusion. Mild coronary artery atherosclerosis is present. No enlarged  axillary, mediastinal, or hilar lymph nodes.     HEPATOBILIARY: There are several small round low-density hepatic foci  present, which are similar in size and number in comparison and most  likely represent cysts. Small area of focal fatty infiltration is seen  along the falciform ligament, which is unchanged to comparison. There  is decreased attenuation of the liver parenchyma, favoring fatty  infiltration. Liver contour is smooth. No signs of cholelithiasis,  acute cholecystitis, or bowel duct dilation.     PANCREAS: Normal.     SPLEEN: Normal.     ADRENAL GLANDS: Normal.     KIDNEYS/BLADDER: Kidneys are  normal-appearing. Urinary bladder is  decompressed and otherwise normal-appearing. Mild urinary bladder wall  thickening is likely secondary to underdistention. No gas in the  urinary bladder wall or lumen.     BOWEL: Mild colonic diverticulosis is present without evidence of  diverticulitis. Postsurgical change involving the right  hemicolon/cecum is noted. Appendix is absent. No signs of bowel  obstruction or inflammation.     PELVIC ORGANS: The prostate gland is markedly enlarged and similar to  comparison with mass effect on the base of the urinary bladder.     ADDITIONAL FINDINGS: There is a similar-appearing complex  multiloculated and multiseptated cystic mass in the right hemipelvis  measuring 11.0 x 4.7 cm (3-259), previously 10.4 x 5.8 cm. This cystic  mass is in close proximity to the resection margin of the right colon.     MUSCULOSKELETAL: Postsurgical change in the right groin is noted with  mild induration of the fat and likely scarring. There has been  significant interval improvement in postsurgical changes in the right  groin when compared to the prior CT. Previously seen subcutaneous  edema and gas have resolved.     Postsurgical changes of the left acetabulum are noted. There is a  linear metallic device/pin in the inferior presacral/perirectal space  on the left. Multilevel hypertrophic and degenerative changes of the  spine are present. No acute osseous abnormality.                                                                    IMPRESSION:  1.  Similar-appearing complex cystic mass in the right hemipelvis,  suspicious for residual/recurrent malignancy.  2.  Status post partial right colectomy/cecectomy, unchanged.  3.  Similar to slight interval enlargement of previously seen  pulmonary nodules. No new pulmonary nodules.  4.  Prostatomegaly.  5.  Additional nonacute findings as above.    LABS: The last test results for Ms. Rod Olson were reviewed.   PSA -   Lab Results   Component  Value Date    PSA 5.45 01/03/2022     BMP -   Recent Labs   Lab Test 04/22/22  1011 04/15/22  2206 04/15/22  1246 01/03/22  1639 12/10/21  0727 12/09/21  0634    137 138   < > 137 138   POTASSIUM 3.9 3.8 4.0   < > 4.0 4.1   CHLORIDE 106 104 104   < > 106 104   CO2 24 25 30   < > 26 27   BUN 17 16 17   < > 13 10   CR 0.95 0.78 0.84   < > 0.79 0.81   * 147* 103*   < > 107* 135*   NAMAN 9.5 9.0 8.8   < > 8.5 8.5   MAG 2.0  --   --   --  2.3 2.3    < > = values in this interval not displayed.       CBC -   Recent Labs   Lab Test 04/22/22  1011 04/15/22  2206 04/15/22  1246   WBC 9.9 4.6 6.1   HGB 13.2* 12.5* 12.9*   PLT 69* 192 200       ASSESSMENT:   1) atrophic right testicle following hydrocelectomy  2) elevated PSA  3) BPH with luts     PLAN:   - Checked bladderscan PVR today = 40cc.    - Recheck PSA.  If dramatically rising could check prostate MRI.  If stable or decreasing then nothing new to do.      VISIT DURATION: 26 minutes (2:37 - 2:56 + 7 minutes documentation on DOS)    Jenni Hummel PA-C  Department of Urologic Surgery

## 2022-05-12 NOTE — NURSING NOTE
Chief Complaint   Patient presents with     Consult     Elevated PSA       Blood pressure 135/77, pulse 97. There is no height or weight on file to calculate BMI.    Patient Active Problem List   Diagnosis     Elevated PSA     Chronic insomnia     Chronic cough     Allergic rhinitis     HTN (hypertension)     Hyperlipidemia     Fatigue     Malignant neoplasm of appendix (H)     Scrotal hematoma     Dehydration       Allergies   Allergen Reactions     Clindamycin Other (See Comments)     PN: psychotic       Current Outpatient Medications   Medication Sig Dispense Refill     acetaminophen (TYLENOL) 500 MG tablet Take 2 tablets (1,000 mg) by mouth every 8 hours (Patient not taking: Reported on 4/22/2022) 50 tablet 0     bismuth subsalicylate (PEPTO BISMOL) 262 MG/15ML suspension Take 15 mLs by mouth every 6 hours as needed for indigestion       capecitabine (XELODA) 500 MG tablet Take 3 tablets (1,500 mg) by mouth 2 times daily Take on Days 1 through 14, then off for 7 days. 140 tablet 0     capecitabine (XELODA) 500 MG tablet Take 4 tablets (2,000 mg) by mouth 2 times daily For 14 days on then 7 days off (Patient not taking: Reported on 4/22/2022) 140 tablet 11     dexamethasone (DECADRON) 4 MG tablet Take 2 tablets (8 mg) by mouth daily Take for 2 days, starting the day after chemo. Take with food. 4 tablet 2     ferrous sulfate 140 (45 Fe) MG TBCR CR tablet Take 96 mg by mouth daily       lisinopril (ZESTRIL) 40 MG tablet Take 1 tablet (40 mg) by mouth daily (Patient taking differently: Take 40 mg by mouth daily (with lunch)) 90 tablet 3     loperamide (IMODIUM A-D) 2 MG tablet Take 1 tablet (2 mg) by mouth 4 times daily as needed for diarrhea 60 tablet 1     loperamide (IMODIUM) 2 MG capsule Start with 2 caps (4 mg), then take one cap (2 mg) after each diarrheal stool as needed. Do not use more than 8 caps (16 mg) per day. 60 capsule 3     methocarbamol (ROBAXIN) 750 MG tablet Take 1 tablet (750 mg) by mouth 4  times daily as needed for muscle spasms 15 tablet 0     ondansetron (ZOFRAN) 8 MG tablet Take 1 tablet (8 mg) by mouth every 8 hours as needed for nausea (vomiting) (Patient not taking: Reported on 4/22/2022) 30 tablet 2     oxyCODONE (ROXICODONE) 5 MG tablet Take 1 tablet (5 mg) by mouth every 6 hours as needed for severe pain 30 tablet 0     polyethylene glycol (MIRALAX) 17 GM/Dose powder Take 1 capful by mouth daily       prochlorperazine (COMPAZINE) 10 MG tablet Take 0.5 tablets (5 mg) by mouth every 6 hours as needed for nausea or vomiting 30 tablet 2     senna-docusate (SENOKOT-S/PERICOLACE) 8.6-50 MG tablet Take 1 tablet by mouth daily as needed for constipation 15 tablet 0       Social History     Tobacco Use     Smoking status: Former Smoker     Years: 3.00     Types: Cigars, Cigarettes     Smokeless tobacco: Never Used   Vaping Use     Vaping Use: Never used   Substance Use Topics     Alcohol use: Not Currently     Drug use: Never       Giovany Bowie  5/12/2022  2:20 PM

## 2022-05-12 NOTE — ORAL ONC MGMT
Oral Chemotherapy Monitoring Program     Placed call to patient in follow up of oral chemotherapy and Bill's dose reduction to his Xeloda. Left message requesting call back. No drug names were mentioned. Will update when response received.     Caleb Torres, PharmD, BCPS, [facility]  Hematology/Oncology Clinical Pharmacist  [facility]  [phone]

## 2022-05-12 NOTE — LETTER
5/12/2022       RE: Rod Olson  [address]  [place] [facility] [address]     Dear Colleague,    Thank you for referring your patient, Rod Olson, to the [facility] [place] at [facility]. Please see a copy of my visit note below.    It was my pleasure to meet Mr. Rod Olson, a 77 year old year old male seen in consultation today for chief complaint: Consult (Elevated PSA)  Today he is unaccompanied     HPI: Mr. 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 Donald on 12/7/21.  He subsequently underwent a R hydrocele repair with Emely Abdi / Hayder on 1/18/22 then Evacuation of a scrotal hematoma on 1/27/22.      Currently he is being followed by Medical Oncology for palliative treatment - Dr. Bal, last seen 4/29/22.  Notes state the patient had been tolerating chemotherapy poorly.  Plan on that date was to DECREASE capecitabine to 1500mg BID and HOLD oxaliplatin for at least 1 cycle.  CEA was being trended.      The patient was referred today to Urology for elevated PSA.  Review of records shows the PSA in January was 5.45ug/L.  This was in the setting of recent abdominal surgery on 12/7/21.        Urinary symptoms:  Previously was on doxazosin. But has been off it for 3 years and noticed no change.  Urinary frequency.  Nocturia every 60-90 minutes.  Does have urgency but no urge incontinence.  With urinating, feels he may not be fully emptying.  No nocturnal enuresis  No UTis or gross hematuria.  No stones.      Today he presents for elevated PSA (1/3/22 - 5.45ug/L).  Previously PSA was 3.6 on 6/7/19  Has had pretty consistent constipation or diarrhea    REVIEW OF DIAGNOSTICS:  5/12/22 - PSA 4.95ug/L  1/3/22 - 5.45 PSA  6/7/19 - 3.6 PSA  10/18/17 - 5.0 PSA    Past  Medical History:   Diagnosis Date     Former smoker      Hypertension      Mucinous cystadenoma of appendix      Obese      Obesity (BMI 30.0-34.9)        Past Surgical History:   Procedure Laterality Date     COLECTOMY RIGHT N/A 12/7/2021    Procedure: Right Hemicolectomy open;  Surgeon: Peng Donald MD;  Location: [facility]     COLONOSCOPY N/A 12/3/2021    Procedure: COLONOSCOPY;  Surgeon: Gia Victoria MD;  Location: [facility] GI     EXPLORE GROIN N/A 1/27/2022    Procedure: Evacuation Scrotal Hematoma;  Surgeon: Darnell Lopez MD;  Location: [facility] OR     HERNIORRHAPHY INGUINAL Right 1/18/2022    Procedure: OPEN  transinguinaL  HYDROCELE REPAIR.;  Surgeon: Darnell Lopez MD;  Location: [facility] OR     LAPAROTOMY EXPLORATORY N/A 12/7/2021    Procedure: EXPLORATORY LAPAROTOMY;  Surgeon: Peng Donald MD;  Location: [facility] OR       FAMILY HISTORY: Denies family history of urologic cancer.   - Brother - prostate cancer around age 63 - possible treatment with XRT  Also lung cancer s/p resection.    - Father - asbestosis  - Mother - skin cancer    SOCIAL HISTORY:    reports that he has quit smoking. His smoking use included cigars and cigarettes. He quit after 3.00 years of use. He has never used smokeless tobacco.    Current Outpatient Medications   Medication Sig Dispense Refill     acetaminophen (TYLENOL) 500 MG tablet Take 2 tablets (1,000 mg) by mouth every 8 hours (Patient not taking: Reported on 4/22/2022) 50 tablet 0     bismuth subsalicylate (PEPTO BISMOL) 262 MG/15ML suspension Take 15 mLs by mouth every 6 hours as needed for indigestion       capecitabine (XELODA) 500 MG tablet Take 3 tablets (1,500 mg) by mouth 2 times daily Take on Days 1 through 14, then off for 7 days. 140 tablet 0     capecitabine (XELODA) 500 MG tablet Take 4 tablets (2,000 mg) by mouth 2 times daily For 14 days on then 7 days off (Patient not taking: Reported on 4/22/2022) 140 tablet 11     dexamethasone (DECADRON) 4 MG tablet  Take 2 tablets (8 mg) by mouth daily Take for 2 days, starting the day after chemo. Take with food. 4 tablet 2     ferrous sulfate 140 (45 Fe) MG TBCR CR tablet Take 96 mg by mouth daily       lisinopril (ZESTRIL) 40 MG tablet Take 1 tablet (40 mg) by mouth daily (Patient taking differently: Take 40 mg by mouth daily (with lunch)) 90 tablet 3     loperamide (IMODIUM A-D) 2 MG tablet Take 1 tablet (2 mg) by mouth 4 times daily as needed for diarrhea 60 tablet 1     loperamide (IMODIUM) 2 MG capsule Start with 2 caps (4 mg), then take one cap (2 mg) after each diarrheal stool as needed. Do not use more than 8 caps (16 mg) per day. 60 capsule 3     methocarbamol (ROBAXIN) 750 MG tablet Take 1 tablet (750 mg) by mouth 4 times daily as needed for muscle spasms 15 tablet 0     ondansetron (ZOFRAN) 8 MG tablet Take 1 tablet (8 mg) by mouth every 8 hours as needed for nausea (vomiting) (Patient not taking: Reported on 4/22/2022) 30 tablet 2     oxyCODONE (ROXICODONE) 5 MG tablet Take 1 tablet (5 mg) by mouth every 6 hours as needed for severe pain 30 tablet 0     polyethylene glycol (MIRALAX) 17 GM/Dose powder Take 1 capful by mouth daily       prochlorperazine (COMPAZINE) 10 MG tablet Take 0.5 tablets (5 mg) by mouth every 6 hours as needed for nausea or vomiting 30 tablet 2     senna-docusate (SENOKOT-S/PERICOLACE) 8.6-50 MG tablet Take 1 tablet by mouth daily as needed for constipation 15 tablet 0       ALLERGIES: Clindamycin      REVIEW OF SYSTEMS:  As above in HPI    GENERAL PHYSICAL EXAM:   Vitals: /77   Pulse 97   There is no height or weight on file to calculate BMI.    GENERAL: Well groomed, well developed, well nourished male in NAD.  NEURO: Alert and oriented x 3.  PSYCH: Normal mood and affect, pleasant and agreeable during interview and exam.     :      Inguinal:   Right incision well healed.   No tenderness.    Right testicle smaller than left, firm, nontender  Left testicle normal  No significant  hydrocele  Scrotum normal     [place]:       rectal tone, small soft amount of stool in the rectal vault.      large sized prostate, without tenderness, nodules or asymmetry.    PVR: Residual urine by ultrasound was 40 ml.      RADIOLOGY: The following tests were reviewed:   CT CHEST/ABDOMEN/PELVIS WITH CONTRAST  3/21/2022 3:41 PM     CLINICAL HISTORY: Colon cancer, monitor, stage IV. Malignant neoplasm  of appendix (H).     TECHNIQUE: CT scan of the chest, abdomen, and pelvis was performed  following injection of IV contrast. Multiplanar reformats were  obtained. Dose reduction techniques were used.   CONTRAST: 100 mL Isovue-370     COMPARISON: 1/19/2022, 1/14/2022     FINDINGS:   LUNGS AND PLEURA: There is an irregular nodule in the left upper lobe  near the apex measuring 13 x 9 mm (4-27), previously 11 x 6 mm. A few  additional scattered subcentimeter pulmonary nodules are unchanged.  One example includes a small area of clustered nodularity in the  anterolateral left lower lobe with the dominant nodule measuring 9 x 4  mm (4-129), previously 10 x 5 mm. Another example includes a cluster  of tree-in-bud-type nodularity in the right lower lobe (4-219), which  is also unchanged. Minimal mucous plugging in the lateral right middle  lobe (4-254) is also unchanged. No new pulmonary nodules. No airspace  consolidation or pleural fluid. Central airways are patent.     MEDIASTINUM/AXILLAE: There is fusiform aneurysmal dilation of the  ascending thoracic aorta measuring 4.7 cm in AP dimension (3-69),  which is similar to comparison. Heart size normal. No pericardial  effusion. Mild coronary artery atherosclerosis is present. No enlarged  axillary, mediastinal, or hilar lymph nodes.     HEPATOBILIARY: There are several small round low-density hepatic foci  present, which are similar in size and number in comparison and most  likely represent cysts. Small area of focal fatty infiltration is seen  along the falciform ligament,  which is unchanged to comparison. There  is decreased attenuation of the liver parenchyma, favoring fatty  infiltration. Liver contour is smooth. No signs of cholelithiasis,  acute cholecystitis, or bowel duct dilation.     PANCREAS: Normal.     SPLEEN: Normal.     ADRENAL GLANDS: Normal.     KIDNEYS/BLADDER: Kidneys are normal-appearing. Urinary bladder is  decompressed and otherwise normal-appearing. Mild urinary bladder wall  thickening is likely secondary to underdistention. No gas in the  urinary bladder wall or lumen.     BOWEL: Mild colonic diverticulosis is present without evidence of  diverticulitis. Postsurgical change involving the right  hemicolon/cecum is noted. Appendix is absent. No signs of bowel  obstruction or inflammation.     PELVIC ORGANS: The prostate gland is markedly enlarged and similar to  comparison with mass effect on the base of the urinary bladder.     ADDITIONAL FINDINGS: There is a similar-appearing complex  multiloculated and multiseptated cystic mass in the right hemipelvis  measuring 11.0 x 4.7 cm (3-259), previously 10.4 x 5.8 cm. This cystic  mass is in close proximity to the resection margin of the right colon.     MUSCULOSKELETAL: Postsurgical change in the right groin is noted with  mild induration of the fat and likely scarring. There has been  significant interval improvement in postsurgical changes in the right  groin when compared to the prior CT. Previously seen subcutaneous  edema and gas have resolved.     Postsurgical changes of the left acetabulum are noted. There is a  linear metallic device/pin in the inferior presacral/perirectal space  on the left. Multilevel hypertrophic and degenerative changes of the  spine are present. No acute osseous abnormality.                                                                    IMPRESSION:  1.  Similar-appearing complex cystic mass in the right hemipelvis,  suspicious for residual/recurrent malignancy.  2.  Status post  partial right colectomy/cecectomy, unchanged.  3.  Similar to slight interval enlargement of previously seen  pulmonary nodules. No new pulmonary nodules.  4.  Prostatomegaly.  5.  Additional nonacute findings as above.    LABS: The last test results for Ms. Rod Olson were reviewed.   PSA -   Lab Results   Component Value Date    PSA 5.45 01/03/2022     BMP -   Recent Labs   Lab Test 04/22/22  1011 04/15/22  2206 04/15/22  1246 01/03/22  1639 12/10/21  0727 12/09/21  0634    137 138   < > 137 138   POTASSIUM 3.9 3.8 4.0   < > 4.0 4.1   CHLORIDE 106 104 104   < > 106 104   CO2 24 25 30   < > 26 27   BUN 17 16 17   < > 13 10   CR 0.95 0.78 0.84   < > 0.79 0.81   * 147* 103*   < > 107* 135*   NAMAN 9.5 9.0 8.8   < > 8.5 8.5   MAG 2.0  --   --   --  2.3 2.3    < > = values in this interval not displayed.       CBC -   Recent Labs   Lab Test 04/22/22  1011 04/15/22  2206 04/15/22  1246   WBC 9.9 4.6 6.1   HGB 13.2* 12.5* 12.9*   PLT 69* 192 200       ASSESSMENT:   1) atrophic right testicle following hydrocelectomy  2) elevated PSA  3) BPH with luts     PLAN:   - Checked bladderscan PVR today = 40cc.    - Recheck PSA.  If dramatically rising could check prostate MRI.  If stable or decreasing then nothing new to do.      VISIT DURATION: 26 minutes (2:37 - 2:56 + 7 minutes documentation on DOS)    Jenni Hummel PA-C  Department of Urologic Surgery

## 2022-05-16 ENCOUNTER — TELEPHONE (OUTPATIENT)
Dept: ONCOLOGY | Facility: CLINIC | Age: 77
End: 2022-05-16
Payer: MEDICARE

## 2022-05-16 NOTE — ORAL ONC MGMT
"Oral Chemotherapy Monitoring Program    Subjective/Objective:  Rod Olson is a 77 year old male contacted by phone for a follow-up visit for oral chemotherapy and recent dose reduction. Talked with patient's daughter, Thuan. She reports her father is tolerating Xeloda \"so much better\" at reduced dose of 1500 mg po BID. She reports his GI issues are resolving and he has much more energy.     ORAL CHEMOTHERAPY 4/1/2022 4/5/2022 4/15/2022 4/25/2022 5/2/2022 5/12/2022 5/16/2022   Assessment Type Left Voicemail - Lab Monitoring Lab Monitoring Chart Review;Refill Left Voicemail Other   Diagnosis Code Appendix Cancer Appendix Cancer Appendix Cancer Appendix Cancer Appendix Cancer Appendix Cancer Appendix Cancer   Providers Dr. Valeriano Bal   [facility]   Drug Name Xeloda (capecitabine) Xeloda (capecitabine) Xeloda (capecitabine) Xeloda (capecitabine) Xeloda (capecitabine) Xeloda (capecitabine) Xeloda (capecitabine)   Dose 2,000 mg 2,000 mg 2,000 mg 2,000 mg 1,500 mg - 1,500 mg   Current Schedule BID BID BID BID BID - BID   Cycle Details 2 weeks on, 1 week off 2 weeks on, 1 week off 2 weeks on, 1 week off Drug on Hold 2 weeks on, 1 week off - 2 weeks on, 1 week off   Start Date of Last Cycle - 3/25/2022 4/15/2022 - - - -   Planned next cycle start date - - 5/6/2022 - 5/6/2022 - 5/6/2022   Doses missed in last 2 weeks - 1 - - - - -   Adherence Assessment - Adherent - - - - -   Adverse Effects - Nausea;Diarrhea;Headache - - - - -   Nausea - Grade 1 - - - - -   Pharmacist Intervention(nausea) - Yes - - - - -   Intervention(s) - Patient education - - - - -   Diarrhea - Grade 1 - - - - -   Pharmacist Intervention(diarrhea) - Yes - - - - -   Intervention(s) - Patient education;OTC recommendation - - - - -   Headache - Grade 1 - - - - -   Pharmacist Intervention(headache) - Yes - - - - - " "  Intervention(s) - Referral to PCP - - - - -   Any new drug interactions? - - - - - - -   Pharmacist Intervention? - - - - - - -   Intervention(s) - - - - - - -   Is the dose as ordered appropriate for the patient? - - - - - - -       Last PHQ-2 Score on record:   PHQ-2 ( 1999 [employer]) 1/14/2022 1/7/2022   Q1: Little interest or pleasure in doing things 0 0   Q2: Feeling down, depressed or hopeless 0 0   PHQ-2 Score 0 0   Q1: Little interest or pleasure in doing things - -   Q2: Feeling down, depressed or hopeless - -   PHQ-2 Score - -       Vitals:  BP:   BP Readings from Last 1 Encounters:   05/12/22 135/77     Wt Readings from Last 1 Encounters:   04/22/22 100 kg (220 lb 8 oz)     Estimated body surface area is 2.24 meters squared as calculated from the following:    Height as of 2/11/22: 1.803 m (5' 11\").    Weight as of 4/22/22: 100 kg (220 lb 8 oz).    Labs:  _  Result Component Current Result Ref Range   Sodium 139 (4/22/2022) 133 - 144 mmol/L     _  Result Component Current Result Ref Range   Potassium 3.9 (4/22/2022) 3.4 - 5.3 mmol/L     _  Result Component Current Result Ref Range   Calcium 9.5 (4/22/2022) 8.5 - 10.1 mg/dL     _  Result Component Current Result Ref Range   Magnesium 2.0 (4/22/2022) 1.6 - 2.3 mg/dL     No results found for Phos within last 30 days.     _  Result Component Current Result Ref Range   Albumin 3.4 (4/22/2022) 3.4 - 5.0 g/dL     _  Result Component Current Result Ref Range   Urea Nitrogen 17 (4/22/2022) 7 - 30 mg/dL     _  Result Component Current Result Ref Range   Creatinine 0.95 (4/22/2022) 0.66 - 1.25 mg/dL     _  Result Component Current Result Ref Range   AST 36 (4/22/2022) 0 - 45 U/L     _  Result Component Current Result Ref Range   ALT 42 (4/22/2022) 0 - 70 U/L     _  Result Component Current Result Ref Range   Bilirubin Total 0.7 (4/22/2022) 0.2 - 1.3 mg/dL     _  Result Component Current Result Ref Range   WBC Count 9.9 (4/22/2022) 4.0 - 11.0 10e3/uL     _  Result " Component Current Result Ref Range   Hemoglobin 13.2 (L) (4/22/2022) 13.3 - 17.7 g/dL     _  Result Component Current Result Ref Range   Platelet Count 69 (L) (4/22/2022) 150 - 450 10e3/uL     No results found for ANC within last 30 days.     _  Result Component Current Result Ref Range   Absolute Neutrophils 5.9 (4/22/2022) 1.6 - 8.3 10e3/uL          Assessment/Plan:  Mr Olson tolerating Xeloda at reduced dose of 1500 mg po BID - continue taking as prescribed.     Follow-Up:  5/27 for labs, provider visit and infusion appointment    Briseyda Cottrell, PharmD, [facility]  Oral Chemotherapy Monitoring Program  [facility]  [phone]  May 16, 2022

## 2022-05-17 DIAGNOSIS — C18.1 MALIGNANT NEOPLASM OF APPENDIX (H): Primary | ICD-10-CM

## 2022-05-17 RX ORDER — DIPHENHYDRAMINE HYDROCHLORIDE 50 MG/ML
50 INJECTION INTRAMUSCULAR; INTRAVENOUS
Status: CANCELLED
Start: 2022-05-25

## 2022-05-17 RX ORDER — ALBUTEROL SULFATE 90 UG/1
1-2 AEROSOL, METERED RESPIRATORY (INHALATION)
Status: CANCELLED
Start: 2022-05-25

## 2022-05-17 RX ORDER — LORAZEPAM 2 MG/ML
0.5 INJECTION INTRAMUSCULAR EVERY 4 HOURS PRN
Status: CANCELLED | OUTPATIENT
Start: 2022-05-25

## 2022-05-17 RX ORDER — NALOXONE HYDROCHLORIDE 0.4 MG/ML
0.2 INJECTION, SOLUTION INTRAMUSCULAR; INTRAVENOUS; SUBCUTANEOUS
Status: CANCELLED | OUTPATIENT
Start: 2022-05-25

## 2022-05-17 RX ORDER — METHYLPREDNISOLONE SODIUM SUCCINATE 125 MG/2ML
125 INJECTION, POWDER, LYOPHILIZED, FOR SOLUTION INTRAMUSCULAR; INTRAVENOUS
Status: CANCELLED
Start: 2022-05-25

## 2022-05-17 RX ORDER — ONDANSETRON 2 MG/ML
8 INJECTION INTRAMUSCULAR; INTRAVENOUS ONCE
Status: CANCELLED | OUTPATIENT
Start: 2022-05-25

## 2022-05-17 RX ORDER — ALBUTEROL SULFATE 0.83 MG/ML
2.5 SOLUTION RESPIRATORY (INHALATION)
Status: CANCELLED | OUTPATIENT
Start: 2022-05-25

## 2022-05-17 RX ORDER — EPINEPHRINE 1 MG/ML
0.3 INJECTION, SOLUTION INTRAMUSCULAR; SUBCUTANEOUS EVERY 5 MIN PRN
Status: CANCELLED | OUTPATIENT
Start: 2022-05-25

## 2022-05-17 RX ORDER — MEPERIDINE HYDROCHLORIDE 25 MG/ML
25 INJECTION INTRAMUSCULAR; INTRAVENOUS; SUBCUTANEOUS EVERY 30 MIN PRN
Status: CANCELLED | OUTPATIENT
Start: 2022-05-25

## 2022-05-18 DIAGNOSIS — C18.1 MALIGNANT NEOPLASM OF APPENDIX (H): Primary | ICD-10-CM

## 2022-05-18 RX ORDER — CAPECITABINE 500 MG/1
1500 TABLET, FILM COATED ORAL 2 TIMES DAILY
Qty: 84 TABLET | Refills: 0 | Status: SHIPPED | OUTPATIENT
Start: 2022-05-27 | End: 2022-07-19

## 2022-05-25 ENCOUNTER — PATIENT OUTREACH (OUTPATIENT)
Dept: ONCOLOGY | Facility: CLINIC | Age: 77
End: 2022-05-25
Payer: MEDICARE

## 2022-05-25 NOTE — PROGRESS NOTES
Call made to daughter Eowyn regarding future infusions; she indicated to scheduling when contacted, that pt was only on oral chemo at this time. Per Dr. Bal: held 5/6 cycle 3 pt to resume on 5/27 after visit with Deysi Reese if all labs within normal and pt feeling well. Future infusions will be attempted to be scheduled at [place] location if available.    Reached Eowyn's vm and lmcb.

## 2022-05-27 NOTE — PROGRESS NOTES
Pt no-showed to visits today at [facility] (NGA, labs, chemo). May hold 6/7 infusion for pt, this will push out his future visits by one week.    No answer on pt's cell phone. Called daughter Eowyn again and left vm to call back. Also sent [employer] message.

## 2022-06-01 ENCOUNTER — PATIENT OUTREACH (OUTPATIENT)
Dept: ONCOLOGY | Facility: CLINIC | Age: 77
End: 2022-06-01
Payer: MEDICARE

## 2022-06-01 NOTE — PROGRESS NOTES
Received vm from daughter Eowaraceli that pt will be unable to attend visit with Dr. Bal 6/9, asked for next available appointment. Pt had restarted Xeloda 5/28 but had not had labs checked since 4/22 so Dr. Bal had wanted pt to hold until he was seen back in clinic with labs. Stated on vm she is available 6/10.    Called Eowyn back and reached her vm; left message informing her next available appointment with Dr. Bal is 6/24; Deysi Reese does have a 9am available on 6/10, will hold this slot for pt and asked Eowyn to make a lab appointment at [place] for him prior to this visit. Will leave future visits (6/17, 7/8) alone for now pending visit with Deysi on 6/10.     Waiting to hear back from Eowyn if they want to go ahead with scheduling 6/10 with Deysi, 6/24 with Dr. Bal.

## 2022-06-02 ENCOUNTER — LAB (OUTPATIENT)
Dept: LAB | Facility: CLINIC | Age: 77
End: 2022-06-02
Payer: MEDICARE

## 2022-06-02 DIAGNOSIS — C18.1 MALIGNANT NEOPLASM OF APPENDIX (H): ICD-10-CM

## 2022-06-02 LAB
ALBUMIN SERPL-MCNC: 3.9 G/DL (ref 3.4–5)
ALP SERPL-CCNC: 74 U/L (ref 40–150)
ALT SERPL W P-5'-P-CCNC: 24 U/L (ref 0–70)
ANION GAP SERPL CALCULATED.3IONS-SCNC: 5 MMOL/L (ref 3–14)
AST SERPL W P-5'-P-CCNC: 22 U/L (ref 0–45)
BASOPHILS # BLD AUTO: 0 10E3/UL (ref 0–0.2)
BASOPHILS NFR BLD AUTO: 0 %
BILIRUB SERPL-MCNC: 0.7 MG/DL (ref 0.2–1.3)
BUN SERPL-MCNC: 16 MG/DL (ref 7–30)
CALCIUM SERPL-MCNC: 9.3 MG/DL (ref 8.5–10.1)
CHLORIDE BLD-SCNC: 105 MMOL/L (ref 94–109)
CO2 SERPL-SCNC: 30 MMOL/L (ref 20–32)
CREAT SERPL-MCNC: 0.92 MG/DL (ref 0.66–1.25)
EOSINOPHIL # BLD AUTO: 0.1 10E3/UL (ref 0–0.7)
EOSINOPHIL NFR BLD AUTO: 1 %
ERYTHROCYTE [DISTWIDTH] IN BLOOD BY AUTOMATED COUNT: 20.5 % (ref 10–15)
GFR SERPL CREATININE-BSD FRML MDRD: 85 ML/MIN/1.73M2
GLUCOSE BLD-MCNC: 90 MG/DL (ref 70–99)
HCT VFR BLD AUTO: 39.5 % (ref 40–53)
HGB BLD-MCNC: 13 G/DL (ref 13.3–17.7)
LYMPHOCYTES # BLD AUTO: 1.8 10E3/UL (ref 0.8–5.3)
LYMPHOCYTES NFR BLD AUTO: 33 %
MCH RBC QN AUTO: 30.2 PG (ref 26.5–33)
MCHC RBC AUTO-ENTMCNC: 32.9 G/DL (ref 31.5–36.5)
MCV RBC AUTO: 92 FL (ref 78–100)
MONOCYTES # BLD AUTO: 0.7 10E3/UL (ref 0–1.3)
MONOCYTES NFR BLD AUTO: 13 %
NEUTROPHILS # BLD AUTO: 2.9 10E3/UL (ref 1.6–8.3)
NEUTROPHILS NFR BLD AUTO: 53 %
PLATELET # BLD AUTO: 178 10E3/UL (ref 150–450)
POTASSIUM BLD-SCNC: 4.4 MMOL/L (ref 3.4–5.3)
PROT SERPL-MCNC: 7.5 G/DL (ref 6.8–8.8)
RBC # BLD AUTO: 4.31 10E6/UL (ref 4.4–5.9)
SODIUM SERPL-SCNC: 140 MMOL/L (ref 133–144)
WBC # BLD AUTO: 5.4 10E3/UL (ref 4–11)

## 2022-06-02 PROCEDURE — 85025 COMPLETE CBC W/AUTO DIFF WBC: CPT

## 2022-06-02 PROCEDURE — 36415 COLL VENOUS BLD VENIPUNCTURE: CPT

## 2022-06-02 PROCEDURE — 82378 CARCINOEMBRYONIC ANTIGEN: CPT

## 2022-06-02 PROCEDURE — 80053 COMPREHEN METABOLIC PANEL: CPT

## 2022-06-02 NOTE — PROGRESS NOTES
Per [employer] sent by [facility], pt will have labs checked 6/2/2022 and attend virtual visit with Deysi on 6/10, will schedule 6/24 with Dr. Bal. Leaving 6/17 infusion appointment as is.

## 2022-06-03 LAB — CEA SERPL-MCNC: 2.8 UG/L (ref 0–2.5)

## 2022-06-10 ENCOUNTER — VIRTUAL VISIT (OUTPATIENT)
Dept: ONCOLOGY | Facility: CLINIC | Age: 77
End: 2022-06-10
Attending: PHYSICIAN ASSISTANT
Payer: MEDICARE

## 2022-06-10 DIAGNOSIS — C18.1 MALIGNANT NEOPLASM OF APPENDIX (H): Primary | ICD-10-CM

## 2022-06-10 PROCEDURE — 99214 OFFICE O/P EST MOD 30 MIN: CPT | Mod: 95 | Performed by: PHYSICIAN ASSISTANT

## 2022-06-10 PROCEDURE — G0463 HOSPITAL OUTPT CLINIC VISIT: HCPCS | Mod: PN,RTG | Performed by: PHYSICIAN ASSISTANT

## 2022-06-10 RX ORDER — MEPERIDINE HYDROCHLORIDE 25 MG/ML
25 INJECTION INTRAMUSCULAR; INTRAVENOUS; SUBCUTANEOUS EVERY 30 MIN PRN
Status: CANCELLED | OUTPATIENT
Start: 2022-06-15

## 2022-06-10 RX ORDER — ONDANSETRON 2 MG/ML
8 INJECTION INTRAMUSCULAR; INTRAVENOUS ONCE
Status: CANCELLED | OUTPATIENT
Start: 2022-06-15

## 2022-06-10 RX ORDER — NALOXONE HYDROCHLORIDE 0.4 MG/ML
0.2 INJECTION, SOLUTION INTRAMUSCULAR; INTRAVENOUS; SUBCUTANEOUS
Status: CANCELLED | OUTPATIENT
Start: 2022-06-15

## 2022-06-10 RX ORDER — LORAZEPAM 2 MG/ML
0.5 INJECTION INTRAMUSCULAR EVERY 4 HOURS PRN
Status: CANCELLED | OUTPATIENT
Start: 2022-06-15

## 2022-06-10 RX ORDER — OXYCODONE HYDROCHLORIDE 5 MG/1
5 TABLET ORAL EVERY 6 HOURS PRN
Qty: 30 TABLET | Refills: 0 | Status: SHIPPED | OUTPATIENT
Start: 2022-06-10 | End: 2022-07-22

## 2022-06-10 RX ORDER — EPINEPHRINE 1 MG/ML
0.3 INJECTION, SOLUTION INTRAMUSCULAR; SUBCUTANEOUS EVERY 5 MIN PRN
Status: CANCELLED | OUTPATIENT
Start: 2022-06-15

## 2022-06-10 RX ORDER — METHYLPREDNISOLONE SODIUM SUCCINATE 125 MG/2ML
125 INJECTION, POWDER, LYOPHILIZED, FOR SOLUTION INTRAMUSCULAR; INTRAVENOUS
Status: CANCELLED
Start: 2022-06-15

## 2022-06-10 RX ORDER — ALBUTEROL SULFATE 90 UG/1
1-2 AEROSOL, METERED RESPIRATORY (INHALATION)
Status: CANCELLED
Start: 2022-06-15

## 2022-06-10 RX ORDER — DIPHENHYDRAMINE HYDROCHLORIDE 50 MG/ML
50 INJECTION INTRAMUSCULAR; INTRAVENOUS
Status: CANCELLED
Start: 2022-06-15

## 2022-06-10 RX ORDER — ALBUTEROL SULFATE 0.83 MG/ML
2.5 SOLUTION RESPIRATORY (INHALATION)
Status: CANCELLED | OUTPATIENT
Start: 2022-06-15

## 2022-06-10 NOTE — PROGRESS NOTES
Manas is a 77 year old who is being evaluated via a billable video visit.      How would you like to obtain your AVS? MyChart  If the video visit is dropped, the invitation should be resent by: Text to cell phone: [phone]  Will anyone else be joining your video visit?  Daughter Thuan Welch, Virtual Facilitator/LPN    Video Start Time: 9:03am  Video-Visit Details    Type of service:  Video Visit    Video End Time:9:20am    Originating Location (pt. Location): Home    Distant Location (provider location):  [facility]     Platform used for Video Visit: [facility] - Medical Oncology Follow-Up Consult Note  06/10/22       Patient Identifiers      Name: Rod Olson  Preferred Address: [address]  Preferred Language: English  : 1945  Gender: male     Oncology Summary      Cancer Staging  Malignant neoplasm of appendix (H)  Staging form: Appendix Carcinoma, AJCC 8th Edition  - Clinical stage from 2021: Stage IVC (cT4b, cN1c, pM1c, G3) - Signed by Larry Bal MD on 2022     Current treatment(s):  - CapeOx  Treatment intent:  - palliative          Oncology History Overview Note    Pt in his USOH until 2021 when he presented to care at the urging of family with eight months of abdominal pain. CT showed large, mucinous collections in R hemipelvis, prompting referral to surgery. Pt thought to have likely a low-grade mucinous neoplasm and underwent cytoreductive surgery, with incomplete (R2) resection. 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     On establishment with Med Onc, pt recommended to initiate FOLFOX therapy.       Malignant neoplasm of appendix (H)    2021 Initial Diagnosis      Malignant neoplasm of appendix (H)       2021 -  Cancer Staged      Staging form: Appendix Carcinoma, AJCC 8th Edition  -  Clinical stage from 12/7/2021: Stage IVC (cT4b, cN1c, pM1c, G3)       3/23/2022 -  Chemotherapy      OP ONC Colorectal Cancer - Oxaliplatin / Capecitabine (XELOX, CAPOX) - EVERY 3 WEEKS  Plan Provider: Larry Bal MD  Treatment goal: Palliative  Line of treatment: First Line             Subjective/Interval Events   Today, Manas is seen virtually with his daughter.  He states that he has been doing better on just the Xeloda.  He is unclear about when exactly he restarted, and he did take a few day break as he did not see your message about restarting.  He thinks he will be done next week.  He is taking 6 pills a day.  With his prior cycle, he decided to take 7 pills a day, 3 in the morning, 4 in the afternoon just because he had some extra pills left over.  He felt that he tolerated that well.  He is not having any diarrhea right now.  He has having 1 loose stool a day.  He is not having any nausea or vomiting.  He is not having fevers body aches or chills.  No redness or cracking or blistering on his hands or feet.  He is able to eat and drink regularly.    Manas does not want to have additional infusions, he felt that they were to tough for him to manage.    He denies any abdominal pain currently.     Physical Exam      General: Resting comfortably in no acute distress  Head: Normocephalic, atraumatic   EENT: No scleral icteris, EOMS intact.   Lung: Normal respiratory effort. Speaking fluently without shortness of breath. No audible wheezes or coughing.   Neuro: AAO x3. Moving upper extremities equally and symmetrically   Skin: Warm, dry, intact. No rashes, no suspicious lesions. No petechia or bruising noted on visible skin     The rest of a comprehensive physical examination is deferred due to PHE (public health emergency) video restrictions             Objective Data      Lab data:  Most Recent 3 CBC's:Recent Labs   Lab Test 06/02/22  1353 04/22/22  1011 04/15/22  2206   WBC 5.4 9.9 4.6   HGB 13.0* 13.2* 12.5*    MCV 92 81 82    69* 192    Most Recent 3 BMP's:  Recent Labs   Lab Test 06/02/22  1353 04/22/22  1011 04/15/22  2206    139 137   POTASSIUM 4.4 3.9 3.8   CHLORIDE 105 106 104   CO2 30 24 25   BUN 16 17 16   CR 0.92 0.95 0.78   ANIONGAP 5 9 8   NAMAN 9.3 9.5 9.0   GLC 90 138* 147*    Most Recent 2 LFT's:  Recent Labs   Lab Test 06/02/22  1353 04/22/22  1011   AST 22 36   ALT 24 42   ALKPHOS 74 81   BILITOTAL 0.7 0.7      I reviewed the above labs today.       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 He is currently on palliative intent XELOX s/p three cycles. He had been tolerating somewhat poorly with nausea, diarrhea, and poor intake.     Cycle 3 was Xeloda only, dose reduced. He is currently in the middle of cycle 4. Tolerating much better      - Continue capecitabine to 1500mg BID this will be complete on 6/17.  Counseled Bill about not taking any extra pills unless he is instructed by us.  Only 3 pills twice daily.  Advised him to get a calendar so we can keep closer track of how he is managing his medications.  -Continue to hold oxaliplatin for this cycle.  To discuss with Dr. Bal at his next visit about adding it in the next cycle, I think he may able to tolerate it with the dose reduction.          Nausea. He receives IV Emend/Zofran/dex with IV chemotherapy. Continue antiemetics PRN for now.   Currently none while on xeloda      Diarrhea: Currently resolved      Cancer related pain: oxycodone every few days, refilled today       Patient will call in the interim with any questions or concerns. They voice understanding and agree with the above plan.     30 minutes spent on the date of the encounter doing chart review, review of test results, interpretation of tests, patient visit and documentation     Deysi Reese PA-C

## 2022-06-10 NOTE — NURSING NOTE
Chief Complaint   Patient presents with     Video Visit     Malignant neoplasm of appendix     Angelica Welch, Virtual Facilitator/LPN

## 2022-06-13 DIAGNOSIS — C18.1 MALIGNANT NEOPLASM OF APPENDIX (H): Primary | ICD-10-CM

## 2022-06-13 RX ORDER — CAPECITABINE 500 MG/1
1500 TABLET, FILM COATED ORAL 2 TIMES DAILY
Qty: 84 TABLET | Refills: 0 | Status: SHIPPED | OUTPATIENT
Start: 2022-06-15 | End: 2022-07-19

## 2022-06-24 ENCOUNTER — ONCOLOGY VISIT (OUTPATIENT)
Dept: ONCOLOGY | Facility: CLINIC | Age: 77
End: 2022-06-24
Attending: STUDENT IN AN ORGANIZED HEALTH CARE EDUCATION/TRAINING PROGRAM
Payer: MEDICARE

## 2022-06-24 ENCOUNTER — APPOINTMENT (OUTPATIENT)
Dept: LAB | Facility: CLINIC | Age: 77
End: 2022-06-24
Attending: STUDENT IN AN ORGANIZED HEALTH CARE EDUCATION/TRAINING PROGRAM
Payer: MEDICARE

## 2022-06-24 VITALS
WEIGHT: 227 LBS | DIASTOLIC BLOOD PRESSURE: 77 MMHG | BODY MASS INDEX: 31.66 KG/M2 | SYSTOLIC BLOOD PRESSURE: 118 MMHG | OXYGEN SATURATION: 95 % | RESPIRATION RATE: 16 BRPM | TEMPERATURE: 97 F | HEART RATE: 79 BPM

## 2022-06-24 DIAGNOSIS — C18.1 MALIGNANT NEOPLASM OF APPENDIX (H): Primary | ICD-10-CM

## 2022-06-24 DIAGNOSIS — N43.3 CHRONIC HYDROCELE: ICD-10-CM

## 2022-06-24 DIAGNOSIS — Z13.6 SCREENING FOR CARDIOVASCULAR CONDITION: ICD-10-CM

## 2022-06-24 LAB
ABO/RH(D): NORMAL
ALBUMIN SERPL BCG-MCNC: 4 G/DL (ref 3.5–5.2)
ALP SERPL-CCNC: 78 U/L (ref 40–129)
ALT SERPL W P-5'-P-CCNC: 13 U/L (ref 10–50)
ANION GAP SERPL CALCULATED.3IONS-SCNC: 11 MMOL/L (ref 7–15)
ANTIBODY SCREEN: NEGATIVE
AST SERPL W P-5'-P-CCNC: 21 U/L (ref 10–50)
BILIRUB SERPL-MCNC: 0.4 MG/DL
BUN SERPL-MCNC: 26.1 MG/DL (ref 8–23)
CALCIUM SERPL-MCNC: 9.2 MG/DL (ref 8.8–10.2)
CEA SERPL-MCNC: 3.4 NG/ML
CHLORIDE SERPL-SCNC: 101 MMOL/L (ref 98–107)
CHOLEST SERPL-MCNC: 149 MG/DL
CREAT SERPL-MCNC: 1.03 MG/DL (ref 0.67–1.17)
DEPRECATED HCO3 PLAS-SCNC: 25 MMOL/L (ref 22–29)
FASTING STATUS PATIENT QL REPORTED: ABNORMAL
GFR SERPL CREATININE-BSD FRML MDRD: 74 ML/MIN/1.73M2
GLUCOSE SERPL-MCNC: 106 MG/DL (ref 70–99)
HDLC SERPL-MCNC: 31 MG/DL
LDLC SERPL CALC-MCNC: 49 MG/DL
NONHDLC SERPL-MCNC: 118 MG/DL
POTASSIUM SERPL-SCNC: 4.1 MMOL/L (ref 3.4–5.3)
PROT SERPL-MCNC: 6.6 G/DL (ref 6.4–8.3)
SODIUM SERPL-SCNC: 137 MMOL/L (ref 136–145)
SPECIMEN EXPIRATION DATE: NORMAL
TRIGL SERPL-MCNC: 347 MG/DL

## 2022-06-24 PROCEDURE — 36415 COLL VENOUS BLD VENIPUNCTURE: CPT | Performed by: STUDENT IN AN ORGANIZED HEALTH CARE EDUCATION/TRAINING PROGRAM

## 2022-06-24 PROCEDURE — 80053 COMPREHEN METABOLIC PANEL: CPT | Performed by: STUDENT IN AN ORGANIZED HEALTH CARE EDUCATION/TRAINING PROGRAM

## 2022-06-24 PROCEDURE — G0463 HOSPITAL OUTPT CLINIC VISIT: HCPCS

## 2022-06-24 PROCEDURE — 82378 CARCINOEMBRYONIC ANTIGEN: CPT | Performed by: STUDENT IN AN ORGANIZED HEALTH CARE EDUCATION/TRAINING PROGRAM

## 2022-06-24 PROCEDURE — 80061 LIPID PANEL: CPT | Performed by: STUDENT IN AN ORGANIZED HEALTH CARE EDUCATION/TRAINING PROGRAM

## 2022-06-24 PROCEDURE — 86850 RBC ANTIBODY SCREEN: CPT | Performed by: STUDENT IN AN ORGANIZED HEALTH CARE EDUCATION/TRAINING PROGRAM

## 2022-06-24 PROCEDURE — 86901 BLOOD TYPING SEROLOGIC RH(D): CPT | Performed by: STUDENT IN AN ORGANIZED HEALTH CARE EDUCATION/TRAINING PROGRAM

## 2022-06-24 PROCEDURE — 99214 OFFICE O/P EST MOD 30 MIN: CPT | Performed by: STUDENT IN AN ORGANIZED HEALTH CARE EDUCATION/TRAINING PROGRAM

## 2022-06-24 RX ORDER — LORAZEPAM 2 MG/ML
0.5 INJECTION INTRAMUSCULAR EVERY 4 HOURS PRN
Status: CANCELLED | OUTPATIENT
Start: 2022-07-06

## 2022-06-24 RX ORDER — ALBUTEROL SULFATE 90 UG/1
1-2 AEROSOL, METERED RESPIRATORY (INHALATION)
Status: CANCELLED
Start: 2022-07-06

## 2022-06-24 RX ORDER — EPINEPHRINE 1 MG/ML
0.3 INJECTION, SOLUTION INTRAMUSCULAR; SUBCUTANEOUS EVERY 5 MIN PRN
Status: CANCELLED | OUTPATIENT
Start: 2022-07-06

## 2022-06-24 RX ORDER — ALBUTEROL SULFATE 0.83 MG/ML
2.5 SOLUTION RESPIRATORY (INHALATION)
Status: CANCELLED | OUTPATIENT
Start: 2022-07-06

## 2022-06-24 RX ORDER — ONDANSETRON 2 MG/ML
8 INJECTION INTRAMUSCULAR; INTRAVENOUS ONCE
Status: CANCELLED | OUTPATIENT
Start: 2022-07-06

## 2022-06-24 RX ORDER — DIPHENHYDRAMINE HYDROCHLORIDE 50 MG/ML
50 INJECTION INTRAMUSCULAR; INTRAVENOUS
Status: CANCELLED
Start: 2022-07-06

## 2022-06-24 RX ORDER — MEPERIDINE HYDROCHLORIDE 25 MG/ML
25 INJECTION INTRAMUSCULAR; INTRAVENOUS; SUBCUTANEOUS EVERY 30 MIN PRN
Status: CANCELLED | OUTPATIENT
Start: 2022-07-06

## 2022-06-24 RX ORDER — NALOXONE HYDROCHLORIDE 0.4 MG/ML
0.2 INJECTION, SOLUTION INTRAMUSCULAR; INTRAVENOUS; SUBCUTANEOUS
Status: CANCELLED | OUTPATIENT
Start: 2022-07-06

## 2022-06-24 RX ORDER — METHYLPREDNISOLONE SODIUM SUCCINATE 125 MG/2ML
125 INJECTION, POWDER, LYOPHILIZED, FOR SOLUTION INTRAMUSCULAR; INTRAVENOUS
Status: CANCELLED
Start: 2022-07-06

## 2022-06-24 ASSESSMENT — PAIN SCALES - GENERAL: PAINLEVEL: NO PAIN (0)

## 2022-06-24 NOTE — PROGRESS NOTES
[facility] - Medical Oncology Follow-Up Consult Note  2022    Patient Identifiers     Name: Rod Olson  Preferred Address: [place]  Preferred Language: English  : 1945  Gender: male    Assessment and Plan     Mr. 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    Pt has continued to tolerate capecitabine monotherapy poorly, requiring prolonged treatment interruption and continuing to have significant GI symptoms. Thus, we will alter capecitabine therapy to every other week in an attempt to ease treatment tolerance and adherence. We will continue to monitor with labs and clinical exams every 3 weeks, along with imaging every 3 months.     Plan:  Signet ring HAMN  - cont dose-reduced capecitabine monotherapy  - trend labs  - monitor GI sx, HFSR  - RTC every 3 weeks while on treatment  - RTC w/ MD every 3 months with imaging to for disease monitoring    The patient and family asked numerous excellent questions which I answered to the best of my abilities. At the completion of our consultation, the patient and accompanying caregivers had a strong understanding of the plan. They have our contact information for any further questions or concerns, and know to reach out for any urgent matters. Patient and family aware that they should call 911 for emergencies.       30 minutes spent on the date of the encounter doing chart review, review of test results, interpretation of tests, patient visit and documentation       Larry Bal MD, PhD   of Medicine  Division of Hematology, Oncology and Transplantation  [facility]    -----------------------------------    Oncology Summary     Cancer Staging  Malignant neoplasm of appendix (H)  Staging form: Appendix Carcinoma, AJCC 8th Edition  - Clinical stage from 2021: Stage IVC (cT4b, cN1c, pM1c, G3) - Signed  by Larry Bal MD on 1/5/2022      Oncology History Overview Note   Pt in his USOH until November 2021 when he presented to care at the urging of family with eight months of abdominal pain. CT showed large, mucinous collections in R hemipelvis, prompting referral to surgery. Pt thought to have likely a low-grade mucinous neoplasm and underwent cytoreductive surgery, with incomplete (R2) resection. 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    On establishment with Med Onc, pt recommended to initiate FOLFOX therapy.      Malignant neoplasm of appendix (H)   12/7/2021 Initial Diagnosis    Malignant neoplasm of appendix (H)     12/7/2021 -  Cancer Staged    Staging form: Appendix Carcinoma, AJCC 8th Edition  - Clinical stage from 12/7/2021: Stage IVC (cT4b, cN1c, pM1c, G3)     3/23/2022 -  Chemotherapy    OP ONC Colorectal Cancer - Oxaliplatin / Capecitabine (XELOX, CAPOX) - EVERY 3 WEEKS  Plan Provider: Larry Bal MD  Treatment goal: Palliative  Line of treatment: First Line         Subjective/Interval Events     - feeling better off the capecitabine, with reduced muscle stiffness  - pt notes significant large muscle stiffness which prevents meaningful daily activity and leads to headaches  - no n/v; BM's not consistent, sometimes days between (pt has meds at home, taking PRN)    Review of Systems: 14 point ROS negative other than the symptoms noted above in the HPI.    Physical Exam     Vital signs: /77   Pulse 79   Temp 97  F (36.1  C)   Resp 16   Wt 103 kg (227 lb)   SpO2 95%   BMI 31.66 kg/m      ECOG performance status:  2  Vascular access:  none    GENERAL: Well-nourished, somewhat ill appearing man, sitting in chair, no acute distress.   HEENT: No icterus, no pallor. Moist mucous membranes.   LUNGS: Clear to ausculation bilaterally, normal work of breathing.   CARDIOVASCULAR: Regular  rate and rhythm, no murmurs, gallops or rubs.   ABDOMEN: Soft, nontender and nondistended.  EXTREMITIES: No cyanosis, no clubbing, no edema.   NEUROLOGIC: No focal deficits, alert/ oriented  LYMPH NODE EXAM: No palpable adenopathy.    Objective Data     Lab data:  I have personally reviewed the lab data, notable for:    - no notable lab abnormalities; mildly abnormal lipids    Radiology data:  I have personally reviewed the radiology data, notable for:  - no new data    Pathology and other data:  I have personally reviewed and interpreted the pathology data, notable for:    - no new data

## 2022-06-24 NOTE — NURSING NOTE
Chief Complaint   Patient presents with     Blood Draw     Labs drawn by RN via , vitlas taken.     Labs collected from venipuncture by RN. Vitals taken. Checked in for appointment(s).    Isabelle Shepard RN

## 2022-06-24 NOTE — Clinical Note
2022         RE: Rod Olson  [address]  [place] [address]        Dear Colleague,    Thank you for referring your patient, Rod Olson, to the [facility]. Please see a copy of my visit note below.    [facility] - Medical Oncology Follow-Up Consult Note  2022    Patient Identifiers     Name: Rod Olson  Preferred Address: ***  Preferred Language: ***  : 1945  Gender: male    Assessment and Plan     Mr. Rod Olson is a 77 year old male with a history of *** who returns to clinic for ***.    ***    Plan:  ***    The patient and family asked numerous excellent questions which I answered to the best of my abilities. At the completion of our consultation, the patient and accompanying caregivers had a strong understanding of the plan. They have our contact information for any further questions or concerns, and know to reach out for any urgent matters. Patient and family aware that they should call 911 for emergencies.       *** minutes spent on the date of the encounter doing { E&M time in:460212}       Larry Bal MD, PhD   of Medicine  Division of Hematology, Oncology and Transplantation  [facility]    -----------------------------------    Oncology Summary     Cancer Staging  Malignant neoplasm of appendix (H)  Staging form: Appendix Carcinoma, AJCC 8th Edition  - Clinical stage from 2021: Stage IVC (cT4b, cN1c, pM1c, G3) - Signed by Larry Bal MD on 2022      Oncology History Overview Note   Pt in his USOH until 2021 when he presented to care at the [facility] of family with eight months of abdominal pain. CT showed large, mucinous collections in R hemipelvis, prompting referral to surgery. Pt thought to have likely a low-grade mucinous neoplasm and underwent cytoreductive surgery, with incomplete (R2) resection. Final pathology revealed high-grade appendiceal  adenocarcinoma with signet ring features prompting referral to Medical Oncology for palliative treatment.    On establishment with Med Onc, pt recommended to initiate FOLFOX therapy.      Malignant neoplasm of appendix (H)   12/7/2021 Initial Diagnosis    Malignant neoplasm of appendix (H)     12/7/2021 -  Cancer Staged    Staging form: Appendix Carcinoma, AJCC 8th Edition  - Clinical stage from 12/7/2021: Stage IVC (cT4b, cN1c, pM1c, G3)     3/23/2022 -  Chemotherapy    OP ONC Colorectal Cancer - Oxaliplatin / Capecitabine (XELOX, CAPOX) - EVERY 3 WEEKS  Plan Provider: Larry Bal MD  Treatment goal: Palliative  Line of treatment: First Line         Subjective/Interval Events     ***  - feeling better off the capecitabine    Review of Systems: 14 point ROS negative other than the symptoms noted above in the HPI.    Physical Exam     Vital signs: /77   Pulse 79   Temp 97  F (36.1  C)   Resp 16   Wt 103 kg (227 lb)   SpO2 95%   BMI 31.66 kg/m      ECOG performance status:  ***  Vascular access:  ***    GENERAL: Well-nourished healthy-appearing *** in chair, no acute distress.   HEENT: No icterus, no pallor. Moist mucous membranes.   LUNGS: Clear to ausculation bilaterally, normal work of breathing.   CARDIOVASCULAR: Regular rate and rhythm, no murmurs, gallops or rubs.   ABDOMEN: Soft, nontender and nondistended.  EXTREMITIES: No cyanosis, no clubbing, no edema.   NEUROLOGIC: No focal deficits, alert/ oriented  LYMPH NODE EXAM: No palpable adenopathy.    Objective Data     Lab data:  I have personally reviewed the lab data, notable for:    ***    Radiology data:  I have personally reviewed the radiology data, notable for:  ***    Pathology and other data:  I have personally reviewed and interpreted the pathology data, notable for:    ***        Again, thank you for allowing me to participate in the care of your patient.        Sincerely,        Larry Bal MD

## 2022-06-24 NOTE — NURSING NOTE
"Oncology Rooming Note    June 24, 2022 1:06 PM   Rod Olson is a 77 year old male who presents for:    Chief Complaint   Patient presents with     Blood Draw     Labs drawn by RN via brianna POLLACK.     Oncology Clinic Visit     Malignant neoplasm of appendix     Initial Vitals: /77   Pulse 79   Temp 97  F (36.1  C)   Resp 16   Wt 103 kg (227 lb)   SpO2 95%   BMI 31.66 kg/m   Estimated body mass index is 31.66 kg/m  as calculated from the following:    Height as of 2/11/22: 1.803 m (5' 11\").    Weight as of this encounter: 103 kg (227 lb). Body surface area is 2.27 meters squared.  No Pain (0) Comment: Data Unavailable   No LMP for male patient.  Allergies reviewed: Yes  Medications reviewed: Yes    Medications: MEDICATION REFILLS NEEDED TODAY. Provider was notified. Pt would like Oxycodone refilled.    Pharmacy name entered into [facility]:    [id number] - [place], [place] - [address] AT [id number] - [place] - [address] AT [facility] OF [address] [place] [address] PHARMACY [facility] [place] [address] [place] [address]  [facility] - [place], [place] - [address]    Clinical concerns: Pt would like a prescription for a pain or muscle relaxer to help with muscle stiffness.       Manuela Mao            "

## 2022-06-27 DIAGNOSIS — C18.1 MALIGNANT NEOPLASM OF APPENDIX (H): Primary | ICD-10-CM

## 2022-06-27 RX ORDER — CAPECITABINE 500 MG/1
1500 TABLET, FILM COATED ORAL 2 TIMES DAILY
Qty: 84 TABLET | Refills: 0 | Status: SHIPPED | OUTPATIENT
Start: 2022-06-28 | End: 2022-08-22

## 2022-07-11 ENCOUNTER — TELEPHONE (OUTPATIENT)
Dept: ONCOLOGY | Facility: CLINIC | Age: 77
End: 2022-07-11

## 2022-07-11 NOTE — TELEPHONE ENCOUNTER
Oral Chemotherapy Monitoring Program    Subjective/Objective:  Rod Olson is a 77 year old male contacted by phone for a follow-up visit for oral chemotherapy. Manas's daughter stated Manas is taking 1500 mg capecitabine BID and has missed no doses as he has been working with a calendar to remember doses. She states he has been taking capecitabine 2 weeks on and 1 week off. Patient's daughter reports that Manas is doing much better than he previously was and seems to be tolerating therapy well. Denies HFS, abdominal pain, and is able to remember things better. Reports mild diarrhea that he potentially uses loperamide on occasion to treat (not confirmed, but notes minimal diarrhea). Side effects have been minimal and patient is tolerating therapy well per patient's daughter.    ORAL CHEMOTHERAPY 5/2/2022 5/12/2022 5/16/2022 5/18/2022 6/13/2022 6/27/2022 7/11/2022   Assessment Type Chart Review;Refill Left Voicemail Other Refill Refill Refill Initial Follow up   Diagnosis Code Appendix Cancer Appendix Cancer Appendix Cancer Appendix Cancer Appendix Cancer Appendix Cancer Appendix Cancer   Providers Dr. Valeriano Bal   [facility]   Drug Name Xeloda (capecitabine) Xeloda (capecitabine) Xeloda (capecitabine) Xeloda (capecitabine) Xeloda (capecitabine) Xeloda (capecitabine) Xeloda (capecitabine)   Dose 1,500 mg - 1,500 mg 1,500 mg - 1,500 mg 1,500 mg   Current Schedule BID - BID BID - BID BID   Cycle Details 2 weeks on, 1 week off - 2 weeks on, 1 week off 2 weeks on, 1 week off - 1 week on, 1 week off 2 weeks on, 1 week off   Start Date of Last Cycle - - - - - - -   Planned next cycle start date 5/6/2022 - 5/6/2022 5/27/2022 - - -   Doses missed in last 2 weeks - - - - - - -   Adherence Assessment - - - - - - -   Adverse Effects - - - - - - -   Nausea - - - - - - -   Pharmacist  "Intervention(nausea) - - - - - - -   Intervention(s) - - - - - - -   Diarrhea - - - - - - -   Pharmacist Intervention(diarrhea) - - - - - - -   Intervention(s) - - - - - - -   Headache - - - - - - -   Pharmacist Intervention(headache) - - - - - - -   Intervention(s) - - - - - - -   Any new drug interactions? - - - - - - -   Pharmacist Intervention? - - - - - - -   Intervention(s) - - - - - - -   Is the dose as ordered appropriate for the patient? - - - - - - -       Last PHQ-2 Score on record:   PHQ-2 ( 1999 [employer]) 1/14/2022 1/7/2022   Q1: Little interest or pleasure in doing things 0 0   Q2: Feeling down, depressed or hopeless 0 0   PHQ-2 Score 0 0   Q1: Little interest or pleasure in doing things - -   Q2: Feeling down, depressed or hopeless - -   PHQ-2 Score - -       Vitals:  BP:   BP Readings from Last 1 Encounters:   06/24/22 118/77     Wt Readings from Last 1 Encounters:   06/24/22 103 kg (227 lb)     Estimated body surface area is 2.27 meters squared as calculated from the following:    Height as of 2/11/22: 1.803 m (5' 11\").    Weight as of 6/24/22: 103 kg (227 lb).    Labs:  _  Result Component Current Result Ref Range   Sodium 137 (6/24/2022) 136 - 145 mmol/L     _  Result Component Current Result Ref Range   Potassium 4.1 (6/24/2022) 3.4 - 5.3 mmol/L     _  Result Component Current Result Ref Range   Calcium 9.2 (6/24/2022) 8.8 - 10.2 mg/dL     No results found for Mag within last 30 days.     No results found for Phos within last 30 days.     _  Result Component Current Result Ref Range   Albumin 4.0 (6/24/2022) 3.5 - 5.2 g/dL     _  Result Component Current Result Ref Range   Urea Nitrogen 26.1 (H) (6/24/2022) 8.0 - 23.0 mg/dL     _  Result Component Current Result Ref Range   Creatinine 1.03 (6/24/2022) 0.67 - 1.17 mg/dL     _  Result Component Current Result Ref Range   AST 21 (6/24/2022) 10 - 50 U/L     _  Result Component Current Result Ref Range   ALT 13 (6/24/2022) 10 - 50 U/L     _  Result " Component Current Result Ref Range   Bilirubin Total 0.4 (6/24/2022) <=1.2 mg/dL     No results found for WBC within last 30 days.     No results found for HGB within last 30 days.     No results found for PLT within last 30 days.     No results found for ANC within last 30 days.     No results found for ANC within last 30 days.          Assessment/Plan:  Patient is tolerating therapy well. Continue capecitabine therapy as planned. Confirmed appropriate dosing schedule should be 14 days on and 7 days off per IB from Dr. Bal. Educated to watch for increase in diarrhea and reach out if it worsens so we can properly dose antidiarrheal if needed.     MyChart sent with update.    Follow-Up:  Review appointment with Dr. Bal 7/22.        Lewis Bertrand, Pharmacy Intern  Oral Chemotherapy Monitoring Program  [phone]

## 2022-07-19 ENCOUNTER — OFFICE VISIT (OUTPATIENT)
Dept: FAMILY MEDICINE | Facility: CLINIC | Age: 77
End: 2022-07-19
Payer: MEDICARE

## 2022-07-19 VITALS
WEIGHT: 220.6 LBS | TEMPERATURE: 98.3 F | DIASTOLIC BLOOD PRESSURE: 82 MMHG | RESPIRATION RATE: 18 BRPM | HEART RATE: 79 BPM | OXYGEN SATURATION: 96 % | BODY MASS INDEX: 30.88 KG/M2 | HEIGHT: 71 IN | SYSTOLIC BLOOD PRESSURE: 110 MMHG

## 2022-07-19 DIAGNOSIS — Z11.59 NEED FOR HEPATITIS C SCREENING TEST: ICD-10-CM

## 2022-07-19 DIAGNOSIS — G44.209 TENSION HEADACHE: ICD-10-CM

## 2022-07-19 DIAGNOSIS — Z23 HIGH PRIORITY FOR 2019-NCOV VACCINE: ICD-10-CM

## 2022-07-19 DIAGNOSIS — L91.8 SKIN TAG: ICD-10-CM

## 2022-07-19 DIAGNOSIS — Z00.00 ENCOUNTER FOR MEDICARE ANNUAL WELLNESS EXAM: ICD-10-CM

## 2022-07-19 DIAGNOSIS — I10 ESSENTIAL HYPERTENSION: Primary | ICD-10-CM

## 2022-07-19 PROCEDURE — 0054A COVID-19,PF,PFIZER (12+ YRS): CPT | Performed by: FAMILY MEDICINE

## 2022-07-19 PROCEDURE — 91305 COVID-19,PF,PFIZER (12+ YRS): CPT | Performed by: FAMILY MEDICINE

## 2022-07-19 PROCEDURE — 99213 OFFICE O/P EST LOW 20 MIN: CPT | Mod: 25 | Performed by: FAMILY MEDICINE

## 2022-07-19 PROCEDURE — G0438 PPPS, INITIAL VISIT: HCPCS | Performed by: FAMILY MEDICINE

## 2022-07-19 RX ORDER — LISINOPRIL 40 MG/1
40 TABLET ORAL DAILY
Qty: 90 TABLET | Refills: 3 | Status: SHIPPED | OUTPATIENT
Start: 2022-07-19

## 2022-07-19 ASSESSMENT — PAIN SCALES - GENERAL: PAINLEVEL: NO PAIN (0)

## 2022-07-19 NOTE — PROGRESS NOTES
Assessment & Plan     Essential hypertension  Controlled.  Low salt, low fat diet.   Exercise as tolerated.  Take meds as prescribed; call if with side effects.   - lisinopril (ZESTRIL) 40 MG tablet  Dispense: 90 tablet; Refill: 3    Tension headache  Patient asked for muscle relaxer but he is already given opiate analgesics by his oncologist.  adivsed possible serious intearction ebtween opiates and other sedatives.   Advised to use otc APAP prn headached.  Manage stressors and engage in relaxation.  Reassess headache if changing in character.    Need for hepatitis C screening test  Offered screening based on current recommendations. Patient concurred to screen.  - Hepatitis C Screen Reflex to HCV RNA Quant and Genotype    High priority for 2019-nCoV vaccine  - COVID-19,PF,PFIZER (12+ Yrs GRAY LABEL)    Encounter for Medicare annual wellness exam    Skin tag  Patient asked at end of visit if this can be removed.  Patient will schedule skin tag removal appt -- the lesion is big enough that he will likely need subcutaneous anesthesia.  Patient Instructions   You may get the Shingrix vaccine for shingles if you desire, and after you verify with insurance how they cover the vaccine.     Be consistent with low salt, low trans fat and low saturated fat diet.  Eat food rich in omega-3-fatty acids as you tolerate. (salmon, olive oil)  Eat 5 cups of vegetables, fruits and whole grains per day.  Limit starchy food (white rice, white bread, white pasta, white potatoes) to less than a cup per meal.  Minimize sweets, junk food and fastfood. Limit soda beverages to one serving per day; best to avoid it altogether though.    Exercise: moderate intensity sustained for at least 30 mins per episode, goal of 150 mins per week at least  Combine cardiovascular and resistance exercises.  These exercise recommendations are in addition to your daily activity at work or home.  Work on losing weight.    Schedule procedure appointment to  "remove the skin tag on your chest.    Acetaminophen 500 mg orally 1-2 tabs every 4-6 hrs as needed for pain/headache.      Return if symptoms worsen or fail to improve.    Jun Emanuel MD  [facility] YENY Malagon is a 77 year old accompanied by his self, presenting for the following health issues:  Establish Care (Pt being seen to establish care with new provider.)      HPI     Medication Followup of lisinopril    Taking Medication as prescribed: yes    Side Effects:  None    Medication Helping Symptoms:  Yes    He  has recent normal renal function.  BP today is normal.    Patient asked for muscle relaxant for his tension headaches. When asked about the headaches, he points to the back of the neck.   Has had Rx for methocarbamol before. None currently.  Has oxycodone prescribed by oncology team.      Annual Wellness Visit    Patient has been advised of split billing requirements and indicates understanding: Yes     Are you in the first 12 months of your Medicare Part B coverage?  No    Physical Health:    In general, how would you rate your overall physical health? fair    Outside of work, how many days during the week do you exercise?active a little    Outside of work, approximately how many minutes a day do you exercise?not applicable    If you drink alcohol do you typically have >3 drinks per day or >7 drinks per week? No    Do you usually eat at least 4 servings of fruit and vegetables a day, include whole grains & fiber and avoid regularly eating high fat or \"junk\" foods? Yes    Do you have any problems taking medications regularly? No    Do you have any side effects from medications? none    Needs assistance for the following daily activities: no assistance needed    Which of the following safety concerns are present in your home?  none identified     Hearing impairment: Yes, Difficulty following a conversation in a noisy restaurant or crowded room.    Feel that people are " mumbling or not speaking clearly.    Difficulty following dialogue in the theater.    Difficult to understand a speaker at a public meeting or [employer] service.    Need to ask people to speak up or repeat themselves.    Find that men's voices are easier to understand than women's.    Difficulty understanding soft or whispered speech.    In the past 6 months, have you been bothered by leaking of urine? no    Mental Health:    In general, how would you rate your overall mental or emotional health? excellent  PHQ-2 Score:      Do you feel safe in your environment? Yes    Have you ever done Advance Care Planning? (For example, a Health Directive, POLST, or a discussion with a medical provider or your loved ones about your wishes)? No, advance care planning information given to patient to review.  Patient plans to discuss their wishes with loved ones or provider.      Fall risk:  Fallen 2 or more times in the past year?: No  Any fall with injury in the past year?: No    Cognitive Screenin) Repeat 3 items (Leader, Season, Table)    2) Clock draw: NORMAL  3) 3 item recall: Recalls 2 objects   Results: NORMAL clock, 1-2 items recalled: COGNITIVE IMPAIRMENT LESS LIKELY    Mini-CogTM Copyright S Kai. Licensed by the author for use in [facility]; reprinted with permission (soclara@[facility]). All rights reserved.      Do you have sleep apnea, excessive snoring or daytime drowsiness?: no    Current providers sharing in care for this patient include:   Patient Care Team:  [facility] as PCP - General (Clinic)  Coretta Wiseman APRN CNP as Assigned PCP  Darnell Lopez MD as MD (Surgery)  Larry Bal MD as Peng Hunt MD as Referring Physician (Surgery)  Larry Bal MD as Assigned Cancer Care Provider  Darnell Lopez MD as Assigned Surgical Provider  Chari Knutson RN as Specialty Care Coordinator (Hematology & Oncology)  Josette Hummel PA as Physician Assistant  "(Urology)    Patient has been advised of split billing requirements and indicates understanding: Yes    Review of Systems   Constitutional, HEENT, cardiovascular, pulmonary, GI, , musculoskeletal, neuro, skin, endocrine and psych systems are negative, except as otherwise noted.      Objective    /82   Pulse 79   Temp 98.3  F (36.8  C) (Tympanic)   Resp 18   Ht 1.803 m (5' 11\")   Wt 100.1 kg (220 lb 9.6 oz)   SpO2 96%   BMI 30.77 kg/m    Body mass index is 30.77 kg/m .  Physical Exam   GENERAL: healthy, alert and no distress, ambulatory w/o assist  NECK: no tenderness, no adenopathy,  Thyroid not enlarged  RESP: lungs clear to auscultation - no rales, no rhonchi, no wheezes  CV: regular rates and rhythm, no murmur  MS: no edema  SKIN: 6 mm reddish brown papule with sharp smooth border on the upper back close to midline; one moderate sized non-inflamed skin tag on the anterior lower chest   NEURO: no tremors    No results found for any visits on 07/19/22.            .  ..  "

## 2022-07-19 NOTE — PATIENT INSTRUCTIONS
You may get the Shingrix vaccine for shingles if you desire, and after you verify with insurance how they cover the vaccine.     Be consistent with low salt, low trans fat and low saturated fat diet.  Eat food rich in omega-3-fatty acids as you tolerate. (salmon, olive oil)  Eat 5 cups of vegetables, fruits and whole grains per day.  Limit starchy food (white rice, white bread, white pasta, white potatoes) to less than a cup per meal.  Minimize sweets, junk food and fastfood. Limit soda beverages to one serving per day; best to avoid it altogether though.    Exercise: moderate intensity sustained for at least 30 mins per episode, goal of 150 mins per week at least  Combine cardiovascular and resistance exercises.  These exercise recommendations are in addition to your daily activity at work or home.  Work on losing weight.    Schedule procedure appointment to remove the skin tag on your chest.    Acetaminophen 500 mg orally 1-2 tabs every 4-6 hrs as needed for pain/headache.

## 2022-07-20 ENCOUNTER — HOSPITAL ENCOUNTER (OUTPATIENT)
Dept: CT IMAGING | Facility: CLINIC | Age: 77
Discharge: HOME | End: 2022-07-20
Attending: STUDENT IN AN ORGANIZED HEALTH CARE EDUCATION/TRAINING PROGRAM | Admitting: STUDENT IN AN ORGANIZED HEALTH CARE EDUCATION/TRAINING PROGRAM
Payer: MEDICARE

## 2022-07-20 ENCOUNTER — LAB (OUTPATIENT)
Dept: LAB | Facility: CLINIC | Age: 77
End: 2022-07-20
Payer: MEDICARE

## 2022-07-20 DIAGNOSIS — C18.1 MALIGNANT NEOPLASM OF APPENDIX (H): ICD-10-CM

## 2022-07-20 DIAGNOSIS — Z11.59 NEED FOR HEPATITIS C SCREENING TEST: ICD-10-CM

## 2022-07-20 LAB
BASOPHILS # BLD AUTO: 0 10E3/UL (ref 0–0.2)
BASOPHILS NFR BLD AUTO: 0 %
CEA SERPL-MCNC: 4.2 NG/ML
EOSINOPHIL # BLD AUTO: 0.1 10E3/UL (ref 0–0.7)
EOSINOPHIL NFR BLD AUTO: 1 %
ERYTHROCYTE [DISTWIDTH] IN BLOOD BY AUTOMATED COUNT: 16.6 % (ref 10–15)
HCT VFR BLD AUTO: 38.1 % (ref 40–53)
HCV AB SERPL QL IA: NONREACTIVE
HGB BLD-MCNC: 12.6 G/DL (ref 13.3–17.7)
LYMPHOCYTES # BLD AUTO: 1.6 10E3/UL (ref 0.8–5.3)
LYMPHOCYTES NFR BLD AUTO: 25 %
MCH RBC QN AUTO: 31.6 PG (ref 26.5–33)
MCHC RBC AUTO-ENTMCNC: 33.1 G/DL (ref 31.5–36.5)
MCV RBC AUTO: 96 FL (ref 78–100)
MONOCYTES # BLD AUTO: 0.6 10E3/UL (ref 0–1.3)
MONOCYTES NFR BLD AUTO: 9 %
NEUTROPHILS # BLD AUTO: 4.2 10E3/UL (ref 1.6–8.3)
NEUTROPHILS NFR BLD AUTO: 66 %
PLATELET # BLD AUTO: 168 10E3/UL (ref 150–450)
RBC # BLD AUTO: 3.99 10E6/UL (ref 4.4–5.9)
WBC # BLD AUTO: 6.5 10E3/UL (ref 4–11)

## 2022-07-20 PROCEDURE — 250N000011 HC RX IP 250 OP 636: Performed by: RADIOLOGY

## 2022-07-20 PROCEDURE — 86803 HEPATITIS C AB TEST: CPT

## 2022-07-20 PROCEDURE — 82378 CARCINOEMBRYONIC ANTIGEN: CPT

## 2022-07-20 PROCEDURE — 80053 COMPREHEN METABOLIC PANEL: CPT

## 2022-07-20 PROCEDURE — G1010 CDSM STANSON: HCPCS

## 2022-07-20 PROCEDURE — 36415 COLL VENOUS BLD VENIPUNCTURE: CPT

## 2022-07-20 PROCEDURE — 85025 COMPLETE CBC W/AUTO DIFF WBC: CPT

## 2022-07-20 PROCEDURE — 250N000009 HC RX 250: Performed by: RADIOLOGY

## 2022-07-20 RX ORDER — IOPAMIDOL 755 MG/ML
100 INJECTION, SOLUTION INTRAVASCULAR ONCE
Status: COMPLETED | OUTPATIENT
Start: 2022-07-20 | End: 2022-07-20

## 2022-07-20 RX ADMIN — IOPAMIDOL 100 ML: 755 INJECTION, SOLUTION INTRAVENOUS at 13:05

## 2022-07-20 RX ADMIN — SODIUM CHLORIDE 67 ML: 9 INJECTION, SOLUTION INTRAVENOUS at 13:05

## 2022-07-22 ENCOUNTER — ONCOLOGY VISIT (OUTPATIENT)
Dept: ONCOLOGY | Facility: CLINIC | Age: 77
End: 2022-07-22
Attending: STUDENT IN AN ORGANIZED HEALTH CARE EDUCATION/TRAINING PROGRAM
Payer: MEDICARE

## 2022-07-22 VITALS
BODY MASS INDEX: 31.24 KG/M2 | DIASTOLIC BLOOD PRESSURE: 79 MMHG | TEMPERATURE: 98 F | OXYGEN SATURATION: 98 % | WEIGHT: 224 LBS | SYSTOLIC BLOOD PRESSURE: 116 MMHG | HEART RATE: 67 BPM

## 2022-07-22 DIAGNOSIS — C18.1 MALIGNANT NEOPLASM OF APPENDIX (H): Primary | ICD-10-CM

## 2022-07-22 PROCEDURE — G0463 HOSPITAL OUTPT CLINIC VISIT: HCPCS

## 2022-07-22 PROCEDURE — 99214 OFFICE O/P EST MOD 30 MIN: CPT | Performed by: STUDENT IN AN ORGANIZED HEALTH CARE EDUCATION/TRAINING PROGRAM

## 2022-07-22 RX ORDER — EPINEPHRINE 1 MG/ML
0.3 INJECTION, SOLUTION INTRAMUSCULAR; SUBCUTANEOUS EVERY 5 MIN PRN
Status: CANCELLED | OUTPATIENT
Start: 2022-07-27

## 2022-07-22 RX ORDER — NALOXONE HYDROCHLORIDE 0.4 MG/ML
0.2 INJECTION, SOLUTION INTRAMUSCULAR; INTRAVENOUS; SUBCUTANEOUS
Status: CANCELLED | OUTPATIENT
Start: 2022-07-27

## 2022-07-22 RX ORDER — ALBUTEROL SULFATE 0.83 MG/ML
2.5 SOLUTION RESPIRATORY (INHALATION)
Status: CANCELLED | OUTPATIENT
Start: 2022-07-27

## 2022-07-22 RX ORDER — ALBUTEROL SULFATE 90 UG/1
1-2 AEROSOL, METERED RESPIRATORY (INHALATION)
Status: CANCELLED
Start: 2022-07-27

## 2022-07-22 RX ORDER — CAPECITABINE 500 MG/1
TABLET, FILM COATED ORAL
Qty: 14 TABLET | Refills: 0 | Status: SHIPPED | OUTPATIENT
Start: 2022-07-22 | End: 2022-08-22

## 2022-07-22 RX ORDER — LORAZEPAM 2 MG/ML
0.5 INJECTION INTRAMUSCULAR EVERY 4 HOURS PRN
Status: CANCELLED | OUTPATIENT
Start: 2022-07-27

## 2022-07-22 RX ORDER — MEPERIDINE HYDROCHLORIDE 25 MG/ML
25 INJECTION INTRAMUSCULAR; INTRAVENOUS; SUBCUTANEOUS EVERY 30 MIN PRN
Status: CANCELLED | OUTPATIENT
Start: 2022-07-27

## 2022-07-22 RX ORDER — DIPHENHYDRAMINE HYDROCHLORIDE 50 MG/ML
50 INJECTION INTRAMUSCULAR; INTRAVENOUS
Status: CANCELLED
Start: 2022-07-27

## 2022-07-22 RX ORDER — OXYCODONE HYDROCHLORIDE 5 MG/1
5 TABLET ORAL EVERY 6 HOURS PRN
Qty: 30 TABLET | Refills: 0 | Status: SHIPPED | OUTPATIENT
Start: 2022-07-22 | End: 2023-01-01

## 2022-07-22 RX ORDER — CYCLOBENZAPRINE HCL 5 MG
5 TABLET ORAL 3 TIMES DAILY PRN
Qty: 30 TABLET | Refills: 0 | Status: SHIPPED | OUTPATIENT
Start: 2022-07-22 | End: 2022-01-01

## 2022-07-22 RX ORDER — METHYLPREDNISOLONE SODIUM SUCCINATE 125 MG/2ML
125 INJECTION, POWDER, LYOPHILIZED, FOR SOLUTION INTRAMUSCULAR; INTRAVENOUS
Status: CANCELLED
Start: 2022-07-27

## 2022-07-22 RX ORDER — ONDANSETRON 2 MG/ML
8 INJECTION INTRAMUSCULAR; INTRAVENOUS ONCE
Status: CANCELLED | OUTPATIENT
Start: 2022-07-27

## 2022-07-22 ASSESSMENT — PAIN SCALES - GENERAL: PAINLEVEL: NO PAIN (0)

## 2022-07-22 NOTE — PROGRESS NOTES
[facility] - Medical Oncology Follow-Up Consult Note  2022    Patient Identifiers     Name: Rod Olson  Preferred Address: [place]  Preferred Language: English  : 1945  Gender: male    Assessment and Plan     Mr. 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    Slow progression of disease may be due to significant treatment interruptions over the past few months. High grade, signet ring adenocarcinoma typically demonstrates rapid metastatic progression and poor treatment response, so the imaging findings are not entirely consistent with treatment failure. We will continue capecitabine monotherapy with dose modification to hopefully allow him to tolerate therapy better.    If pt continues to tolerate capecitabine poorly, or if local symptoms worsen, we will consider referral to radiation oncology to consider treatment. Further treatment options remain limited, with consideration of regorafenib or TAS-102 if progression is noted on capecitabine. We would also consider clinical trial, but think that pt's overall clinical status would be concerning for enrollment.     Plan:  Signet ring mucinous adenoCA  - cont Cape monotherapy; try alternate dosing regimens (like 7 on/7 off; or 3 pills BID)  - trend labs, CEA  - monitor cytopenias, fatigue, GI tox, HFSR  - c/s Rad Onc re: local treatment  - clinical evaluation 3 weeks while on treatment  - plan for imaging re-evaluation in 3 months with RTC    The patient and family asked numerous excellent questions which I answered to the best of my abilities. At the completion of our consultation, the patient and accompanying caregivers had a strong understanding of the plan. They have our contact information for any further questions or concerns, and know to reach out for any urgent matters. Patient and  family aware that they should call 911 for emergencies.       30 minutes spent on the date of the encounter doing chart review, review of test results, interpretation of tests, patient visit and documentation       Larry Bal MD, PhD   of Medicine  Division of Hematology, Oncology and Transplantation  [facility]    -----------------------------------    Oncology Summary     Cancer Staging  Malignant neoplasm of appendix (H)  Staging form: Appendix Carcinoma, AJCC 8th Edition  - Clinical stage from 12/7/2021: Stage IVC (cT4b, cN1c, pM1c, G3) - Signed by Larry Bal MD on 1/5/2022      Oncology History Overview Note   Pt in his USOH until November 2021 when he presented to care at the [facility] of family with eight months of abdominal pain. CT showed large, mucinous collections in R hemipelvis, prompting referral to surgery. Pt thought to have likely a low-grade mucinous neoplasm and underwent cytoreductive surgery, with incomplete (R2) resection. 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    On establishment with Med Onc, pt recommended to initiate FOLFOX therapy.      Malignant neoplasm of appendix (H)   12/7/2021 Initial Diagnosis    Malignant neoplasm of appendix (H)     12/7/2021 -  Cancer Staged    Staging form: Appendix Carcinoma, AJCC 8th Edition  - Clinical stage from 12/7/2021: Stage IVC (cT4b, cN1c, pM1c, G3)     3/23/2022 -  Chemotherapy    OP ONC Colorectal Cancer - Oxaliplatin / Capecitabine (XELOX, CAPOX) - EVERY 3 WEEKS  Plan Provider: Larry Bal MD  Treatment goal: Palliative  Line of treatment: First Line         Subjective/Interval Events     - no nausea, vomiting  - BM's somewhat irregular (constipated)  - significant fatigue, unchanged from prior    Review of Systems: 14 point ROS negative other than the symptoms noted above in the HPI.    Physical  Exam     Vital signs: /79   Pulse 67   Temp 98  F (36.7  C) (Oral)   Wt 101.6 kg (224 lb)   SpO2 98%   BMI 31.24 kg/m      ECOG performance status:  1  Vascular access:  none    GENERAL: Well-nourished healthy-appearing man, sitting in chair, no acute distress.   HEENT: No icterus, no pallor. Moist mucous membranes.   LUNGS: Clear to ausculation bilaterally, normal work of breathing.   CARDIOVASCULAR: Regular rate and rhythm, no murmurs, gallops or rubs.   ABDOMEN: Soft, nontender and nondistended.  EXTREMITIES: No cyanosis, no clubbing, no edema.   NEUROLOGIC: No focal deficits, alert/ oriented  LYMPH NODE EXAM: No palpable adenopathy.    Objective Data     Lab data:  I have personally reviewed the lab data, notable for:    - CMP pending  - no other notable abnormalities    Radiology data:  I have personally reviewed the radiology data, notable for:  - slow growth of R pelvic mass    Pathology and other data:  I have personally reviewed and interpreted the pathology data, notable for:    - no new data

## 2022-07-22 NOTE — LETTER
2022         RE: Rod Olson  [address]  [place] [address]        Dear Colleague,    Thank you for referring your patient, Rod Olson, to the [facility]. Please see a copy of my visit note below.    [facility] - Medical Oncology Follow-Up Consult Note  2022    Patient Identifiers     Name: Rod Olson  Preferred Address: [place]  Preferred Language: English  : 1945  Gender: male    Assessment and Plan     Mr. 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    Slow progression of disease may be due to significant treatment interruptions over the past few months. High grade, signet ring adenocarcinoma typically demonstrates rapid metastatic progression and poor treatment response, so the imaging findings are not entirely consistent with treatment failure. We will continue capecitabine monotherapy with dose modification to hopefully allow him to tolerate therapy better.    If pt continues to tolerate capecitabine poorly, or if local symptoms worsen, we will consider referral to radiation oncology to consider treatment. Further treatment options remain limited, with consideration of regorafenib or TAS-102 if progression is noted on capecitabine. We would also consider clinical trial, but think that pt's overall clinical status would be concerning for enrollment.     Plan:  Signet ring mucinous adenoCA  - cont Cape monotherapy; try alternate dosing regimens (like 7 on/7 off; or 3 pills BID)  - trend labs, CEA  - monitor cytopenias, fatigue, GI tox, HFSR  - c/s Rad Onc re: local treatment  - clinical evaluation 3 weeks while on treatment  - plan for imaging re-evaluation in 3 months with RTC    The patient and family asked numerous excellent questions which I answered to the best of  my abilities. At the completion of our consultation, the patient and accompanying caregivers had a strong understanding of the plan. They have our contact information for any further questions or concerns, and know to reach out for any urgent matters. Patient and family aware that they should call 911 for emergencies.       30 minutes spent on the date of the encounter doing chart review, review of test results, interpretation of tests, patient visit and documentation       Larry Bal MD, PhD   of Medicine  Division of Hematology, Oncology and Transplantation  [facility]    -----------------------------------    Oncology Summary     Cancer Staging  Malignant neoplasm of appendix (H)  Staging form: Appendix Carcinoma, AJCC 8th Edition  - Clinical stage from 12/7/2021: Stage IVC (cT4b, cN1c, pM1c, G3) - Signed by Larry Bal MD on 1/5/2022      Oncology History Overview Note   Pt in his USOH until November 2021 when he presented to care at the urging of family with eight months of abdominal pain. CT showed large, mucinous collections in R hemipelvis, prompting referral to surgery. Pt thought to have likely a low-grade mucinous neoplasm and underwent cytoreductive surgery, with incomplete (R2) resection. 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    On establishment with Med Onc, pt recommended to initiate FOLFOX therapy.      Malignant neoplasm of appendix (H)   12/7/2021 Initial Diagnosis    Malignant neoplasm of appendix (H)     12/7/2021 -  Cancer Staged    Staging form: Appendix Carcinoma, AJCC 8th Edition  - Clinical stage from 12/7/2021: Stage IVC (cT4b, cN1c, pM1c, G3)     3/23/2022 -  Chemotherapy    OP ONC Colorectal Cancer - Oxaliplatin / Capecitabine (XELOX, CAPOX) - EVERY 3 WEEKS  Plan Provider: Larry Bal MD  Treatment goal: Palliative  Line of treatment:  First Line         Subjective/Interval Events     - no nausea, vomiting  - BM's somewhat irregular (constipated)  - significant fatigue, unchanged from prior    Review of Systems: 14 point ROS negative other than the symptoms noted above in the HPI.    Physical Exam     Vital signs: /79   Pulse 67   Temp 98  F (36.7  C) (Oral)   Wt 101.6 kg (224 lb)   SpO2 98%   BMI 31.24 kg/m      ECOG performance status:  1  Vascular access:  none    GENERAL: Well-nourished healthy-appearing man, sitting in chair, no acute distress.   HEENT: No icterus, no pallor. Moist mucous membranes.   LUNGS: Clear to ausculation bilaterally, normal work of breathing.   CARDIOVASCULAR: Regular rate and rhythm, no murmurs, gallops or rubs.   ABDOMEN: Soft, nontender and nondistended.  EXTREMITIES: No cyanosis, no clubbing, no edema.   NEUROLOGIC: No focal deficits, alert/ oriented  LYMPH NODE EXAM: No palpable adenopathy.    Objective Data     Lab data:  I have personally reviewed the lab data, notable for:    - CMP pending  - no other notable abnormalities    Radiology data:  I have personally reviewed the radiology data, notable for:  - slow growth of R pelvic mass    Pathology and other data:  I have personally reviewed and interpreted the pathology data, notable for:    - no new data        Again, thank you for allowing me to participate in the care of your patient.      Sincerely,    Larry Bal MD

## 2022-07-22 NOTE — NURSING NOTE
"Oncology Rooming Note    July 22, 2022 2:53 PM   Rod Olson is a 77 year old male who presents for:    Chief Complaint   Patient presents with     Oncology Clinic Visit     Rtn for malignant neoplasm of appendix     Initial Vitals: /79   Pulse 67   Temp 98  F (36.7  C) (Oral)   Wt 101.6 kg (224 lb)   SpO2 98%   BMI 31.24 kg/m   Estimated body mass index is 31.24 kg/m  as calculated from the following:    Height as of 7/19/22: 1.803 m (5' 11\").    Weight as of this encounter: 101.6 kg (224 lb). Body surface area is 2.26 meters squared.  No Pain (0) Comment: Data Unavailable   No LMP for male patient.  Allergies reviewed: Yes  Medications reviewed: Yes    Medications: MEDICATION REFILLS NEEDED TODAY. Provider was notified.     Oxycodone  [facility]    Pharmacy name entered into [facility]:    [id number] [place], [place] - [address] [id number] - [place], [place] - [address] PHARMACY [place], [place] - [address]  [facility] - [place], [place] - [address]    Clinical concerns: Pt would like to ask about being prescribed a possible muscle relaxant. MD was notified.      Celeste Arriaza, EMT            "

## 2022-07-25 DIAGNOSIS — C18.1 MALIGNANT NEOPLASM OF APPENDIX (H): Primary | ICD-10-CM

## 2022-07-25 LAB
ALBUMIN SERPL BCG-MCNC: 4.4 G/DL (ref 3.5–5.2)
ALP SERPL-CCNC: 84 U/L (ref 40–129)
ALT SERPL W P-5'-P-CCNC: 14 U/L (ref 10–50)
ANION GAP SERPL CALCULATED.3IONS-SCNC: 16 MMOL/L (ref 7–15)
AST SERPL W P-5'-P-CCNC: 21 U/L (ref 10–50)
BILIRUB SERPL-MCNC: 0.6 MG/DL
BUN SERPL-MCNC: 20.7 MG/DL (ref 8–23)
CALCIUM SERPL-MCNC: 9.3 MG/DL (ref 8.8–10.2)
CHLORIDE SERPL-SCNC: 104 MMOL/L (ref 98–107)
CREAT SERPL-MCNC: 0.91 MG/DL (ref 0.67–1.17)
DEPRECATED HCO3 PLAS-SCNC: 22 MMOL/L (ref 22–29)
GFR SERPL CREATININE-BSD FRML MDRD: 86 ML/MIN/1.73M2
GLUCOSE SERPL-MCNC: 113 MG/DL (ref 70–99)
POTASSIUM SERPL-SCNC: 4.3 MMOL/L (ref 3.4–5.3)
PROT SERPL-MCNC: 7 G/DL (ref 6.4–8.3)
SODIUM SERPL-SCNC: 142 MMOL/L (ref 136–145)

## 2022-07-25 RX ORDER — CAPECITABINE 500 MG/1
TABLET, FILM COATED ORAL
Qty: 98 TABLET | Refills: 0 | Status: SHIPPED | OUTPATIENT
Start: 2022-07-27 | End: 2022-08-22

## 2022-08-10 ENCOUNTER — TELEPHONE (OUTPATIENT)
Dept: ONCOLOGY | Facility: CLINIC | Age: 77
End: 2022-08-10

## 2022-08-10 NOTE — ORAL ONC MGMT
Oral Chemotherapy Monitoring Program     Placed call to Rod Olson in follow up of Xeloda oral chemotherapy. This was to complete a routine assessment and remind pt to schedule monthly labs.   Left a message requesting a call back. No drug names were mentioned in the voicemail. Will update when response is received.    Shameka Linares, PharmD  Hematology/Oncology Clinical Pharmacist  Oral Chemotherapy Monitoring Program  [facility]  [phone]  August 10, 2022

## 2022-08-15 ENCOUNTER — TELEPHONE (OUTPATIENT)
Dept: ONCOLOGY | Facility: CLINIC | Age: 77
End: 2022-08-15

## 2022-08-15 DIAGNOSIS — C18.1 MALIGNANT NEOPLASM OF APPENDIX (H): Primary | ICD-10-CM

## 2022-08-15 RX ORDER — ONDANSETRON 2 MG/ML
8 INJECTION INTRAMUSCULAR; INTRAVENOUS ONCE
Status: CANCELLED | OUTPATIENT
Start: 2022-08-24

## 2022-08-15 RX ORDER — EPINEPHRINE 1 MG/ML
0.3 INJECTION, SOLUTION INTRAMUSCULAR; SUBCUTANEOUS EVERY 5 MIN PRN
Status: CANCELLED | OUTPATIENT
Start: 2022-08-24

## 2022-08-15 RX ORDER — LORAZEPAM 2 MG/ML
0.5 INJECTION INTRAMUSCULAR EVERY 4 HOURS PRN
Status: CANCELLED | OUTPATIENT
Start: 2022-08-24

## 2022-08-15 RX ORDER — DIPHENHYDRAMINE HYDROCHLORIDE 50 MG/ML
50 INJECTION INTRAMUSCULAR; INTRAVENOUS
Status: CANCELLED
Start: 2022-08-24

## 2022-08-15 RX ORDER — ALBUTEROL SULFATE 90 UG/1
1-2 AEROSOL, METERED RESPIRATORY (INHALATION)
Status: CANCELLED
Start: 2022-08-24

## 2022-08-15 RX ORDER — METHYLPREDNISOLONE SODIUM SUCCINATE 125 MG/2ML
125 INJECTION, POWDER, LYOPHILIZED, FOR SOLUTION INTRAMUSCULAR; INTRAVENOUS
Status: CANCELLED
Start: 2022-08-24

## 2022-08-15 RX ORDER — MEPERIDINE HYDROCHLORIDE 25 MG/ML
25 INJECTION INTRAMUSCULAR; INTRAVENOUS; SUBCUTANEOUS EVERY 30 MIN PRN
Status: CANCELLED | OUTPATIENT
Start: 2022-08-24

## 2022-08-15 RX ORDER — ALBUTEROL SULFATE 0.83 MG/ML
2.5 SOLUTION RESPIRATORY (INHALATION)
Status: CANCELLED | OUTPATIENT
Start: 2022-08-24

## 2022-08-15 RX ORDER — NALOXONE HYDROCHLORIDE 0.4 MG/ML
0.2 INJECTION, SOLUTION INTRAMUSCULAR; INTRAVENOUS; SUBCUTANEOUS
Status: CANCELLED | OUTPATIENT
Start: 2022-08-24

## 2022-08-15 NOTE — TELEPHONE ENCOUNTER
Subjective/Objective:  Rod Olson is a 77 year old male. Daughter, Thuan, was contacted by phone for a follow-up visit for oral chemotherapy. Spoke to pt's daughter Thuan and she stated that [place] has been taking Xeloda 2,000mg in AM and 1,500mg in PM for 1 week on and 1 week off. She stated he has been doing this for at least 1 month. She was unable to go to his last appt with him and states his memory and hearing is pretty bad, but she is certain that he thought he was told to decrease down from 14 days on/7off to 7 on/7 off a while back. I discussed that I unfotunately did not see any notes in regards to the change but will follow-up with Dr. Bal. Daughter states otherwise pt is doing very well and denies any missed doses or known side effects including nausea, vomiting, diarrhea, loss of appetite, itchy/rashy skin, mouth sores or irritation and pain or discomfort in hands and feet.     ORAL CHEMOTHERAPY 6/13/2022 6/27/2022 7/11/2022 7/22/2022 7/25/2022 8/10/2022 8/15/2022   Assessment Type Refill Refill Initial Follow up Other Refill Left Voicemail Monthly Follow up   Diagnosis Code Appendix Cancer Appendix Cancer Appendix Cancer Appendix Cancer Appendix Cancer Appendix Cancer Appendix Cancer   Providers Dr. Valeriano Bal   [facility]   Drug Name Xeloda (capecitabine) Xeloda (capecitabine) Xeloda (capecitabine) Xeloda (capecitabine) Xeloda (capecitabine) Xeloda (capecitabine) Xeloda (capecitabine)   Dose - 1,500 mg 1,500 mg Other: Other: Other: Other:   Current Schedule - BID BID BID BID BID BID   Cycle Details - 1 week on, 1 week off 2 weeks on, 1 week off 2 weeks on, 1 week off 2 weeks on, 1 week off 2 weeks on, 1 week off 2 weeks on, 1 week off   Start Date of Last Cycle - - - - - - -   Planned next cycle start date - - - - - - -   Doses missed in last 2 weeks - - - - -  "- 0   Adherence Assessment - - - - - - Adherent   Adverse Effects - - - - - - No AE identified during assessment   Nausea - - - - - - -   Pharmacist Intervention(nausea) - - - - - - -   Intervention(s) - - - - - - -   Diarrhea - - - - - - -   Pharmacist Intervention(diarrhea) - - - - - - -   Intervention(s) - - - - - - -   Headache - - - - - - -   Pharmacist Intervention(headache) - - - - - - -   Intervention(s) - - - - - - -   Any new drug interactions? - - - - - - -   Pharmacist Intervention? - - - - - - -   Intervention(s) - - - - - - -   Is the dose as ordered appropriate for the patient? - - - - - - -       Vitals:  BP:   BP Readings from Last 1 Encounters:   07/22/22 116/79     Wt Readings from Last 1 Encounters:   07/22/22 101.6 kg (224 lb)     Estimated body surface area is 2.26 meters squared as calculated from the following:    Height as of 7/19/22: 1.803 m (5' 11\").    Weight as of 7/22/22: 101.6 kg (224 lb).    Labs:  _  Result Component Current Result Ref Range   Sodium 142 (7/20/2022) 136 - 145 mmol/L     _  Result Component Current Result Ref Range   Potassium 4.3 (7/20/2022) 3.4 - 5.3 mmol/L     _  Result Component Current Result Ref Range   Calcium 9.3 (7/20/2022) 8.8 - 10.2 mg/dL     No results found for Mag within last 30 days.     No results found for Phos within last 30 days.     _  Result Component Current Result Ref Range   Albumin 4.4 (7/20/2022) 3.5 - 5.2 g/dL     _  Result Component Current Result Ref Range   Urea Nitrogen 20.7 (7/20/2022) 8.0 - 23.0 mg/dL     _  Result Component Current Result Ref Range   Creatinine 0.91 (7/20/2022) 0.67 - 1.17 mg/dL       _  Result Component Current Result Ref Range   AST 21 (7/20/2022) 10 - 50 U/L     _  Result Component Current Result Ref Range   ALT 14 (7/20/2022) 10 - 50 U/L     _  Result Component Current Result Ref Range   Bilirubin Total 0.6 (7/20/2022) <=1.2 mg/dL       _  Result Component Current Result Ref Range   WBC Count 6.5 (7/20/2022) 4.0 - " 11.0 10e3/uL     _  Result Component Current Result Ref Range   Hemoglobin 12.6 (L) (7/20/2022) 13.3 - 17.7 g/dL     _  Result Component Current Result Ref Range   Platelet Count 168 (7/20/2022) 150 - 450 10e3/uL     No results found for ANC within last 30 days.       Assessment/Plan:  Pt is currently tolerating therapy well. Plan to continue capecitabine (Xeloda) as prescribed. Will confirm frequency with Dr. Bal and follow-up with pt and daughter when response is known. Pt's daughter verbalized understanding and agrees to plan. Encouraged pt to call with any issues or concerns.    Follow-Up:  8/19 for labs    Leigh Dey PharmD, BCACP  Oral Chemotherapy Monitoring Program  [facility]  [phone]  August 15, 2022

## 2022-08-15 NOTE — TELEPHONE ENCOUNTER
"Spoke to Dr. Bal. Per Dr. Bal \"I'm fine if he's doing this. I'll reflect it in my documentation. As long as it prevents treatment interruption, I think the benefits outweigh the risks.\" Updated pt's daughter. Pt's daughter verbalized understanding and agrees to plan. No further questions or concerns at this time, but encouraged to call if any were to arise.      "

## 2022-08-17 ENCOUNTER — MYC MEDICAL ADVICE (OUTPATIENT)
Dept: ONCOLOGY | Facility: CLINIC | Age: 77
End: 2022-08-17

## 2022-08-19 ENCOUNTER — TELEPHONE (OUTPATIENT)
Dept: ONCOLOGY | Facility: CLINIC | Age: 77
End: 2022-08-19

## 2022-08-19 ENCOUNTER — LAB (OUTPATIENT)
Dept: LAB | Facility: CLINIC | Age: 77
End: 2022-08-19

## 2022-08-19 DIAGNOSIS — C18.1 MALIGNANT NEOPLASM OF APPENDIX (H): ICD-10-CM

## 2022-08-19 DIAGNOSIS — C78.7 MALIGNANT NEOPLASM OF COLON METASTATIC TO LIVER (H): ICD-10-CM

## 2022-08-19 DIAGNOSIS — C78.7 MALIGNANT NEOPLASM OF COLON METASTATIC TO LIVER (H): Primary | ICD-10-CM

## 2022-08-19 DIAGNOSIS — C18.9 MALIGNANT NEOPLASM OF COLON METASTATIC TO LIVER (H): ICD-10-CM

## 2022-08-19 DIAGNOSIS — C18.9 MALIGNANT NEOPLASM OF COLON METASTATIC TO LIVER (H): Primary | ICD-10-CM

## 2022-08-19 LAB
ALBUMIN SERPL-MCNC: 4.1 G/DL (ref 3.4–5)
ALP SERPL-CCNC: 81 U/L (ref 40–150)
ALT SERPL W P-5'-P-CCNC: 20 U/L (ref 0–70)
ANION GAP SERPL CALCULATED.3IONS-SCNC: 10 MMOL/L (ref 3–14)
AST SERPL W P-5'-P-CCNC: 20 U/L (ref 0–45)
BILIRUB SERPL-MCNC: 0.5 MG/DL (ref 0.2–1.3)
BUN SERPL-MCNC: 15 MG/DL (ref 7–30)
CALCIUM SERPL-MCNC: 8.8 MG/DL (ref 8.5–10.1)
CEA SERPL-MCNC: 3.9 NG/ML
CHLORIDE BLD-SCNC: 107 MMOL/L (ref 94–109)
CO2 SERPL-SCNC: 24 MMOL/L (ref 20–32)
CREAT SERPL-MCNC: 0.84 MG/DL (ref 0.66–1.25)
GFR SERPL CREATININE-BSD FRML MDRD: 89 ML/MIN/1.73M2
GLUCOSE BLD-MCNC: 96 MG/DL (ref 70–99)
POTASSIUM BLD-SCNC: 4 MMOL/L (ref 3.4–5.3)
PROT SERPL-MCNC: 7.2 G/DL (ref 6.8–8.8)
SODIUM SERPL-SCNC: 141 MMOL/L (ref 133–144)

## 2022-08-19 PROCEDURE — 80053 COMPREHEN METABOLIC PANEL: CPT

## 2022-08-19 PROCEDURE — 36415 COLL VENOUS BLD VENIPUNCTURE: CPT

## 2022-08-19 PROCEDURE — 82378 CARCINOEMBRYONIC ANTIGEN: CPT

## 2022-08-19 NOTE — ORAL ONC MGMT
Left voicemail for Bill and Eowyn - only a CEA was drawn at today's lab appointment.     Scheduling assisted with adding on a lab appointment after Monday's 0900 radiation appointment. adicate timeads message sent with update.    Jennifer Gil, PharmD, [facility]  Oral Chemotherapy Monitoring Program  [facility]  [phone]

## 2022-08-22 ENCOUNTER — OFFICE VISIT (OUTPATIENT)
Dept: RADIATION THERAPY | Facility: OUTPATIENT CENTER | Age: 77
End: 2022-08-22
Payer: MEDICARE

## 2022-08-22 ENCOUNTER — TELEPHONE (OUTPATIENT)
Dept: ONCOLOGY | Facility: CLINIC | Age: 77
End: 2022-08-22

## 2022-08-22 VITALS
WEIGHT: 225 LBS | DIASTOLIC BLOOD PRESSURE: 76 MMHG | BODY MASS INDEX: 31.38 KG/M2 | HEART RATE: 77 BPM | SYSTOLIC BLOOD PRESSURE: 136 MMHG

## 2022-08-22 DIAGNOSIS — C18.1 MALIGNANT NEOPLASM OF APPENDIX (H): ICD-10-CM

## 2022-08-22 DIAGNOSIS — C18.1 MALIGNANT NEOPLASM OF APPENDIX (H): Primary | ICD-10-CM

## 2022-08-22 RX ORDER — ALBUTEROL SULFATE 90 UG/1
1-2 AEROSOL, METERED RESPIRATORY (INHALATION)
Status: CANCELLED
Start: 2022-09-21

## 2022-08-22 RX ORDER — MEPERIDINE HYDROCHLORIDE 25 MG/ML
25 INJECTION INTRAMUSCULAR; INTRAVENOUS; SUBCUTANEOUS EVERY 30 MIN PRN
Status: CANCELLED | OUTPATIENT
Start: 2022-09-21

## 2022-08-22 RX ORDER — EPINEPHRINE 1 MG/ML
0.3 INJECTION, SOLUTION INTRAMUSCULAR; SUBCUTANEOUS EVERY 5 MIN PRN
Status: CANCELLED | OUTPATIENT
Start: 2022-09-21

## 2022-08-22 RX ORDER — DIPHENHYDRAMINE HYDROCHLORIDE 50 MG/ML
50 INJECTION INTRAMUSCULAR; INTRAVENOUS
Status: CANCELLED
Start: 2022-09-21

## 2022-08-22 RX ORDER — NALOXONE HYDROCHLORIDE 0.4 MG/ML
0.2 INJECTION, SOLUTION INTRAMUSCULAR; INTRAVENOUS; SUBCUTANEOUS
Status: CANCELLED | OUTPATIENT
Start: 2022-09-21

## 2022-08-22 RX ORDER — ALBUTEROL SULFATE 0.83 MG/ML
2.5 SOLUTION RESPIRATORY (INHALATION)
Status: CANCELLED | OUTPATIENT
Start: 2022-09-21

## 2022-08-22 RX ORDER — LORAZEPAM 2 MG/ML
0.5 INJECTION INTRAMUSCULAR EVERY 4 HOURS PRN
Status: CANCELLED | OUTPATIENT
Start: 2022-09-21

## 2022-08-22 RX ORDER — ONDANSETRON 2 MG/ML
8 INJECTION INTRAMUSCULAR; INTRAVENOUS ONCE
Status: CANCELLED | OUTPATIENT
Start: 2022-09-21

## 2022-08-22 RX ORDER — METHYLPREDNISOLONE SODIUM SUCCINATE 125 MG/2ML
125 INJECTION, POWDER, LYOPHILIZED, FOR SOLUTION INTRAMUSCULAR; INTRAVENOUS
Status: CANCELLED
Start: 2022-09-21

## 2022-08-22 RX ORDER — CAPECITABINE 500 MG/1
TABLET, FILM COATED ORAL
Qty: 98 TABLET | Refills: 0 | Status: SHIPPED | OUTPATIENT
Start: 2022-08-24 | End: 2022-01-01

## 2022-08-22 NOTE — LETTER
2022         RE: Rod Olson  [address]  [facility] [address]        Dear Colleague,    Thank you for referring your patient, Rod Olson, to the [facility]. Please see a copy of my visit note below.       Department of Radiation Oncology  Radiation Therapy Center  [facility]  [address], [address]  [place] [address]  [phone]       Consultation Note    Name: Rod Olson MRN: [id number]   : 1945   Date of Service: 2022  Referring: Dr. Bal     Reason for consultation: Metastatic mucinous adenocarcinoma of the appendix.  Evaluate role for palliative radiation therapy.    History of Present Illness   Mr. Olson is a 77 year old male a diagnosis of metastatic mucinous adenocarcinoma of the appendix.  Evaluate role for palliative radiation therapy.    Patient initially presented in 2021 to the urgent care with symptoms of abdominal pain.  CT scan demonstrated large mucinous collections in the right hemipelvis prompting evaluation elation with surgical team.  Patient underwent surgery on 2021 under the care of Dr. Donald.  He underwent right hemicolectomy and mesenteric and retroperitoneal mass excisions.  Pathology demonstrated mucinous adenocarcinoma with high-grade features including signet ring cells arising from the appendix.  The tumor was involving the mesentery and retroperitoneum with invasion of the terminal ileum and cecum.  Maximum tumor size was 20.8 cm in size.  Patient reportedly underwent R2 resection.  The final stage was [id number].  The patient was subsequently treated with systemic treatment under the care of Dr. Leavitt.  He was treated with oxaliplatin and capecitabine.  Most recently patient was on capecitabine monotherapy with poor tolerance overall requiring treatment holiday and dose reduction.  Most recent CT chest abdomen pelvis on 2022 demonstrated slight increase in  size of the mixed cystic and solid mass in the right lower quadrant, measuring 11.5 x 4.9 cm in size compared to 11.0 x 4.5 cm previously.  There is also noted a 16 mm new right lower lobe lung nodule, unclear in etiology. Patient was referred to our clinic to discuss potential role of palliative radiation therapy.    Today, the patient states he is overall doing okay. Having some GI urgency and loose stool from treatments. No blood in stools. No consistent abdominal pelvic pain or pressure.  Tolerating p.o. and eating well.  No prior radiation.  No pacemaker.    Past Medical History:   Past Medical History:   Diagnosis Date     Former smoker      Hypertension      Mucinous cystadenoma of appendix      Obese      Obesity (BMI 30.0-34.9)        Past Surgical History:   Past Surgical History:   Procedure Laterality Date     COLECTOMY RIGHT N/A 12/7/2021    Procedure: Right Hemicolectomy open;  Surgeon: Peng Donald MD;  Location: [facility] OR     COLONOSCOPY N/A 12/3/2021    Procedure: COLONOSCOPY;  Surgeon: Gia Victoria MD;  Location: [facility] GI     EXPLORE GROIN N/A 1/27/2022    Procedure: Evacuation Scrotal Hematoma;  Surgeon: Darnell Lopez MD;  Location: [facility] OR     HERNIORRHAPHY INGUINAL Right 1/18/2022    Procedure: OPEN  transinguinaL  HYDROCELE REPAIR.;  Surgeon: Darnell Lopez MD;  Location: [facility]     LAPAROTOMY EXPLORATORY N/A 12/7/2021    Procedure: EXPLORATORY LAPAROTOMY;  Surgeon: Peng Donald MD;  Location: [facility] OR       Chemotherapy History:  Per HPI    Radiation History:  None    Pregnant: Not Applicable  Implanted Cardiac Devices: No    Medications:  Current Outpatient Medications   Medication     bismuth subsalicylate (PEPTO BISMOL) 262 MG/15ML suspension     [START ON 8/24/2022] capecitabine (XELODA) 500 MG tablet     cyclobenzaprine (FLEXERIL) 5 MG tablet     ferrous sulfate 140 (45 Fe) MG TBCR CR tablet     lisinopril (ZESTRIL) 40 MG tablet     loperamide (IMODIUM A-D) 2 MG  tablet     ondansetron (ZOFRAN) 8 MG tablet     oxyCODONE (ROXICODONE) 5 MG tablet     polyethylene glycol (MIRALAX) 17 GM/Dose powder     prochlorperazine (COMPAZINE) 10 MG tablet     No current facility-administered medications for this visit.         Allergies:     Allergies   Allergen Reactions     Clindamycin Other (See Comments)     PN: psychotic         Family History:  Family History   Problem Relation Age of Onset     Cataracts Mother      Hypertension Father      Prostate Cancer Brother      Lung Cancer Brother      Myocardial Infarction Brother        Review of Systems   A 10-point review of systems was performed. Pertinent findings are noted in the HPI.    Physical Exam   ECOG Status: 1    Vitals:  There were no vitals taken for this visit.    Gen: Alert, in NAD  Head: NC/AT  Eyes: PERRL, EOMI, sclera anicteric  Ears: No external auricular lesions  Nose/sinus: No rhinorrhea or epistaxis  Oral cavity/oropharynx: MMM, no visible oral cavity lesions, FOM and BOT are soft to palpation  Neck: Full ROM, supple, no palpable adenopathy  Pulm: No wheezing, stridor or respiratory distress  CV: Extremities are warm and well-perfused, no cyanosis, no pedal edema  Abdominal: Normal bowel sounds, soft, nontender, no masses  Musculoskeletal: Normal bulk and tone  Skin: Normal color and turgor  Neuro: A/Ox3, CN II-XII intact, normal gait    Imaging/Path/Labs   Imagin22  CTCAP  FINDINGS:   LUNGS AND PLEURA: Stable 11 mm irregular nodule in the left apex on  image 41 of series 3. A new elongated irregular nodule measures 16 mm  in greatest dimension in the right lower lobe on image 252. This may  be due to some atelectasis, but attention is recommended to this area  on future exams. No consolidation. The central airways are patent. No  effusion.     MEDIASTINUM/AXILLAE: No axillary, mediastinal, or hilar adenopathy.  Small amount of pericardial fluid is present. The ascending aorta is  dilated measuring up to  4.6 cm. No significant change from the  previous study. Diffuse esophageal wall thickening is noted. Moderate  coronary artery calcification.     HEPATOBILIARY: Stable small cysts are seen through the liver.     PANCREAS: Normal.     SPLEEN: Normal.     ADRENAL GLANDS: Normal.     KIDNEYS/BLADDER: Normal.     BOWEL: No bowel obstruction. Previous right lower quadrant resection  with ileocolic anastomosis is again noted. The mixed cystic and solid  mass in the right pelvis abutting loops of small bowel, sigmoid colon,  and the resection site is minimally larger measuring 11.5 x 4.9 cm  compared to 11.0 x 4.5 cm previously.     PELVIC ORGANS: The prostate is significantly enlarged and indents the  underside of the bladder.     ADDITIONAL FINDINGS: None.     MUSCULOSKELETAL: Degenerative changes are noted through the spine.                                                                      IMPRESSION:  1.  Slight interval increase in size of the mixed cystic and solid  mass in the right lower quadrant abutting loops of small bowel,  sigmoid colon, and the resection site.  2.  New irregular 16 mm elongated nodule in the right lower lobe. This  could represent atelectasis, but attention is recommended to this area  on future exams.  3.  Stable 11 mm irregular nodule in the left apex.  4.  Diffuse esophageal wall thickening without focal mass.  5.  Stable dilation of the ascending aorta.    Path:     12/7/21  A.  RIGHT COLON AND ILEUM, RIGHT HEMICOLECTOMY:  - Mucinous adenocarcinoma with high-grade features including signet ring cells, arising from the appendix  - Tumor involves the mesentery and retroperitoneum and invades the terminal ileum and cecum  - Tumor size: 20.8 cm in greatest dimension  - Resection margins appear uninvolved  - No evidence of lymphovascular invasion   - Perineural invasion present  - Tumor deposits present: >10  - Sixteen lymph nodes negative for metastatic carcinoma (0/16)  - Please see tumor  synoptic report    B.  MESENTERY, MESENTERIC MASS, EXCISION:  - Positive for mucinous adenocarcinoma    C.  RETROPERITONEAL TUMOR, EXCISION:  - Positive for mucinous adenocarcinoma    D.  RETROPERITONEAL MASS, EXCISION:  - Positive for mucinous adenocarcinoma      Electronically signed by Clara Arciniega MD on 12/13/2021 at  2:40 PM   Comment     Case was reviewed by the following resident: HANNA SEGURA   Synoptic Checklist     APPENDIX: Resection  8th Edition - Protocol posted: 2/26/2020  APPENDIX: RESECTION - A  SPECIMEN   Procedure  Exploratory laparotomy, right hemicolectomy, resection of retroperitoneal tumor and right-sided ureterolysis.    TUMOR   Tumor Site  Diffusely involving appendix      duodenum, terminal ileum, cecum, ascending colon, mesentery, retroperitoneum    Histologic Type  Mucinous adenocarcinoma    Histologic Grade  G3: Poorly differentiated    Tumor Size  Greatest Dimension (Centimeters): 20.8 cm   Tumor Deposits  Present    Number of Deposits  Cannot be determined: Large masses composed of inumerable tumor deposits from the mesentery and retroperitoneum were resected.    Tumor Extension  Tumor directly invades adjacent organs or structures: duodenum, terminal ileum, cecum, ascending colon, mesentery, retroperitoneum    Lymphovascular Invasion  Not identified    Perineural Invasion  Present    MARGINS   Proximal Margin  Uninvolved by invasive carcinoma      Involved by acellular mucin    Mesenteric Margin  Uninvolved by invasive carcinoma    Status of Mucinous Neoplasm at Mesenteric Margin  Uninvolved by appendiceal mucinous neoplasm    Distance of Invasive Carcinoma from Closest Mesenteric Margin  1.5 cm   Other Margin  Retroperitoneal    Margin Status  Uninvolved by invasive carcinoma    LYMPH NODES   Number of Lymph Nodes Involved  0    Number of Lymph Nodes Examined  16    PATHOLOGIC STAGE CLASSIFICATION (pTNM, AJCC 8th Edition)      Primary Tumor (pT)  pT4b    Regional Lymph Nodes (pN)   pN1c    Distant Metastasis (pM)  pM1c    Site(s)  Retroperitoneum    .              Assessment    Mr. Olson is a 77 year old male with a diagnosis of metastatic mucinous adenocarcinoma of the appendix.  Evaluate role for palliative radiation therapy.    Plan     1.  I discussed the natural history of metastatic appendiceal cancer.    2.  The patient has underwent cytoreductive surgery and systemic treatment.  He has had overall poor tolerance to systemic treatment at this time.  His largest tumor burden remains in the abdomino-pelvic/region.  As such, I feel it is reasonable to consider a course of palliative radiation therapy with the goal of local control.    3.  I discussed side effects with the patient in great detail.  Consent was obtained.  We will plan to schedule for CT simulation and start radiation therapy soon thereafter.  Tentatively will plan to treat to 30 Gy in 10 fractions.      Slade Castillo MD  Department of Radiation Oncology  [facility]

## 2022-08-22 NOTE — PROGRESS NOTES
Oral Chemotherapy Monitoring Program  Lab Follow Up    Reviewed lab results from 8/19/22.    ORAL CHEMOTHERAPY 7/11/2022 7/22/2022 7/25/2022 8/10/2022 8/15/2022 8/22/2022 8/22/2022   Assessment Type Initial Follow up Other Refill Left Voicemail Monthly Follow up Refill Lab Monitoring   Diagnosis Code Appendix Cancer Appendix Cancer Appendix Cancer Appendix Cancer Appendix Cancer Appendix Cancer Appendix Cancer   Providers Dr. Valeriano Bal   [facility]   Drug Name Xeloda (capecitabine) Xeloda (capecitabine) Xeloda (capecitabine) Xeloda (capecitabine) Xeloda (capecitabine) Xeloda (capecitabine) Xeloda (capecitabine)   Dose 1,500 mg Other: Other: Other: Other: Other: Other:   Current Schedule BID BID BID BID BID BID BID   Cycle Details 2 weeks on, 1 week off 2 weeks on, 1 week off 2 weeks on, 1 week off 2 weeks on, 1 week off 2 weeks on, 1 week off 1 week on, 1 week off 1 week on, 1 week off   Start Date of Last Cycle - - - - - - -   Planned next cycle start date - - - - - - -   Doses missed in last 2 weeks - - - - 0 - -   Adherence Assessment - - - - Adherent - -   Adverse Effects - - - - No AE identified during assessment - -   Nausea - - - - - - -   Pharmacist Intervention(nausea) - - - - - - -   Intervention(s) - - - - - - -   Diarrhea - - - - - - -   Pharmacist Intervention(diarrhea) - - - - - - -   Intervention(s) - - - - - - -   Headache - - - - - - -   Pharmacist Intervention(headache) - - - - - - -   Intervention(s) - - - - - - -   Any new drug interactions? - - - - - - -   Pharmacist Intervention? - - - - - - -   Intervention(s) - - - - - - -   Is the dose as ordered appropriate for the patient? - - - - - - -       Labs:  _  Result Component Current Result Ref Range   Sodium 141 (8/19/2022) 133 - 144 mmol/L     _  Result Component Current Result Ref Range   Potassium 4.0  (8/19/2022) 3.4 - 5.3 mmol/L     _  Result Component Current Result Ref Range   Calcium 8.8 (8/19/2022) 8.5 - 10.1 mg/dL     No results found for Mag within last 30 days.     No results found for Phos within last 30 days.     _  Result Component Current Result Ref Range   Albumin 4.1 (8/19/2022) 3.4 - 5.0 g/dL     _  Result Component Current Result Ref Range   Urea Nitrogen 15 (8/19/2022) 7 - 30 mg/dL     _  Result Component Current Result Ref Range   Creatinine 0.84 (8/19/2022) 0.66 - 1.25 mg/dL     _  Result Component Current Result Ref Range   AST 20 (8/19/2022) 0 - 45 U/L     _  Result Component Current Result Ref Range   ALT 20 (8/19/2022) 0 - 70 U/L     _  Result Component Current Result Ref Range   Bilirubin Total 0.5 (8/19/2022) 0.2 - 1.3 mg/dL     No results found for WBC within last 30 days.     No results found for HGB within last 30 days.     No results found for PLT within last 30 days.     No results found for ANC within last 30 days.     No results found for ANC within last 30 days.        Assessment & Plan:  No concerning abnormalities.CBC was not completed, asked patient to get CBC drawn after today's radiation appointment that did not happen but labs have been stable. Patient to see Dr Bal later this week.      Follow-Up:  8/26 Visit with Dr Valeriano Gil, PharmD, BCPS  Oral Chemotherapy Monitoring Program  [facility]  [phone]

## 2022-08-22 NOTE — PROGRESS NOTES
Department of Radiation Oncology  Radiation Therapy Center  [facility]  [address], [address]  [place] [address]  [phone]       Consultation Note    Name: Rod Olson MRN: [id number]   : 1945   Date of Service: 2022  Referring: Dr. Bal     Reason for consultation: Metastatic mucinous adenocarcinoma of the appendix.  Evaluate role for palliative radiation therapy.    History of Present Illness   Mr. Olson is a 77 year old male a diagnosis of metastatic mucinous adenocarcinoma of the appendix.  Evaluate role for palliative radiation therapy.    Patient initially presented in 2021 to the urgent care with symptoms of abdominal pain.  CT scan demonstrated large mucinous collections in the right hemipelvis prompting evaluation elation with surgical team.  Patient underwent surgery on 2021 under the care of Dr. Donald.  He underwent right hemicolectomy and mesenteric and retroperitoneal mass excisions.  Pathology demonstrated mucinous adenocarcinoma with high-grade features including signet ring cells arising from the appendix.  The tumor was involving the mesentery and retroperitoneum with invasion of the terminal ileum and cecum.  Maximum tumor size was 20.8 cm in size.  Patient reportedly underwent R2 resection.  The final stage was [id number].  The patient was subsequently treated with systemic treatment under the care of Dr. Bal.  He was treated with oxaliplatin and capecitabine.  Most recently patient was on capecitabine monotherapy with poor tolerance overall requiring treatment holiday and dose reduction.  Most recent CT chest abdomen pelvis on 2022 demonstrated slight increase in size of the mixed cystic and solid mass in the right lower quadrant, measuring 11.5 x 4.9 cm in size compared to 11.0 x 4.5 cm previously.  There is also noted a 16 mm new right lower lobe lung nodule, unclear in etiology. Patient was referred to our  clinic to discuss potential role of palliative radiation therapy.    Today, the patient states he is overall doing okay. Having some GI urgency and loose stool from treatments. No blood in stools. No consistent abdominal pelvic pain or pressure.  Tolerating p.o. and eating well.  No prior radiation.  No pacemaker.    Past Medical History:   Past Medical History:   Diagnosis Date     Former smoker      Hypertension      Mucinous cystadenoma of appendix      Obese      Obesity (BMI 30.0-34.9)        Past Surgical History:   Past Surgical History:   Procedure Laterality Date     COLECTOMY RIGHT N/A 12/7/2021    Procedure: Right Hemicolectomy open;  Surgeon: Peng Donald MD;  Location: [facility]     COLONOSCOPY N/A 12/3/2021    Procedure: COLONOSCOPY;  Surgeon: Gia Victoria MD;  Location: [facility] GI     EXPLORE GROIN N/A 1/27/2022    Procedure: Evacuation Scrotal Hematoma;  Surgeon: Darnell Lopez MD;  Location: [facility]     HERNIORRHAPHY INGUINAL Right 1/18/2022    Procedure: OPEN  transinguinaL  HYDROCELE REPAIR.;  Surgeon: Darnell Lopez MD;  Location: [facility]     LAPAROTOMY EXPLORATORY N/A 12/7/2021    Procedure: EXPLORATORY LAPAROTOMY;  Surgeon: Peng Donald MD;  Location: [facility] OR       Chemotherapy History:  Per HPI    Radiation History:  None    Pregnant: Not Applicable  Implanted Cardiac Devices: No    Medications:  Current Outpatient Medications   Medication     bismuth subsalicylate (PEPTO BISMOL) 262 MG/15ML suspension     [START ON 8/24/2022] capecitabine (XELODA) 500 MG tablet     cyclobenzaprine (FLEXERIL) 5 MG tablet     ferrous sulfate 140 (45 Fe) MG TBCR CR tablet     lisinopril (ZESTRIL) 40 MG tablet     loperamide (IMODIUM A-D) 2 MG tablet     ondansetron (ZOFRAN) 8 MG tablet     oxyCODONE (ROXICODONE) 5 MG tablet     polyethylene glycol (MIRALAX) 17 GM/Dose powder     prochlorperazine (COMPAZINE) 10 MG tablet     No current facility-administered medications for this visit.          Allergies:     Allergies   Allergen Reactions     Clindamycin Other (See Comments)     PN: psychotic         Family History:  Family History   Problem Relation Age of Onset     Cataracts Mother      Hypertension Father      Prostate Cancer Brother      Lung Cancer Brother      Myocardial Infarction Brother        Review of Systems   A 10-point review of systems was performed. Pertinent findings are noted in the HPI.    Physical Exam   ECOG Status: 1    Vitals:  There were no vitals taken for this visit.    Gen: Alert, in NAD  Head: NC/AT  Eyes: PERRL, EOMI, sclera anicteric  Ears: No external auricular lesions  Nose/sinus: No rhinorrhea or epistaxis  Oral cavity/oropharynx: MMM, no visible oral cavity lesions, FOM and BOT are soft to palpation  Neck: Full ROM, supple, no palpable adenopathy  Pulm: No wheezing, stridor or respiratory distress  CV: Extremities are warm and well-perfused, no cyanosis, no pedal edema  Abdominal: Normal bowel sounds, soft, nontender, no masses  Musculoskeletal: Normal bulk and tone  Skin: Normal color and turgor  Neuro: A/Ox3, CN II-XII intact, normal gait    Imaging/Path/Labs   Imagin22  CTCAP  FINDINGS:   LUNGS AND PLEURA: Stable 11 mm irregular nodule in the left apex on  image 41 of series 3. A new elongated irregular nodule measures 16 mm  in greatest dimension in the right lower lobe on image 252. This may  be due to some atelectasis, but attention is recommended to this area  on future exams. No consolidation. The central airways are patent. No  effusion.     MEDIASTINUM/AXILLAE: No axillary, mediastinal, or hilar adenopathy.  Small amount of pericardial fluid is present. The ascending aorta is  dilated measuring up to 4.6 cm. No significant change from the  previous study. Diffuse esophageal wall thickening is noted. Moderate  coronary artery calcification.     HEPATOBILIARY: Stable small cysts are seen through the liver.     PANCREAS: Normal.     SPLEEN:  Normal.     ADRENAL GLANDS: Normal.     KIDNEYS/BLADDER: Normal.     BOWEL: No bowel obstruction. Previous right lower quadrant resection  with ileocolic anastomosis is again noted. The mixed cystic and solid  mass in the right pelvis abutting loops of small bowel, sigmoid colon,  and the resection site is minimally larger measuring 11.5 x 4.9 cm  compared to 11.0 x 4.5 cm previously.     PELVIC ORGANS: The prostate is significantly enlarged and indents the  underside of the bladder.     ADDITIONAL FINDINGS: None.     MUSCULOSKELETAL: Degenerative changes are noted through the spine.                                                                      IMPRESSION:  1.  Slight interval increase in size of the mixed cystic and solid  mass in the right lower quadrant abutting loops of small bowel,  sigmoid colon, and the resection site.  2.  New irregular 16 mm elongated nodule in the right lower lobe. This  could represent atelectasis, but attention is recommended to this area  on future exams.  3.  Stable 11 mm irregular nodule in the left apex.  4.  Diffuse esophageal wall thickening without focal mass.  5.  Stable dilation of the ascending aorta.    Path:     12/7/21  A.  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  MESENTERY, MESENTERIC MASS, EXCISION:  - Positive for mucinous adenocarcinoma    C.  RETROPERITONEAL TUMOR, EXCISION:  - Positive for mucinous adenocarcinoma    D.  RETROPERITONEAL MASS, EXCISION:  - Positive for  mucinous adenocarcinoma      Electronically signed by Clara Arciniega MD on 12/13/2021 at  2:40 PM   Comment     Case was reviewed by the following resident: HANNA SEGURA   Synoptic Checklist     APPENDIX: Resection  8th Edition - Protocol posted: 2/26/2020  APPENDIX: RESECTION - A  SPECIMEN   Procedure  Exploratory laparotomy, right hemicolectomy, resection of retroperitoneal tumor and right-sided ureterolysis.    TUMOR   Tumor Site  Diffusely involving appendix      duodenum, terminal ileum, cecum, ascending colon, mesentery, retroperitoneum    Histologic Type  Mucinous adenocarcinoma    Histologic Grade  G3: Poorly differentiated    Tumor Size  Greatest Dimension (Centimeters): 20.8 cm   Tumor Deposits  Present    Number of Deposits  Cannot be determined: Large masses composed of inumerable tumor deposits from the mesentery and retroperitoneum were resected.    Tumor Extension  Tumor directly invades adjacent organs or structures: duodenum, terminal ileum, cecum, ascending colon, mesentery, retroperitoneum    Lymphovascular Invasion  Not identified    Perineural Invasion  Present    MARGINS   Proximal Margin  Uninvolved by invasive carcinoma      Involved by acellular mucin    Mesenteric Margin  Uninvolved by invasive carcinoma    Status of Mucinous Neoplasm at Mesenteric Margin  Uninvolved by appendiceal mucinous neoplasm    Distance of Invasive Carcinoma from Closest Mesenteric Margin  1.5 cm   Other Margin  Retroperitoneal    Margin Status  Uninvolved by invasive carcinoma    LYMPH NODES   Number of Lymph Nodes Involved  0    Number of Lymph Nodes Examined  16    PATHOLOGIC STAGE CLASSIFICATION (pTNM, AJCC 8th Edition)      Primary Tumor (pT)  pT4b    Regional Lymph Nodes (pN)  pN1c    Distant Metastasis (pM)  pM1c    Site(s)  Retroperitoneum    .              Assessment    Mr. Olson is a 77 year old male with a diagnosis of metastatic mucinous adenocarcinoma of the appendix.  Evaluate role for palliative  radiation therapy.    Plan     1.  I discussed the natural history of metastatic appendiceal cancer.    2.  The patient has underwent cytoreductive surgery and systemic treatment.  He has had overall poor tolerance to systemic treatment at this time.  His largest tumor burden remains in the abdomino-pelvic/region.  As such, I feel it is reasonable to consider a course of palliative radiation therapy with the goal of local control.    3.  I discussed side effects with the patient in great detail.  Consent was obtained.  We will plan to schedule for CT simulation and start radiation therapy soon thereafter.  Tentatively will plan to treat to 30 Gy in 10 fractions.        Slade Castillo MD  Department of Radiation Oncology  [facility]

## 2022-08-22 NOTE — NURSING NOTE
"REASON FOR APPOINTMENT   Type of Cancer: high grade appendiceal mucinous adenocarcinoma  Location: RLQ mass  Date of Symptom Onset:  Started treatment 12/2021 with surgery and then chemo. Side effects making chemo difficult.     TREATMENT TO-DATE FOR THIS CANCER  Surgery ? Dr. Donald   Chemotherapy ? Dr. Bal, Xeloda currently doing 7 days on, 7 days off, repeat   Other Treatments for this Cancer ? Discussion for option of palliative RT    PERSONAL HISTORY OF CANCER   Previous Cancer ? no   Prior Radiation ? no   Prior Chemotherapy ? Per above   Prior Hormonal Therapy ? no     RECENT IMAGING STUDIES  CT scan (date: 7/20/22, location: C/A/P)    REFERRALS NEEDED  Will review options with pt and dtr for SW, , psychology support    VITALS  /76   Pulse 77   Wt 102.1 kg (225 lb)   BMI 31.38 kg/m      PACEMAKER/IMPLANTED CARDIAC DEVICE no    PAIN  Denies RLQ pain. Does not Back pain and tension headaches    PSYCHOSOCIAL  Marital Status:   Patient lives in [place] with self.  Number of children: 3  Working status: retired  Do you feel safe in your home? Yes    REVIEW OF SYSTEMS  Skin: age spots, bruises from lab draw, pale  Eyes: reading glasses, recently underwent cataract surgery  Ears/Nose/Throat: hearing loss, tinnitus  Respiratory: No shortness of breath, dyspnea on exertion, cough, or hemoptysis  Cardiovascular: negative  Gastrointestinal: urgency with stools, soft/loose at times  Genitourinary: urgency  Musculoskeletal: muscular aches/tightness, tension headaches  Neurologic: negative  Psychiatric: negative  Hematologic/Lymphatic/Immunologic: fatigue and weight loss  Endocrine: negative    Radiation Oncology Patient Teaching    Current Concern: \"how do you target the area that needs radiation, how precise, how do you image it?\"    Person involved with teaching: Patient and Daughter Margarita  Patient asked Questions: Yes  Patient was cooperative: Yes  Patient was receptive (willing to " accept information given): Yes    Education Assessment  Comprehension ability: Medium  Knowledge level: Medium  Factors affecting teaching: None    Education Materials Given  Radiation Therapy and You  Radiation to the pelvis    Educational Topics Discussed  Side effects, Activity, Nutrition, Adjustment to illness and When to call MD/RN    Response To Teaching  More review necessary    Do you have an advanced directive or living will? yes  Are you DNR/DNI? no

## 2022-08-22 NOTE — TELEPHONE ENCOUNTER
Writer placed a call to [employer] to expedite the order of Xeloda as the patient was out of medication today. Representative is sending a stat message to the [facility] team and will reach out to the patient this afternoon to schedule delivery. Patients daughter could not be reached to make sure that she knows to set a delivery date of tomorrow so a VM was left with instructions and [employer] phone number.     Harriet Vazquez CPOncology Pharmacy Liaison     [facility] [place] [address]  Office: [phone]  Fax: [phone]  [email or web address]     diffuse

## 2022-08-26 ENCOUNTER — VIRTUAL VISIT (OUTPATIENT)
Dept: ONCOLOGY | Facility: CLINIC | Age: 77
End: 2022-08-26
Attending: STUDENT IN AN ORGANIZED HEALTH CARE EDUCATION/TRAINING PROGRAM
Payer: MEDICARE

## 2022-08-26 DIAGNOSIS — C18.1 MALIGNANT NEOPLASM OF APPENDIX (H): Primary | ICD-10-CM

## 2022-08-26 PROCEDURE — G0463 HOSPITAL OUTPT CLINIC VISIT: HCPCS | Mod: PN,RTG | Performed by: STUDENT IN AN ORGANIZED HEALTH CARE EDUCATION/TRAINING PROGRAM

## 2022-08-26 PROCEDURE — 99215 OFFICE O/P EST HI 40 MIN: CPT | Mod: 95 | Performed by: STUDENT IN AN ORGANIZED HEALTH CARE EDUCATION/TRAINING PROGRAM

## 2022-08-26 NOTE — NURSING NOTE
Patient declined individual allergy and medication review by support staff because nothing has changed since last reviewed on August 22nd.    Mee CROWE

## 2022-08-26 NOTE — LETTER
2022         RE: Rod Olson  [address]  [facility] [address]        Dear Colleague,    Thank you for referring your patient, Rod Olson, to the [facility]. Please see a copy of my visit note below.    [facility] - Medical Oncology TeleHealth Consult Note  2022    Patient Identifiers     Name: Rod Olson  Preferred Address: [place]  Preferred Language: English  : 1945  Gender: male    Assessment and Plan     Mr. 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    Pt has tolerated current dosing of chemotherapy reasonably well. As prior, we suspect that significant dose interruptions resulted in the disease progression noted on scans. Thus, as long as pt is able to continue treatment without interruption, it is likely that we will be able to generate meaningful disease control. If pt continues to have progression on subsequent scans, we may discuss additional options for treatment including dose escalation or return to 14-day on, 7-days off regimen.    Plan:  Metastatic HAMN  - cont capecitabine monotherapy at 4/3 pill qAM/qPM dosing  - trend labs  - monitor fatigue, appetite, GI tox  - trial psyllium for improvement in stool consistency  - plan for CT scans in early October    The patient and family asked numerous excellent questions which I answered to the best of my abilities. At the completion of our consultation, the patient and accompanying caregivers had a strong understanding of the plan. They have our contact information for any further questions or concerns, and know to reach out for any urgent matters. Patient and family aware that they should call 911 for emergencies.       45 minutes spent on the date of the encounter doing chart review, review of test results,  interpretation of tests, patient visit and documentation       Larry Bal MD, PhD   of Medicine  Division of Hematology, Oncology and Transplantation  [facility]    -----------------------------------    Oncology Summary     Cancer Staging  Malignant neoplasm of appendix (H)  Staging form: Appendix Carcinoma, AJCC 8th Edition  - Clinical stage from 12/7/2021: Stage IVC (cT4b, cN1c, pM1c, G3) - Signed by Larry Bal MD on 1/5/2022      Oncology History Overview Note   Pt in his USOH until November 2021 when he presented to care at the urging of family with eight months of abdominal pain. CT showed large, mucinous collections in R hemipelvis, prompting referral to surgery. Pt thought to have likely a low-grade mucinous neoplasm and underwent cytoreductive surgery, with incomplete (R2) resection. 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    On establishment with Med Onc, pt recommended to initiate FOLFOX therapy.      Malignant neoplasm of appendix (H)   12/7/2021 Initial Diagnosis    Malignant neoplasm of appendix (H)     12/7/2021 -  Cancer Staged    Staging form: Appendix Carcinoma, AJCC 8th Edition  - Clinical stage from 12/7/2021: Stage IVC (cT4b, cN1c, pM1c, G3)     3/23/2022 -  Chemotherapy    OP ONC Colorectal Cancer - Oxaliplatin / Capecitabine (XELOX, CAPOX) - EVERY 3 WEEKS  Plan Provider: Larry Bal MD  Treatment goal: Palliative  Line of treatment: First Line         Subjective/Interval Events     - pt met in video visit with daughter  - doing 'Okay'; started cycle on Thursday  - notices a difference BM's; some tenesmus (upsetting, somewhat unfocused).     Review of Systems: 14 point ROS negative other than the symptoms noted above in the HPI.      Objective Data     Lab data:  I have personally reviewed the lab data, notable for:    - no  notables    Radiology data:  I have personally reviewed the radiology data, notable for:  7/20/22 CT C/A/P:  1.  Slight interval increase in size of the mixed cystic and solid  mass in the right lower quadrant abutting loops of small bowel,  sigmoid colon, and the resection site.  2.  New irregular 16 mm elongated nodule in the right lower lobe. This  could represent atelectasis, but attention is recommended to this area  on future exams.  3.  Stable 11 mm irregular nodule in the left apex.  4.  Diffuse esophageal wall thickening without focal mass.  5.  Stable dilation of the ascending aorta.    Pathology and other data:  I have personally reviewed and interpreted the pathology data, notable for:    - no new data    Billing Stuff     [place] is a 77 year old who is being evaluated via a billable video visit.      How would you like to obtain your AVS? MyChart  If the video visit is dropped, the invitation should be resent by: Text to cell phone: [phone]  Will anyone else be joining your video visit? Yes: Daughter Eowaraceli. How would they like to receive their invitation? Send to e-mail at: [email or web address]    Mee Melendrez VF         Again, thank you for allowing me to participate in the care of your patient.      Sincerely,    Larry Bal MD

## 2022-08-26 NOTE — PROGRESS NOTES
[facility] - Medical Oncology TeleHealth Consult Note  2022    Patient Identifiers     Name: Rod Olson  Preferred Address: [place]  Preferred Language: English  : 1945  Gender: male    Assessment and Plan     Mr. 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    Pt has tolerated current dosing of chemotherapy reasonably well. As prior, we suspect that significant dose interruptions resulted in the disease progression noted on scans. Thus, as long as pt is able to continue treatment without interruption, it is likely that we will be able to generate meaningful disease control. If pt continues to have progression on subsequent scans, we may discuss additional options for treatment including dose escalation or return to 14-day on, 7-days off regimen.    Plan:  Metastatic HAMN  - cont capecitabine monotherapy at 4/3 pill qAM/qPM dosing  - trend labs  - monitor fatigue, appetite, GI tox  - trial psyllium for improvement in stool consistency  - plan for CT scans in early October    The patient and family asked numerous excellent questions which I answered to the best of my abilities. At the completion of our consultation, the patient and accompanying caregivers had a strong understanding of the plan. They have our contact information for any further questions or concerns, and know to reach out for any urgent matters. Patient and family aware that they should call 911 for emergencies.       45 minutes spent on the date of the encounter doing chart review, review of test results, interpretation of tests, patient visit and documentation       Larry Bal MD, PhD   of Medicine  Division of Hematology, Oncology and Transplantation  [facility]    -----------------------------------    Oncology Summary     Cancer  Staging  Malignant neoplasm of appendix (H)  Staging form: Appendix Carcinoma, AJCC 8th Edition  - Clinical stage from 12/7/2021: Stage IVC (cT4b, cN1c, pM1c, G3) - Signed by Larry Bal MD on 1/5/2022      Oncology History Overview Note   Pt in his USOH until November 2021 when he presented to care at the urging of family with eight months of abdominal pain. CT showed large, mucinous collections in R hemipelvis, prompting referral to surgery. Pt thought to have likely a low-grade mucinous neoplasm and underwent cytoreductive surgery, with incomplete (R2) resection. 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    On establishment with Med Onc, pt recommended to initiate FOLFOX therapy.      Malignant neoplasm of appendix (H)   12/7/2021 Initial Diagnosis    Malignant neoplasm of appendix (H)     12/7/2021 -  Cancer Staged    Staging form: Appendix Carcinoma, AJCC 8th Edition  - Clinical stage from 12/7/2021: Stage IVC (cT4b, cN1c, pM1c, G3)     3/23/2022 -  Chemotherapy    OP ONC Colorectal Cancer - Oxaliplatin / Capecitabine (XELOX, CAPOX) - EVERY 3 WEEKS  Plan Provider: Larry Bal MD  Treatment goal: Palliative  Line of treatment: First Line         Subjective/Interval Events     - pt met in video visit with daughter  - doing 'Okay'; started cycle on Thursday  - notices a difference BM's; some tenesmus (upsetting, somewhat unfocused).     Review of Systems: 14 point ROS negative other than the symptoms noted above in the HPI.      Objective Data     Lab data:  I have personally reviewed the lab data, notable for:    - no notables    Radiology data:  I have personally reviewed the radiology data, notable for:  7/20/22 CT C/A/P:  1.  Slight interval increase in size of the mixed cystic and solid  mass in the right lower quadrant abutting loops of small bowel,  sigmoid colon, and the resection site.  2.   New irregular 16 mm elongated nodule in the right lower lobe. This  could represent atelectasis, but attention is recommended to this area  on future exams.  3.  Stable 11 mm irregular nodule in the left apex.  4.  Diffuse esophageal wall thickening without focal mass.  5.  Stable dilation of the ascending aorta.    Pathology and other data:  I have personally reviewed and interpreted the pathology data, notable for:    - no new data    Billing Stuff     [place] is a 77 year old who is being evaluated via a billable video visit.      How would you like to obtain your AVS? MyChart  If the video visit is dropped, the invitation should be resent by: Text to cell phone: [phone]  Will anyone else be joining your video visit? Yes: Daughter Eowaraceli. How would they like to receive their invitation? Send to e-mail at: [email or web address]      Mee CROWE     Video-Visit Details    Video Start Time: 2:36 PM    Type of service:  Video Visit    Video End Time:2:56 PM    Originating Location (pt. Location): Home    Distant Location (provider location):  [facility]     Platform used for Video Visit: [employer]

## 2022-08-29 ENCOUNTER — OFFICE VISIT (OUTPATIENT)
Dept: RADIATION THERAPY | Facility: OUTPATIENT CENTER | Age: 77
End: 2022-08-29
Payer: MEDICARE

## 2022-08-29 DIAGNOSIS — C18.1 MALIGNANT NEOPLASM OF APPENDIX (H): Primary | ICD-10-CM

## 2022-08-29 NOTE — PROGRESS NOTES
The patient underwent CT simulation.    Slade Castillo M.D.  Department of Radiation Oncology  [facility]

## 2022-08-29 NOTE — LETTER
8/29/2022         RE: Rod Olson  [address]  [place] [facility] [address]        Dear Colleague,    Thank you for referring your patient, Rod Olson, to the [facility]. Please see a copy of my visit note below.    The patient underwent CT simulation.    Slade Castillo M.D.  Department of Radiation Oncology  [facility]         Again, thank you for allowing me to participate in the care of your patient.        Sincerely,        Slade Castillo MD

## 2022-09-01 ENCOUNTER — APPOINTMENT (OUTPATIENT)
Dept: RADIATION THERAPY | Facility: OUTPATIENT CENTER | Age: 77
End: 2022-09-01
Payer: MEDICARE

## 2022-09-02 ENCOUNTER — APPOINTMENT (OUTPATIENT)
Dept: RADIATION THERAPY | Facility: OUTPATIENT CENTER | Age: 77
End: 2022-09-02
Payer: MEDICARE

## 2022-09-07 ENCOUNTER — OFFICE VISIT (OUTPATIENT)
Dept: RADIATION THERAPY | Facility: OUTPATIENT CENTER | Age: 77
End: 2022-09-07
Payer: MEDICARE

## 2022-09-07 VITALS
HEART RATE: 70 BPM | DIASTOLIC BLOOD PRESSURE: 78 MMHG | WEIGHT: 229 LBS | SYSTOLIC BLOOD PRESSURE: 118 MMHG | BODY MASS INDEX: 31.94 KG/M2

## 2022-09-07 DIAGNOSIS — C18.1 MALIGNANT NEOPLASM OF APPENDIX (H): Primary | ICD-10-CM

## 2022-09-07 NOTE — PROGRESS NOTES
[facility]  SPECIALIZING IN BREAKTHROUGHS  Radiation Oncology    On Treatment Visit Note      Rod Olson      Date: 2022   MRN: [id number]   : 1945  Diagnosis: appendix cancer      Reason for Visit:  On Radiation Treatment Visit     Treatment Summary to Date  Treatment Site: abdomen/pelvis Current Dose: 1200/3000 cGy Fractions: 4/10      Chemotherapy  Chemo concurrent with radx?: No    Subjective:   Doing well. No acute issues. No change in bowel habit. No nausea. Tolerating PO. No  bother.     Nursing ROS:   Nutrition Alteration  Diet Type: Patient's Preference  Skin  Skin Reaction: 0 - No changes        Cardiovascular  Respiratory effort: 1 - Normal - without distress  Gastrointestinal  GI Note: loose bowels, not new        Pain Assessment  0-10 Pain Scale: 0      Objective:   /78   Pulse 70   Wt 103.9 kg (229 lb)   BMI 31.94 kg/m    NAD    Labs:  CBC RESULTS: Recent Labs   Lab Test 22  1151   WBC 6.5   RBC 3.99*   HGB 12.6*   HCT 38.1*   MCV 96   MCH 31.6   MCHC 33.1   RDW 16.6*        ELECTROLYTES:  Recent Labs   Lab Test 22  1407      POTASSIUM 4.0   CHLORIDE 107   NAMAN 8.8   CO2 24   BUN 15   CR 0.84   GLC 96       Assessment:  Mr. Olson is a 77 year old male with a diagnosis of metastatic mucinous adenocarcinoma of the appendix. He is undergoing palliative radiation therapy.    Tolerating radiation therapy well.  All questions and concerns addressed.    Plan:   1. Continue current therapy.        Mosaiq chart and setup information reviewed  Ports checked    Medication Review  Med list reviewed with patient?: Yes           Slade Castillo MD

## 2022-09-07 NOTE — LETTER
2022         RE: Rod Olson  [address]  [facility] [address]        Dear Colleague,    Thank you for referring your patient, Rod Olson, to the [facility]. Please see a copy of my visit note below.    [facility]  SPECIALIZING IN BREAKTHROUGHS  Radiation Oncology    On Treatment Visit Note      Rod Olson      Date: 2022   MRN: [id number]   : 1945  Diagnosis: appendix cancer      Reason for Visit:  On Radiation Treatment Visit     Treatment Summary to Date  Treatment Site: abdomen/pelvis Current Dose: 1200/3000 cGy Fractions: 4/10      Chemotherapy  Chemo concurrent with radx?: No    Subjective:   Doing well. No acute issues. No change in bowel habit. No nausea. Tolerating PO. No  bother.     Nursing ROS:   Nutrition Alteration  Diet Type: Patient's Preference  Skin  Skin Reaction: 0 - No changes        Cardiovascular  Respiratory effort: 1 - Normal - without distress  Gastrointestinal  GI Note: loose bowels, not new        Pain Assessment  0-10 Pain Scale: 0      Objective:   /78   Pulse 70   Wt 103.9 kg (229 lb)   BMI 31.94 kg/m    NAD    Labs:  CBC RESULTS: Recent Labs   Lab Test 22  1151   WBC 6.5   RBC 3.99*   HGB 12.6*   HCT 38.1*   MCV 96   MCH 31.6   MCHC 33.1   RDW 16.6*        ELECTROLYTES:  Recent Labs   Lab Test 22  1407      POTASSIUM 4.0   CHLORIDE 107   NAMAN 8.8   CO2 24   BUN 15   CR 0.84   GLC 96       Assessment:  Mr. Olson is a 77 year old male with a diagnosis of metastatic mucinous adenocarcinoma of the appendix. He is undergoing palliative radiation therapy.    Tolerating radiation therapy well.  All questions and concerns addressed.    Plan:   1. Continue current therapy.        Mosaiq chart and setup information reviewed  Ports checked    Medication Review  Med list reviewed with patient?: Yes           Slade Castillo MD

## 2022-09-14 ENCOUNTER — OFFICE VISIT (OUTPATIENT)
Dept: RADIATION THERAPY | Facility: OUTPATIENT CENTER | Age: 77
End: 2022-09-14
Payer: MEDICARE

## 2022-09-14 VITALS
OXYGEN SATURATION: 96 % | SYSTOLIC BLOOD PRESSURE: 112 MMHG | RESPIRATION RATE: 18 BRPM | DIASTOLIC BLOOD PRESSURE: 77 MMHG | HEART RATE: 80 BPM | BODY MASS INDEX: 31.8 KG/M2 | WEIGHT: 228 LBS

## 2022-09-14 DIAGNOSIS — C18.1 MALIGNANT NEOPLASM OF APPENDIX (H): ICD-10-CM

## 2022-09-14 RX ORDER — LOPERAMIDE HYDROCHLORIDE 2 MG/1
2 TABLET ORAL 4 TIMES DAILY PRN
Qty: 60 TABLET | Refills: 1 | Status: SHIPPED | OUTPATIENT
Start: 2022-09-14

## 2022-09-14 ASSESSMENT — PAIN SCALES - GENERAL: PAINLEVEL: NO PAIN (0)

## 2022-09-14 NOTE — PROGRESS NOTES
[facility]  SPECIALIZING IN BREAKTHROUGHS  Radiation Oncology    On Treatment Visit Note      Rod Olson      Date: 2022   MRN: [id number]   : 1945  Diagnosis: appendix cancer      Reason for Visit:  On Radiation Treatment Visit     Treatment Summary to Date  Treatment Site: abdomen/pelvis Current Dose: 2700/3000 cGy Fractions: 9/10      Chemotherapy  Chemo concurrent with radx?: No    Subjective:   Doing well. No acute issues. Mild change in bowel habit. No nausea. Tolerating PO. No  bother.     Nursing ROS:   Nutrition Alteration  Diet Type: Patient's Preference  Skin  Skin Reaction: 0 - No changes        Cardiovascular  Respiratory effort: 1 - Normal - without distress  Gastrointestinal  GI Note: loose bowels, not new        Pain Assessment  0-10 Pain Scale: 0      Objective:   /77   Pulse 80   Resp 18   Wt 103.4 kg (228 lb)   SpO2 96%   BMI 31.80 kg/m    NAD    Labs:  CBC RESULTS: Recent Labs   Lab Test 22  1151   WBC 6.5   RBC 3.99*   HGB 12.6*   HCT 38.1*   MCV 96   MCH 31.6   MCHC 33.1   RDW 16.6*        ELECTROLYTES:  Recent Labs   Lab Test 22  1407      POTASSIUM 4.0   CHLORIDE 107   NAMAN 8.8   CO2 24   BUN 15   CR 0.84   GLC 96       Assessment:  Mr. Olson is a 77 year old male with a diagnosis of metastatic mucinous adenocarcinoma of the appendix. He is undergoing palliative radiation therapy.    Tolerating radiation therapy well.  All questions and concerns addressed.    Plan:   1. Continue current therapy.  EOT tomorrow.  RTC in 1 month.  2. GI bother. Imodium PRN.         Mosaiq chart and setup information reviewed  Ports checked    Medication Review  Med list reviewed with patient?: Yes           Slade Castillo MD

## 2022-09-14 NOTE — LETTER
2022         RE: Rod Olson  [address]  [facility] [address]        Dear Colleague,    Thank you for referring your patient, Rod Olson, to the [facility]. Please see a copy of my visit note below.    [facility]  SPECIALIZING IN BREAKTHROUGHS  Radiation Oncology    On Treatment Visit Note      Rod Olson      Date: 2022   MRN: [id number]   : 1945  Diagnosis: appendix cancer      Reason for Visit:  On Radiation Treatment Visit     Treatment Summary to Date  Treatment Site: abdomen/pelvis Current Dose: 2700/3000 cGy Fractions: 9/10      Chemotherapy  Chemo concurrent with radx?: No    Subjective:   Doing well. No acute issues. Mild change in bowel habit. No nausea. Tolerating PO. No  bother.     Nursing ROS:   Nutrition Alteration  Diet Type: Patient's Preference  Skin  Skin Reaction: 0 - No changes        Cardiovascular  Respiratory effort: 1 - Normal - without distress  Gastrointestinal  GI Note: loose bowels, not new        Pain Assessment  0-10 Pain Scale: 0      Objective:   /77   Pulse 80   Resp 18   Wt 103.4 kg (228 lb)   SpO2 96%   BMI 31.80 kg/m    NAD    Labs:  CBC RESULTS: Recent Labs   Lab Test 22  1151   WBC 6.5   RBC 3.99*   HGB 12.6*   HCT 38.1*   MCV 96   MCH 31.6   MCHC 33.1   RDW 16.6*        ELECTROLYTES:  Recent Labs   Lab Test 22  1407      POTASSIUM 4.0   CHLORIDE 107   NAMAN 8.8   CO2 24   BUN 15   CR 0.84   GLC 96       Assessment:  Mr. Olson is a 77 year old male with a diagnosis of metastatic mucinous adenocarcinoma of the appendix. He is undergoing palliative radiation therapy.    Tolerating radiation therapy well.  All questions and concerns addressed.    Plan:   1. Continue current therapy.  EOT tomorrow.  RTC in 1 month.  2. GI bother. Imodium PRN.         Mosaiq chart and setup information reviewed  Ports checked    Medication Review  Med list reviewed with patient?: Yes            Slade Castillo MD

## 2022-09-15 DIAGNOSIS — C18.1 MALIGNANT NEOPLASM OF APPENDIX (H): Primary | ICD-10-CM

## 2022-09-15 RX ORDER — CAPECITABINE 500 MG/1
TABLET, FILM COATED ORAL
Qty: 98 TABLET | Refills: 0 | Status: SHIPPED | OUTPATIENT
Start: 2022-09-21 | End: 2022-01-01

## 2022-09-16 ENCOUNTER — LAB (OUTPATIENT)
Dept: LAB | Facility: CLINIC | Age: 77
End: 2022-09-16
Payer: MEDICARE

## 2022-09-16 DIAGNOSIS — C18.1 MALIGNANT NEOPLASM OF APPENDIX (H): ICD-10-CM

## 2022-09-16 LAB
ALBUMIN SERPL BCG-MCNC: 4.4 G/DL (ref 3.5–5.2)
ALP SERPL-CCNC: 78 U/L (ref 40–129)
ALT SERPL W P-5'-P-CCNC: 17 U/L (ref 10–50)
ANION GAP SERPL CALCULATED.3IONS-SCNC: 10 MMOL/L (ref 7–15)
AST SERPL W P-5'-P-CCNC: 28 U/L (ref 10–50)
BASOPHILS # BLD AUTO: 0 10E3/UL (ref 0–0.2)
BASOPHILS NFR BLD AUTO: 0 %
BILIRUB SERPL-MCNC: 0.9 MG/DL
BUN SERPL-MCNC: 15.1 MG/DL (ref 8–23)
CALCIUM SERPL-MCNC: 9.3 MG/DL (ref 8.8–10.2)
CEA SERPL-MCNC: 4.2 NG/ML
CHLORIDE SERPL-SCNC: 105 MMOL/L (ref 98–107)
CREAT SERPL-MCNC: 0.94 MG/DL (ref 0.67–1.17)
DEPRECATED HCO3 PLAS-SCNC: 25 MMOL/L (ref 22–29)
EOSINOPHIL # BLD AUTO: 0.2 10E3/UL (ref 0–0.7)
EOSINOPHIL NFR BLD AUTO: 3 %
ERYTHROCYTE [DISTWIDTH] IN BLOOD BY AUTOMATED COUNT: 16.1 % (ref 10–15)
GFR SERPL CREATININE-BSD FRML MDRD: 83 ML/MIN/1.73M2
GLUCOSE SERPL-MCNC: 109 MG/DL (ref 70–99)
HCT VFR BLD AUTO: 38.9 % (ref 40–53)
HGB BLD-MCNC: 12.8 G/DL (ref 13.3–17.7)
LYMPHOCYTES # BLD AUTO: 0.9 10E3/UL (ref 0.8–5.3)
LYMPHOCYTES NFR BLD AUTO: 15 %
MCH RBC QN AUTO: 32 PG (ref 26.5–33)
MCHC RBC AUTO-ENTMCNC: 32.9 G/DL (ref 31.5–36.5)
MCV RBC AUTO: 97 FL (ref 78–100)
MONOCYTES # BLD AUTO: 0.8 10E3/UL (ref 0–1.3)
MONOCYTES NFR BLD AUTO: 12 %
NEUTROPHILS # BLD AUTO: 4.4 10E3/UL (ref 1.6–8.3)
NEUTROPHILS NFR BLD AUTO: 70 %
PLATELET # BLD AUTO: 152 10E3/UL (ref 150–450)
POTASSIUM SERPL-SCNC: 4.2 MMOL/L (ref 3.4–5.3)
PROT SERPL-MCNC: 7.6 G/DL (ref 6.4–8.3)
RBC # BLD AUTO: 4 10E6/UL (ref 4.4–5.9)
SODIUM SERPL-SCNC: 140 MMOL/L (ref 136–145)
WBC # BLD AUTO: 6.3 10E3/UL (ref 4–11)

## 2022-09-16 PROCEDURE — 36415 COLL VENOUS BLD VENIPUNCTURE: CPT

## 2022-09-16 PROCEDURE — 85025 COMPLETE CBC W/AUTO DIFF WBC: CPT

## 2022-09-16 PROCEDURE — 82378 CARCINOEMBRYONIC ANTIGEN: CPT

## 2022-09-16 PROCEDURE — 80053 COMPREHEN METABOLIC PANEL: CPT

## 2022-09-21 ENCOUNTER — DOCUMENTATION ONLY (OUTPATIENT)
Dept: RADIATION THERAPY | Facility: OUTPATIENT CENTER | Age: 77
End: 2022-09-21

## 2022-09-21 NOTE — PROGRESS NOTES
Radiotherapy Treatment Summary              PATIENT: Rod Olson  MEDICAL RECORD NO: [id number]   : 1945    DIAGNOSIS: Metastatic mucinous adenocarcinoma of the appendix.  INTENT OF RADIOTHERAPY: Palliative  PATHOLOGY: Mucinous adenocarcinoma with high-grade features including signet ring cells, arising from the appendix  - Tumor involves the mesentery and retroperitoneum and invades the terminal ileum and cecum  - Tumor size: 20.8 cm in greatest dimension  - Resection margins appear uninvolved  - No evidence of lymphovascular invasion   - Perineural invasion present  - Tumor deposits present: >10  - Sixteen lymph nodes negative for metastatic carcinoma (0/16)  B.  MESENTERY, MESENTERIC MASS, EXCISION:  - Positive for mucinous adenocarcinoma  C.  RETROPERITONEAL TUMOR, EXCISION:  - Positive for mucinous adenocarcinoma  D.  RETROPERITONEAL MASS, EXCISION:  - Positive for mucinous adenocarcinoma                                  STAGE: pT4b pN1c pM1c  CONCURRENT SYSTEMIC THERAPY: No       ONCOLOGIC HISTORY: Patient initially presented in 2021 to the urgent care with symptoms of abdominal pain.  CT scan demonstrated large mucinous collections in the right hemipelvis prompting evaluation elation with surgical team.  Patient underwent surgery on 2021 under the care of Dr. Donald.  He underwent right hemicolectomy and mesenteric and retroperitoneal mass excisions.  Pathology demonstrated mucinous adenocarcinoma with high-grade features including signet ring cells arising from the appendix.  The tumor was involving the mesentery and retroperitoneum with invasion of the terminal ileum and cecum.  Maximum tumor size was 20.8 cm in size.  Patient reportedly underwent R2 resection.  The final stage was [id number].  The patient was subsequently treated with systemic treatment under the care of Dr. Bal.  He was treated with oxaliplatin and capecitabine.  Most recently patient was on capecitabine  monotherapy with poor tolerance overall requiring treatment holiday and dose reduction.  Most recent CT chest abdomen pelvis on 7/20/2022 demonstrated slight increase in size of the mixed cystic and solid mass in the right lower quadrant, measuring 11.5 x 4.9 cm in size compared to 11.0 x 4.5 cm previously.  There is also noted a 16 mm new right lower lobe lung nodule, unclear in etiology. Patient was referred to our clinic to discuss potential role of palliative radiation therapy. He completed radiation therapy         SITE OF TREATMENT: Abdomen/pelvis    DATES  OF TREATMENT: 9/1/22 to 9/15/22    TOTAL DOSE OF TREATMENT: 3,000 cGy    DOSE PER FRACTION OF TREATMENT: 300 cGy in 10 fractions       COMMENT/TOXICITY: GI bother. Imodium PRN.                    PAIN MANAGEMENT:  None                           FOLLOW UP PLAN: One month    CC  Patient Care Team:  [facility] [facility] as PCP - General (Clinic)  Darnell Lopez MD as MD (Surgery)  Larry Bal MD as MD  Peng Donald MD as Referring Physician (Surgery)  Larry Bal MD as Assigned Cancer Care Provider  Darnell Lopez MD as Assigned Surgical Provider  Chari Knutson RN as Specialty Care Coordinator (Hematology & Oncology)  Josette Hummel PA as Physician Assistant (Urology)  Jun Emanuel MD as Assigned PCP       ABRAHAM Soto  Department of Radiation Oncology  [facility]

## 2022-09-25 ENCOUNTER — HEALTH MAINTENANCE LETTER (OUTPATIENT)
Age: 77
End: 2022-09-25

## 2022-09-26 ENCOUNTER — ONCOLOGY VISIT (OUTPATIENT)
Dept: ONCOLOGY | Facility: CLINIC | Age: 77
End: 2022-09-26
Attending: STUDENT IN AN ORGANIZED HEALTH CARE EDUCATION/TRAINING PROGRAM
Payer: MEDICARE

## 2022-09-26 VITALS
BODY MASS INDEX: 31.1 KG/M2 | HEART RATE: 77 BPM | WEIGHT: 223 LBS | DIASTOLIC BLOOD PRESSURE: 80 MMHG | TEMPERATURE: 98 F | OXYGEN SATURATION: 97 % | SYSTOLIC BLOOD PRESSURE: 124 MMHG | RESPIRATION RATE: 18 BRPM

## 2022-09-26 DIAGNOSIS — C18.1 MALIGNANT NEOPLASM OF APPENDIX (H): Primary | ICD-10-CM

## 2022-09-26 PROCEDURE — 99215 OFFICE O/P EST HI 40 MIN: CPT | Performed by: STUDENT IN AN ORGANIZED HEALTH CARE EDUCATION/TRAINING PROGRAM

## 2022-09-26 PROCEDURE — G0463 HOSPITAL OUTPT CLINIC VISIT: HCPCS

## 2022-09-26 RX ORDER — METHYLPREDNISOLONE SODIUM SUCCINATE 125 MG/2ML
125 INJECTION, POWDER, LYOPHILIZED, FOR SOLUTION INTRAMUSCULAR; INTRAVENOUS
Status: CANCELLED
Start: 2022-01-01

## 2022-09-26 RX ORDER — LORAZEPAM 2 MG/ML
0.5 INJECTION INTRAMUSCULAR EVERY 4 HOURS PRN
Status: CANCELLED | OUTPATIENT
Start: 2022-01-01

## 2022-09-26 RX ORDER — NALOXONE HYDROCHLORIDE 0.4 MG/ML
0.2 INJECTION, SOLUTION INTRAMUSCULAR; INTRAVENOUS; SUBCUTANEOUS
Status: CANCELLED | OUTPATIENT
Start: 2022-01-01

## 2022-09-26 RX ORDER — DIPHENHYDRAMINE HYDROCHLORIDE 50 MG/ML
50 INJECTION INTRAMUSCULAR; INTRAVENOUS
Status: CANCELLED
Start: 2022-01-01

## 2022-09-26 RX ORDER — MEPERIDINE HYDROCHLORIDE 25 MG/ML
25 INJECTION INTRAMUSCULAR; INTRAVENOUS; SUBCUTANEOUS EVERY 30 MIN PRN
Status: CANCELLED | OUTPATIENT
Start: 2022-01-01

## 2022-09-26 RX ORDER — EPINEPHRINE 1 MG/ML
0.3 INJECTION, SOLUTION INTRAMUSCULAR; SUBCUTANEOUS EVERY 5 MIN PRN
Status: CANCELLED | OUTPATIENT
Start: 2022-01-01

## 2022-09-26 RX ORDER — ONDANSETRON 2 MG/ML
8 INJECTION INTRAMUSCULAR; INTRAVENOUS ONCE
Status: CANCELLED | OUTPATIENT
Start: 2022-01-01

## 2022-09-26 RX ORDER — ALBUTEROL SULFATE 0.83 MG/ML
2.5 SOLUTION RESPIRATORY (INHALATION)
Status: CANCELLED | OUTPATIENT
Start: 2022-01-01

## 2022-09-26 RX ORDER — ALBUTEROL SULFATE 90 UG/1
1-2 AEROSOL, METERED RESPIRATORY (INHALATION)
Status: CANCELLED
Start: 2022-01-01

## 2022-09-26 ASSESSMENT — PAIN SCALES - GENERAL: PAINLEVEL: MILD PAIN (2)

## 2022-09-26 NOTE — LETTER
2022         RE: Rod Olson  [address]  [facility] [address]        Dear Colleague,    Thank you for referring your patient, Rod Olson, to the [facility]. Please see a copy of my visit note below.    [facility] - Medical Oncology Follow-Up Consult Note  2022    Patient Identifiers     Name: Rod Olson  Preferred Address: [place]  Preferred Language: English  : 1945  Gender: male    Assessment and Plan     Mr. 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    Given palliative radiation treatment to primary disease site, it is likely that we may successfully use capecitabine treatment for the foreseeable future. We will consider dose increase with the next cycle, increasing to 4 pill BID every other week. Pt may reduce dosing as necessary to continue to tolerate treatment.    Given pt's issues with fecal urgency, we would recommend pelvic floor exercises and have provided a broad overview of literature on these practices. Finally, pt is having some trouble communicating with [employer] care coordination at [facility]. We will attempt to assist in streamlining this process.    Plan:  HAMN w/ signet ring features  - cont capecitabine monotherapy; increase cape to 4pills BID every other week with next cycle. Pt may reduce PRN  - RTC monthly while on treatment with labs prior  - consider alternative communication methodology for [employer] pill supply    Fecal urgency  - pelvic floor exercises to strengthen anal muscle tone    The patient and family asked numerous excellent questions which I answered to the best of my abilities. At the completion of our consultation, the patient and accompanying caregivers had a strong understanding of the plan. They have our contact information  for any further questions or concerns, and know to reach out for any urgent matters. Patient and family aware that they should call 911 for emergencies.       45 minutes spent on the date of the encounter doing chart review, review of test results, interpretation of tests, patient visit and documentation       Larry Bal MD, PhD   of Medicine  Division of Hematology, Oncology and Transplantation  [facility]    -----------------------------------    Oncology Summary     Cancer Staging  Malignant neoplasm of appendix (H)  Staging form: Appendix Carcinoma, AJCC 8th Edition  - Clinical stage from 12/7/2021: Stage IVC (cT4b, cN1c, pM1c, G3) - Signed by Larry Bal MD on 1/5/2022      Oncology History Overview Note   Pt in his USOH until [facility] 2021 [facility] months of [facility] pain. CT showed large, mucinous collections in R hemipelvis, prompting referral to surgery. Pt thought to have likely a low-grade mucinous neoplasm and underwent cytoreductive surgery, with incomplete (R2) resection. 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    On establishment with Med Onc, pt recommended to initiate FOLFOX therapy.      Malignant neoplasm of appendix (H)   12/7/2021 Initial Diagnosis    Malignant neoplasm of appendix (H)     12/7/2021 -  Cancer Staged    Staging form: Appendix Carcinoma, AJCC 8th Edition  - Clinical stage from 12/7/2021: Stage IVC (cT4b, cN1c, pM1c, G3)     3/23/2022 -  Chemotherapy    OP ONC Colorectal Cancer - Oxaliplatin / Capecitabine (XELOX, CAPOX) - EVERY 3 WEEKS  Plan Provider: Larry Bal MD  Treatment goal: Palliative  Line of treatment: First Line         Subjective/Interval Events       - since last visit, pt has undergone 10 fractions of palliative radiotherapy  - having sharp mid/lower abdominal  pain since radiation treatment  - diarrhea immediately following last dose of radiation; improved on loperamide  - stool consistency has improved over the past 2 days; variable volume    Review of Systems: 14 point ROS negative other than the symptoms noted above in the HPI.    Physical Exam     Vital signs: /80   Pulse 77   Temp 98  F (36.7  C) (Oral)   Resp 18   Wt 101.2 kg (223 lb)   SpO2 97%   BMI 31.10 kg/m      ECOG performance status:  0  Vascular access:  none    GENERAL: Well-nourished healthy-appearing man, sitting in chair, no acute distress.   HEENT: No icterus, no pallor. Moist mucous membranes.   LUNGS: Clear to ausculation bilaterally, normal work of breathing.   CARDIOVASCULAR: Regular rate and rhythm, no murmurs, gallops or rubs.   ABDOMEN: Soft, nontender and nondistended.  EXTREMITIES: No cyanosis, no clubbing, no edema.   NEUROLOGIC: No focal deficits, alert/ oriented  LYMPH NODE EXAM: No palpable adenopathy.    Objective Data     Lab data:  I have personally reviewed the lab data, notable for:    - hgb 12.8    Radiology data:  I have personally reviewed the radiology data, notable for:  - no new data    Pathology and other data:  I have personally reviewed and interpreted the pathology data, notable for:    - no new data        Again, thank you for allowing me to participate in the care of your patient.      Sincerely,    Larry Bal MD

## 2022-09-26 NOTE — NURSING NOTE
"Oncology Rooming Note    September 26, 2022 2:33 PM   Rod Olson is a 77 year old male who presents for:    Chief Complaint   Patient presents with     Oncology Clinic Visit     Pt is here for a rtn for Malignant Neoplasm of Appendix     Initial Vitals: Blood Pressure 124/80   Pulse 77   Temperature 98  F (36.7  C) (Oral)   Respiration 18   Weight 101.2 kg (223 lb)   Oxygen Saturation 97%   Body Mass Index 31.10 kg/m   Estimated body mass index is 31.1 kg/m  as calculated from the following:    Height as of 7/19/22: 1.803 m (5' 11\").    Weight as of this encounter: 101.2 kg (223 lb). Body surface area is 2.25 meters squared.  Mild Pain (2) Comment: Data Unavailable   No LMP for male patient.  Allergies reviewed: Yes  Medications reviewed: Yes    Medications: Medication refills not needed today.  Pharmacy name entered into EPIC:    [id number] - [place], [place] - [address] AT [facility][id number] - [place], [place] - [address] [address] [address] AT [address] PHARMACY [place], [place] - [address] [place] [address]  [facility] - [place], [place] - [address]    Clinical concerns: none       Jonelle Mccoy MA            "

## 2022-09-26 NOTE — PROGRESS NOTES
[facility] - Medical Oncology Follow-Up Consult Note  2022    Patient Identifiers     Name: Rod Olson  Preferred Address: [place]  Preferred Language: English  : 1945  Gender: male    Assessment and Plan     Mr. 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    Given palliative radiation treatment to primary disease site, it is likely that we may successfully use capecitabine treatment for the foreseeable future. We will consider dose increase with the next cycle, increasing to 4 pill BID every other week. Pt may reduce dosing as necessary to continue to tolerate treatment.    Given pt's issues with fecal urgency, we would recommend pelvic floor exercises and have provided a broad overview of literature on these practices. Finally, pt is having some trouble communicating with [employer] care coordination at [facility]. We will attempt to assist in streamlining this process.    Plan:  HAMN w/ signet ring features  - cont capecitabine monotherapy; increase cape to 4pills BID every other week with next cycle. Pt may reduce PRN  - RTC monthly while on treatment with labs prior  - consider alternative communication methodology for [employer] pill supply    Fecal urgency  - pelvic floor exercises to strengthen anal muscle tone    The patient and family asked numerous excellent questions which I answered to the best of my abilities. At the completion of our consultation, the patient and accompanying caregivers had a strong understanding of the plan. They have our contact information for any further questions or concerns, and know to reach out for any urgent matters. Patient and family aware that they should call 911 for emergencies.       45 minutes spent on the date of the encounter doing chart review, review of test results, interpretation  of tests, patient visit and documentation       Larry Bal MD, PhD   of Medicine  Division of Hematology, Oncology and Transplantation  [facility]    -----------------------------------    Oncology Summary     Cancer Staging  Malignant neoplasm of appendix (H)  Staging form: Appendix Carcinoma, AJCC 8th Edition  - Clinical stage from 12/7/2021: Stage IVC (cT4b, cN1c, pM1c, G3) - Signed by Larry Bal MD on 1/5/2022      Oncology History Overview Note   Pt in his USOH until November 2021 when he presented to care at the urging of family with eight months of abdominal pain. CT showed large, mucinous collections in R hemipelvis, prompting referral to surgery. Pt thought to have likely a low-grade mucinous neoplasm and underwent cytoreductive surgery, with incomplete (R2) resection. 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    On establishment with Med Onc, pt recommended to initiate FOLFOX therapy.      Malignant neoplasm of appendix (H)   12/7/2021 Initial Diagnosis    Malignant neoplasm of appendix (H)     12/7/2021 -  Cancer Staged    Staging form: Appendix Carcinoma, AJCC 8th Edition  - Clinical stage from 12/7/2021: Stage IVC (cT4b, cN1c, pM1c, G3)     3/23/2022 -  Chemotherapy    OP ONC Colorectal Cancer - Oxaliplatin / Capecitabine (XELOX, CAPOX) - EVERY 3 WEEKS  Plan Provider: Larry Bal MD  Treatment goal: Palliative  Line of treatment: First Line         Subjective/Interval Events       - since last visit, pt has undergone 10 fractions of palliative radiotherapy  - having sharp mid/lower abdominal pain since radiation treatment  - diarrhea immediately following last dose of radiation; improved on loperamide  - stool consistency has improved over the past 2 days; variable volume    Review of Systems: 14 point ROS negative other than the symptoms noted above in  the HPI.    Physical Exam     Vital signs: /80   Pulse 77   Temp 98  F (36.7  C) (Oral)   Resp 18   Wt 101.2 kg (223 lb)   SpO2 97%   BMI 31.10 kg/m      ECOG performance status:  0  Vascular access:  none    GENERAL: Well-nourished healthy-appearing man, sitting in chair, no acute distress.   HEENT: No icterus, no pallor. Moist mucous membranes.   LUNGS: Clear to ausculation bilaterally, normal work of breathing.   CARDIOVASCULAR: Regular rate and rhythm, no murmurs, gallops or rubs.   ABDOMEN: Soft, nontender and nondistended.  EXTREMITIES: No cyanosis, no clubbing, no edema.   NEUROLOGIC: No focal deficits, alert/ oriented  LYMPH NODE EXAM: No palpable adenopathy.    Objective Data     Lab data:  I have personally reviewed the lab data, notable for:    - hgb 12.8    Radiology data:  I have personally reviewed the radiology data, notable for:  - no new data    Pathology and other data:  I have personally reviewed and interpreted the pathology data, notable for:    - no new data

## 2022-10-03 DIAGNOSIS — C18.1 MALIGNANT NEOPLASM OF APPENDIX (H): Primary | ICD-10-CM

## 2022-10-03 RX ORDER — CAPECITABINE 500 MG/1
TABLET, FILM COATED ORAL
Qty: 112 TABLET | Refills: 0 | Status: SHIPPED | OUTPATIENT
Start: 2022-01-01 | End: 2022-01-01

## 2022-10-11 ENCOUNTER — OFFICE VISIT (OUTPATIENT)
Dept: RADIATION THERAPY | Facility: OUTPATIENT CENTER | Age: 77
End: 2022-10-11
Payer: MEDICARE

## 2022-10-11 VITALS
DIASTOLIC BLOOD PRESSURE: 81 MMHG | RESPIRATION RATE: 18 BRPM | OXYGEN SATURATION: 96 % | WEIGHT: 222 LBS | BODY MASS INDEX: 30.96 KG/M2 | SYSTOLIC BLOOD PRESSURE: 128 MMHG | HEART RATE: 79 BPM

## 2022-10-11 DIAGNOSIS — C18.1 MALIGNANT NEOPLASM OF APPENDIX (H): Primary | ICD-10-CM

## 2022-10-11 ASSESSMENT — PAIN SCALES - GENERAL: PAINLEVEL: NO PAIN (0)

## 2022-10-11 NOTE — LETTER
10/11/2022         RE: Rod Olson  [address]  [facility] [address]        Dear Colleague,    Thank you for referring your patient, Rod Olson, to the [facility]Y [facility]. Please see a copy of my visit note below.    Radiation Oncology Note    HPI: Mr. Olson is a 77 year old male with a diagnosis of metastatic mucinous adenocarcinoma of the appendix. He underwent palliative radiation therapy.    Radiation Treatment  1. 9/1/22 to 9/15/22: Abdomen/pelvis, 3,000 cGy in 10 fractions    The patient returns for follow up.     He is overall doing fairly well. GI bother post treatment has started to return back to baseline. Energy slowly improving. Mil  bother after RT completion, now resolved.     Will undergo re-staging scans on 10/19/22.     Undergoing systemic treatment and surveillance with Dr. Bal.       Plan:  1. Acute toxicities from RT starting to turn the corner appropriately.   2. Continue systemic treatment and oncologic surveillance per medical oncology team (Dr. Bal)   3. RTC as needed.     Slade Castillo M.D.  Department of Radiation Oncology  [facility]

## 2022-10-11 NOTE — NURSING NOTE
"FOLLOW-UP VISIT    Patient Name: Rod Olson      : 1945     Age: 77 year old        ______________________________________________________________________________     Chief Complaint   Patient presents with     Radiation Therapy     Follow up     /81   Pulse 79   Resp 18   Wt 100.7 kg (222 lb)   SpO2 96%   BMI 30.96 kg/m       Date Radiation Completed: 9/15/22    Pain  Denies    Meds  Current Med List Reviewed: Yes  Medication Note:     Labs  Other Labs: No    Imaging  None    Skin: Warm  Dry  Intact  Energy Level: fair.  \" I'm doing pretty good after completing radiation and being back on chemo pills\"  Appetite: Fair  Bowels: had diarrhea for a night/day after radiation complete. Some \"sense of immediacy when I need to have BM, but that is not a new feeling\"       Other Appointments:     Date  MD Name:       Other Notes:           "

## 2022-10-11 NOTE — PROGRESS NOTES
Radiation Oncology Note    HPI: Mr. Olson is a 77 year old male with a diagnosis of metastatic mucinous adenocarcinoma of the appendix. He underwent palliative radiation therapy.    Radiation Treatment  1. 9/1/22 to 9/15/22: Abdomen/pelvis, 3,000 cGy in 10 fractions    The patient returns for follow up.     He is overall doing fairly well. GI bother post treatment has started to return back to baseline. Energy slowly improving. Mil  bother after RT completion, now resolved.     Will undergo re-staging scans on 10/19/22.     Undergoing systemic treatment and surveillance with Dr. Bal.       Plan:  1. Acute toxicities from RT starting to turn the corner appropriately.   2. Continue systemic treatment and oncologic surveillance per medical oncology team (Dr. Bal)   3. RTC as needed.     Slade Castillo M.D.  Department of Radiation Oncology  [facility]

## 2022-10-21 NOTE — ORAL ONC MGMT
Oral Chemotherapy Monitoring Program     Placed call to patient in follow up of oral chemotherapy. Spoke with patient's daughter, Thuan. Manas is due for labs, which were planned for today but unfortunately Manas didn't realize he needed labs in addition to his CT today. Eowyn requested labs be rescheduled for 10/24 afternoon. IB message sent to scheduling.       Briseyda Cottrell, PharmD, [facility]  Oral Chemotherapy Monitoring Program  [facility]  [phone]  October 21, 2022

## 2022-10-25 NOTE — TELEPHONE ENCOUNTER
Received message from scheduling that pt wanted to discuss recent lab results. Writer called and pt stated he is on his off week of Capecitabine, he will start this Friday and wondering how labs were. Informed him labs looked great, no concerning numbers. Oral chemo pharmacy may be calling him as well but writer is certain he's fine to start. He stated he was only taking 7 tabs a day but now will increase to 8 tabs a day (4000 mg daily). Stated he just received a shipment in the mail and will check that there's enough in there for 8 tabs a day.    Reminded him Dr. Bal wanted to see him monthly and scheduling has not been able to get ahold of daughter Thuan to schedule. He should get labs at local [place] 1-2 days prior to monthly provider visits. He stated he would remind Eowyn to return call to schedulers.

## 2022-10-25 NOTE — ORAL ONC MGMT
Oral Chemotherapy Monitoring Program  Lab Follow Up  Reviewed lab results from 10/25/2022.    Labs:  _  Result Component Current Result Ref Range   Sodium 138 (10/24/2022) 136 - 145 mmol/L   _  Result Component Current Result Ref Range   Potassium 4.1 (10/24/2022) 3.4 - 5.3 mmol/L   _  Result Component Current Result Ref Range   Calcium 9.5 (10/24/2022) 8.8 - 10.2 mg/dL     Result Component Current Result Ref Range   Albumin 4.4 (10/24/2022) 3.5 - 5.2 g/dL   _  Result Component Current Result Ref Range   Urea Nitrogen 18.2 (10/24/2022) 8.0 - 23.0 mg/dL   _  Result Component Current Result Ref Range   Creatinine 0.90 (10/24/2022) 0.67 - 1.17 mg/dL   _  Result Component Current Result Ref Range   AST 25 (10/24/2022) 10 - 50 U/L   _  Result Component Current Result Ref Range   ALT 21 (10/24/2022) 10 - 50 U/L   _  Result Component Current Result Ref Range   Bilirubin Total 0.5 (10/24/2022) <=1.2 mg/dL   _  Result Component Current Result Ref Range   WBC Count 5.6 (10/24/2022) 4.0 - 11.0 10e3/uL   _  Result Component Current Result Ref Range   Hemoglobin 13.0 (L) (10/24/2022) 13.3 - 17.7 g/dL   _  Result Component Current Result Ref Range   Platelet Count 166 (10/24/2022) 150 - 450 10e3/uL     Assessment & Plan:  No concerning abnormalities. Continue plan of care.     Follow-Up:  Review next appointment    Dominick Bland, PharmD  Hematology/Oncology Clinical Pharmacist  [facility]  [facility]
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
## 2022-11-28 NOTE — NURSING NOTE
"Oncology Rooming Note    November 28, 2022 3:46 PM   Rod Olson is a 77 year old male who presents for:    Chief Complaint   Patient presents with     Blood Draw     Labs drawn by RN via , vitals taken.     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    Weight as of this encounter: 104.8 kg (231 lb). Body surface area is 2.29 meters squared.  No Pain (0) Comment: Data Unavailable   No LMP for male patient.  Allergies reviewed: Yes  Medications reviewed: Yes    Medications: Medication refills not needed today.  Pharmacy name entered into [facility]:    [id number] - [place], [place] - [address] [id number] - [place], [place] - [address]  [facility] - [place], [place] - [address]    Clinical concerns:  None       Usman Peter            "

## 2022-11-28 NOTE — LETTER
2022         RE: Rod Olson  [address]  [place] [address]        Dear Colleague,    Thank you for referring your patient, Rod Olson, to the [facility]. Please see a copy of my visit note below.    [facility] - Medical Oncology Follow-Up Consult Note  2022    Patient Identifiers     Name: Rod Olson  Preferred Address: [place]  Preferred Language: English  : 1945  Gender: male    Assessment and Plan     Mr. 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    'Progression' seen on prior scan was prior to treatment intensification. Primary mass showed no growth, but likely due to radiation-induced local control. Peritoneal met doubled in size. We will continue curre Cape dosing in hopes of better control. Re-evaluate disease in January and consider addition of oxaliplatin every 2 weeks to current reduced-dose Cape.    Continue monthly labs and clinical monitoring while on Cape. Pt to reach out if AE's worsen. Otherwise tolerating well.    Plan:  [facility]  - cont reduced dose capecitabine 2g BID every other week  - trend labs, CEA  - f/u with [facility] video visit in late December to review labs and treatment tolerance  - RTC w/ me following imaging in late January to consider treatment alteration    The patient and family asked numerous excellent questions which I answered to the best of my abilities. At the completion of our consultation, the patient and accompanying caregivers had a strong understanding of the plan. They have our contact information for any further questions or concerns, and know to reach out for any urgent matters. Patient and family aware that they should call 911 for emergencies.       45 minutes spent on the date of the encounter doing chart review, review  of test results, interpretation of tests, patient visit and documentation       Larry Bal MD, PhD   of Medicine  Division of Hematology, Oncology and Transplantation  [facility]    -----------------------------------    Oncology Summary     Cancer Staging   Malignant neoplasm of appendix (H)  Staging form: Appendix Carcinoma, AJCC 8th Edition  - Clinical stage from 12/7/2021: Stage IVC (cT4b, cN1c, pM1c, G3) - Signed by Larry Bal MD on 1/5/2022      Oncology History Overview Note   Pt in his USOH until November 2021 when he presented to care at the urging of family with eight months of abdominal pain. CT showed large, mucinous collections in R hemipelvis, prompting referral to surgery. Pt thought to have likely a low-grade mucinous neoplasm and underwent cytoreductive surgery, with incomplete (R2) resection. 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    On establishment with Med Onc, pt recommended to initiate FOLFOX therapy.      Malignant neoplasm of appendix (H)   12/7/2021 Initial Diagnosis    Malignant neoplasm of appendix (H)     12/7/2021 -  Cancer Staged    Staging form: Appendix Carcinoma, AJCC 8th Edition  - Clinical stage from 12/7/2021: Stage IVC (cT4b, cN1c, pM1c, G3)     3/23/2022 -  Chemotherapy    OP ONC Colorectal Cancer - Oxaliplatin / Capecitabine (XELOX, CAPOX) - EVERY 3 WEEKS  Plan Provider: Larry Bal MD  Treatment goal: Palliative  Line of treatment: First Line         Subjective/Interval Events     - no significant changes in side effects  - continues to have BPH related toileting issues (frequent urination, incomplete emptying)  - minimal nausea, diarrhea; appetite and weight stable    Review of Systems: 14 point ROS negative other than the symptoms noted above in the HPI.    Physical Exam     Vital signs: BP (!) 126/92   Pulse 87   Temp  98.1  F (36.7  C)   Resp 16   Wt 104.8 kg (231 lb)   SpO2 99%   BMI 32.22 kg/m      ECOG performance status:  1  Vascular access:  none    GENERAL: Well-nourished healthy-appearing man, sitting in chair, no acute distress.   HEENT: No icterus, no pallor. Moist mucous membranes.   LUNGS: Clear to ausculation bilaterally, normal work of breathing.   CARDIOVASCULAR: Regular rate and rhythm, no murmurs, gallops or rubs.   ABDOMEN: Soft, nontender and nondistended.  EXTREMITIES: No cyanosis, no clubbing, no edema.   NEUROLOGIC: No focal deficits, alert/ oriented  LYMPH NODE EXAM: No palpable adenopathy.    Objective Data     Lab data:  I have personally reviewed the lab data, notable for:    - no notables    Radiology data:  I have personally reviewed the radiology data, notable for:  - no new data    Pathology and other data:  I have personally reviewed and interpreted the pathology data, notable for:    - no new data          Again, thank you for allowing me to participate in the care of your patient.      Sincerely,    Larry Bal MD

## 2022-11-28 NOTE — PROGRESS NOTES
[facility] - Medical Oncology Follow-Up Consult Note  2022    Patient Identifiers     Name: Rod Olson  Preferred Address: [place]  Preferred Language: English  : 1945  Gender: male    Assessment and Plan     Mr. 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    'Progression' seen on prior scan was prior to treatment intensification. Primary mass showed no growth, but likely due to radiation-induced local control. Peritoneal met doubled in size. We will continue curre Cape dosing in hopes of better control. Re-evaluate disease in January and consider addition of oxaliplatin every 2 weeks to current reduced-dose Cape.    Continue monthly labs and clinical monitoring while on Cape. Pt to reach out if AE's worsen. Otherwise tolerating well.    Plan:  [facility]  - cont reduced dose capecitabine 2g BID every other week  - trend labs, CEA  - f/u with [facility]GA video visit in late December to review labs and treatment tolerance  - RTC w/ me following imaging in late January to consider treatment alteration    The patient and family asked numerous excellent questions which I answered to the best of my abilities. At the completion of our consultation, the patient and accompanying caregivers had a strong understanding of the plan. They have our contact information for any further questions or concerns, and know to reach out for any urgent matters. Patient and family aware that they should call 911 for emergencies.       45 minutes spent on the date of the encounter doing chart review, review of test results, interpretation of tests, patient visit and documentation       Larry Bal MD, PhD   of Medicine  Division of Hematology, Oncology and Transplantation  [facility]    -----------------------------------    Oncology  Summary     Cancer Staging   Malignant neoplasm of appendix (H)  Staging form: Appendix Carcinoma, AJCC 8th Edition  - Clinical stage from 12/7/2021: Stage IVC (cT4b, cN1c, pM1c, G3) - Signed by Larry Bal MD on 1/5/2022      Oncology History Overview Note   Pt in his USOH until November 2021 when he presented to care at the urging of family with eight months of abdominal pain. CT showed large, mucinous collections in R hemipelvis, prompting referral to surgery. Pt thought to have likely a low-grade mucinous neoplasm and underwent cytoreductive surgery, with incomplete (R2) resection. 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    On establishment with Med Onc, pt recommended to initiate FOLFOX therapy.      Malignant neoplasm of appendix (H)   12/7/2021 Initial Diagnosis    Malignant neoplasm of appendix (H)     12/7/2021 -  Cancer Staged    Staging form: Appendix Carcinoma, AJCC 8th Edition  - Clinical stage from 12/7/2021: Stage IVC (cT4b, cN1c, pM1c, G3)     3/23/2022 -  Chemotherapy    OP ONC Colorectal Cancer - Oxaliplatin / Capecitabine (XELOX, CAPOX) - EVERY 3 WEEKS  Plan Provider: Larry Bal MD  Treatment goal: Palliative  Line of treatment: First Line         Subjective/Interval Events     - no significant changes in side effects  - continues to have BPH related toileting issues (frequent urination, incomplete emptying)  - minimal nausea, diarrhea; appetite and weight stable    Review of Systems: 14 point ROS negative other than the symptoms noted above in the HPI.    Physical Exam     Vital signs: BP (!) 126/92   Pulse 87   Temp 98.1  F (36.7  C)   Resp 16   Wt 104.8 kg (231 lb)   SpO2 99%   BMI 32.22 kg/m      ECOG performance status:  1  Vascular access:  none    GENERAL: Well-nourished healthy-appearing man, sitting in chair, no acute distress.   HEENT: No icterus, no pallor. Moist  mucous membranes.   LUNGS: Clear to ausculation bilaterally, normal work of breathing.   CARDIOVASCULAR: Regular rate and rhythm, no murmurs, gallops or rubs.   ABDOMEN: Soft, nontender and nondistended.  EXTREMITIES: No cyanosis, no clubbing, no edema.   NEUROLOGIC: No focal deficits, alert/ oriented  LYMPH NODE EXAM: No palpable adenopathy.    Objective Data     Lab data:  I have personally reviewed the lab data, notable for:    - no notables    Radiology data:  I have personally reviewed the radiology data, notable for:  - no new data    Pathology and other data:  I have personally reviewed and interpreted the pathology data, notable for:    - no new data

## 2022-12-21 NOTE — TELEPHONE ENCOUNTER
Oral Chemotherapy Monitoring Program    Subjective/Objective:  Rod Olson is a 77 year old male contacted by phone for a follow-up visit for oral chemotherapy.  Spoke to the daughter Eowaraceli who reports that Manas is in good standing with taking his Xeloda (capecitabine) regularly. Doesn't report any new side-effects and reports that Manas has been adherent with his dose and regimen. No new medications started since last check-in, and no concerns regarding safety or efficacy today. Furthermore, patient has been getting enough nutrition/water intake.    ORAL CHEMOTHERAPY 9/15/2022 10/3/2022 10/19/2022 10/21/2022 10/25/2022 12/2/2022 12/21/2022   Assessment Type Refill Refill Monthly Follow up Other Refill;Lab Monitoring Refill Monthly Follow up   Diagnosis Code Appendix Cancer Appendix Cancer Appendix Cancer Appendix Cancer Appendix Cancer Appendix Cancer Appendix Cancer   Providers Dr. Valeriano Bal   [facility]   Drug Name Xeloda (capecitabine) Xeloda (capecitabine) Xeloda (capecitabine) Xeloda (capecitabine) Xeloda (capecitabine) Xeloda (capecitabine) Xeloda (capecitabine)   Dose Other: 2,000 mg 2,000 mg 2,000 mg 2,000 mg 2,000 mg 2,000 mg   Current Schedule BID BID BID BID BID BID BID   Cycle Details 1 week on, 1 week off 1 week on, 1 week off 1 week on, 1 week off 1 week on, 1 week off 1 week on, 1 week off 1 week on, 1 week off 1 week on, 1 week off   Start Date of Last Cycle - - 10/14/2022 - - - -   Planned next cycle start date 9/17/2022 - 10/28/2022 - - - -   Doses missed in last 2 weeks - - 0 - - - 0   Adherence Assessment - - Adherent - - - Adherent   Adverse Effects - - Diarrhea;Fatigue - - - No AE identified during assessment   Nausea - - Grade 1 - - - -   Pharmacist Intervention(nausea) - - Yes - - - -   Intervention(s) - - Patient education;OTC recommendation - - - -  "  Diarrhea - - - - - - -   Pharmacist Intervention(diarrhea) - - - - - - -   Intervention(s) - - - - - - -   Headache - - - - - - -   Pharmacist Intervention(headache) - - - - - - -   Intervention(s) - - - - - - -   Fatigue - - Grade 1 - - - -   Pharmacist Intervention(fatigue) - - Yes - - - -   Intervention(s) - - Patient education - - - -   Any new drug interactions? - - No - - - No   Pharmacist Intervention? - - No - - - -   Intervention(s) - - - - - - -   Is the dose as ordered appropriate for the patient? - - Yes - - - Yes   Is the patient currently in pain? - - - - - - No   Has the patient been assessed within the past 6 months for depression? - - - - - - No   Has the patient missed any days of school, work, or other routine activity? - - - - - - No   Since the last time we talked, have you been hospitalized or used the emergency room? - - - - - - No       Last PHQ-2 Score on record:   PHQ-2 ( 1999 [facility]) 7/19/2022 1/14/2022   Q1: Little interest or pleasure in doing things 0 0   Q2: Feeling down, depressed or hopeless 0 0   PHQ-2 Score 0 0   Q1: Little interest or pleasure in doing things - -   Q2: Feeling down, depressed or hopeless - -   PHQ-2 Score - -       Vitals:  BP:   BP Readings from Last 1 Encounters:   11/28/22 (!) 126/92     Wt Readings from Last 1 Encounters:   11/28/22 104.8 kg (231 lb)     Estimated body surface area is 2.29 meters squared as calculated from the following:    Height as of 7/19/22: 1.803 m (5' 11\").    Weight as of 11/28/22: 104.8 kg (231 lb).    Labs:  _  Result Component Current Result Ref Range   Sodium 139 (11/28/2022) 136 - 145 mmol/L     _  Result Component Current Result Ref Range   Potassium 4.3 (11/28/2022) 3.4 - 5.3 mmol/L     _  Result Component Current Result Ref Range   Calcium 9.4 (11/28/2022) 8.8 - 10.2 mg/dL     _  Result Component Current Result Ref Range   Magnesium 2.1 (11/28/2022) 1.7 - 2.3 mg/dL     No results found for Phos within last 30 days. "     _  Result Component Current Result Ref Range   Albumin 4.2 (11/28/2022) 3.5 - 5.2 g/dL     _  Result Component Current Result Ref Range   Urea Nitrogen 19.7 (11/28/2022) 8.0 - 23.0 mg/dL     _  Result Component Current Result Ref Range   Creatinine 0.86 (11/28/2022) 0.67 - 1.17 mg/dL     _  Result Component Current Result Ref Range   AST 25 (11/28/2022) 10 - 50 U/L     _  Result Component Current Result Ref Range   ALT 18 (11/28/2022) 10 - 50 U/L     _  Result Component Current Result Ref Range   Bilirubin Total 0.7 (11/28/2022) <=1.2 mg/dL     _  Result Component Current Result Ref Range   WBC Count 6.3 (11/28/2022) 4.0 - 11.0 10e3/uL     _  Result Component Current Result Ref Range   Hemoglobin 13.2 (L) (11/28/2022) 13.3 - 17.7 g/dL     _  Result Component Current Result Ref Range   Platelet Count 151 (11/28/2022) 150 - 450 10e3/uL     No results found for ANC within last 30 days.     _  Result Component Current Result Ref Range   Absolute Neutrophils 4.3 (11/28/2022) 1.6 - 8.3 10e3/uL      Assessment/Plan:  Since there were no adverse drug events identified in today's assessment, Manas should continue to take his Xeloda as scheduled. I asked to see if they were planning to schedule an appointment for labs/provider appointment coming forward here and Eowyn explained that they got backed up on phone calls recently. She said she will get back to scheduling to get something inputted, preferably on a Monday or Friday.    Follow-Up:  Should be due for monthly lab draw by the end of December and provider appointment sometime in January. Will redate our SharePoint task list to see if this is done by next week since daughter said she would call .    Refill Due:  12/28/22    Dex Ross  Pharmacy Intern  Oral Chemotherapy Monitoring Program  [facility]  [phone]

## 2022-12-26 NOTE — PROGRESS NOTES
Manas is a 77 year old who is being evaluated via a billable video visit.      How would you like to obtain your AVS? MyChart  If the video visit is dropped, the invitation should be resent by: Text to cell phone: [phone]  Will anyone else be joining your video visit? Yes: Daughter Thuan will be joining visit today. How would they like to receive their invitation?       Venita Laird, Visit Facilitator/MA.    Video-Visit Details    Type of service:  Video Visit   Video Start Time: 4:40 pm  Video End Time:4:54 PM    Originating Location (pt. 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 He presented in November 2021 at the urging of family with eight months of abdominal pain. CT showed large, mucinous collections in R hemipelvis, prompting referral to surgery. Pt thought to have likely a low-grade mucinous neoplasm and underwent cytoreductive surgery, with incomplete (R2) resection. 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 He was initially treated with 2 cycles of XElOX in March-April 2022. Due to poor tolerability, oxaliplatin was held and capecitabine was dose reduced by 25%, starting in April 2022. His dose was then gradually increased over the summer 2022, back up to 2000 mg bid capecitabine 7 days on, 7 days off.  Please see previous notes for further details on the patient's history. He comes in today for routine follow up.    Interval History: Manas is feeling okay. He started xeloda yesterday. He has been tolerating  2000 mg BID 7 days on 7 days off dosing well. He will have some diarrhea and stomach upset a few days into his week on which lingers a few days into week off. He will use Pepto Bismol with relief. Rarely needs Imodium. He will also have headaches and will take oxycodone. No nausea. Does need antiemetics. No mucositis.     Mild skin irritation on back which waxes and wanes.     He does not have erythema or tenderness to hands or feet but cannot see fingerprints. Skin is thin. He has some dryness on his feet and uses lotion every few days. No blisters.     No colds or fevers. Sleeping well. He will sometimes take naps. Appetite is great.     Current Outpatient Medications   Medication Sig Dispense Refill     bismuth subsalicylate (PEPTO BISMOL) 262 MG/15ML suspension Take 15 mLs by mouth every 6 hours as needed for indigestion       capecitabine (XELODA) 500 MG tablet Take 4 tabs (2,000mg) by mouth in the morning and in the evening, daily. Take for 7 days on, then 7 days off. 112 tablet 0     capecitabine (XELODA) 500 MG tablet Take 4 tabs (2,000mg) by mouth in the morning and in the evening, daily. Take for 7 days on, then 7 days off. 112 tablet 0     lisinopril (ZESTRIL) 40 MG tablet Take 1 tablet (40 mg) by mouth daily Hold until patient calls for refill. 90 tablet 3     loperamide (IMODIUM A-D) 2 MG tablet Take 1 tablet (2 mg) by mouth 4 times daily as needed for diarrhea 60 tablet 1     oxyCODONE (ROXICODONE) 5 MG tablet Take 1 tablet (5 mg) by mouth every 6 hours as needed for severe pain !!Do NOT take together with Flexeril!! 30 tablet 0     cyclobenzaprine (FLEXERIL) 5 MG tablet Take 1 tablet (5 mg) by mouth 3 times daily as needed for muscle spasms !!Do NOT take together with oxycodone!! (Patient not taking: Reported on 9/26/2022) 30 tablet 0     ferrous sulfate 140 (45 Fe) MG TBCR CR tablet Take 96 mg by mouth daily (Patient not taking: Reported on 9/26/2022)       ondansetron (ZOFRAN) 8 MG tablet Take 1  tablet (8 mg) by mouth every 8 hours as needed for nausea (vomiting) (Patient not taking: Reported on 4/22/2022) 30 tablet 2     polyethylene glycol (MIRALAX) 17 GM/Dose powder Take 1 capful by mouth daily (Patient not taking: Reported on 7/19/2022)       prochlorperazine (COMPAZINE) 10 MG tablet Take 0.5 tablets (5 mg) by mouth every 6 hours as needed for nausea or vomiting (Patient not taking: Reported on 7/19/2022) 30 tablet 2     Objective:  General: patient appears well in no acute distress, alert and oriented, speech clear and fluid  Skin: no visualized rash or lesions on visualized skin  Resp: Appears to be breathing comfortably without accessory muscle usage, speaking in full sentences, no audible wheezes or cough.  Psych: Coherent speech, normal rate and volume, able to articulate logical thoughts, able to abstract reason, no tangential thoughts, no hallucinations or delusions  Patient's affect is appropriate.    Laboratory Data:  Most Recent 3 CBC's:  Recent Labs   Lab Test 12/27/22  1507 11/28/22  1539 10/24/22  1551   WBC 5.2 6.3 5.6   HGB 13.2* 13.2* 13.0*   MCV 99 93 96   * 151 166   ANEUTAUTO 3.2 4.3 3.7    Most Recent 3 BMP's:  Recent Labs   Lab Test 12/27/22  1507 11/28/22  1539 10/24/22  1551    139 138   POTASSIUM 4.4 4.3 4.1   CHLORIDE 103 104 102   CO2 29 24 27   BUN 16.2 19.7 18.2   CR 0.91 0.86 0.90   ANIONGAP 7 11 9   NAMAN 9.5 9.4 9.5   GLC 95 107* 105*   PROTTOTAL 7.2 7.0 7.4   ALBUMIN 4.2 4.2 4.4    Most Recent 2 LFT's:  Recent Labs   Lab Test 12/27/22  1507 11/28/22  1539   AST 25 25   ALT 21 18   ALKPHOS 87 76   BILITOTAL 0.7 0.7   I reviewed the above labs today.      Assessment and Plan:  Appendiceal mucinous adenocarcinoma w/ signet ring features. Patient is currently on treatment with capecitabine at 2000 mg (4 tablets) bid for 7 days on, 7 days off. He is tolerating this well with some fatigue, grade 1 HFS, and grade 1 diarrhea.  His labs are stable. Next imaging is end  of January.    Diarrhea: Managed well with Pepto and Imodium PRN.     HFS: Recommended using thick emollients at least once daily to hands and feet.     Kristina Hong PA-C   [facility] [address] [place] [address] [phone]    25 minutes spent on the date of the encounter doing chart review, review of test results, interpretation of tests, patient visit and documentation

## 2022-12-27 NOTE — NURSING NOTE
Patient denies any changes since echeck-in regarding medication and allergies and states all information entered during echeck-in remains accurate.    Venita Laird, Visit Facilitator/MA.

## 2022-12-27 NOTE — LETTER
12/27/2022         RE: Rod Olson  [address]  [place] [facility] [address]        Dear Colleague,    Thank you for referring your patient, Rod Olson, to the [facility]. Please see a copy of my visit note below.    Manas is a 77 year old who is being evaluated via a billable video visit.      How would you like to obtain your AVS? MyChart  If the video visit is dropped, the invitation should be resent by: Text to cell phone: [phone]  Will anyone else be joining your video visit? Yes: Daughter Thuan will be joining visit today. How would they like to receive their invitation?       Venita Laird, Visit Facilitator/MA.    Video-Visit Details    Type of service:  Video Visit   Video Start Time: 4:40 pm  Video End Time:4:54 PM    Originating Location (pt. 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 He presented in November 2021 at the urging of family with eight months of abdominal pain. CT showed large, mucinous collections in R hemipelvis, prompting referral to surgery. Pt thought to have likely a low-grade mucinous neoplasm and underwent cytoreductive surgery, with incomplete (R2) resection. 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 He was initially treated with 2 cycles of XElOX in March-April 2022. Due to poor tolerability, oxaliplatin was held and capecitabine was dose reduced by 25%, starting in April 2022. His dose was then gradually increased over the summer 2022,  back up to 2000 mg bid capecitabine 7 days on, 7 days off.  Please see previous notes for further details on the patient's history. He comes in today for routine follow up.    Interval History: Manas is feeling okay. He started xeloda yesterday. He has been tolerating 2000 mg BID 7 days on 7 days off dosing well. He will have some diarrhea and stomach upset a few days into his week on which lingers a few days into week off. He will use Pepto Bismol with relief. Rarely needs Imodium. He will also have headaches and will take oxycodone. No nausea. Does need antiemetics. No mucositis.     Mild skin irritation on back which waxes and wanes.     He does not have erythema or tenderness to hands or feet but cannot see fingerprints. Skin is thin. He has some dryness on his feet and uses lotion every few days. No blisters.     No colds or fevers. Sleeping well. He will sometimes take naps. Appetite is great.     Current Outpatient Medications   Medication Sig Dispense Refill     bismuth subsalicylate (PEPTO BISMOL) 262 MG/15ML suspension Take 15 mLs by mouth every 6 hours as needed for indigestion       capecitabine (XELODA) 500 MG tablet Take 4 tabs (2,000mg) by mouth in the morning and in the evening, daily. Take for 7 days on, then 7 days off. 112 tablet 0     capecitabine (XELODA) 500 MG tablet Take 4 tabs (2,000mg) by mouth in the morning and in the evening, daily. Take for 7 days on, then 7 days off. 112 tablet 0     lisinopril (ZESTRIL) 40 MG tablet Take 1 tablet (40 mg) by mouth daily Hold until patient calls for refill. 90 tablet 3     loperamide (IMODIUM A-D) 2 MG tablet Take 1 tablet (2 mg) by mouth 4 times daily as needed for diarrhea 60 tablet 1     oxyCODONE (ROXICODONE) 5 MG tablet Take 1 tablet (5 mg) by mouth every 6 hours as needed for severe pain !!Do NOT take together with Flexeril!! 30 tablet 0     cyclobenzaprine (FLEXERIL) 5 MG tablet Take 1 tablet (5 mg) by mouth 3 times daily as needed for muscle  spasms !!Do NOT take together with oxycodone!! (Patient not taking: Reported on 9/26/2022) 30 tablet 0     ferrous sulfate 140 (45 Fe) MG TBCR CR tablet Take 96 mg by mouth daily (Patient not taking: Reported on 9/26/2022)       ondansetron (ZOFRAN) 8 MG tablet Take 1 tablet (8 mg) by mouth every 8 hours as needed for nausea (vomiting) (Patient not taking: Reported on 4/22/2022) 30 tablet 2     polyethylene glycol (MIRALAX) 17 GM/Dose powder Take 1 capful by mouth daily (Patient not taking: Reported on 7/19/2022)       prochlorperazine (COMPAZINE) 10 MG tablet Take 0.5 tablets (5 mg) by mouth every 6 hours as needed for nausea or vomiting (Patient not taking: Reported on 7/19/2022) 30 tablet 2     Objective:  General: patient appears well in no acute distress, alert and oriented, speech clear and fluid  Skin: no visualized rash or lesions on visualized skin  Resp: Appears to be breathing comfortably without accessory muscle usage, speaking in full sentences, no audible wheezes or cough.  Psych: Coherent speech, normal rate and volume, able to articulate logical thoughts, able to abstract reason, no tangential thoughts, no hallucinations or delusions  Patient's affect is appropriate.    Laboratory Data:  Most Recent 3 CBC's:  Recent Labs   Lab Test 12/27/22  1507 11/28/22  1539 10/24/22  1551   WBC 5.2 6.3 5.6   HGB 13.2* 13.2* 13.0*   MCV 99 93 96   * 151 166   ANEUTAUTO 3.2 4.3 3.7    Most Recent 3 BMP's:  Recent Labs   Lab Test 12/27/22  1507 11/28/22  1539 10/24/22  1551    139 138   POTASSIUM 4.4 4.3 4.1   CHLORIDE 103 104 102   CO2 29 24 27   BUN 16.2 19.7 18.2   CR 0.91 0.86 0.90   ANIONGAP 7 11 9   NAMAN 9.5 9.4 9.5   GLC 95 107* 105*   PROTTOTAL 7.2 7.0 7.4   ALBUMIN 4.2 4.2 4.4    Most Recent 2 LFT's:  Recent Labs   Lab Test 12/27/22  1507 11/28/22  1539   AST 25 25   ALT 21 18   ALKPHOS 87 76   BILITOTAL 0.7 0.7   I reviewed the above labs today.      Assessment and Plan:  Appendiceal mucinous  adenocarcinoma w/ signet ring features. Patient is currently on treatment with capecitabine at 2000 mg (4 tablets) bid for 7 days on, 7 days off. He is tolerating this well with some fatigue, grade 1 HFS, and grade 1 diarrhea.  His labs are stable. Next imaging is end of January.    Diarrhea: Managed well with Pepto and Imodium PRN.     HFS: Recommended using thick emollients at least once daily to hands and feet.     Kristina Hong PA-C   [facility] [address] [id number] [phone]    25 minutes spent on the date of the encounter doing chart review, review of test results, interpretation of tests, patient visit and documentation       Again, thank you for allowing me to participate in the care of your patient.        Sincerely,        Kristina Hong PA-C

## 2023-01-01 ENCOUNTER — OFFICE VISIT (OUTPATIENT)
Dept: INFUSION THERAPY | Facility: CLINIC | Age: 78
End: 2023-01-01
Payer: MEDICARE

## 2023-01-01 ENCOUNTER — VIRTUAL VISIT (OUTPATIENT)
Dept: ONCOLOGY | Facility: CLINIC | Age: 78
End: 2023-01-01
Attending: STUDENT IN AN ORGANIZED HEALTH CARE EDUCATION/TRAINING PROGRAM
Payer: MEDICARE

## 2023-01-01 ENCOUNTER — LAB (OUTPATIENT)
Dept: LAB | Facility: CLINIC | Age: 78
End: 2023-01-01
Payer: MEDICARE

## 2023-01-01 ENCOUNTER — APPOINTMENT (OUTPATIENT)
Dept: MEDSURG UNIT | Facility: CLINIC | Age: 78
End: 2023-01-01
Attending: STUDENT IN AN ORGANIZED HEALTH CARE EDUCATION/TRAINING PROGRAM
Payer: MEDICARE

## 2023-01-01 ENCOUNTER — PATIENT OUTREACH (OUTPATIENT)
Dept: ONCOLOGY | Facility: CLINIC | Age: 78
End: 2023-01-01
Payer: MEDICARE

## 2023-01-01 ENCOUNTER — HOSPITAL ENCOUNTER (OUTPATIENT)
Facility: CLINIC | Age: 78
Discharge: HOME | End: 2023-09-27
Attending: STUDENT IN AN ORGANIZED HEALTH CARE EDUCATION/TRAINING PROGRAM | Admitting: STUDENT IN AN ORGANIZED HEALTH CARE EDUCATION/TRAINING PROGRAM
Payer: MEDICARE

## 2023-01-01 ENCOUNTER — HOSPITAL ENCOUNTER (OUTPATIENT)
Dept: ULTRASOUND IMAGING | Facility: CLINIC | Age: 78
Discharge: HOME | End: 2023-09-22
Attending: STUDENT IN AN ORGANIZED HEALTH CARE EDUCATION/TRAINING PROGRAM | Admitting: RADIOLOGY
Payer: MEDICARE

## 2023-01-01 ENCOUNTER — HOSPITAL ENCOUNTER (OUTPATIENT)
Dept: EDUCATION SERVICES | Facility: CLINIC | Age: 78
Discharge: HOME | End: 2023-09-27
Payer: MEDICARE

## 2023-01-01 ENCOUNTER — TRANSCRIBE ORDERS (OUTPATIENT)
Dept: OTHER | Age: 78
End: 2023-01-01

## 2023-01-01 ENCOUNTER — ANCILLARY PROCEDURE (OUTPATIENT)
Dept: ULTRASOUND IMAGING | Facility: CLINIC | Age: 78
End: 2023-01-01
Attending: STUDENT IN AN ORGANIZED HEALTH CARE EDUCATION/TRAINING PROGRAM
Payer: MEDICARE

## 2023-01-01 ENCOUNTER — HOSPITAL ENCOUNTER (OUTPATIENT)
Dept: CT IMAGING | Facility: CLINIC | Age: 78
Discharge: HOME | End: 2023-07-24
Attending: STUDENT IN AN ORGANIZED HEALTH CARE EDUCATION/TRAINING PROGRAM | Admitting: STUDENT IN AN ORGANIZED HEALTH CARE EDUCATION/TRAINING PROGRAM
Payer: MEDICARE

## 2023-01-01 ENCOUNTER — TELEPHONE (OUTPATIENT)
Dept: FAMILY MEDICINE | Facility: CLINIC | Age: 78
End: 2023-01-01
Payer: MEDICARE

## 2023-01-01 ENCOUNTER — TELEPHONE (OUTPATIENT)
Dept: ONCOLOGY | Facility: CLINIC | Age: 78
End: 2023-01-01
Payer: MEDICARE

## 2023-01-01 ENCOUNTER — HOSPITAL ENCOUNTER (OUTPATIENT)
Dept: CT IMAGING | Facility: CLINIC | Age: 78
Discharge: HOME | End: 2023-01-27
Attending: STUDENT IN AN ORGANIZED HEALTH CARE EDUCATION/TRAINING PROGRAM | Admitting: STUDENT IN AN ORGANIZED HEALTH CARE EDUCATION/TRAINING PROGRAM
Payer: MEDICARE

## 2023-01-01 ENCOUNTER — VIRTUAL VISIT (OUTPATIENT)
Dept: ONCOLOGY | Facility: CLINIC | Age: 78
End: 2023-01-01
Attending: PHYSICIAN ASSISTANT
Payer: MEDICARE

## 2023-01-01 ENCOUNTER — HOSPITAL ENCOUNTER (OUTPATIENT)
Dept: CT IMAGING | Facility: CLINIC | Age: 78
Discharge: HOME | End: 2023-04-27
Attending: STUDENT IN AN ORGANIZED HEALTH CARE EDUCATION/TRAINING PROGRAM | Admitting: STUDENT IN AN ORGANIZED HEALTH CARE EDUCATION/TRAINING PROGRAM
Payer: MEDICARE

## 2023-01-01 ENCOUNTER — MYC MEDICAL ADVICE (OUTPATIENT)
Dept: ONCOLOGY | Facility: CLINIC | Age: 78
End: 2023-01-01
Payer: MEDICARE

## 2023-01-01 ENCOUNTER — TELEPHONE (OUTPATIENT)
Dept: INTERVENTIONAL RADIOLOGY/VASCULAR | Facility: CLINIC | Age: 78
End: 2023-01-01
Payer: MEDICARE

## 2023-01-01 ENCOUNTER — TUMOR CONFERENCE (OUTPATIENT)
Dept: ONCOLOGY | Facility: CLINIC | Age: 78
End: 2023-01-01
Payer: MEDICARE

## 2023-01-01 ENCOUNTER — HOSPITAL ENCOUNTER (EMERGENCY)
Facility: CLINIC | Age: 78
Discharge: HOME | End: 2023-09-08
Attending: STUDENT IN AN ORGANIZED HEALTH CARE EDUCATION/TRAINING PROGRAM | Admitting: STUDENT IN AN ORGANIZED HEALTH CARE EDUCATION/TRAINING PROGRAM
Payer: MEDICARE

## 2023-01-01 ENCOUNTER — APPOINTMENT (OUTPATIENT)
Dept: INTERVENTIONAL RADIOLOGY/VASCULAR | Facility: CLINIC | Age: 78
End: 2023-01-01
Attending: STUDENT IN AN ORGANIZED HEALTH CARE EDUCATION/TRAINING PROGRAM
Payer: MEDICARE

## 2023-01-01 ENCOUNTER — NURSE TRIAGE (OUTPATIENT)
Dept: ONCOLOGY | Facility: CLINIC | Age: 78
End: 2023-01-01
Payer: MEDICARE

## 2023-01-01 ENCOUNTER — APPOINTMENT (OUTPATIENT)
Dept: CT IMAGING | Facility: CLINIC | Age: 78
End: 2023-01-01
Attending: STUDENT IN AN ORGANIZED HEALTH CARE EDUCATION/TRAINING PROGRAM
Payer: MEDICARE

## 2023-01-01 ENCOUNTER — TELEPHONE (OUTPATIENT)
Dept: PALLIATIVE CARE | Facility: CLINIC | Age: 78
End: 2023-01-01
Payer: MEDICARE

## 2023-01-01 ENCOUNTER — VIRTUAL VISIT (OUTPATIENT)
Dept: RADIATION ONCOLOGY | Facility: CLINIC | Age: 78
End: 2023-01-01
Attending: FAMILY MEDICINE
Payer: MEDICARE

## 2023-01-01 ENCOUNTER — HOSPITAL ENCOUNTER (EMERGENCY)
Facility: CLINIC | Age: 78
Discharge: HOME | End: 2023-09-01
Attending: EMERGENCY MEDICINE | Admitting: EMERGENCY MEDICINE
Payer: MEDICARE

## 2023-01-01 ENCOUNTER — OFFICE VISIT (OUTPATIENT)
Dept: RADIATION THERAPY | Facility: OUTPATIENT CENTER | Age: 78
End: 2023-01-01
Attending: STUDENT IN AN ORGANIZED HEALTH CARE EDUCATION/TRAINING PROGRAM
Payer: MEDICARE

## 2023-01-01 ENCOUNTER — HEALTH MAINTENANCE LETTER (OUTPATIENT)
Age: 78
End: 2023-01-01

## 2023-01-01 ENCOUNTER — PATIENT OUTREACH (OUTPATIENT)
Dept: CARE COORDINATION | Facility: CLINIC | Age: 78
End: 2023-01-01
Payer: MEDICARE

## 2023-01-01 VITALS
HEART RATE: 76 BPM | WEIGHT: 233.2 LBS | BODY MASS INDEX: 32.52 KG/M2 | RESPIRATION RATE: 16 BRPM | OXYGEN SATURATION: 98 % | SYSTOLIC BLOOD PRESSURE: 117 MMHG | DIASTOLIC BLOOD PRESSURE: 77 MMHG

## 2023-01-01 VITALS — BODY MASS INDEX: 31.78 KG/M2 | WEIGHT: 227 LBS | HEIGHT: 71 IN

## 2023-01-01 VITALS
BODY MASS INDEX: 32.55 KG/M2 | WEIGHT: 233.4 LBS | TEMPERATURE: 97.8 F | OXYGEN SATURATION: 98 % | DIASTOLIC BLOOD PRESSURE: 75 MMHG | SYSTOLIC BLOOD PRESSURE: 116 MMHG | HEART RATE: 81 BPM

## 2023-01-01 VITALS
BODY MASS INDEX: 33.47 KG/M2 | WEIGHT: 240 LBS | OXYGEN SATURATION: 96 % | HEART RATE: 92 BPM | DIASTOLIC BLOOD PRESSURE: 82 MMHG | SYSTOLIC BLOOD PRESSURE: 126 MMHG | TEMPERATURE: 98.1 F

## 2023-01-01 VITALS
SYSTOLIC BLOOD PRESSURE: 153 MMHG | HEART RATE: 123 BPM | DIASTOLIC BLOOD PRESSURE: 135 MMHG | BODY MASS INDEX: 32.48 KG/M2 | TEMPERATURE: 98.1 F | WEIGHT: 232 LBS | OXYGEN SATURATION: 93 % | RESPIRATION RATE: 18 BRPM | HEIGHT: 71 IN

## 2023-01-01 VITALS
RESPIRATION RATE: 16 BRPM | SYSTOLIC BLOOD PRESSURE: 107 MMHG | WEIGHT: 228 LBS | DIASTOLIC BLOOD PRESSURE: 68 MMHG | BODY MASS INDEX: 31.92 KG/M2 | HEIGHT: 71 IN | OXYGEN SATURATION: 96 % | TEMPERATURE: 98 F | HEART RATE: 122 BPM

## 2023-01-01 VITALS
DIASTOLIC BLOOD PRESSURE: 92 MMHG | HEART RATE: 104 BPM | SYSTOLIC BLOOD PRESSURE: 131 MMHG | RESPIRATION RATE: 20 BRPM | WEIGHT: 229 LBS | OXYGEN SATURATION: 95 % | BODY MASS INDEX: 31.94 KG/M2

## 2023-01-01 VITALS
HEART RATE: 96 BPM | RESPIRATION RATE: 16 BRPM | TEMPERATURE: 97.6 F | SYSTOLIC BLOOD PRESSURE: 121 MMHG | DIASTOLIC BLOOD PRESSURE: 85 MMHG | OXYGEN SATURATION: 96 % | BODY MASS INDEX: 32.92 KG/M2 | WEIGHT: 236 LBS

## 2023-01-01 VITALS — BODY MASS INDEX: 32.2 KG/M2 | HEIGHT: 71 IN | WEIGHT: 230 LBS

## 2023-01-01 VITALS
RESPIRATION RATE: 25 BRPM | SYSTOLIC BLOOD PRESSURE: 110 MMHG | DIASTOLIC BLOOD PRESSURE: 85 MMHG | HEART RATE: 109 BPM | TEMPERATURE: 97.6 F | OXYGEN SATURATION: 96 %

## 2023-01-01 VITALS — BODY MASS INDEX: 31.5 KG/M2 | WEIGHT: 225 LBS | HEIGHT: 71 IN

## 2023-01-01 VITALS — HEIGHT: 71 IN | BODY MASS INDEX: 31.92 KG/M2 | WEIGHT: 228 LBS

## 2023-01-01 VITALS — BODY MASS INDEX: 33.47 KG/M2 | WEIGHT: 240 LBS

## 2023-01-01 DIAGNOSIS — C18.1 MALIGNANT NEOPLASM OF APPENDIX (H): ICD-10-CM

## 2023-01-01 DIAGNOSIS — C18.1 MALIGNANT NEOPLASM OF APPENDIX (H): Primary | ICD-10-CM

## 2023-01-01 DIAGNOSIS — T40.2X5A THERAPEUTIC OPIOID-INDUCED CONSTIPATION (OIC): ICD-10-CM

## 2023-01-01 DIAGNOSIS — E86.0 DEHYDRATION: ICD-10-CM

## 2023-01-01 DIAGNOSIS — C18.9 MALIGNANT NEOPLASM OF COLON METASTATIC TO LIVER (H): ICD-10-CM

## 2023-01-01 DIAGNOSIS — K59.03 THERAPEUTIC OPIOID-INDUCED CONSTIPATION (OIC): ICD-10-CM

## 2023-01-01 DIAGNOSIS — Z51.5 PALLIATIVE CARE PATIENT: Primary | ICD-10-CM

## 2023-01-01 DIAGNOSIS — R53.0 NEOPLASTIC MALIGNANT RELATED FATIGUE: ICD-10-CM

## 2023-01-01 DIAGNOSIS — C18.1 MALIGNANT NEOPLASM OF APPENDIX VERMIFORMIS (H): ICD-10-CM

## 2023-01-01 DIAGNOSIS — R18.0 MALIGNANT ASCITES (H): ICD-10-CM

## 2023-01-01 DIAGNOSIS — R18.8 OTHER ASCITES: ICD-10-CM

## 2023-01-01 DIAGNOSIS — C78.7 MALIGNANT NEOPLASM OF COLON METASTATIC TO LIVER (H): ICD-10-CM

## 2023-01-01 DIAGNOSIS — R06.02 SHORTNESS OF BREATH: ICD-10-CM

## 2023-01-01 DIAGNOSIS — J30.9 ALLERGIC RHINITIS, UNSPECIFIED SEASONALITY, UNSPECIFIED TRIGGER: ICD-10-CM

## 2023-01-01 DIAGNOSIS — R18.8 ASCITES: ICD-10-CM

## 2023-01-01 DIAGNOSIS — G89.3 CANCER ASSOCIATED PAIN: ICD-10-CM

## 2023-01-01 DIAGNOSIS — T40.2X5A THERAPEUTIC OPIOID-INDUCED CONSTIPATION (OIC): Primary | ICD-10-CM

## 2023-01-01 DIAGNOSIS — K59.03 THERAPEUTIC OPIOID-INDUCED CONSTIPATION (OIC): Primary | ICD-10-CM

## 2023-01-01 DIAGNOSIS — Z13.220 SCREENING FOR LIPID DISORDERS: ICD-10-CM

## 2023-01-01 LAB
ABSOLUTE NEUTROPHILS, BODY FLUID: 177.7 /UL
ALBUMIN BODY FLUID SOURCE: NORMAL
ALBUMIN FLD-MCNC: 2.6 G/DL
ALBUMIN SERPL BCG-MCNC: 3.4 G/DL (ref 3.5–5.2)
ALBUMIN SERPL BCG-MCNC: 3.5 G/DL (ref 3.5–5.2)
ALBUMIN SERPL BCG-MCNC: 3.5 G/DL (ref 3.5–5.2)
ALBUMIN SERPL BCG-MCNC: 4.1 G/DL (ref 3.5–5.2)
ALBUMIN SERPL BCG-MCNC: 4.2 G/DL (ref 3.5–5.2)
ALBUMIN SERPL BCG-MCNC: 4.3 G/DL (ref 3.5–5.2)
ALBUMIN SERPL BCG-MCNC: 4.3 G/DL (ref 3.5–5.2)
ALBUMIN SERPL BCG-MCNC: 4.6 G/DL (ref 3.5–5.2)
ALP SERPL-CCNC: 61 U/L (ref 40–129)
ALP SERPL-CCNC: 64 U/L (ref 40–129)
ALP SERPL-CCNC: 64 U/L (ref 40–129)
ALP SERPL-CCNC: 79 U/L (ref 40–129)
ALP SERPL-CCNC: 80 U/L (ref 40–129)
ALP SERPL-CCNC: 81 U/L (ref 40–129)
ALP SERPL-CCNC: 82 U/L (ref 40–129)
ALT SERPL W P-5'-P-CCNC: 14 U/L (ref 10–50)
ALT SERPL W P-5'-P-CCNC: 15 U/L (ref 10–50)
ALT SERPL W P-5'-P-CCNC: 21 U/L (ref 10–50)
ALT SERPL W P-5'-P-CCNC: 21 U/L (ref 10–50)
ALT SERPL W P-5'-P-CCNC: 23 U/L (ref 0–70)
ALT SERPL W P-5'-P-CCNC: 23 U/L (ref 10–50)
ALT SERPL W P-5'-P-CCNC: 31 U/L (ref 0–70)
ANION GAP SERPL CALCULATED.3IONS-SCNC: 11 MMOL/L (ref 7–15)
ANION GAP SERPL CALCULATED.3IONS-SCNC: 12 MMOL/L (ref 7–15)
ANION GAP SERPL CALCULATED.3IONS-SCNC: 7 MMOL/L (ref 7–15)
ANION GAP SERPL CALCULATED.3IONS-SCNC: 9 MMOL/L (ref 7–15)
ANION GAP SERPL CALCULATED.3IONS-SCNC: 9 MMOL/L (ref 7–15)
APPEARANCE FLD: ABNORMAL
AST SERPL W P-5'-P-CCNC: 19 U/L (ref 10–50)
AST SERPL W P-5'-P-CCNC: 20 U/L (ref 10–50)
AST SERPL W P-5'-P-CCNC: 25 U/L (ref 10–50)
AST SERPL W P-5'-P-CCNC: 26 U/L (ref 10–50)
AST SERPL W P-5'-P-CCNC: 29 U/L (ref 0–45)
AST SERPL W P-5'-P-CCNC: 29 U/L (ref 10–50)
AST SERPL W P-5'-P-CCNC: 33 U/L (ref 0–45)
BACTERIA FLD CULT: NO GROWTH
BASOPHILS # BLD AUTO: 0 10E3/UL (ref 0–0.2)
BASOPHILS # BLD AUTO: 0.1 10E3/UL (ref 0–0.2)
BASOPHILS NFR BLD AUTO: 0 %
BASOPHILS NFR BLD AUTO: 1 %
BASOPHILS NFR BLD AUTO: 1 %
BILIRUB SERPL-MCNC: 0.4 MG/DL
BILIRUB SERPL-MCNC: 0.5 MG/DL
BILIRUB SERPL-MCNC: 0.5 MG/DL
BILIRUB SERPL-MCNC: 0.8 MG/DL
BILIRUB SERPL-MCNC: 0.8 MG/DL
BUN SERPL-MCNC: 14.6 MG/DL (ref 8–23)
BUN SERPL-MCNC: 14.6 MG/DL (ref 8–23)
BUN SERPL-MCNC: 16 MG/DL (ref 8–23)
BUN SERPL-MCNC: 16.2 MG/DL (ref 8–23)
BUN SERPL-MCNC: 16.5 MG/DL (ref 8–23)
BUN SERPL-MCNC: 17.2 MG/DL (ref 8–23)
BUN SERPL-MCNC: 17.3 MG/DL (ref 8–23)
CALCIUM SERPL-MCNC: 9.2 MG/DL (ref 8.8–10.2)
CALCIUM SERPL-MCNC: 9.2 MG/DL (ref 8.8–10.2)
CALCIUM SERPL-MCNC: 9.4 MG/DL (ref 8.8–10.2)
CALCIUM SERPL-MCNC: 9.5 MG/DL (ref 8.8–10.2)
CALCIUM SERPL-MCNC: 9.5 MG/DL (ref 8.8–10.2)
CALCIUM SERPL-MCNC: 9.6 MG/DL (ref 8.8–10.2)
CALCIUM SERPL-MCNC: 9.7 MG/DL (ref 8.8–10.2)
CEA SERPL-MCNC: 4.2 NG/ML
CEA SERPL-MCNC: 5.4 NG/ML
CEA SERPL-MCNC: 5.9 NG/ML
CEA SERPL-MCNC: 6.8 NG/ML
CEA SERPL-MCNC: 7 NG/ML
CELL COUNT BODY FLUID SOURCE: ABNORMAL
CHLORIDE SERPL-SCNC: 100 MMOL/L (ref 98–107)
CHLORIDE SERPL-SCNC: 100 MMOL/L (ref 98–107)
CHLORIDE SERPL-SCNC: 102 MMOL/L (ref 98–107)
CHLORIDE SERPL-SCNC: 102 MMOL/L (ref 98–107)
CHLORIDE SERPL-SCNC: 103 MMOL/L (ref 98–107)
CHLORIDE SERPL-SCNC: 106 MMOL/L (ref 98–107)
CHLORIDE SERPL-SCNC: 98 MMOL/L (ref 98–107)
CHOLEST SERPL-MCNC: 143 MG/DL
COLOR FLD: ABNORMAL
CREAT BLD-MCNC: 1.1 MG/DL (ref 0.7–1.3)
CREAT SERPL-MCNC: 0.88 MG/DL (ref 0.67–1.17)
CREAT SERPL-MCNC: 0.9 MG/DL (ref 0.67–1.17)
CREAT SERPL-MCNC: 0.99 MG/DL (ref 0.67–1.17)
CREAT SERPL-MCNC: 1.01 MG/DL (ref 0.67–1.17)
CREAT SERPL-MCNC: 1.01 MG/DL (ref 0.67–1.17)
CREAT SERPL-MCNC: 1.06 MG/DL (ref 0.67–1.17)
CREAT SERPL-MCNC: 1.06 MG/DL (ref 0.67–1.17)
D DIMER PPP FEU-MCNC: >20 UG/ML FEU (ref 0–0.5)
DEPRECATED HCO3 PLAS-SCNC: 24 MMOL/L (ref 22–29)
DEPRECATED HCO3 PLAS-SCNC: 24 MMOL/L (ref 22–29)
DEPRECATED HCO3 PLAS-SCNC: 25 MMOL/L (ref 22–29)
DEPRECATED HCO3 PLAS-SCNC: 26 MMOL/L (ref 22–29)
DEPRECATED HCO3 PLAS-SCNC: 28 MMOL/L (ref 22–29)
EGFRCR SERPLBLD CKD-EPI 2021: 71 ML/MIN/1.73M2
EOSINOPHIL # BLD AUTO: 0 10E3/UL (ref 0–0.7)
EOSINOPHIL # BLD AUTO: 0.1 10E3/UL (ref 0–0.7)
EOSINOPHIL NFR BLD AUTO: 1 %
EOSINOPHIL NFR BLD AUTO: 2 %
ERYTHROCYTE [DISTWIDTH] IN BLOOD BY AUTOMATED COUNT: 14.5 % (ref 10–15)
ERYTHROCYTE [DISTWIDTH] IN BLOOD BY AUTOMATED COUNT: 14.8 % (ref 10–15)
ERYTHROCYTE [DISTWIDTH] IN BLOOD BY AUTOMATED COUNT: 15.4 % (ref 10–15)
ERYTHROCYTE [DISTWIDTH] IN BLOOD BY AUTOMATED COUNT: 16.5 % (ref 10–15)
ERYTHROCYTE [DISTWIDTH] IN BLOOD BY AUTOMATED COUNT: 16.6 % (ref 10–15)
GFR SERPL CREATININE-BSD FRML MDRD: 72 ML/MIN/1.73M2
GFR SERPL CREATININE-BSD FRML MDRD: 76 ML/MIN/1.73M2
GFR SERPL CREATININE-BSD FRML MDRD: 76 ML/MIN/1.73M2
GFR SERPL CREATININE-BSD FRML MDRD: 78 ML/MIN/1.73M2
GFR SERPL CREATININE-BSD FRML MDRD: 87 ML/MIN/1.73M2
GFR SERPL CREATININE-BSD FRML MDRD: 87 ML/MIN/1.73M2
GFR SERPL CREATININE-BSD FRML MDRD: >60 ML/MIN/1.73M2
GLUCOSE SERPL-MCNC: 105 MG/DL (ref 70–99)
GLUCOSE SERPL-MCNC: 109 MG/DL (ref 70–99)
GLUCOSE SERPL-MCNC: 111 MG/DL (ref 70–99)
GLUCOSE SERPL-MCNC: 119 MG/DL (ref 70–99)
GLUCOSE SERPL-MCNC: 151 MG/DL (ref 70–99)
GLUCOSE SERPL-MCNC: 91 MG/DL (ref 70–99)
GLUCOSE SERPL-MCNC: 96 MG/DL (ref 70–99)
GRAM STAIN RESULT: NORMAL
GRAM STAIN RESULT: NORMAL
HCT VFR BLD AUTO: 35.7 % (ref 40–53)
HCT VFR BLD AUTO: 37.1 % (ref 40–53)
HCT VFR BLD AUTO: 37.4 % (ref 40–53)
HCT VFR BLD AUTO: 38.2 % (ref 40–53)
HCT VFR BLD AUTO: 39.1 % (ref 40–53)
HCT VFR BLD AUTO: 39.2 % (ref 40–53)
HCT VFR BLD AUTO: 39.5 % (ref 40–53)
HDLC SERPL-MCNC: 36 MG/DL
HGB BLD-MCNC: 11.7 G/DL (ref 13.3–17.7)
HGB BLD-MCNC: 11.7 G/DL (ref 13.3–17.7)
HGB BLD-MCNC: 12.8 G/DL (ref 13.3–17.7)
HGB BLD-MCNC: 12.9 G/DL (ref 13.3–17.7)
HGB BLD-MCNC: 12.9 G/DL (ref 13.3–17.7)
HGB BLD-MCNC: 13.1 G/DL (ref 13.3–17.7)
HGB BLD-MCNC: 13.6 G/DL (ref 13.3–17.7)
HOLD SPECIMEN: NORMAL
IMM GRANULOCYTES # BLD: 0 10E3/UL
IMM GRANULOCYTES # BLD: 0.1 10E3/UL
IMM GRANULOCYTES NFR BLD: 0 %
IMM GRANULOCYTES NFR BLD: 1 %
INR PPP: 1.25 (ref 0.85–1.15)
LDLC SERPL CALC-MCNC: 58 MG/DL
LYMPHOCYTES # BLD AUTO: 0.8 10E3/UL (ref 0.8–5.3)
LYMPHOCYTES # BLD AUTO: 1.1 10E3/UL (ref 0.8–5.3)
LYMPHOCYTES # BLD AUTO: 1.2 10E3/UL (ref 0.8–5.3)
LYMPHOCYTES # BLD AUTO: 1.3 10E3/UL (ref 0.8–5.3)
LYMPHOCYTES # BLD AUTO: 1.3 10E3/UL (ref 0.8–5.3)
LYMPHOCYTES NFR BLD AUTO: 12 %
LYMPHOCYTES NFR BLD AUTO: 19 %
LYMPHOCYTES NFR BLD AUTO: 20 %
LYMPHOCYTES NFR BLD AUTO: 21 %
LYMPHOCYTES NFR BLD AUTO: 22 %
LYMPHOCYTES NFR BLD AUTO: 25 %
LYMPHOCYTES NFR BLD AUTO: 8 %
LYMPHOCYTES NFR FLD MANUAL: 51 %
MCH RBC QN AUTO: 25.7 PG (ref 26.5–33)
MCH RBC QN AUTO: 26.4 PG (ref 26.5–33)
MCH RBC QN AUTO: 31.8 PG (ref 26.5–33)
MCH RBC QN AUTO: 33.2 PG (ref 26.5–33)
MCH RBC QN AUTO: 33.4 PG (ref 26.5–33)
MCH RBC QN AUTO: 33.6 PG (ref 26.5–33)
MCH RBC QN AUTO: 34.1 PG (ref 26.5–33)
MCHC RBC AUTO-ENTMCNC: 31.3 G/DL (ref 31.5–36.5)
MCHC RBC AUTO-ENTMCNC: 32.7 G/DL (ref 31.5–36.5)
MCHC RBC AUTO-ENTMCNC: 32.8 G/DL (ref 31.5–36.5)
MCHC RBC AUTO-ENTMCNC: 33.5 G/DL (ref 31.5–36.5)
MCHC RBC AUTO-ENTMCNC: 33.8 G/DL (ref 31.5–36.5)
MCHC RBC AUTO-ENTMCNC: 34.4 G/DL (ref 31.5–36.5)
MCHC RBC AUTO-ENTMCNC: 34.8 G/DL (ref 31.5–36.5)
MCV RBC AUTO: 80 FL (ref 78–100)
MCV RBC AUTO: 82 FL (ref 78–100)
MCV RBC AUTO: 97 FL (ref 78–100)
MCV RBC AUTO: 98 FL (ref 78–100)
MCV RBC AUTO: 99 FL (ref 78–100)
MONOCYTES # BLD AUTO: 0.6 10E3/UL (ref 0–1.3)
MONOCYTES # BLD AUTO: 0.7 10E3/UL (ref 0–1.3)
MONOCYTES # BLD AUTO: 0.8 10E3/UL (ref 0–1.3)
MONOCYTES # BLD AUTO: 0.9 10E3/UL (ref 0–1.3)
MONOCYTES # BLD AUTO: 1 10E3/UL (ref 0–1.3)
MONOCYTES NFR BLD AUTO: 11 %
MONOCYTES NFR BLD AUTO: 12 %
MONOCYTES NFR BLD AUTO: 12 %
MONOCYTES NFR BLD AUTO: 14 %
MONOCYTES NFR BLD AUTO: 9 %
MONOS+MACROS NFR FLD MANUAL: 32 %
NEUTROPHILS # BLD AUTO: 3.2 10E3/UL (ref 1.6–8.3)
NEUTROPHILS # BLD AUTO: 3.5 10E3/UL (ref 1.6–8.3)
NEUTROPHILS # BLD AUTO: 3.6 10E3/UL (ref 1.6–8.3)
NEUTROPHILS # BLD AUTO: 3.6 10E3/UL (ref 1.6–8.3)
NEUTROPHILS # BLD AUTO: 4.4 10E3/UL (ref 1.6–8.3)
NEUTROPHILS # BLD AUTO: 6.9 10E3/UL (ref 1.6–8.3)
NEUTROPHILS # BLD AUTO: 8.8 10E3/UL (ref 1.6–8.3)
NEUTROPHILS NFR BLD AUTO: 63 %
NEUTROPHILS NFR BLD AUTO: 63 %
NEUTROPHILS NFR BLD AUTO: 65 %
NEUTROPHILS NFR BLD AUTO: 66 %
NEUTROPHILS NFR BLD AUTO: 68 %
NEUTROPHILS NFR BLD AUTO: 75 %
NEUTROPHILS NFR BLD AUTO: 81 %
NEUTS BAND NFR FLD MANUAL: 17 %
NONHDLC SERPL-MCNC: 107 MG/DL
NRBC # BLD AUTO: 0 10E3/UL
NRBC # BLD AUTO: 0 10E3/UL
NRBC BLD AUTO-RTO: 0 /100
NRBC BLD AUTO-RTO: 0 /100
PLATELET # BLD AUTO: 143 10E3/UL (ref 150–450)
PLATELET # BLD AUTO: 143 10E3/UL (ref 150–450)
PLATELET # BLD AUTO: 149 10E3/UL (ref 150–450)
PLATELET # BLD AUTO: 176 10E3/UL (ref 150–450)
PLATELET # BLD AUTO: 193 10E3/UL (ref 150–450)
PLATELET # BLD AUTO: 362 10E3/UL (ref 150–450)
PLATELET # BLD AUTO: 380 10E3/UL (ref 150–450)
POTASSIUM SERPL-SCNC: 3.9 MMOL/L (ref 3.4–5.3)
POTASSIUM SERPL-SCNC: 4.1 MMOL/L (ref 3.4–5.3)
POTASSIUM SERPL-SCNC: 4.1 MMOL/L (ref 3.4–5.3)
POTASSIUM SERPL-SCNC: 4.5 MMOL/L (ref 3.4–5.3)
POTASSIUM SERPL-SCNC: 4.5 MMOL/L (ref 3.4–5.3)
POTASSIUM SERPL-SCNC: 4.8 MMOL/L (ref 3.4–5.3)
POTASSIUM SERPL-SCNC: 5.2 MMOL/L (ref 3.4–5.3)
PROT FLD-MCNC: 4.8 G/DL
PROT SERPL-MCNC: 7 G/DL (ref 6.4–8.3)
PROT SERPL-MCNC: 7.1 G/DL (ref 6.4–8.3)
PROT SERPL-MCNC: 7.2 G/DL (ref 6.4–8.3)
PROT SERPL-MCNC: 7.2 G/DL (ref 6.4–8.3)
PROT SERPL-MCNC: 7.4 G/DL (ref 6.4–8.3)
PROT SERPL-MCNC: 7.5 G/DL (ref 6.4–8.3)
PROT SERPL-MCNC: 7.7 G/DL (ref 6.4–8.3)
PROTEIN BODY FLUID SOURCE: NORMAL
RADIOLOGIST FLAGS: ABNORMAL
RBC # BLD AUTO: 3.84 10E6/UL (ref 4.4–5.9)
RBC # BLD AUTO: 3.86 10E6/UL (ref 4.4–5.9)
RBC # BLD AUTO: 3.94 10E6/UL (ref 4.4–5.9)
RBC # BLD AUTO: 3.99 10E6/UL (ref 4.4–5.9)
RBC # BLD AUTO: 4.02 10E6/UL (ref 4.4–5.9)
RBC # BLD AUTO: 4.44 10E6/UL (ref 4.4–5.9)
RBC # BLD AUTO: 4.55 10E6/UL (ref 4.4–5.9)
SODIUM SERPL-SCNC: 135 MMOL/L (ref 136–145)
SODIUM SERPL-SCNC: 135 MMOL/L (ref 136–145)
SODIUM SERPL-SCNC: 137 MMOL/L (ref 136–145)
SODIUM SERPL-SCNC: 137 MMOL/L (ref 136–145)
SODIUM SERPL-SCNC: 139 MMOL/L (ref 136–145)
SODIUM SERPL-SCNC: 140 MMOL/L (ref 136–145)
SODIUM SERPL-SCNC: 141 MMOL/L (ref 136–145)
TRIGL SERPL-MCNC: 246 MG/DL
TROPONIN T SERPL HS-MCNC: 13 NG/L
WBC # BLD AUTO: 10.7 10E3/UL (ref 4–11)
WBC # BLD AUTO: 5.1 10E3/UL (ref 4–11)
WBC # BLD AUTO: 5.5 10E3/UL (ref 4–11)
WBC # BLD AUTO: 6.4 10E3/UL (ref 4–11)
WBC # BLD AUTO: 9.2 10E3/UL (ref 4–11)
WBC # FLD AUTO: 1045 /UL

## 2023-01-01 PROCEDURE — 85025 COMPLETE CBC W/AUTO DIFF WBC: CPT | Performed by: STUDENT IN AN ORGANIZED HEALTH CARE EDUCATION/TRAINING PROGRAM

## 2023-01-01 PROCEDURE — 99283 EMERGENCY DEPT VISIT LOW MDM: CPT | Performed by: EMERGENCY MEDICINE

## 2023-01-01 PROCEDURE — 36415 COLL VENOUS BLD VENIPUNCTURE: CPT

## 2023-01-01 PROCEDURE — 258N000003 HC RX IP 258 OP 636: Performed by: STUDENT IN AN ORGANIZED HEALTH CARE EDUCATION/TRAINING PROGRAM

## 2023-01-01 PROCEDURE — 82378 CARCINOEMBRYONIC ANTIGEN: CPT

## 2023-01-01 PROCEDURE — 99285 EMERGENCY DEPT VISIT HI MDM: CPT | Mod: 25 | Performed by: STUDENT IN AN ORGANIZED HEALTH CARE EDUCATION/TRAINING PROGRAM

## 2023-01-01 PROCEDURE — 96374 THER/PROPH/DIAG INJ IV PUSH: CPT | Mod: 59 | Performed by: STUDENT IN AN ORGANIZED HEALTH CARE EDUCATION/TRAINING PROGRAM

## 2023-01-01 PROCEDURE — 82042 OTHER SOURCE ALBUMIN QUAN EA: CPT | Performed by: STUDENT IN AN ORGANIZED HEALTH CARE EDUCATION/TRAINING PROGRAM

## 2023-01-01 PROCEDURE — 96360 HYDRATION IV INFUSION INIT: CPT | Performed by: EMERGENCY MEDICINE

## 2023-01-01 PROCEDURE — 80061 LIPID PANEL: CPT | Performed by: PHYSICIAN ASSISTANT

## 2023-01-01 PROCEDURE — 250N000011 HC RX IP 250 OP 636: Mod: JZ | Performed by: NURSE PRACTITIONER

## 2023-01-01 PROCEDURE — 85014 HEMATOCRIT: CPT | Performed by: EMERGENCY MEDICINE

## 2023-01-01 PROCEDURE — 99215 OFFICE O/P EST HI 40 MIN: CPT | Performed by: STUDENT IN AN ORGANIZED HEALTH CARE EDUCATION/TRAINING PROGRAM

## 2023-01-01 PROCEDURE — 99215 OFFICE O/P EST HI 40 MIN: CPT | Mod: VID | Performed by: STUDENT IN AN ORGANIZED HEALTH CARE EDUCATION/TRAINING PROGRAM

## 2023-01-01 PROCEDURE — 85025 COMPLETE CBC W/AUTO DIFF WBC: CPT

## 2023-01-01 PROCEDURE — 250N000009 HC RX 250: Performed by: STUDENT IN AN ORGANIZED HEALTH CARE EDUCATION/TRAINING PROGRAM

## 2023-01-01 PROCEDURE — 80053 COMPREHEN METABOLIC PANEL: CPT

## 2023-01-01 PROCEDURE — 250N000011 HC RX IP 250 OP 636: Performed by: PHYSICIAN ASSISTANT

## 2023-01-01 PROCEDURE — 74177 CT ABD & PELVIS W/CONTRAST: CPT | Mod: MG

## 2023-01-01 PROCEDURE — 250N000011 HC RX IP 250 OP 636: Mod: JZ | Performed by: STUDENT IN AN ORGANIZED HEALTH CARE EDUCATION/TRAINING PROGRAM

## 2023-01-01 PROCEDURE — 250N000011 HC RX IP 250 OP 636: Performed by: RADIOLOGY

## 2023-01-01 PROCEDURE — 87070 CULTURE OTHR SPECIMN AEROBIC: CPT | Performed by: STUDENT IN AN ORGANIZED HEALTH CARE EDUCATION/TRAINING PROGRAM

## 2023-01-01 PROCEDURE — 85025 COMPLETE CBC W/AUTO DIFF WBC: CPT | Performed by: EMERGENCY MEDICINE

## 2023-01-01 PROCEDURE — 99152 MOD SED SAME PHYS/QHP 5/>YRS: CPT

## 2023-01-01 PROCEDURE — G0463 HOSPITAL OUTPT CLINIC VISIT: HCPCS | Performed by: STUDENT IN AN ORGANIZED HEALTH CARE EDUCATION/TRAINING PROGRAM

## 2023-01-01 PROCEDURE — 99215 OFFICE O/P EST HI 40 MIN: CPT | Mod: 95 | Performed by: STUDENT IN AN ORGANIZED HEALTH CARE EDUCATION/TRAINING PROGRAM

## 2023-01-01 PROCEDURE — 99152 MOD SED SAME PHYS/QHP 5/>YRS: CPT | Performed by: PHYSICIAN ASSISTANT

## 2023-01-01 PROCEDURE — 36415 COLL VENOUS BLD VENIPUNCTURE: CPT | Performed by: NURSE PRACTITIONER

## 2023-01-01 PROCEDURE — G1010 CDSM STANSON: HCPCS

## 2023-01-01 PROCEDURE — 258N000003 HC RX IP 258 OP 636: Performed by: NURSE PRACTITIONER

## 2023-01-01 PROCEDURE — 272N000710 US PARACENTESIS WITHOUT ALBUMIN

## 2023-01-01 PROCEDURE — 250N000009 HC RX 250: Performed by: RADIOLOGY

## 2023-01-01 PROCEDURE — 49418 INSERT TUN IP CATH PERC: CPT

## 2023-01-01 PROCEDURE — 99213 OFFICE O/P EST LOW 20 MIN: CPT | Mod: VID | Performed by: PHYSICIAN ASSISTANT

## 2023-01-01 PROCEDURE — 80053 COMPREHEN METABOLIC PANEL: CPT | Performed by: EMERGENCY MEDICINE

## 2023-01-01 PROCEDURE — 80053 COMPREHEN METABOLIC PANEL: CPT | Performed by: STUDENT IN AN ORGANIZED HEALTH CARE EDUCATION/TRAINING PROGRAM

## 2023-01-01 PROCEDURE — 82565 ASSAY OF CREATININE: CPT

## 2023-01-01 PROCEDURE — 999N000133 HC STATISTIC PP CARE STAGE 2

## 2023-01-01 PROCEDURE — 258N000003 HC RX IP 258 OP 636: Performed by: EMERGENCY MEDICINE

## 2023-01-01 PROCEDURE — C1729 CATH, DRAINAGE: HCPCS

## 2023-01-01 PROCEDURE — 84520 ASSAY OF UREA NITROGEN: CPT | Performed by: EMERGENCY MEDICINE

## 2023-01-01 PROCEDURE — 85379 FIBRIN DEGRADATION QUANT: CPT | Performed by: STUDENT IN AN ORGANIZED HEALTH CARE EDUCATION/TRAINING PROGRAM

## 2023-01-01 PROCEDURE — G0463 HOSPITAL OUTPT CLINIC VISIT: HCPCS

## 2023-01-01 PROCEDURE — 96361 HYDRATE IV INFUSION ADD-ON: CPT | Mod: 59 | Performed by: STUDENT IN AN ORGANIZED HEALTH CARE EDUCATION/TRAINING PROGRAM

## 2023-01-01 PROCEDURE — 250N000011 HC RX IP 250 OP 636: Performed by: STUDENT IN AN ORGANIZED HEALTH CARE EDUCATION/TRAINING PROGRAM

## 2023-01-01 PROCEDURE — 96375 TX/PRO/DX INJ NEW DRUG ADDON: CPT | Mod: 59 | Performed by: STUDENT IN AN ORGANIZED HEALTH CARE EDUCATION/TRAINING PROGRAM

## 2023-01-01 PROCEDURE — 36415 COLL VENOUS BLD VENIPUNCTURE: CPT | Performed by: STUDENT IN AN ORGANIZED HEALTH CARE EDUCATION/TRAINING PROGRAM

## 2023-01-01 PROCEDURE — 99283 EMERGENCY DEPT VISIT LOW MDM: CPT | Mod: 25 | Performed by: EMERGENCY MEDICINE

## 2023-01-01 PROCEDURE — 49083 ABD PARACENTESIS W/IMAGING: CPT | Mod: LT | Performed by: STUDENT IN AN ORGANIZED HEALTH CARE EDUCATION/TRAINING PROGRAM

## 2023-01-01 PROCEDURE — 272N000192 HC ACCESSORY CR2

## 2023-01-01 PROCEDURE — 250N000009 HC RX 250: Performed by: PHYSICIAN ASSISTANT

## 2023-01-01 PROCEDURE — 49418 INSERT TUN IP CATH PERC: CPT | Performed by: PHYSICIAN ASSISTANT

## 2023-01-01 PROCEDURE — 99205 OFFICE O/P NEW HI 60 MIN: CPT | Mod: 25 | Performed by: FAMILY MEDICINE

## 2023-01-01 PROCEDURE — 89050 BODY FLUID CELL COUNT: CPT | Performed by: STUDENT IN AN ORGANIZED HEALTH CARE EDUCATION/TRAINING PROGRAM

## 2023-01-01 PROCEDURE — 89051 BODY FLUID CELL COUNT: CPT | Performed by: STUDENT IN AN ORGANIZED HEALTH CARE EDUCATION/TRAINING PROGRAM

## 2023-01-01 PROCEDURE — 87205 SMEAR GRAM STAIN: CPT | Performed by: STUDENT IN AN ORGANIZED HEALTH CARE EDUCATION/TRAINING PROGRAM

## 2023-01-01 PROCEDURE — 85610 PROTHROMBIN TIME: CPT | Performed by: NURSE PRACTITIONER

## 2023-01-01 PROCEDURE — 99497 ADVNCD CARE PLAN 30 MIN: CPT | Mod: 25 | Performed by: FAMILY MEDICINE

## 2023-01-01 PROCEDURE — 49083 ABD PARACENTESIS W/IMAGING: CPT

## 2023-01-01 PROCEDURE — 49083 ABD PARACENTESIS W/IMAGING: CPT | Performed by: RADIOLOGY

## 2023-01-01 PROCEDURE — 36415 COLL VENOUS BLD VENIPUNCTURE: CPT | Performed by: EMERGENCY MEDICINE

## 2023-01-01 PROCEDURE — 84484 ASSAY OF TROPONIN QUANT: CPT | Performed by: STUDENT IN AN ORGANIZED HEALTH CARE EDUCATION/TRAINING PROGRAM

## 2023-01-01 PROCEDURE — 999N000142 HC STATISTIC PROCEDURE PREP ONLY

## 2023-01-01 PROCEDURE — 84157 ASSAY OF PROTEIN OTHER: CPT | Performed by: STUDENT IN AN ORGANIZED HEALTH CARE EDUCATION/TRAINING PROGRAM

## 2023-01-01 RX ORDER — ALBUTEROL SULFATE 0.83 MG/ML
2.5 SOLUTION RESPIRATORY (INHALATION)
Status: CANCELLED | OUTPATIENT
Start: 2023-01-01

## 2023-01-01 RX ORDER — NALOXONE HYDROCHLORIDE 0.4 MG/ML
0.2 INJECTION, SOLUTION INTRAMUSCULAR; INTRAVENOUS; SUBCUTANEOUS
Status: DISCONTINUED | OUTPATIENT
Start: 2023-01-01 | End: 2023-01-01 | Stop reason: HOSPADM

## 2023-01-01 RX ORDER — ONDANSETRON 2 MG/ML
4 INJECTION INTRAMUSCULAR; INTRAVENOUS EVERY 30 MIN PRN
Status: DISCONTINUED | OUTPATIENT
Start: 2023-01-01 | End: 2023-01-01 | Stop reason: HOSPADM

## 2023-01-01 RX ORDER — IOPAMIDOL 755 MG/ML
100 INJECTION, SOLUTION INTRAVASCULAR ONCE
Status: COMPLETED | OUTPATIENT
Start: 2023-01-01 | End: 2023-01-01

## 2023-01-01 RX ORDER — ONDANSETRON 2 MG/ML
4 INJECTION INTRAMUSCULAR; INTRAVENOUS
Status: COMPLETED | OUTPATIENT
Start: 2023-01-01 | End: 2023-01-01

## 2023-01-01 RX ORDER — OXYCODONE HYDROCHLORIDE 5 MG/1
5 TABLET ORAL EVERY 6 HOURS PRN
Qty: 60 TABLET | Refills: 0 | Status: SHIPPED | OUTPATIENT
Start: 2023-01-01 | End: 2023-01-01

## 2023-01-01 RX ORDER — SENNOSIDES 8.6 MG
1-2 TABLET ORAL 4 TIMES DAILY PRN
Qty: 90 TABLET | Refills: 4 | Status: SHIPPED | OUTPATIENT
Start: 2023-01-01

## 2023-01-01 RX ORDER — METHYLPREDNISOLONE SODIUM SUCCINATE 125 MG/2ML
125 INJECTION, POWDER, LYOPHILIZED, FOR SOLUTION INTRAMUSCULAR; INTRAVENOUS
Status: CANCELLED
Start: 2023-01-01

## 2023-01-01 RX ORDER — FENTANYL CITRATE 50 UG/ML
25-50 INJECTION, SOLUTION INTRAMUSCULAR; INTRAVENOUS EVERY 5 MIN PRN
Status: DISCONTINUED | OUTPATIENT
Start: 2023-01-01 | End: 2023-01-01 | Stop reason: HOSPADM

## 2023-01-01 RX ORDER — HEPARIN SODIUM (PORCINE) LOCK FLUSH IV SOLN 100 UNIT/ML 100 UNIT/ML
5 SOLUTION INTRAVENOUS
Status: CANCELLED | OUTPATIENT
Start: 2023-01-01

## 2023-01-01 RX ORDER — SODIUM CHLORIDE 9 MG/ML
INJECTION, SOLUTION INTRAVENOUS CONTINUOUS
Status: DISCONTINUED | OUTPATIENT
Start: 2023-01-01 | End: 2023-01-01 | Stop reason: HOSPADM

## 2023-01-01 RX ORDER — FUROSEMIDE 20 MG
20 TABLET ORAL DAILY
Qty: 30 TABLET | Refills: 3 | Status: SHIPPED | OUTPATIENT
Start: 2023-01-01

## 2023-01-01 RX ORDER — FLUMAZENIL 0.1 MG/ML
0.2 INJECTION, SOLUTION INTRAVENOUS
Status: DISCONTINUED | OUTPATIENT
Start: 2023-01-01 | End: 2023-01-01 | Stop reason: HOSPADM

## 2023-01-01 RX ORDER — MEPERIDINE HYDROCHLORIDE 25 MG/ML
25 INJECTION INTRAMUSCULAR; INTRAVENOUS; SUBCUTANEOUS EVERY 30 MIN PRN
Status: CANCELLED | OUTPATIENT
Start: 2023-01-01

## 2023-01-01 RX ORDER — CAPECITABINE 500 MG/1
TABLET, FILM COATED ORAL
Qty: 112 TABLET | Refills: 0 | Status: SHIPPED | OUTPATIENT
Start: 2023-01-01 | End: 2023-01-01

## 2023-01-01 RX ORDER — CEFAZOLIN SODIUM 2 G/100ML
2 INJECTION, SOLUTION INTRAVENOUS
Status: COMPLETED | OUTPATIENT
Start: 2023-01-01 | End: 2023-01-01

## 2023-01-01 RX ORDER — NALOXONE HYDROCHLORIDE 0.4 MG/ML
0.4 INJECTION, SOLUTION INTRAMUSCULAR; INTRAVENOUS; SUBCUTANEOUS
Status: DISCONTINUED | OUTPATIENT
Start: 2023-01-01 | End: 2023-01-01 | Stop reason: HOSPADM

## 2023-01-01 RX ORDER — DIPHENHYDRAMINE HYDROCHLORIDE 50 MG/ML
50 INJECTION INTRAMUSCULAR; INTRAVENOUS
Status: CANCELLED
Start: 2023-01-01

## 2023-01-01 RX ORDER — SPIRONOLACTONE 50 MG/1
50 TABLET, FILM COATED ORAL DAILY
Qty: 90 TABLET | Refills: 3 | Status: SHIPPED | OUTPATIENT
Start: 2023-01-01

## 2023-01-01 RX ORDER — IOPAMIDOL 755 MG/ML
91 INJECTION, SOLUTION INTRAVASCULAR ONCE
Status: COMPLETED | OUTPATIENT
Start: 2023-01-01 | End: 2023-01-01

## 2023-01-01 RX ORDER — ALBUTEROL SULFATE 90 UG/1
1-2 AEROSOL, METERED RESPIRATORY (INHALATION)
Status: CANCELLED
Start: 2023-01-01

## 2023-01-01 RX ORDER — HEPARIN SODIUM,PORCINE 10 UNIT/ML
5-20 VIAL (ML) INTRAVENOUS DAILY PRN
Status: CANCELLED | OUTPATIENT
Start: 2023-01-01

## 2023-01-01 RX ORDER — CAPECITABINE 500 MG/1
TABLET, FILM COATED ORAL
Qty: 112 TABLET | Refills: 11 | Status: SHIPPED | OUTPATIENT
Start: 2023-01-01

## 2023-01-01 RX ORDER — LIDOCAINE 40 MG/G
CREAM TOPICAL
Status: DISCONTINUED | OUTPATIENT
Start: 2023-01-01 | End: 2023-01-01 | Stop reason: HOSPADM

## 2023-01-01 RX ORDER — EPINEPHRINE 1 MG/ML
0.3 INJECTION, SOLUTION INTRAMUSCULAR; SUBCUTANEOUS EVERY 5 MIN PRN
Status: CANCELLED | OUTPATIENT
Start: 2023-01-01

## 2023-01-01 RX ORDER — SPIRONOLACTONE 50 MG/1
50 TABLET, FILM COATED ORAL DAILY
Qty: 30 TABLET | Refills: 3 | Status: SHIPPED | OUTPATIENT
Start: 2023-01-01 | End: 2023-01-01

## 2023-01-01 RX ORDER — OXYCODONE HYDROCHLORIDE 5 MG/1
5-10 TABLET ORAL EVERY 6 HOURS PRN
Qty: 120 TABLET | Refills: 0 | Status: SHIPPED | OUTPATIENT
Start: 2023-01-01

## 2023-01-01 RX ORDER — HYDROMORPHONE HYDROCHLORIDE 1 MG/ML
0.5 INJECTION, SOLUTION INTRAMUSCULAR; INTRAVENOUS; SUBCUTANEOUS ONCE
Status: COMPLETED | OUTPATIENT
Start: 2023-01-01 | End: 2023-01-01

## 2023-01-01 RX ORDER — LIDOCAINE HYDROCHLORIDE 10 MG/ML
20 INJECTION, SOLUTION EPIDURAL; INFILTRATION; INTRACAUDAL; PERINEURAL ONCE
Status: CANCELLED | OUTPATIENT
Start: 2023-01-01 | End: 2023-01-01

## 2023-01-01 RX ORDER — LIDOCAINE HYDROCHLORIDE 10 MG/ML
20 INJECTION, SOLUTION EPIDURAL; INFILTRATION; INTRACAUDAL; PERINEURAL ONCE
Status: COMPLETED | OUTPATIENT
Start: 2023-01-01 | End: 2023-01-01

## 2023-01-01 RX ADMIN — IOPAMIDOL 100 ML: 755 INJECTION, SOLUTION INTRAVENOUS at 14:54

## 2023-01-01 RX ADMIN — FENTANYL CITRATE 25 MCG: 50 INJECTION, SOLUTION INTRAMUSCULAR; INTRAVENOUS at 14:21

## 2023-01-01 RX ADMIN — HYDROMORPHONE HYDROCHLORIDE 0.5 MG: 1 INJECTION, SOLUTION INTRAMUSCULAR; INTRAVENOUS; SUBCUTANEOUS at 18:27

## 2023-01-01 RX ADMIN — LIDOCAINE HYDROCHLORIDE 18 ML: 10 INJECTION, SOLUTION EPIDURAL; INFILTRATION; INTRACAUDAL; PERINEURAL at 14:01

## 2023-01-01 RX ADMIN — ONDANSETRON 4 MG: 2 INJECTION INTRAMUSCULAR; INTRAVENOUS at 14:01

## 2023-01-01 RX ADMIN — LIDOCAINE HYDROCHLORIDE 10 ML: 10 INJECTION, SOLUTION EPIDURAL; INFILTRATION; INTRACAUDAL; PERINEURAL at 12:00

## 2023-01-01 RX ADMIN — MIDAZOLAM 0.5 MG: 1 INJECTION INTRAMUSCULAR; INTRAVENOUS at 14:01

## 2023-01-01 RX ADMIN — SODIUM CHLORIDE 100 ML: 9 INJECTION, SOLUTION INTRAVENOUS at 19:56

## 2023-01-01 RX ADMIN — SODIUM CHLORIDE 68 ML: 9 INJECTION, SOLUTION INTRAVENOUS at 15:58

## 2023-01-01 RX ADMIN — SODIUM CHLORIDE 69 ML: 9 INJECTION, SOLUTION INTRAVENOUS at 15:15

## 2023-01-01 RX ADMIN — SODIUM CHLORIDE: 9 INJECTION, SOLUTION INTRAVENOUS at 12:47

## 2023-01-01 RX ADMIN — FENTANYL CITRATE 25 MCG: 50 INJECTION, SOLUTION INTRAMUSCULAR; INTRAVENOUS at 14:17

## 2023-01-01 RX ADMIN — SODIUM CHLORIDE 69 ML: 9 INJECTION, SOLUTION INTRAVENOUS at 14:54

## 2023-01-01 RX ADMIN — FENTANYL CITRATE 50 MCG: 50 INJECTION, SOLUTION INTRAMUSCULAR; INTRAVENOUS at 14:01

## 2023-01-01 RX ADMIN — IOPAMIDOL 100 ML: 755 INJECTION, SOLUTION INTRAVENOUS at 15:15

## 2023-01-01 RX ADMIN — CEFAZOLIN SODIUM 2 G: 2 INJECTION, SOLUTION INTRAVENOUS at 12:46

## 2023-01-01 RX ADMIN — MIDAZOLAM 1 MG: 1 INJECTION INTRAMUSCULAR; INTRAVENOUS at 14:08

## 2023-01-01 RX ADMIN — SODIUM CHLORIDE, POTASSIUM CHLORIDE, SODIUM LACTATE AND CALCIUM CHLORIDE 1000 ML: 600; 310; 30; 20 INJECTION, SOLUTION INTRAVENOUS at 16:54

## 2023-01-01 RX ADMIN — MIDAZOLAM 0.5 MG: 1 INJECTION INTRAMUSCULAR; INTRAVENOUS at 14:28

## 2023-01-01 RX ADMIN — IOPAMIDOL 91 ML: 755 INJECTION, SOLUTION INTRAVENOUS at 19:57

## 2023-01-01 RX ADMIN — IOPAMIDOL 100 ML: 755 INJECTION, SOLUTION INTRAVENOUS at 15:57

## 2023-01-01 RX ADMIN — ONDANSETRON 4 MG: 2 INJECTION INTRAMUSCULAR; INTRAVENOUS at 18:02

## 2023-01-01 RX ADMIN — SODIUM CHLORIDE 1000 ML: 9 INJECTION, SOLUTION INTRAVENOUS at 21:30

## 2023-01-01 ASSESSMENT — PAIN SCALES - GENERAL
PAINLEVEL: MODERATE PAIN (5)
PAINLEVEL: NO PAIN (0)
PAINLEVEL: MILD PAIN (3)
PAINLEVEL: NO PAIN (0)
PAINLEVEL: NO PAIN (0)

## 2023-01-01 ASSESSMENT — ACTIVITIES OF DAILY LIVING (ADL)
ADLS_ACUITY_SCORE: 35

## 2023-01-17 NOTE — TELEPHONE ENCOUNTER
Oral Chemotherapy Monitoring Program    Placed call to patient in follow up of Xeloda (capecitabine) therapy.    Left message to please call back in follow up of therapy. No patient or drug names were mentioned.    Dex Ross  Pharmacy Intern  Oral Chemotherapy Monitoring Program  [facility]  [phone]

## 2023-01-24 NOTE — TELEPHONE ENCOUNTER
Oral Chemotherapy Monitoring Program    Subjective/Objective:  Rod Olson is a 77 year old male contacted by phone for a follow-up visit for oral chemotherapy. Spoke to daughter Eowyn today who reports that Manas has not experienced any new side-effects or any concerns regarding safety/efficacy of their Xeloda (capecitabine). They have been adherent with the dose and regimen and did not report starting any new medications as of recently. Furthermore, they report getting adequate nutrition/ water intake, and daily exercise in their routine. Eowyn did mention that they will be getting their refill shipment a few days late, which may account for up to 3 missed doses going forward.    ORAL CHEMOTHERAPY 10/21/2022 10/25/2022 12/2/2022 12/21/2022 12/27/2022 1/17/2023 1/24/2023   Assessment Type Other Refill;Lab Monitoring Refill Monthly Follow up Lab Monitoring Left Voicemail;Monthly Follow up Monthly Follow up   Diagnosis Code Appendix Cancer Appendix Cancer Appendix Cancer Appendix Cancer Appendix Cancer Appendix Cancer Appendix Cancer   Providers Dr. Valeriano Bal   [facility]   Drug Name Xeloda (capecitabine) Xeloda (capecitabine) Xeloda (capecitabine) Xeloda (capecitabine) Xeloda (capecitabine) Xeloda (capecitabine) Xeloda (capecitabine)   Dose 2,000 mg 2,000 mg 2,000 mg 2,000 mg 2,000 mg 2,000 mg 2,000 mg   Current Schedule BID BID BID BID BID BID BID   Cycle Details 1 week on, 1 week off 1 week on, 1 week off 1 week on, 1 week off 1 week on, 1 week off 1 week on, 1 week off 1 week on, 1 week off 1 week on, 1 week off   Start Date of Last Cycle - - - - - - -   Planned next cycle start date - - - - - - -   Doses missed in last 2 weeks - - - 0 - - 0   Adherence Assessment - - - Adherent - - Adherent   Adverse Effects - - - No AE identified during assessment Anemia;Thrombocytopenia -  "No AE identified during assessment   Nausea - - - - - - -   Pharmacist Intervention(nausea) - - - - - - -   Intervention(s) - - - - - - -   Diarrhea - - - - - - -   Pharmacist Intervention(diarrhea) - - - - - - -   Intervention(s) - - - - - - -   Anemia - - - - Grade 1 - -   Pharmacist Intervention(anemia) - - - - No - -   Headache - - - - - - -   Pharmacist Intervention(headache) - - - - - - -   Intervention(s) - - - - - - -   Fatigue - - - - - - -   Pharmacist Intervention(fatigue) - - - - - - -   Intervention(s) - - - - - - -   Thrombocytopenia - - - - Grade 1 - -   Pharmacist Intervention(thrombocytopenia) - - - - No - -   Any new drug interactions? - - - No - - No   Pharmacist Intervention? - - - - - - -   Intervention(s) - - - - - - -   Is the dose as ordered appropriate for the patient? - - - Yes - - Yes   Is the patient currently in pain? - - - No - - No   Has the patient been assessed within the past 6 months for depression? - - - No - - No   Has the patient missed any days of school, work, or other routine activity? - - - No - - No   Since the last time we talked, have you been hospitalized or used the emergency room? - - - No - - No       Last PHQ-2 Score on record:   PHQ-2 ( 1999 [employer]) 12/27/2022 7/19/2022   Q1: Little interest or pleasure in doing things 0 0   Q2: Feeling down, depressed or hopeless 0 0   PHQ-2 Score 0 0   Q1: Little interest or pleasure in doing things - -   Q2: Feeling down, depressed or hopeless - -   PHQ-2 Score - -       Vitals:  BP:   BP Readings from Last 1 Encounters:   11/28/22 (!) 126/92     Wt Readings from Last 1 Encounters:   11/28/22 104.8 kg (231 lb)     Estimated body surface area is 2.29 meters squared as calculated from the following:    Height as of 7/19/22: 1.803 m (5' 11\").    Weight as of 11/28/22: 104.8 kg (231 lb).    Labs:  _  Result Component Current Result Ref Range   Sodium 139 (12/27/2022) 136 - 145 mmol/L     _  Result Component Current Result Ref Range "   Potassium 4.4 (12/27/2022) 3.4 - 5.3 mmol/L     _  Result Component Current Result Ref Range   Calcium 9.5 (12/27/2022) 8.8 - 10.2 mg/dL     _  Result Component Current Result Ref Range   Albumin 4.2 (12/27/2022) 3.5 - 5.2 g/dL     _  Result Component Current Result Ref Range   Urea Nitrogen 16.2 (12/27/2022) 8.0 - 23.0 mg/dL     _  Result Component Current Result Ref Range   Creatinine 0.91 (12/27/2022) 0.67 - 1.17 mg/dL     _  Result Component Current Result Ref Range   AST 25 (12/27/2022) 10 - 50 U/L     _  Result Component Current Result Ref Range   ALT 21 (12/27/2022) 10 - 50 U/L     _  Result Component Current Result Ref Range   Bilirubin Total 0.7 (12/27/2022) <=1.2 mg/dL     _  Result Component Current Result Ref Range   WBC Count 5.2 (12/27/2022) 4.0 - 11.0 10e3/uL     _  Result Component Current Result Ref Range   Hemoglobin 13.2 (L) (12/27/2022) 13.3 - 17.7 g/dL     _  Result Component Current Result Ref Range   Platelet Count 149 (L) (12/27/2022) 150 - 450 10e3/uL     _  Result Component Current Result Ref Range   Absolute Neutrophils 3.2 (12/27/2022) 1.6 - 8.3 10e3/uL      Assessment/Plan:  Because there were no adverse drug events or concerning findings today during our conversation, Bill should continue to take their Xeloda (capecitabine) as scheduled. Reminded patient of upcoming appointments and encouraged them to call us if any questions come up.    Follow-Up:  Labs/CT on 1/27 @ 3:30PM-4PM; Provider appt w/Dr. Bal on 1/30 @ 2:30PM    Refill Due:  1/26/23    Dex Ross  Pharmacy Intern  Oral Chemotherapy Monitoring Program  [facility]  [phone]

## 2023-01-30 NOTE — NURSING NOTE
"Oncology Rooming Note    January 30, 2023 3:01 PM   Rod Olson is a 77 year old male who presents for:    Chief Complaint   Patient presents with     Oncology Clinic Visit     Colorectal Cancer     Initial Vitals: /85 (BP Location: Right arm, Patient Position: Sitting, Cuff Size: Adult Large)   Pulse 96   Temp 97.6  F (36.4  C) (Oral)   Resp 16   Wt 107 kg (236 lb)   SpO2 96%   BMI 32.92 kg/m   Estimated body mass index is 32.92 kg/m  as calculated from the following:    Height as of 7/19/22: 1.803 m (5' 11\").    Weight as of this encounter: 107 kg (236 lb). Body surface area is 2.32 meters squared.  No Pain (0) Comment: Data Unavailable   No LMP for male patient.  Allergies reviewed: Yes  Medications reviewed: Yes    Medications: Medication refills not needed today.  Pharmacy name entered into [facility]:    [id number] - [place], [place] - [address] AT [facility] #[id number] - [place], [place] - [address] PHARMACY [place], [place] - [address] [place] [address]  [facility] - [place], [place] - [address]    Clinical concerns: Pt presents today for f/uSummer Banks LPN  1/30/2023              "

## 2023-01-30 NOTE — LETTER
1/30/2023         RE: Rod Olson  [address]  [place] [facility] [address]        Dear Colleague,    Thank you for referring your patient, Rod Olson, to the [facility]. Please see a copy of my visit note below.    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 He presented in November 2021 at the urging of family with eight months of abdominal pain. CT showed large, mucinous collections in R hemipelvis, prompting referral to surgery. Pt thought to have likely a low-grade mucinous neoplasm and underwent cytoreductive surgery, with incomplete (R2) resection. 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 He was initially treated with 2 cycles of XElOX in March-April 2022. Due to poor tolerability, oxaliplatin was held and capecitabine was dose reduced by 25%, starting in April 2022. His dose was then gradually increased over the summer 2022, back up to 2000 mg bid capecitabine 7 days on, 7 days off.  He is status post palliative radiation therapy under the care of Dr. Castillo, 30 Gy in 10 fractions from 9/1/22 - 9/15/22. Please see previous notes for further details on the patient's history. He comes in today for routine follow up.    Interval History: Manas is feeling okay. He started xeloda yesterday. He has been tolerating 2000 mg BID 7 days on 7 days off dosing well. He will have some diarrhea and stomach upset a few days into his week on which lingers a few days into week off. About 2 bowel movements daily. He will use Pepto Bismol with relief. Uses Imodium about once daily. No mucositis.     He  does not have erythema or tenderness to hands or feet but cannot see fingerprints.  He has some dryness on his feet and uses lotion every few days. No blisters.    He does have ongoing neuropathy of the feet, as well as sciatica mainly involving the left posterior thigh. Neuropathy mainly bothers him at night.      Appetite remains good - he has gained 10 pounds.    Current Outpatient Medications   Medication Sig Dispense Refill     bismuth subsalicylate (PEPTO BISMOL) 262 MG/15ML suspension Take 15 mLs by mouth every 6 hours as needed for indigestion       capecitabine (XELODA) 500 MG tablet Take 4 tabs (2,000mg) by mouth in the morning and in the evening, daily. Take for 7 days on, then 7 days off. 112 tablet 0     lisinopril (ZESTRIL) 40 MG tablet Take 1 tablet (40 mg) by mouth daily Hold until patient calls for refill. 90 tablet 3     oxyCODONE (ROXICODONE) 5 MG tablet Take 1 tablet (5 mg) by mouth every 6 hours as needed for severe pain !!Do NOT take together with Flexeril!! 30 tablet 0     loperamide (IMODIUM A-D) 2 MG tablet Take 1 tablet (2 mg) by mouth 4 times daily as needed for diarrhea (Patient taking differently: Take 2 mg by mouth daily as needed for diarrhea) 60 tablet 1     Objective:  General: patient appears well in no acute distress, alert and oriented, speech clear and fluid  Skin: no visualized rash or lesions on visualized skin  Resp: Appears to be breathing comfortably without accessory muscle usage, speaking in full sentences, no audible wheezes or cough.  Psych: Coherent speech, normal rate and volume, able to articulate logical thoughts, able to abstract reason, no tangential thoughts, no hallucinations or delusions  Patient's affect is appropriate.    Laboratory Data:  Most Recent 3 CBC's:  Recent Labs   Lab Test 01/27/23  1625 12/27/22  1507 11/28/22  1539   WBC 5.5 5.2 6.3   HGB 13.1* 13.2* 13.2*   MCV 99 99 93   * 149* 151   ANEUTAUTO 3.6 3.2 4.3    Most Recent 3 BMP's:  Recent  Labs   Lab Test 01/27/23  1625 12/27/22  1507 11/28/22  1539    139 139   POTASSIUM 4.1 4.4 4.3   CHLORIDE 100 103 104   CO2 28 29 24   BUN 17.2 16.2 19.7   CR 0.90 0.91 0.86   ANIONGAP 9 7 11   NAMAN 9.4 9.5 9.4   * 95 107*   PROTTOTAL 7.0 7.2 7.0   ALBUMIN 4.1 4.2 4.2    Most Recent 2 LFT's:  Recent Labs   Lab Test 01/27/23  1625 12/27/22  1507   AST 25 25   ALT 21 21   ALKPHOS 81 87   BILITOTAL 0.8 0.7   I reviewed the above labs today.    RADIOLOGY/IMAGING:  CT C/A/P 1/27/23  IMPRESSION:  1.  Similar-appearing mixed cystic and solid mass in the pelvis  adjacent to the sigmoid colon.  2.  Mild interval increase in size of previously seen peritoneal  implant in the right lower quadrant.  3.  Similar-appearing irregular pulmonary nodule in the left upper  lobe apex.  4.  Additional nonacute findings as above.       Assessment and Plan:  Appendiceal mucinous adenocarcinoma w/ signet ring features. Patient is currently on treatment with capecitabine at 2000 mg (4 tablets) bid for 7 days on, 7 days off. He is tolerating this well with some fatigue, grade 1 HFS, and grade 1 diarrhea.  His labs are stable. He is tolerating Xeloda well at current regimen. Most recent imaging demonstrates stability of pelvic mass status post palliative radiation therapy, with some interval growth of satellite nodule along right paracolic gutter (2.2 x 1.9 cm from 1.4 x 1.1 cm). Patient has done well post-radiation  - Referral to Dr. Castillo for consideration of radiation to growing nodule  - Referral to Dr. Donald for discussion of possible surgical management of satellite nodule.    Peripheral Neuropathy: largely stable over time, discussed medical management today. Can consider duloxetine or gabapentin if patient is interested in the future.     Diarrhea: Managed well with Pepto-bismol and Imodium PRN.     HFS: No redness or pain in the hands/feet, however patient has stated in the past that he does have some blisters on the  feet for which he uses lotion. Previously recommended using thick emollients at least once daily to hands and feet.     Sciatica: Physical therapy referral placed today    The patient was seen and assessed by my attending, Dr. Bal, who agrees with the above assessment and plan.    Melanie Blanc MD PGY4  Department of Radiation Oncology  [phone] Clinic  [phone] Pager      Attestation signed by Larry Bal MD at 2/1/2023  7:21 PM:  Physician Attestation  I, Larry Bal MD, saw this patient and agree with the findings and plan of care as documented in the note.      In particular, pt's oligoprogressive disease may be amenable to SBRT or other local radiation treatments; so we will request follow-up with Dr. Castillo. Will also query Dr. Donald regarding potential cytoreduction to this lesion. Given pt is known to both services, no need for new referrals at this time.    Pt's weight is stable, so we are pleased with treatment tolerance. Will provide PT referral to assist with long-standing 'sciatica' low back pain.     Items personally reviewed/procedural attestation: vitals, labs, and imaging and agree with the interpretation documented in the note.    Larry Bal MD

## 2023-01-30 NOTE — PROGRESS NOTES
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 He presented in November 2021 at the urging of family with eight months of abdominal pain. CT showed large, mucinous collections in R hemipelvis, prompting referral to surgery. Pt thought to have likely a low-grade mucinous neoplasm and underwent cytoreductive surgery, with incomplete (R2) resection. 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 He was initially treated with 2 cycles of XElOX in March-April 2022. Due to poor tolerability, oxaliplatin was held and capecitabine was dose reduced by 25%, starting in April 2022. His dose was then gradually increased over the summer 2022, back up to 2000 mg bid capecitabine 7 days on, 7 days off.  He is status post palliative radiation therapy under the care of Dr. Castillo, 30 Gy in 10 fractions from 9/1/22 - 9/15/22. Please see previous notes for further details on the patient's history. He comes in today for routine follow up.    Interval History: Manas is feeling okay. He started xeloda yesterday. He has been tolerating 2000 mg BID 7 days on 7 days off dosing well. He will have some diarrhea and stomach upset a few days into his week on which lingers a few days into week off. About 2 bowel movements daily. He will use Pepto Bismol with relief. Uses Imodium about once daily. No mucositis.     He does not have erythema or tenderness to hands or feet but cannot see fingerprints.  He has some dryness on his feet and uses lotion every few days. No blisters.    He does have ongoing neuropathy of the feet, as well as sciatica mainly involving the left posterior  thigh. Neuropathy mainly bothers him at night.      Appetite remains good - he has gained 10 pounds.    Current Outpatient Medications   Medication Sig Dispense Refill     bismuth subsalicylate (PEPTO BISMOL) 262 MG/15ML suspension Take 15 mLs by mouth every 6 hours as needed for indigestion       capecitabine (XELODA) 500 MG tablet Take 4 tabs (2,000mg) by mouth in the morning and in the evening, daily. Take for 7 days on, then 7 days off. 112 tablet 0     lisinopril (ZESTRIL) 40 MG tablet Take 1 tablet (40 mg) by mouth daily Hold until patient calls for refill. 90 tablet 3     oxyCODONE (ROXICODONE) 5 MG tablet Take 1 tablet (5 mg) by mouth every 6 hours as needed for severe pain !!Do NOT take together with Flexeril!! 30 tablet 0     loperamide (IMODIUM A-D) 2 MG tablet Take 1 tablet (2 mg) by mouth 4 times daily as needed for diarrhea (Patient taking differently: Take 2 mg by mouth daily as needed for diarrhea) 60 tablet 1     Objective:  General: patient appears well in no acute distress, alert and oriented, speech clear and fluid  Skin: no visualized rash or lesions on visualized skin  Resp: Appears to be breathing comfortably without accessory muscle usage, speaking in full sentences, no audible wheezes or cough.  Psych: Coherent speech, normal rate and volume, able to articulate logical thoughts, able to abstract reason, no tangential thoughts, no hallucinations or delusions  Patient's affect is appropriate.    Laboratory Data:  Most Recent 3 CBC's:  Recent Labs   Lab Test 01/27/23  1625 12/27/22  1507 11/28/22  1539   WBC 5.5 5.2 6.3   HGB 13.1* 13.2* 13.2*   MCV 99 99 93   * 149* 151   ANEUTAUTO 3.6 3.2 4.3    Most Recent 3 BMP's:  Recent Labs   Lab Test 01/27/23  1625 12/27/22  1507 11/28/22  1539    139 139   POTASSIUM 4.1 4.4 4.3   CHLORIDE 100 103 104   CO2 28 29 24   BUN 17.2 16.2 19.7   CR 0.90 0.91 0.86   ANIONGAP 9 7 11   NAMAN 9.4 9.5 9.4   * 95 107*   PROTTOTAL 7.0 7.2 7.0    ALBUMIN 4.1 4.2 4.2    Most Recent 2 LFT's:  Recent Labs   Lab Test 01/27/23  1625 12/27/22  1507   AST 25 25   ALT 21 21   ALKPHOS 81 87   BILITOTAL 0.8 0.7   I reviewed the above labs today.    RADIOLOGY/IMAGING:  CT C/A/P 1/27/23  IMPRESSION:  1.  Similar-appearing mixed cystic and solid mass in the pelvis  adjacent to the sigmoid colon.  2.  Mild interval increase in size of previously seen peritoneal  implant in the right lower quadrant.  3.  Similar-appearing irregular pulmonary nodule in the left upper  lobe apex.  4.  Additional nonacute findings as above.       Assessment and Plan:  Appendiceal mucinous adenocarcinoma w/ signet ring features. Patient is currently on treatment with capecitabine at 2000 mg (4 tablets) bid for 7 days on, 7 days off. He is tolerating this well with some fatigue, grade 1 HFS, and grade 1 diarrhea.  His labs are stable. He is tolerating Xeloda well at current regimen. Most recent imaging demonstrates stability of pelvic mass status post palliative radiation therapy, with some interval growth of satellite nodule along right paracolic gutter (2.2 x 1.9 cm from 1.4 x 1.1 cm). Patient has done well post-radiation  - Referral to Dr. Castillo for consideration of radiation to growing nodule  - Referral to Dr. Donald for discussion of possible surgical management of satellite nodule.    Peripheral Neuropathy: largely stable over time, discussed medical management today. Can consider duloxetine or gabapentin if patient is interested in the future.     Diarrhea: Managed well with Pepto-bismol and Imodium PRN.     HFS: No redness or pain in the hands/feet, however patient has stated in the past that he does have some blisters on the feet for which he uses lotion. Previously recommended using thick emollients at least once daily to hands and feet.     Sciatica: Physical therapy referral placed today    The patient was seen and assessed by my attending, Dr. Bal, who agrees with the above  assessment and plan.    Melanie Blanc MD PGY4  Department of Radiation Oncology  [phone] [facility]  [phone] Pager

## 2023-02-03 NOTE — TELEPHONE ENCOUNTER
Writer reached out to [facility] today to discuss order of Capecitabine that was sent to the pharmacy yesterday. Spoke with Lea, who attempted to conference in the patient to set up delivery. She stated the next available time for delivery would be 2/17 but if they reach out today if could be set up for overnight. Writer also reached out to Thuan to have her call the pharmacy back to get it set up.     Harriet Vazquez, [facility] Center[facility] Pharmacy Liaison     [facility] [address] [address]  Office: [phone]  Fax: [phone]  [email or web address]

## 2023-02-17 NOTE — PLAN OF CARE
BP (!) 139/91 (BP Location: Right arm)   Pulse 87   Temp 99.1  F (37.3  C) (Oral)   Resp 18   Ht 1.829 m (6')   Wt 109.1 kg (240 lb 9.6 oz)   SpO2 93%   BMI 32.63 kg/m    Hypertensive but not within notifying parameters, all other VSS on RA. A&Ox4. Pain managed with scheduled tylenol. Denies nausea, tolerating regular diet well. Left PIV SL. Midline incision with staples, CDI & OSMAR w/abdominal binder in place. Scrotum w/+3 edema, unchanged per pt. Passing gas, awaiting post op BM. Offered PRN suppository but pt refused, willing to take later in evening if still no BM. Voiding spont, not saving. Up ad job. Pt able to successfully demonstrate & teach back Lovenox administration. Possible discharge tomorrow if ROBF, continue POC.    Transitional Care Management Telephone Call Attempt    Discharge Date: 2/14/23  Discharge Location: [facility]    Call Attempt Date: 2/17/2023  Call Attempt: Second     Requested call on 2/17, no answer left vm

## 2023-02-27 NOTE — NURSING NOTE
Is the patient currently in the state of [place]? YES    Visit mode:VIDEO    If the visit is dropped, the patient can be reconnected by: VIDEO VISIT: Text to cell phone: [phone]    Will anyone else be joining the visit? NO      How would you like to obtain your AVS? MyChart    Are changes needed to the allergy or medication list? NO    Reason for visit: return    Neisha CROWE

## 2023-02-27 NOTE — PROGRESS NOTES
Video-Visit Details    Type of service:  Video Visit    Video Start Time (time video started): 2:16 pm    Video End Time (time video stopped): 2:25 pm    Originating Location (pt. 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 He presented in November 2021 at the urging of family with eight months of abdominal pain. CT showed large, mucinous collections in R hemipelvis, prompting referral to surgery. Pt thought to have likely a low-grade mucinous neoplasm and underwent cytoreductive surgery, with incomplete (R2) resection. 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 He was initially treated with 2 cycles of XElOX in March-April 2022. Due to poor tolerability, oxaliplatin was held and capecitabine was dose reduced by 25%, starting in April 2022. His dose was then gradually increased over the summer 2022, back up to 2000 mg bid capecitabine 7 days on, 7 days off.  Please see previous notes for further details on the patient's history. He comes in today for routine follow up.    Interval History:   -Feeling okay overall.  -Tolerating Xeloda well, currently in middle of the week on.   -Has stool urgency with loose stools. Manages with Peptobismal. Has stools about every other day, up to 3 stools/day.   -Denies any mouth sores.   -Denies skin changes on hands or feet. Has chronically dry skin. Uses lotion as needed.   -Denies any nausea.  -Reports eating well.   -Reports doing fair drinking fluids. Has 3 cups of decaf  coffee and a glass of water per day.      Current Outpatient Medications   Medication Sig Dispense Refill     bismuth subsalicylate (PEPTO BISMOL) 262 MG/15ML suspension Take 15 mLs by mouth every 6 hours as needed for indigestion       capecitabine (XELODA) 500 MG tablet Take 4 tabs (2,000mg) by mouth in the morning and in the evening, daily. Take for 7 days on, then 7 days off. 112 tablet 0     lisinopril (ZESTRIL) 40 MG tablet Take 1 tablet (40 mg) by mouth daily Hold until patient calls for refill. 90 tablet 3     loperamide (IMODIUM A-D) 2 MG tablet Take 1 tablet (2 mg) by mouth 4 times daily as needed for diarrhea (Patient taking differently: Take 2 mg by mouth daily as needed for diarrhea) 60 tablet 1     oxyCODONE (ROXICODONE) 5 MG tablet Take 1 tablet (5 mg) by mouth every 6 hours as needed for severe pain !!Do NOT take together with Flexeril!! (Patient not taking: Reported on 2/27/2023) 30 tablet 0     Objective:  General: patient appears well in no acute distress, alert and oriented, speech clear and fluid  Skin: no visualized rash or lesions on visualized skin  Resp: Appears to be breathing comfortably without accessory muscle usage, speaking in full sentences, no audible wheezes or cough.  Psych: Coherent speech, normal rate and volume, able to articulate logical thoughts, able to abstract reason, no tangential thoughts, no hallucinations or delusions  Patient's affect is appropriate.    Laboratory Data:  Most Recent 3 CBC's:  Recent Labs   Lab Test 02/24/23  1535 01/27/23  1625 12/27/22  1507   WBC 5.1 5.5 5.2   HGB 12.9* 13.1* 13.2*   MCV 99 99 99   * 143* 149*   ANEUTAUTO 3.2 3.6 3.2    Most Recent 3 BMP's:  Recent Labs   Lab Test 02/24/23  1535 01/27/23  1625 12/27/22  1507    137 139   POTASSIUM 4.1 4.1 4.4   CHLORIDE 102 100 103   CO2 28 28 29   BUN 17.3 17.2 16.2   CR 0.99 0.90 0.91   ANIONGAP 7 9 7   NAMAN 9.6 9.4 9.5   GLC 91 105* 95   PROTTOTAL 7.4 7.0 7.2   ALBUMIN 4.3 4.1 4.2     Most Recent 2 LFT's:  Recent Labs   Lab Test 02/24/23  1535 01/27/23  1625   AST 20 25   ALT 14 21   ALKPHOS 79 81   BILITOTAL 0.8 0.8   I reviewed the above labs today.    Assessment and Plan:  Appendiceal mucinous adenocarcinoma w/ signet ring features. Patient is currently on treatment with capecitabine at 2000 mg (4 tablets) bid for 7 days on, 7 days off. He is tolerating this well with some fatigue, grade 1 HFS, and grade 1 diarrhea.  His labs are stable. He will see me back in 1 month. Next imaging is scheduled for the end of April.    Diarrhea: Managed well with Pepto and Imodium PRN.     HFS: Recommended using thick emollients at least once daily to hands and feet.     Low back pain: Referred to PT, but declines attending.     20 minutes spent on the date of the encounter doing chart review, review of test results, interpretation of tests, patient visit and documentation

## 2023-02-27 NOTE — LETTER
2/27/2023         RE: Rod Olson  [address]  [facility] [address]        Dear Colleague,    Thank you for referring your patient, Rod Olson, to the [facility]. Please see a copy of my visit note below.    Video-Visit Details    Type of service:  Video Visit    Video Start Time (time video started): 2:16 pm    Video End Time (time video stopped): 2:25 pm    Originating Location (pt. 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 He presented in November 2021 at the urging of family with eight months of abdominal pain. CT showed large, mucinous collections in R hemipelvis, prompting referral to surgery. Pt thought to have likely a low-grade mucinous neoplasm and underwent cytoreductive surgery, with incomplete (R2) resection. 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 He was initially treated with 2 cycles of XElOX in March-April 2022. Due to poor tolerability, oxaliplatin was held and capecitabine was dose reduced by 25%, starting in April 2022. His dose was then gradually increased over the summer 2022, back up to 2000 mg bid capecitabine 7 days on, 7 days off.  Please see previous notes for further details on the patient's history. He comes in today for routine follow up.    Interval History:   -Feeling okay overall.  -Tolerating Xeloda well, currently in middle of the week on.   -Has stool urgency with loose stools. Manages with Peptobismal. Has stools  about every other day, up to 3 stools/day.   -Denies any mouth sores.   -Denies skin changes on hands or feet. Has chronically dry skin. Uses lotion as needed.   -Denies any nausea.  -Reports eating well.   -Reports doing fair drinking fluids. Has 3 cups of decaf coffee and a glass of water per day.      Current Outpatient Medications   Medication Sig Dispense Refill     bismuth subsalicylate (PEPTO BISMOL) 262 MG/15ML suspension Take 15 mLs by mouth every 6 hours as needed for indigestion       capecitabine (XELODA) 500 MG tablet Take 4 tabs (2,000mg) by mouth in the morning and in the evening, daily. Take for 7 days on, then 7 days off. 112 tablet 0     lisinopril (ZESTRIL) 40 MG tablet Take 1 tablet (40 mg) by mouth daily Hold until patient calls for refill. 90 tablet 3     loperamide (IMODIUM A-D) 2 MG tablet Take 1 tablet (2 mg) by mouth 4 times daily as needed for diarrhea (Patient taking differently: Take 2 mg by mouth daily as needed for diarrhea) 60 tablet 1     oxyCODONE (ROXICODONE) 5 MG tablet Take 1 tablet (5 mg) by mouth every 6 hours as needed for severe pain !!Do NOT take together with Flexeril!! (Patient not taking: Reported on 2/27/2023) 30 tablet 0     Objective:  General: patient appears well in no acute distress, alert and oriented, speech clear and fluid  Skin: no visualized rash or lesions on visualized skin  Resp: Appears to be breathing comfortably without accessory muscle usage, speaking in full sentences, no audible wheezes or cough.  Psych: Coherent speech, normal rate and volume, able to articulate logical thoughts, able to abstract reason, no tangential thoughts, no hallucinations or delusions  Patient's affect is appropriate.    Laboratory Data:  Most Recent 3 CBC's:  Recent Labs   Lab Test 02/24/23  1535 01/27/23  1625 12/27/22  1507   WBC 5.1 5.5 5.2   HGB 12.9* 13.1* 13.2*   MCV 99 99 99   * 143* 149*   ANEUTAUTO 3.2 3.6 3.2    Most Recent 3 BMP's:  Recent Labs   Lab Test  02/24/23  1535 01/27/23  1625 12/27/22  1507    137 139   POTASSIUM 4.1 4.1 4.4   CHLORIDE 102 100 103   CO2 28 28 29   BUN 17.3 17.2 16.2   CR 0.99 0.90 0.91   ANIONGAP 7 9 7   NAMAN 9.6 9.4 9.5   GLC 91 105* 95   PROTTOTAL 7.4 7.0 7.2   ALBUMIN 4.3 4.1 4.2    Most Recent 2 LFT's:  Recent Labs   Lab Test 02/24/23  1535 01/27/23  1625   AST 20 25   ALT 14 21   ALKPHOS 79 81   BILITOTAL 0.8 0.8   I reviewed the above labs today.    Assessment and Plan:  Appendiceal mucinous adenocarcinoma w/ signet ring features. Patient is currently on treatment with capecitabine at 2000 mg (4 tablets) bid for 7 days on, 7 days off. He is tolerating this well with some fatigue, grade 1 HFS, and grade 1 diarrhea.  His labs are stable. He will see me back in 1 month. Next imaging is scheduled for the end of April.    Diarrhea: Managed well with Pepto and Imodium PRN.     HFS: Recommended using thick emollients at least once daily to hands and feet.     Low back pain: Referred to PT, but declines attending.     20 minutes spent on the date of the encounter doing chart review, review of test results, interpretation of tests, patient visit and documentation

## 2023-03-27 NOTE — PROGRESS NOTES
Virtual Visit Details    Type of service:  Video Visit   Video Start Time: 2:16 PM  Video End Time:2:22 PM    Originating Location (pt. 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 He presented in November 2021 at the urging of family with eight months of abdominal pain. CT showed large, mucinous collections in R hemipelvis, prompting referral to surgery. Pt thought to have likely a low-grade mucinous neoplasm and underwent cytoreductive surgery, with incomplete (R2) resection. 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 He was initially treated with 2 cycles of XElOX in March-April 2022. Due to poor tolerability, oxaliplatin was held and capecitabine was dose reduced by 25%, starting in April 2022. His dose was then gradually increased over the summer 2022, back up to 2000 mg bid capecitabine 7 days on, 7 days off.  Please see previous notes for further details on the patient's history. He comes in today for routine follow up.    Interval History:   -Started Xeloda this morning.   -Energy is fair. Naps about 3-4 hours/day, unchanged. Occasionally skips naps.   -Has bowel movements twice/day. Manages loose stools with Peptobismal.   -Denies any mouth sores.   -Denies skin changes on hands or feet. Still does not have fingerprints. Uses lotion as needed on feet as they have been dry.    -Denies any nausea.  -Reports eating well.   -Reports doing fair drinking fluids. Has 2 cups of decaf coffee, can of Diet Pepsi, and a glass of water  per day.    -Has occasional right sided abdominal pain, none currently.     Current Outpatient Medications   Medication Sig Dispense Refill     bismuth subsalicylate (PEPTO BISMOL) 262 MG/15ML suspension Take 15 mLs by mouth every 6 hours as needed for indigestion       capecitabine (XELODA) 500 MG tablet Take 4 tabs (2,000mg) by mouth in the morning and in the evening, daily. Take for 7 days on, then 7 days off. 112 tablet 0     capecitabine (XELODA) 500 MG tablet Take 4 tabs (2,000mg) by mouth in the morning and in the evening, daily. Take for 7 days on, then 7 days off. 112 tablet 0     lisinopril (ZESTRIL) 40 MG tablet Take 1 tablet (40 mg) by mouth daily Hold until patient calls for refill. 90 tablet 3     loperamide (IMODIUM A-D) 2 MG tablet Take 1 tablet (2 mg) by mouth 4 times daily as needed for diarrhea (Patient taking differently: Take 2 mg by mouth daily as needed for diarrhea) 60 tablet 1     oxyCODONE (ROXICODONE) 5 MG tablet Take 1 tablet (5 mg) by mouth every 6 hours as needed for severe pain !!Do NOT take together with Flexeril!! (Patient not taking: Reported on 2/27/2023) 30 tablet 0     Objective:  General: patient appears well in no acute distress, alert and oriented, speech clear and fluid  Skin: no visualized rash or lesions on visualized skin  Resp: Appears to be breathing comfortably without accessory muscle usage, speaking in full sentences, no audible wheezes or cough.  Psych: Coherent speech, normal rate and volume, able to articulate logical thoughts, able to abstract reason, no tangential thoughts, no hallucinations or delusions  Patient's affect is appropriate.    Laboratory Data:  Most Recent 3 CBC's:  Recent Labs   Lab Test 03/24/23  1523 02/24/23  1535 01/27/23  1625   WBC 5.5 5.1 5.5   HGB 13.6 12.9* 13.1*   MCV 99 99 99    149* 143*   ANEUTAUTO 3.6 3.2 3.6    Most Recent 3 BMP's:  Recent Labs   Lab Test 03/24/23  1523 02/24/23  1535 01/27/23  1625    137 137    POTASSIUM 3.9 4.1 4.1   CHLORIDE 103 102 100   CO2 25 28 28   BUN 16.0 17.3 17.2   CR 1.06 0.99 0.90   ANIONGAP 12 7 9   NAMAN 9.7 9.6 9.4   * 91 105*   PROTTOTAL 7.7 7.4 7.0   ALBUMIN 4.6 4.3 4.1    Most Recent 2 LFT's:  Recent Labs   Lab Test 03/24/23  1523 02/24/23  1535   AST 26 20   ALT 21 14   ALKPHOS 82 79   BILITOTAL 0.5 0.8   I reviewed the above labs today.    Assessment and Plan:  Appendiceal mucinous adenocarcinoma w/ signet ring features. Patient is currently on treatment with capecitabine at 2000 mg (4 tablets) bid for 7 days on, 7 days off. He is tolerating this well with some fatigue, grade 1 HFS, and grade 1 diarrhea.  His labs are stable. Next imaging is scheduled for the end of April. He will see Dr. Bal in early May to review this imaging. Of note, his CEA has been very gradually trending up over the last 3 months.     Diarrhea: Managed well with Peptobismal PRN.     HFS: Recommended using thick emollients at least once daily to hands and feet.     Loulou Rojas PA-C  [facility]  [address] [place] [address] [phone]       V-Y Plasty Text: The defect edges were debeveled with a #15 scalpel blade.  Given the location of the defect, shape of the defect and the proximity to free margins an V-Y advancement flap was deemed most appropriate.  Using a sterile surgical marker, an appropriate advancement flap was drawn incorporating the defect and placing the expected incisions within the relaxed skin tension lines where possible.    The area thus outlined was incised deep to adipose tissue with a #15 scalpel blade.  The skin margins were undermined to an appropriate distance in all directions utilizing iris scissors.

## 2023-03-27 NOTE — NURSING NOTE
Is the patient currently in the state of [place]? YES    Visit mode:VIDEO    If the visit is dropped, the patient can be reconnected by: VIDEO VISIT: Text to cell phone: [phone]    Will anyone else be joining the visit? NO      How would you like to obtain your AVS? MyChart    Are changes needed to the allergy or medication list? YES: pt reports taking iron/b-12 supllement occasionally    Reason for visit: follow up

## 2023-03-27 NOTE — LETTER
3/27/2023         RE: Rod Olson  [address]  [facility] [address]        Dear Colleague,    Thank you for referring your patient, Rod Olson, to the [facility]. Please see a copy of my visit note below.    Virtual Visit Details    Type of service:  Video Visit   Video Start Time: 2:16 PM  Video End Time:2:22 PM    Originating Location (pt. 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 He presented in November 2021 at the urging of family with eight months of abdominal pain. CT showed large, mucinous collections in R hemipelvis, prompting referral to surgery. Pt thought to have likely a low-grade mucinous neoplasm and underwent cytoreductive surgery, with incomplete (R2) resection. 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 He was initially treated with 2 cycles of XElOX in March-April 2022. Due to poor tolerability, oxaliplatin was held and capecitabine was dose reduced by 25%, starting in April 2022. His dose was then gradually increased over the summer 2022, back up to 2000 mg bid capecitabine 7 days on, 7 days off.  Please see previous notes for further details on the patient's history. He comes in today for routine follow up.    Interval History:   -Started Xeloda this morning.   -Energy is fair. Naps about 3-4 hours/day, unchanged. Occasionally skips naps.   -Has bowel movements twice/day. Manages loose stools with Peptobismal.   -Denies any mouth sores.   -Denies skin  changes on hands or feet. Still does not have fingerprints. Uses lotion as needed on feet as they have been dry.    -Denies any nausea.  -Reports eating well.   -Reports doing fair drinking fluids. Has 2 cups of decaf coffee, can of Diet Pepsi, and a glass of water per day.    -Has occasional right sided abdominal pain, none currently.     Current Outpatient Medications   Medication Sig Dispense Refill     bismuth subsalicylate (PEPTO BISMOL) 262 MG/15ML suspension Take 15 mLs by mouth every 6 hours as needed for indigestion       capecitabine (XELODA) 500 MG tablet Take 4 tabs (2,000mg) by mouth in the morning and in the evening, daily. Take for 7 days on, then 7 days off. 112 tablet 0     capecitabine (XELODA) 500 MG tablet Take 4 tabs (2,000mg) by mouth in the morning and in the evening, daily. Take for 7 days on, then 7 days off. 112 tablet 0     lisinopril (ZESTRIL) 40 MG tablet Take 1 tablet (40 mg) by mouth daily Hold until patient calls for refill. 90 tablet 3     loperamide (IMODIUM A-D) 2 MG tablet Take 1 tablet (2 mg) by mouth 4 times daily as needed for diarrhea (Patient taking differently: Take 2 mg by mouth daily as needed for diarrhea) 60 tablet 1     oxyCODONE (ROXICODONE) 5 MG tablet Take 1 tablet (5 mg) by mouth every 6 hours as needed for severe pain !!Do NOT take together with Flexeril!! (Patient not taking: Reported on 2/27/2023) 30 tablet 0     Objective:  General: patient appears well in no acute distress, alert and oriented, speech clear and fluid  Skin: no visualized rash or lesions on visualized skin  Resp: Appears to be breathing comfortably without accessory muscle usage, speaking in full sentences, no audible wheezes or cough.  Psych: Coherent speech, normal rate and volume, able to articulate logical thoughts, able to abstract reason, no tangential thoughts, no hallucinations or delusions  Patient's affect is appropriate.    Laboratory Data:  Most Recent 3 CBC's:  Recent Labs   Lab Test  03/24/23  1523 02/24/23  1535 01/27/23  1625   WBC 5.5 5.1 5.5   HGB 13.6 12.9* 13.1*   MCV 99 99 99    149* 143*   ANEUTAUTO 3.6 3.2 3.6    Most Recent 3 BMP's:  Recent Labs   Lab Test 03/24/23  1523 02/24/23  1535 01/27/23  1625    137 137   POTASSIUM 3.9 4.1 4.1   CHLORIDE 103 102 100   CO2 25 28 28   BUN 16.0 17.3 17.2   CR 1.06 0.99 0.90   ANIONGAP 12 7 9   NAMAN 9.7 9.6 9.4   * 91 105*   PROTTOTAL 7.7 7.4 7.0   ALBUMIN 4.6 4.3 4.1    Most Recent 2 LFT's:  Recent Labs   Lab Test 03/24/23  1523 02/24/23  1535   AST 26 20   ALT 21 14   ALKPHOS 82 79   BILITOTAL 0.5 0.8   I reviewed the above labs today.    Assessment and Plan:  Appendiceal mucinous adenocarcinoma w/ signet ring features. Patient is currently on treatment with capecitabine at 2000 mg (4 tablets) bid for 7 days on, 7 days off. He is tolerating this well with some fatigue, grade 1 HFS, and grade 1 diarrhea.  His labs are stable. Next imaging is scheduled for the end of April. He will see Dr. Bal in early May to review this imaging. Of note, his CEA has been very gradually trending up over the last 3 months.     Diarrhea: Managed well with Peptobismal PRN.     HFS: Recommended using thick emollients at least once daily to hands and feet.     Loulou Rojas PA-C  [facility]  [address] [place] [address] [phone]

## 2023-04-24 NOTE — TELEPHONE ENCOUNTER
Was suppose to start chemo today but since having diarrhea last week he would like to wait a week to start chemo.     Had diarrhea last week, felt weak.   This week feels as if he bouncing back, starting to want to do projects that he has not completed.     Doing better today, has had week off. Feels as if he needs to recover this week. Felt sleepy and less energy last week, but today he finally feels like he wants to do something.     Has cat bite that does not heal, does not feel like body is bouncing back like it should. No oozing or weeping has scabs on the bite. Says cat bites him when the cat wants to be fed. No warmth, or redness.     No diarrhea today, Had solid stool yesterday.   Feels like it takes him 3 hours to go to the bathroom, goes and then he walks around and has to go again about 3 times.   Normally he would be OK for part of the week and the other days he is having loose stools. But this week he never had chance to recover like normally would.   Feels as if it would bring/wear him down if he would take his chemotherapy this week. Would like an extra week of recovery time.     Weight 230lb. Stable and up a little bit.      Doing OK with eating and drinking.     1:19 message sent to Loulou Rojas as Dr Bal is out today.   1:33 Per Loulou Rojas OK to hold chemo for 1 week and then restart.     1:34 call placed to Bill to let him know that he can postpone his chemo this week and restart it next week. No further questions at this time.

## 2023-05-01 NOTE — NURSING NOTE
"Oncology Rooming Note    May 1, 2023 3:04 PM   Rod Olson is a 78 year old male who presents for:    Chief Complaint   Patient presents with     Oncology Clinic Visit     Colorectal CA     Initial Vitals: /82 (BP Location: Right arm, Patient Position: Sitting, Cuff Size: Adult Regular)   Pulse 92   Temp 98.1  F (36.7  C) (Oral)   Wt 108.9 kg (240 lb)   SpO2 96%   BMI 33.47 kg/m   Estimated body mass index is 33.47 kg/m  as calculated from the following:    Height as of 7/19/22: 1.803 m (5' 11\").    Weight as of this encounter: 108.9 kg (240 lb). Body surface area is 2.34 meters squared.  No Pain (0) Comment: Data Unavailable   No LMP for male patient.  Allergies reviewed: Yes  Medications reviewed: Yes    Medications: Medication refills not needed today.  Pharmacy name entered into [facility]:    [id number] - [place], [place] - [address] [id number] - [place], [place] - [address] PHARMACY [place], [place] - [address]  [facility] - [place], [place] - [address]    Clinical concerns: none.       Mo Hammer            "

## 2023-05-01 NOTE — LETTER
2023         RE: Rod Olson  [address]  [facility] [address]        Dear Colleague,    Thank you for referring your patient, Rod Olson, to the [facility]. Please see a copy of my visit note below.    [facility] - Medical Oncology Follow-Up Consult Note  2023    Patient Identifiers     Name: Rod Olson  Preferred Address: [place]  Preferred Language: English  : 1945  Gender: male    Assessment and Plan     Mr. 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    Patient continues to have oligo progressive disease, increasing the potential benefit of targeted therapy to satellite lesion.  We will ensure appropriate follow-up with Surgery and Radiation Oncology, as previously discussed.  Given patient's poor tolerance of standard dose capecitabine and CapeOx, it is unlikely that further infusional chemotherapy would be tolerated, limiting future systemic treatment options.    Given poor prognosis histology, and absence of other systemic disease, we must presume that capecitabine has effective in otherwise controlling disease.  Therefore we will continue treatment at current dose while we pursue local regional therapies as above.    Continue monthly follow-up with alternating virtual and in person visits.    Plan:  Appendiceal adenoCA  - cont capecitabine 7days on/off; pt may take an extra week off as needed for symptom recovery  - trend CBC, CMP, CEA  - c/s Emely Castillo and Shabana to consider definitive therapy of oligoprogressive disease to extend capecitabine effectiveness.    The patient and family asked numerous excellent questions which I answered to the best of my abilities. At the completion of our consultation, the patient and accompanying caregivers  had a strong understanding of the plan. They have our contact information for any further questions or concerns, and know to reach out for any urgent matters. Patient and family aware that they should call 911 for emergencies.       45 minutes spent on the date of the encounter doing chart review, review of test results, interpretation of tests, patient visit and documentation       Larry Bal MD, PhD   of Medicine  Division of Hematology, Oncology and Transplantation  [facility]    -----------------------------------    Oncology Summary     Cancer Staging   Malignant neoplasm of appendix (H)  Staging form: Appendix Carcinoma, AJCC 8th Edition  - Clinical stage from 12/7/2021: Stage IVC (cT4b, cN1c, pM1c, G3) - Signed by Larry Bal MD on 1/5/2022      Oncology History Overview Note   Pt in his USOH until November 2021 when he presented to care at the urging of family with eight months of abdominal pain. CT showed large, mucinous collections in R hemipelvis, prompting referral to surgery. Pt thought to have likely a low-grade mucinous neoplasm and underwent cytoreductive surgery, with incomplete (R2) resection. 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    On establishment with Med Onc, pt recommended to initiate FOLFOX therapy.      Malignant neoplasm of appendix (H)   12/7/2021 Initial Diagnosis    Malignant neoplasm of appendix (H)     12/7/2021 -  Cancer Staged    Staging form: Appendix Carcinoma, AJCC 8th Edition  - Clinical stage from 12/7/2021: Stage IVC (cT4b, cN1c, pM1c, G3)     3/23/2022 -  Chemotherapy    OP ONC Colorectal Cancer - Capecitabine  Plan Provider: Larry Bal MD  Treatment goal: Palliative  Line of treatment: First Line         Subjective/Interval Events     - pt has had to hold capecitabine for an extra week due to treatment intolerance;  otherwise symptoms unchanged from prior    Review of Systems: 14 point ROS negative other than the symptoms noted above in the HPI.    Physical Exam     Vital signs: /82 (BP Location: Right arm, Patient Position: Sitting, Cuff Size: Adult Regular)   Pulse 92   Temp 98.1  F (36.7  C) (Oral)   Wt 108.9 kg (240 lb)   SpO2 96%   BMI 33.47 kg/m      ECOG performance status:  1  Vascular access:  none    GENERAL: Well-nourished healthy-appearing man, sitting in chair, no acute distress.   HEENT: No icterus, no pallor. Moist mucous membranes.   LUNGS: Clear to ausculation bilaterally, normal work of breathing.   CARDIOVASCULAR: Regular rate and rhythm, no murmurs, gallops or rubs.   ABDOMEN: Soft, nontender and nondistended.  EXTREMITIES: No cyanosis, no clubbing, no edema.   NEUROLOGIC: No focal deficits, alert/ oriented  LYMPH NODE EXAM: No palpable adenopathy.    Objective Data     Lab data:  I have personally reviewed the lab data, notable for:    - hgb 12.9  - plt 143    Radiology data:  I have personally reviewed the radiology data, notable for:  04/27/23 CT C/A/P  IMPRESSION:  1.  No change in mixed cystic and solid mass centered in the pelvis  and abutting the sigmoid colon.  2.  Mild continued increase in size of the mixed cystic and solid  lesion in the right lateral abdominal mesentery.  3.  Stable irregular nodule in the medial left apex.  4.  Stable small soft tissue density nodule near the root of the  mesentery.  5.  1 mm nonobstructing stone in the mid left kidney.    Pathology and other data:  I have personally reviewed and interpreted the pathology data, notable for:    - no new data

## 2023-05-01 NOTE — PROGRESS NOTES
[facility] - Medical Oncology Follow-Up Consult Note  2023    Patient Identifiers     Name: Rod Olson  Preferred Address: [place]  Preferred Language: English  : 1945  Gender: male    Assessment and Plan     Mr. 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    Patient continues to have oligo progressive disease, increasing the potential benefit of targeted therapy to satellite lesion.  We will ensure appropriate follow-up with Surgery and Radiation Oncology, as previously discussed.  Given patient's poor tolerance of standard dose capecitabine and CapeOx, it is unlikely that further infusional chemotherapy would be tolerated, limiting future systemic treatment options.    Given poor prognosis histology, and absence of other systemic disease, we must presume that capecitabine has effective in otherwise controlling disease.  Therefore we will continue treatment at current dose while we pursue local regional therapies as above.    Continue monthly follow-up with alternating virtual and in person visits.    Plan:  Appendiceal adenoCA  - cont capecitabine 7days on/off; pt may take an extra week off as needed for symptom recovery  - trend CBC, CMP, CEA  - c/s Emely Castillo and Shabana to consider definitive therapy of oligoprogressive disease to extend capecitabine effectiveness.    The patient and family asked numerous excellent questions which I answered to the best of my abilities. At the completion of our consultation, the patient and accompanying caregivers had a strong understanding of the plan. They have our contact information for any further questions or concerns, and know to reach out for any urgent matters. Patient and family aware that they should call 911 for emergencies.       45 minutes spent on the date of  the encounter doing chart review, review of test results, interpretation of tests, patient visit and documentation       Larry Bal MD, PhD   of Medicine  Division of Hematology, Oncology and Transplantation  [facility]    -----------------------------------    Oncology Summary     Cancer Staging   Malignant neoplasm of appendix (H)  Staging form: Appendix Carcinoma, AJCC 8th Edition  - Clinical stage from 12/7/2021: Stage IVC (cT4b, cN1c, pM1c, G3) - Signed by Larry Bal MD on 1/5/2022      Oncology History Overview Note   Pt in his USOH until November 2021 when he presented to care at the urging of family with eight months of abdominal pain. CT showed large, mucinous collections in R hemipelvis, prompting referral to surgery. Pt thought to have likely a low-grade mucinous neoplasm and underwent cytoreductive surgery, with incomplete (R2) resection. 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    On establishment with Med Onc, pt recommended to initiate FOLFOX therapy.      Malignant neoplasm of appendix (H)   12/7/2021 Initial Diagnosis    Malignant neoplasm of appendix (H)     12/7/2021 -  Cancer Staged    Staging form: Appendix Carcinoma, AJCC 8th Edition  - Clinical stage from 12/7/2021: Stage IVC (cT4b, cN1c, pM1c, G3)     3/23/2022 -  Chemotherapy    OP ONC Colorectal Cancer - Capecitabine  Plan Provider: Larry Bal MD  Treatment goal: Palliative  Line of treatment: First Line         Subjective/Interval Events     - pt has had to hold capecitabine for an extra week due to treatment intolerance; otherwise symptoms unchanged from prior    Review of Systems: 14 point ROS negative other than the symptoms noted above in the HPI.    Physical Exam     Vital signs: /82 (BP Location: Right arm, Patient Position: Sitting, Cuff Size: Adult Regular)   Pulse 92    Temp 98.1  F (36.7  C) (Oral)   Wt 108.9 kg (240 lb)   SpO2 96%   BMI 33.47 kg/m      ECOG performance status:  1  Vascular access:  none    GENERAL: Well-nourished healthy-appearing man, sitting in chair, no acute distress.   HEENT: No icterus, no pallor. Moist mucous membranes.   LUNGS: Clear to ausculation bilaterally, normal work of breathing.   CARDIOVASCULAR: Regular rate and rhythm, no murmurs, gallops or rubs.   ABDOMEN: Soft, nontender and nondistended.  EXTREMITIES: No cyanosis, no clubbing, no edema.   NEUROLOGIC: No focal deficits, alert/ oriented  LYMPH NODE EXAM: No palpable adenopathy.    Objective Data     Lab data:  I have personally reviewed the lab data, notable for:    - hgb 12.9  - plt 143    Radiology data:  I have personally reviewed the radiology data, notable for:  04/27/23 CT C/A/P  IMPRESSION:  1.  No change in mixed cystic and solid mass centered in the pelvis  and abutting the sigmoid colon.  2.  Mild continued increase in size of the mixed cystic and solid  lesion in the right lateral abdominal mesentery.  3.  Stable irregular nodule in the medial left apex.  4.  Stable small soft tissue density nodule near the root of the  mesentery.  5.  1 mm nonobstructing stone in the mid left kidney.    Pathology and other data:  I have personally reviewed and interpreted the pathology data, notable for:    - no new data

## 2023-05-08 NOTE — TUMOR CONFERENCE
Tumor Conference Information  Tumor Conference: Colorectal  Specialties Present: Medical oncology, Radiation oncology, Pathology, Radiology, Surgery  Patient Status: Prospective  Stage: metastatic high grade appendiceal mucinous adenoCA w/ signet ring features  Treatment to Date: Chemotherapy, Radiation  Clinical Trials: Not discussed  Genetic Testing Discussed/Recommended?: No  Supportive Care Services Discussed/Recommended?: No  Recommended Plan: Follows evidence-based guidelines (Comment: cytoreduction not recommended by colo/rectal)  Did the review exceed 30 minutes?: did not           Documentation / Disclaimer Cancer Tumor Board Note  Cancer tumor board recommendations do not override what is determined to be reasonable care and treatment, which is dependent on the circumstances of a patient's case; the patient's medical, social, and personal concerns; and the clinical judgment of the oncologist [physician].

## 2023-06-12 NOTE — PROGRESS NOTES
[facility] - Medical Oncology TeleHealth Consult Note  2023    Patient Identifiers     Name: Rod Olson  Preferred Address: [place]  Preferred Language: English  : 1945  Gender: male    Assessment and Plan     Mr. 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    NGS: pending  Immuno: pending    Patient returns for routine follow-up of mucinous adenocarcinoma with signet cell features on first-line Xeloda.  He continues to tolerate treatment reasonably well, though has continued to require significant dose reduction and treatment breaks for tolerance.  Given limited options as previously outlined, we have no plans for treatment change at this time.  We will continue to follow-up with Dr. Castillo's office and radiation oncology to determine if there may be locoregional treatment options for patient's RLE peritoneal nodule.  If XRT is not an option, we may consider infusional treatment options as these may be both more effective and better tolerated than the Xeloda.    Patient to return to clinic in 1 month with labs prior.  We will also follow-up Rad Onc recommendations.    Plan:  Mucinous adenoCA  - cont capecitabine as tolerated  - c/s Dr. Castillo re: locoregional therapy  - trend CBC, CMP, CEA  - Caris NGS testing   - monitor fatigue, cytopenias, GI tox  - RTC in 1mo w/ labs prior    The patient and family asked numerous excellent questions which I answered to the best of my abilities. At the completion of our consultation, the patient and accompanying caregivers had a strong understanding of the plan. They have our contact information for any further questions or concerns, and know to reach out for any urgent matters. Patient and family aware that they should call 911 for emergencies.       45 minutes spent on the  date of the encounter doing chart review, review of test results, interpretation of tests, patient visit and documentation       Larry Bal MD, PhD   of Medicine  Division of Hematology, Oncology and Transplantation  [facility]    -----------------------------------    Oncology Summary     Cancer Staging   Malignant neoplasm of appendix (H)  Staging form: Appendix Carcinoma, AJCC 8th Edition  - Clinical stage from 12/7/2021: Stage IVC (cT4b, cN1c, pM1c, G3) - Signed by Larry Bal MD on 1/5/2022      Oncology History Overview Note   Pt in his USOH until November 2021 when he presented to care at the urging of family with eight months of abdominal pain. CT showed large, mucinous collections in R hemipelvis, prompting referral to surgery. Pt thought to have likely a low-grade mucinous neoplasm and underwent cytoreductive surgery, with incomplete (R2) resection. 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    On establishment with Med Onc, pt recommended to initiate FOLFOX therapy.      Malignant neoplasm of appendix (H)   12/7/2021 Initial Diagnosis    Malignant neoplasm of appendix (H)     12/7/2021 -  Cancer Staged    Staging form: Appendix Carcinoma, AJCC 8th Edition  - Clinical stage from 12/7/2021: Stage IVC (cT4b, cN1c, pM1c, G3)     3/23/2022 -  Chemotherapy    OP ONC Colorectal Cancer - Capecitabine  Plan Provider: Larry Bal MD  Treatment goal: Palliative  Line of treatment: First Line         Subjective/Interval Events     - pt noted burning pain with eating, which prompted another weeklong break from treatment  - no nausea, vomiting, diarrhea, or constipation  - no significant pain    Review of Systems: 14 point ROS negative other than the symptoms noted above in the HPI.      Objective Data     Lab data:  I have personally reviewed the lab data, notable for:     - hgb12.8  - no other notables  - CEA 6.8    Radiology data:  I have personally reviewed the radiology data, notable for:  - no new data    Pathology and other data:  I have personally reviewed and interpreted the pathology data, notable for:    - no new data    Billing Stuff     Virtual Visit Details    Type of service:  Video Visit   Video Start Time: 2:30 PM  Video End Time:2:58 PM    Originating Location (pt. Location): Home    Distant Location (provider location):  On-site  Platform used for Video Visit: Kyrie

## 2023-06-12 NOTE — LETTER
2023         RE: Rod Olson  [address]  [facility] [address]        Dear Colleague,    Thank you for referring your patient, Rod Olson, to the [facility]. Please see a copy of my visit note below.    [facility] - Medical Oncology TeleHealth Consult Note  2023    Patient Identifiers     Name: Rod Olson  Preferred Address: [place]  Preferred Language: English  : 1945  Gender: male    Assessment and Plan     Mr. 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    NGS: pending  Immuno: pending    Patient returns for routine follow-up of mucinous adenocarcinoma with signet cell features on first-line Xeloda.  He continues to tolerate treatment reasonably well, though has continued to require significant dose reduction and treatment breaks for tolerance.  Given limited options as previously outlined, we have no plans for treatment change at this time.  We will continue to follow-up with Dr. Castillo's office and radiation oncology to determine if there may be locoregional treatment options for patient's RLE peritoneal nodule.  If XRT is not an option, we may consider infusional treatment options as these may be both more effective and better tolerated than the Xeloda.    Patient to return to clinic in 1 month with labs prior.  We will also follow-up Rad Onc recommendations.    Plan:  Mucinous adenoCA  - cont capecitabine as tolerated  - c/s Dr. Castillo re: locoregional therapy  - trend CBC, CMP, CEA  - Caris NGS testing   - monitor fatigue, cytopenias, GI tox  - RTC in 1mo w/ labs prior    The patient and family asked numerous excellent questions which I answered to the best of my abilities. At the completion of our consultation, the patient and accompanying  caregivers had a strong understanding of the plan. They have our contact information for any further questions or concerns, and know to reach out for any urgent matters. Patient and family aware that they should call 911 for emergencies.       45 minutes spent on the date of the encounter doing chart review, review of test results, interpretation of tests, patient visit and documentation       Larry Bal MD, PhD   of Medicine  Division of Hematology, Oncology and Transplantation  [facility]    -----------------------------------    Oncology Summary     Cancer Staging   Malignant neoplasm of appendix (H)  Staging form: Appendix Carcinoma, AJCC 8th Edition  - Clinical stage from 12/7/2021: Stage IVC (cT4b, cN1c, pM1c, G3) - Signed by Larry Bal MD on 1/5/2022      Oncology History Overview Note   Pt in his USOH until November 2021 when he presented to care at the urging of family with eight months of abdominal pain. CT showed large, mucinous collections in R hemipelvis, prompting referral to surgery. Pt thought to have likely a low-grade mucinous neoplasm and underwent cytoreductive surgery, with incomplete (R2) resection. 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    On establishment with Med Onc, pt recommended to initiate FOLFOX therapy.      Malignant neoplasm of appendix (H)   12/7/2021 Initial Diagnosis    Malignant neoplasm of appendix (H)     12/7/2021 -  Cancer Staged    Staging form: Appendix Carcinoma, AJCC 8th Edition  - Clinical stage from 12/7/2021: Stage IVC (cT4b, cN1c, pM1c, G3)     3/23/2022 -  Chemotherapy    OP ONC Colorectal Cancer - Capecitabine  Plan Provider: Larry Bal MD  Treatment goal: Palliative  Line of treatment: First Line         Subjective/Interval Events     - pt noted burning pain with eating, which prompted another weeklong break  from treatment  - no nausea, vomiting, diarrhea, or constipation  - no significant pain    Review of Systems: 14 point ROS negative other than the symptoms noted above in the HPI.      Objective Data     Lab data:  I have personally reviewed the lab data, notable for:    - hgb12.8  - no other notables  - CEA 6.8    Radiology data:  I have personally reviewed the radiology data, notable for:  - no new data    Pathology and other data:  I have personally reviewed and interpreted the pathology data, notable for:    - no new data    Billing Stuff     Virtual Visit Details    Type of service:  Video Visit   Video Start Time: 2:30 PM  Video End Time:2:58 PM    Originating Location (pt. Location): Home    Distant Location (provider location):  On-site  Platform used for Video Visit: Kyrie Bal MD

## 2023-06-22 NOTE — TELEPHONE ENCOUNTER
06/22/23 10:30: Writer received an e-mail from Jennifer Valladares RN case manager with [facility] requesting the Initial PT evaluation note from 6/20/2023 and confirming patient's next office visit in [facility]    Writer e-mailed Jennifer Valladares, via encrypted e-mail confirmation to e-mail [email or web address], that patient's next visit with GLORIA Gomez is 6/30/23 at 1300. PT notes as requested faxed to Jennifer at fax [phone].       Oral Chemotherapy Monitoring Program    Subjective/Objective:  Rod Olson is a 77 year old male contacted by phone for a follow-up visit for oral chemotherapy.  I spoke today with Rod's daughter Thuan to discuss how his most recent cycle has been going and where he is currently with the capecitabine (Xeloda) in regards to refills. When asked about side effects, she mentioned that Rod has been experiencing more fatigue lately with radiation, and diarrhea which is managed with diet and loperamide when needed. She did not report that Rod was taking any new medications and he has been adherent with the dose and regimen. The last cycle start date was 10/14/22 (Friday), and currently they have rescheduled an appointment for both CT/Labs for this coming Friday due to Eowyn not being able to provide transportation for [place] today. Lastly, Eowyn reports that Rod has been staying hydrated and has been receiving adequate nutrition.    ORAL CHEMOTHERAPY 8/10/2022 8/15/2022 8/22/2022 8/22/2022 9/15/2022 10/3/2022 10/19/2022   Assessment Type Left Voicemail Monthly Follow up Refill Lab Monitoring Refill Refill Monthly Follow up   Diagnosis Code Appendix Cancer Appendix Cancer Appendix Cancer Appendix Cancer Appendix Cancer Appendix Cancer Appendix Cancer   Providers Dr. Valeriano Bal   [facility]   Drug Name Xeloda (capecitabine) Xeloda (capecitabine) Xeloda (capecitabine) Xeloda (capecitabine) Xeloda (capecitabine) Xeloda (capecitabine) Xeloda (capecitabine)   Dose Other: Other: Other: Other: Other: 2,000 mg 2,000 mg   Current Schedule BID BID BID BID BID BID BID   Cycle Details 2 weeks on, 1 week off 2 weeks on, 1 week off 1 week on, 1 week off 1 week on, 1 week off 1 week on, 1 week off 1 week on, 1 week off 1 week on, 1 week off   Start Date of Last Cycle - - - - - -  "10/14/2022   Planned next cycle start date - - - - 9/17/2022 - 10/28/2022   Doses missed in last 2 weeks - 0 - - - - 0   Adherence Assessment - Adherent - - - - Adherent   Adverse Effects - No AE identified during assessment - - - - Diarrhea;Fatigue   Nausea - - - - - - Grade 1   Pharmacist Intervention(nausea) - - - - - - Yes   Intervention(s) - - - - - - Patient education;OTC recommendation   Diarrhea - - - - - - -   Pharmacist Intervention(diarrhea) - - - - - - -   Intervention(s) - - - - - - -   Headache - - - - - - -   Pharmacist Intervention(headache) - - - - - - -   Intervention(s) - - - - - - -   Fatigue - - - - - - Grade 1   Pharmacist Intervention(fatigue) - - - - - - Yes   Intervention(s) - - - - - - Patient education   Any new drug interactions? - - - - - - No   Pharmacist Intervention? - - - - - - No   Intervention(s) - - - - - - -   Is the dose as ordered appropriate for the patient? - - - - - - Yes       Last PHQ-2 Score on record:   PHQ-2 ( 1999 [employer]) 7/19/2022 1/14/2022   Q1: Little interest or pleasure in doing things 0 0   Q2: Feeling down, depressed or hopeless 0 0   PHQ-2 Score 0 0   Q1: Little interest or pleasure in doing things - -   Q2: Feeling down, depressed or hopeless - -   PHQ-2 Score - -       Vitals:  BP:   BP Readings from Last 1 Encounters:   10/11/22 128/81     Wt Readings from Last 1 Encounters:   10/11/22 100.7 kg (222 lb)     Estimated body surface area is 2.25 meters squared as calculated from the following:    Height as of 7/19/22: 1.803 m (5' 11\").    Weight as of 10/11/22: 100.7 kg (222 lb).    Labs:  No results found for NA within last 30 days.     No results found for K within last 30 days.     No results found for CA within last 30 days.     No results found for Mag within last 30 days.     No results found for Phos within last 30 days.     No results found for ALBUMIN within last 30 days.     No results found for BUN within last 30 days.     No results found for CR " within last 30 days.     No results found for AST within last 30 days.     No results found for ALT within last 30 days.     No results found for BILITOTAL within last 30 days.     No results found for WBC within last 30 days.     No results found for HGB within last 30 days.     No results found for PLT within last 30 days.     No results found for ANC within last 30 days.     No results found for ANC within last 30 days.          Assessment/Plan:  Because there are no immediate concerns regarding Rod's capecitabine (Xeloda), he should continue taking it as scheduled. It seems like there was a hiccup today resulting in Rod having to reschedule labs/CT for this coming Friday on 10/21, and Eowyn informed me that they will schedule an appointment with Dr. Bal going forward here soon.    Follow-Up:  10/21/22 Lab appointment    Refill Due:  10/21/22    Dex Ross  Pharmacy Intern  Oral Chemotherapy Monitoring Program  [facility]  [phone]

## 2023-07-10 NOTE — NURSING NOTE
"Oncology Rooming Note    July 10, 2023 2:56 PM   Rod Olson is a 78 year old male who presents for:    Chief Complaint   Patient presents with     Oncology Clinic Visit     Malignant neoplasm of appendix      Initial Vitals: /75 (BP Location: Left arm, Patient Position: Sitting, Cuff Size: Adult Regular)   Pulse 81   Temp 97.8  F (36.6  C) (Oral)   Wt 105.9 kg (233 lb 6.4 oz)   SpO2 98%   BMI 32.55 kg/m   Estimated body mass index is 32.55 kg/m  as calculated from the following:    Height as of 7/19/22: 1.803 m (5' 11\").    Weight as of this encounter: 105.9 kg (233 lb 6.4 oz). Body surface area is 2.3 meters squared.  Moderate Pain (5) Comment: on & off, thinking not related. Previous surgery possibly.   No LMP for male patient.  Allergies reviewed: Yes  Medications reviewed: Yes    Medications: Medication refills not needed today.  Pharmacy name entered into [facility]:    [id number] - [facility] [address] AT [facility][id number] - [place], [place] - [address] AT [address]  [facility] - [place], [place] - [address]    Clinical concerns: Pt presents today for f/u visit.      Leigh Heard, EMT  7/10/2023            "

## 2023-07-10 NOTE — PROGRESS NOTES
[facility] - Medical Oncology Follow-Up Consult Note  7/10/2023    Patient Identifiers     Name: Rod Olson  Preferred Address: [place]  Preferred Language: English  : 1945  Gender: male    Assessment and Plan     Mr. 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     Patient returns for counseling follow-up after tumor board discussion of metastatic appendiceal mucinous adenocarcinoma.  Patient is unfortunately not a candidate for any further surgical intervention.  Therefore, we will reach out to Dr. Castillo's office to determine if he may be interested in providing further palliative radiotherapy.  Given the location of persistent tumor, we suspect that the likelihood of success for locoregional therapies will be low.    Regarding systemic treatments, we will consider infusional chemotherapy with FOLFOX if patient is interested in further aggressive treatment.  This would be provided through temporary PICC placement to determine whether patient is able to tolerate this  regimen.  Another option would be consideration of either Lonsurf or Stivarga.  The low success rate of these medications, along with patient's poor tolerance of prior Xeloda, makes it challenging to assess risk versus benefit.  Another option would be consideration of clinical trial, especially with HCW 9218.  Patient is in reasonably good health and may be a candidate for this treatment.  Final option, which we currently recommend, is to consider no further systemic treatment to the tumor with a focus on palliative therapies.    Patient to consider all of the above options, and return to clinic in 2 weeks with CT scans prior.  Based on those CTs, the discussion with Dr. Castillo's office, and patient's discussion with family we will determine future treatment at that  time.    45 minutes spent on the date of the encounter doing chart review, review of test results, interpretation of tests, patient visit and documentation     Larry Bal MD, PhD   of Medicine  Division of Hematology, Oncology and Transplantation  [facility]    -----------------------------------    Oncology Summary     Cancer Staging   Malignant neoplasm of appendix (H)  Staging form: Appendix Carcinoma, AJCC 8th Edition  - Clinical stage from 12/7/2021: Stage IVC (cT4b, cN1c, pM1c, G3) - Signed by Larry Bal MD on 1/5/2022      Oncology History Overview Note   Pt in his USOH until November 2021 when he presented to care at the urging of family with eight months of abdominal pain. CT showed large, mucinous collections in R hemipelvis, prompting referral to surgery. Pt thought to have likely a low-grade mucinous neoplasm and underwent cytoreductive surgery, with incomplete (R2) resection. 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    On establishment with Med Onc, pt recommended to initiate FOLFOX therapy.      Malignant neoplasm of appendix (H)   12/7/2021 Initial Diagnosis    Malignant neoplasm of appendix (H)     12/7/2021 -  Cancer Staged    Staging form: Appendix Carcinoma, AJCC 8th Edition  - Clinical stage from 12/7/2021: Stage IVC (cT4b, cN1c, pM1c, G3)     3/23/2022 -  Chemotherapy    OP ONC Colorectal Cancer - Capecitabine  Plan Provider: Larry Bal MD  Treatment goal: Palliative  Line of treatment: First Line         Subjective/Interval Events     - pt has been off the Xeloda for the last 6 weeks  - remain slightly fatigued, but not nearly as fatigued since Xeloda cessation  - continues to get heavy cramps before he goes. No longer having diarrhea, but now having some constipation. Taking prunes with resolution.   - Variable activity. Yesterday was doing  some woodworking; moving 20lb objects without significant difficulty. Able to lift grandchildren.    Physical Exam     Vital signs: /75 (BP Location: Left arm, Patient Position: Sitting, Cuff Size: Adult Regular)   Pulse 81   Temp 97.8  F (36.6  C) (Oral)   Wt 105.9 kg (233 lb 6.4 oz)   SpO2 98%   BMI 32.55 kg/m      ECOG performance status:  1  Vascular access:  none    Physical Exam:  Exam performed, notable for:  - no pain on palpation of abdomen; no other notables    Objective Data     Lab data:  I have personally reviewed the lab data, notable for:    - no new data    Radiology data:  I have personally reviewed the radiology data, notable for:  - no new data    Pathology and other data:  I have personally reviewed and interpreted the pathology data, notable for:    - no new data

## 2023-07-10 NOTE — LETTER
7/10/2023         RE: Rod Olson  [address]  [place] [facility] [address]        Dear Colleague,    Thank you for referring your patient, Rod Olson, to the [facility]. Please see a copy of my visit note below.    [facility] - Medical Oncology Follow-Up Consult Note  7/10/2023    Patient Identifiers     Name: Rod Olson  Preferred Address: [place]  Preferred Language: English  : 1945  Gender: male    Assessment and Plan     Mr. 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     Patient returns for counseling follow-up after tumor board discussion of metastatic appendiceal mucinous adenocarcinoma.  Patient is unfortunately not a candidate for any further surgical intervention.  Therefore, we will reach out to Dr. Castillo's office to determine if he may be interested in providing further palliative radiotherapy.  Given the location of persistent tumor, we suspect that the likelihood of success for locoregional therapies will be low.    Regarding systemic treatments, we will consider infusional chemotherapy with FOLFOX if patient is interested in further aggressive treatment.  This would be provided through temporary PICC placement to determine whether patient is able to tolerate this  regimen.  Another option would be consideration of either Lonsurf or Stivarga.  The low success rate of these medications, along with patient's poor tolerance of prior Xeloda, makes it challenging to assess risk versus benefit.  Another option would be consideration of clinical trial, especially with HCW 9218.  Patient is in reasonably good health and may be a candidate for this treatment.  Final option, which we currently recommend, is to consider no further systemic treatment to the tumor with a focus on  palliative therapies.    Patient to consider all of the above options, and return to clinic in 2 weeks with CT scans prior.  Based on those CTs, the discussion with Dr. Castillo's office, and patient's discussion with family we will determine future treatment at that time.    45 minutes spent on the date of the encounter doing chart review, review of test results, interpretation of tests, patient visit and documentation     Larry Bal MD, PhD   of Medicine  Division of Hematology, Oncology and Transplantation  [facility]    -----------------------------------    Oncology Summary     Cancer Staging   Malignant neoplasm of appendix (H)  Staging form: Appendix Carcinoma, AJCC 8th Edition  - Clinical stage from 12/7/2021: Stage IVC (cT4b, cN1c, pM1c, G3) - Signed by Larry Bal MD on 1/5/2022      Oncology History Overview Note   Pt in his USOH until November 2021 when he presented to care at the urging of family with eight months of abdominal pain. CT showed large, mucinous collections in R hemipelvis, prompting referral to surgery. Pt thought to have likely a low-grade mucinous neoplasm and underwent cytoreductive surgery, with incomplete (R2) resection. 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    On establishment with Med Onc, pt recommended to initiate FOLFOX therapy.      Malignant neoplasm of appendix (H)   12/7/2021 Initial Diagnosis    Malignant neoplasm of appendix (H)     12/7/2021 -  Cancer Staged    Staging form: Appendix Carcinoma, AJCC 8th Edition  - Clinical stage from 12/7/2021: Stage IVC (cT4b, cN1c, pM1c, G3)     3/23/2022 -  Chemotherapy    OP ONC Colorectal Cancer - Capecitabine  Plan Provider: Larry Bal MD  Treatment goal: Palliative  Line of treatment: First Line         Subjective/Interval Events     - pt has been off the Xeloda for the last 6  weeks  - remain slightly fatigued, but not nearly as fatigued since Xeloda cessation  - continues to get heavy cramps before he goes. No longer having diarrhea, but now having some constipation. Taking prunes with resolution.   - Variable activity. Yesterday was doing some woodworking; moving 20lb objects without significant difficulty. Able to lift grandchildren.    Physical Exam     Vital signs: /75 (BP Location: Left arm, Patient Position: Sitting, Cuff Size: Adult Regular)   Pulse 81   Temp 97.8  F (36.6  C) (Oral)   Wt 105.9 kg (233 lb 6.4 oz)   SpO2 98%   BMI 32.55 kg/m      ECOG performance status:  1  Vascular access:  none    Physical Exam:  Exam performed, notable for:  - no pain on palpation of abdomen; no other notables    Objective Data     Lab data:  I have personally reviewed the lab data, notable for:    - no new data    Radiology data:  I have personally reviewed the radiology data, notable for:  - no new data    Pathology and other data:  I have personally reviewed and interpreted the pathology data, notable for:    - no new data        Larry Bal MD

## 2023-07-17 NOTE — PROGRESS NOTES
Department of Radiation Oncology  Radiation Therapy Center  [facility]  [address], [address]  [place] [address]  [phone]       Consultation Note    Name: Rod Olson MRN: [id number]   : 1945   Date of Service: 2023  Referring: Dr. Bal     Reason for consultation: Metastatic mucinous adenocarcinoma of the appendix.  Evaluate role for palliative radiation therapy.    History of Present Illness   Mr. Olson is a 78 year old male a diagnosis of metastatic mucinous adenocarcinoma of the appendix.  Evaluate role for palliative radiation therapy.     Patient initially presented in 2021 to the urgent care with symptoms of abdominal pain.  CT scan demonstrated large mucinous collections in the right hemipelvis prompting evaluation elation with surgical team.  Patient underwent surgery on 2021 under the care of Dr. Donald.  He underwent right hemicolectomy and mesenteric and retroperitoneal mass excisions.  Pathology demonstrated mucinous adenocarcinoma with high-grade features including signet ring cells arising from the appendix.  The tumor was involving the mesentery and retroperitoneum with invasion of the terminal ileum and cecum.  Maximum tumor size was 20.8 cm in size.  Patient reportedly underwent R2 resection.  The final stage was [id number].  The patient was subsequently treated with systemic treatment under the care of Dr. Bal.  He was treated with oxaliplatin and capecitabine.    The patient was noted to have a progressive abdominal pelvic lesion measuring up to 11.5 cm in size.  He subsequently completed palliative radiation therapy, completing a total dose of 30 Gray in 10 fractions on 9/15/2022.  The patient has continued on systemic treatment thereafter under the care of Dr. Bal.  He has received oral capecitabine.  Due to toxicities however this was discontinued since May 2023.  Last restaging scan on 2023  demonstrated stable treated prior pelvic mass.  It however demonstrated increase in size of the right lateral abdominal mesenteric mass measuring up to 2.5 cm in size.  He is due for restaging scans, currently scheduled for 7/24/2023.  Patient presents today discuss the potential role of radiation therapy as a part of treatment strategy.    Today, the patient states he is overall doing okay. Has mild right lower abdominal pain at times. No blood in stools. Tolerating p.o. and eating well.   No pacemaker.    Past Medical History:   Past Medical History:   Diagnosis Date     Former smoker      Hypertension      Mucinous cystadenoma of appendix      Obese      Obesity (BMI 30.0-34.9)        Past Surgical History:   Past Surgical History:   Procedure Laterality Date     COLECTOMY RIGHT N/A 12/7/2021    Procedure: Right Hemicolectomy open;  Surgeon: Peng Donald MD;  Location: [facility] OR     COLONOSCOPY N/A 12/3/2021    Procedure: COLONOSCOPY;  Surgeon: Gia Victoria MD;  Location: [facility] GI     EXPLORE GROIN N/A 1/27/2022    Procedure: Evacuation Scrotal Hematoma;  Surgeon: Darnell Lopez MD;  Location: [facility] OR     HERNIORRHAPHY INGUINAL Right 1/18/2022    Procedure: OPEN  transinguinaL  HYDROCELE REPAIR.;  Surgeon: Darnell Lopez MD;  Location: [facility] OR     LAPAROTOMY EXPLORATORY N/A 12/7/2021    Procedure: EXPLORATORY LAPAROTOMY;  Surgeon: Peng Donald MD;  Location: [facility] OR       Chemotherapy History:  Per HPI    Radiation History:  1. 9/1/22 to 9/15/22: Abdomen/pelvis, 3000 cGy in 10 fractions    Pregnant: Not Applicable  Implanted Cardiac Devices: No    Medications:  Current Outpatient Medications   Medication     bismuth subsalicylate (PEPTO BISMOL) 262 MG/15ML suspension     capecitabine (XELODA) 500 MG tablet     lisinopril (ZESTRIL) 40 MG tablet     loperamide (IMODIUM A-D) 2 MG tablet     oxyCODONE (ROXICODONE) 5 MG tablet     No current facility-administered medications for this visit.          Allergies:     Allergies   Allergen Reactions     Clindamycin Other (See Comments)     PN: psychotic       Family History:  Family History   Problem Relation Age of Onset     Cataracts Mother      Hypertension Father      Prostate Cancer Brother      Lung Cancer Brother      Myocardial Infarction Brother        Review of Systems   A 10-point review of systems was performed. Pertinent findings are noted in the HPI.    Physical Exam   ECOG Status: 1    Vitals:  /77 (BP Location: Left arm, Patient Position: Chair, Cuff Size: Adult Large)   Pulse 76   Resp 16   Wt 105.8 kg (233 lb 3.2 oz)   SpO2 98%   BMI 32.52 kg/m      Gen: Alert, in NAD  Neck: Full ROM, supple, no palpable adenopathy  Pulm: No wheezing, stridor or respiratory distress  CV: Extremities are warm and well-perfused, no cyanosis, no pedal edema  Abdominal: Normal bowel sounds, soft, nontender, no masses  Musculoskeletal: Normal bulk and tone  Skin: Normal color and turgor  Neuro: A/Ox3, CN II-XII intact, normal gait    Imaging/Path/Labs   Imagin23  CTCAP  IMPRESSION:  1.  No change in mixed cystic and solid mass centered in the pelvis  and abutting the sigmoid colon.  2.  Mild continued increase in size of the mixed cystic and solid  lesion in the right lateral abdominal mesentery.  3.  Stable irregular nodule in the medial left apex.  4.  Stable small soft tissue density nodule near the root of the  mesentery.  5.  1 mm nonobstructing stone in the mid left kidney.       Path:   Per HPI    Assessment    Mr. Olson is a 78 year old male with a diagnosis of metastatic mucinous adenocarcinoma of the appendix.  The patient has a progressive right lateral abdominal mesenteric mass.  He presents to discuss potential role of radiation therapy as a part of treatment strategy.    Plan   1. I re-discussed the natural history of metastatic appendiceal cancer.    2. Overall, the patient has done fairly well with regard to his disease  course.  The patient has had prior cytoreductive reductive surgery, systemic treatment, and prior pelvic radiation therapy.  Previous area of pelvic radiation therapy has controlled local tumor burden.  The patient had continued on systemic therapy, but this has been held due to side effects.  Most recent scans demonstrated progression locally in the right lateral abdominal mesenteric mass.    3.  The patient will undergo restaging scans on 7/24/2023.  We will wait for staging scans before making final treatment decisions.    4.  However, I did  discussed the potential possibility of considering another course of radiation therapy to treat the right lateral abdominal mesenteric mass as demonstrated progressive growth with time.  I did review the patient's prior dosimetry records and it appears at this area may have at least partial dose contribution to this region.  As such I am uncertain if additional radiation therapy would be safe at this time.  However, if upcoming scans do not demonstrate any clear progression distantly, and disease may remains progressive locally, I discussed possibility of attempting to plan additional radiation therapy to the right lateral mesenteric mass given prior control from radiation treatment. If dose to the organs at risk are too high during planning process, I discussed that may not recommend further RT at that time.  Patient is agreement the current plan in place and we will have him return after restaging scans are obtained.     Slade Castillo MD  Department of Radiation Oncology  [facility]

## 2023-07-17 NOTE — NURSING NOTE
"REASON FOR APPOINTMENT   Type of Cancer: mucinous adenocarcinoma of the appendix. Mass in R abdomen  Location: R abdomen  Date of Symptom Onset: noted on scans, pt reports pain/pressure in R abdomen - right at the line where RUQ/RLQ meet,  R base of ribs, sometimes under ribs    TREATMENT TO-DATE FOR THIS CANCER  Surgery ? Previous with Dr. Donald   Chemotherapy ? Dr. Bal, was on xeloda 7 days on/off for over a year. Now has been off x2 months due to side effects/disease progression.      Other Treatments for this Cancer ? Referred to radiation to discuss if option of treating R abdominal mass is possible    Previous RT to abdominal mass 9/2022   10 fxns 3000 cGy    PERSONAL HISTORY OF CANCER   Previous Cancer ? no   Prior Radiation ? no   Prior Chemotherapy ? no   Prior Hormonal Therapy ? no     RECENT IMAGING STUDIES  4/2023,   Upcoming next week 7/24/23    REFERRALS NEEDED  None at this time    VITALS  /77 (BP Location: Left arm, Patient Position: Chair, Cuff Size: Adult Large)   Pulse 76   Resp 16   Wt 105.8 kg (233 lb 3.2 oz)   SpO2 98%   BMI 32.52 kg/m      PACEMAKER/IMPLANTED CARDIAC DEVICE no    PAIN  Current history of pain associated with this visit:   Intensity: \"ache/pain, can be worse if lots of gas or when straining with BM\"  Current: \"today when I am in to see the doctor I have no pain.  Yesterday My rib pain and R abdomen were hurting - It's like pressure and an ache\"  Location:  Treatment:     PSYCHOSOCIAL  Marital Status:   Patient lives in [place] with self. Ami Avila is very helpful and involved in MD visits  Number of children: 3  Working status: retired   Do you feel safe in your home? Yes    REVIEW OF SYSTEMS  Skin: negative  Eyes: glasses  Ears/Nose/Throat: hearing loss, tinnitus  Respiratory: No shortness of breath, dyspnea on exertion, cough, or hemoptysis  Cardiovascular: negative  Gastrointestinal: abdominal pain and excessive gas or " "bloating  Genitourinary: urgency  Musculoskeletal: generalized aches  Neurologic: negative  Psychiatric: negative  Hematologic/Lymphatic/Immunologic: fatigue  Endocrine: negative    Radiation Oncology Patient Teaching    Current Concern: \" it  Sounds like surgery is not an option, I am done with chemo pills - the side effects were rough. I am here to see if radiation might help get rid of this stuff\"    Person involved with teaching: Patient and Daughter  Patient asked Questions: Yes  Patient was cooperative: Yes  Patient was receptive (willing to accept information given): Yes    Education Assessment  Comprehension ability: Medium  Knowledge level: Medium  Factors affecting teaching: None    Education Materials Given  Radiation Therapy to the pelvis    Educational Topics Discussed  Side effects, Medications, Activity, Nutrition and When to call MD/RN    Response To Teaching  More review necessary      Do you have an advanced directive or living will? Yes  Are you DNR/DNI? No        "

## 2023-07-28 NOTE — PROGRESS NOTES
Called and left message for daughter Thuan, letting her know Dr. Bal will not be available for the 3:30 pm virtual visit today. May reschedule to Monday 7/31 at 4:15 pm if this works for both of them. If not, Dr. Smith will step in and complete virtual visit today. Asked her to call back if they would like to reschedule to Monday.

## 2023-07-31 NOTE — LETTER
2023         RE: Rod Olson  [address]  [place] [facility] [place] [address]        Dear Colleague,    Thank you for referring your patient, Rod Olson, to the [facility]. Please see a copy of my visit note below.    [facility] - Medical Oncology TeleHealth Consult Note  2023    Patient Identifiers     Name: Rod Olson  Preferred Address: [place]  Preferred Language: English  : 1945  Gender: male    Assessment and Plan     Mr. 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      Patient returns to clinic for treatment counseling off cancer active therapy for his mucinous adenocarcinoma.  Given degree of growth seen since treatment cessation, it may be that patient's low-dose capecitabine was providing some anticancer effect.  It may also be that trajectory of cancer growth has fundamentally changed as cancer biology does shift during the course of diagnosis.  Regardless of underlying etiology, return to capecitabine is not clinically appropriate given patient's poor tolerance of treatment.  At this time, we would recommend transition to symptom directed therapy with potential involvement of home hospice.      However, given potential cancer directed effect seen on prior regimen, it may be reasonable to trial infusional chemotherapy which is often better tolerated from a diarrheal perspective.  Patient will consider both hospice and infusional chemotherapy as potential options and return to clinic in 2 weeks following discussion with family.    45 minutes spent on the date of the encounter doing chart review, review of test results, interpretation of tests, patient visit, and documentation       Larry Bal MD, PhD   of Medicine  Division of Hematology, Oncology  and Transplantation  [facility]    -----------------------------------    Oncology Summary     Cancer Staging   Malignant neoplasm of appendix (H)  Staging form: Appendix Carcinoma, AJCC 8th Edition  - Clinical stage from 12/7/2021: Stage IVC (cT4b, cN1c, pM1c, G3) - Signed by Larry Bal MD on 1/5/2022      Oncology History Overview Note   Pt in his USOH until November 2021 when he presented to care at the urging of family with eight months of abdominal pain. CT showed large, mucinous collections in R hemipelvis, prompting referral to surgery. Pt thought to have likely a low-grade mucinous neoplasm and underwent cytoreductive surgery, with incomplete (R2) resection. 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    On establishment with Med Onc, pt recommended to initiate FOLFOX therapy.      Malignant neoplasm of appendix (H)   12/7/2021 Initial Diagnosis    Malignant neoplasm of appendix (H)     12/7/2021 -  Cancer Staged    Staging form: Appendix Carcinoma, AJCC 8th Edition  - Clinical stage from 12/7/2021: Stage IVC (cT4b, cN1c, pM1c, G3)     3/23/2022 -  Chemotherapy    OP ONC Colorectal Cancer - Capecitabine  Plan Provider: Larry Bal MD  Treatment goal: Palliative  Line of treatment: First Line         Subjective/Interval Events     - pt's imaging demonstrates widespread metastatic growth; given these findings it is unlikely that Rad Onc would provide palliative treatment    Review of Systems: 14 point ROS negative other than the symptoms noted above in the HPI.      Objective Data     Lab data:  I have personally reviewed the lab data, notable for:    - no notables    Radiology data:  I have personally reviewed the radiology data, notable for:  07/24/2023 CT C/A/P                                                                 IMPRESSION:  1.  Significant worsening of metastatic  disease with numerous omental  and peritoneal implants predominantly in the right abdomen along the  right paracolic gutter and under the right hemidiaphragm along the  liver.  2.  Stable left apical nodule.  3.  Stable mixed cystic and solid mass in the pelvis.  4.  Stable likely bone islands scattered through the visualized bones.    Pathology and other data:  I have personally reviewed and interpreted the pathology data, notable for:    - no new data    Billing Stuff     Virtual Visit Details    Type of service:  Video Visit   Video Start Time:  4:23 PM  Video End Time: 4:48 PM    Originating Location (pt. Location): Home    Distant Location (provider location):  On-site  Platform used for Video Visit: Kyrie Bal MD

## 2023-07-31 NOTE — PROGRESS NOTES
[facility] - Medical Oncology TeleHealth Consult Note  2023    Patient Identifiers     Name: Rod Olson  Preferred Address: [place]  Preferred Language: English  : 1945  Gender: male    Assessment and Plan     Mr. 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      Patient returns to clinic for treatment counseling off cancer active therapy for his mucinous adenocarcinoma.  Given degree of growth seen since treatment cessation, it may be that patient's low-dose capecitabine was providing some anticancer effect.  It may also be that trajectory of cancer growth has fundamentally changed as cancer biology does shift during the course of diagnosis.  Regardless of underlying etiology, return to capecitabine is not clinically appropriate given patient's poor tolerance of treatment.  At this time, we would recommend transition to symptom directed therapy with potential involvement of home hospice.      However, given potential cancer directed effect seen on prior regimen, it may be reasonable to trial infusional chemotherapy which is often better tolerated from a diarrheal perspective.  Patient will consider both hospice and infusional chemotherapy as potential options and return to clinic in 2 weeks following discussion with family.    45 minutes spent on the date of the encounter doing chart review, review of test results, interpretation of tests, patient visit, and documentation       Larry Bal MD, PhD   of Medicine  Division of Hematology, Oncology and Transplantation  [facility]    -----------------------------------    Oncology Summary     Cancer Staging   Malignant neoplasm of appendix (H)  Staging form: Appendix Carcinoma, AJCC 8th Edition  - Clinical stage from 2021: Stage IVC (cT4b,  cN1c, pM1c, G3) - Signed by Larry Bal MD on 1/5/2022      Oncology History Overview Note   Pt in his USOH until November 2021 when he presented to care at the urging of family with eight months of abdominal pain. CT showed large, mucinous collections in R hemipelvis, prompting referral to surgery. Pt thought to have likely a low-grade mucinous neoplasm and underwent cytoreductive surgery, with incomplete (R2) resection. 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    On establishment with Med Onc, pt recommended to initiate FOLFOX therapy.      Malignant neoplasm of appendix (H)   12/7/2021 Initial Diagnosis    Malignant neoplasm of appendix (H)     12/7/2021 -  Cancer Staged    Staging form: Appendix Carcinoma, AJCC 8th Edition  - Clinical stage from 12/7/2021: Stage IVC (cT4b, cN1c, pM1c, G3)     3/23/2022 -  Chemotherapy    OP ONC Colorectal Cancer - Capecitabine  Plan Provider: Larry Bal MD  Treatment goal: Palliative  Line of treatment: First Line         Subjective/Interval Events     - pt's imaging demonstrates widespread metastatic growth; given these findings it is unlikely that Rad Onc would provide palliative treatment    Review of Systems: 14 point ROS negative other than the symptoms noted above in the HPI.      Objective Data     Lab data:  I have personally reviewed the lab data, notable for:    - no notables    Radiology data:  I have personally reviewed the radiology data, notable for:  07/24/2023 CT C/A/P                                                                 IMPRESSION:  1.  Significant worsening of metastatic disease with numerous omental  and peritoneal implants predominantly in the right abdomen along the  right paracolic gutter and under the right hemidiaphragm along the  liver.  2.  Stable left apical nodule.  3.  Stable mixed cystic and solid mass in the pelvis.  4.   Stable likely bone islands scattered through the visualized bones.    Pathology and other data:  I have personally reviewed and interpreted the pathology data, notable for:    - no new data    Billing Stuff     Virtual Visit Details    Type of service:  Video Visit   Video Start Time:  4:23 PM  Video End Time: 4:48 PM    Originating Location (pt. Location): Home    Distant Location (provider location):  On-site  Platform used for Video Visit: Kyrie

## 2023-07-31 NOTE — NURSING NOTE
Is the patient currently in the state of [place]? YES    Visit mode:VIDEO    If the visit is dropped, the patient can be reconnected by: VIDEO VISIT: Send to e-mail at: [email or web address]    Will anyone else be joining the visit? YES: How would they like to receive their invitation? Send to e-mail at: [email or web address]      How would you like to obtain your AVS? MyChart    Are changes needed to the allergy or medication list? NO    Reason for visit: Video Visit (Follw Up )      Jess Lima

## 2023-08-02 NOTE — ORAL ONC MGMT
[facility] Oral Chemotherapy Monitoring Program    Thank you for the opportunity to be a part in the care of this patient's oral chemotherapy. The oncology pharmacy will no longer be following this patient for oral chemotherapy. If there are any questions or the plan changes, feel free to contact us.    Briseyda Cottrell,PharmD, [facility]  Oral Chemotherapy Monitoring Program  [facility]  [phone]  August 2, 2023

## 2023-08-07 NOTE — LETTER
2023         RE: Rod Olson  [address]  [place] [address]        Dear Colleague,    Thank you for referring your patient, Rod Olson, to the [facility]. Please see a copy of my visit note below.    [facility] - Medical Oncology TeleHealth Consult Note  2023    Patient Identifiers     Name: Rod Olson  Preferred Address: [facility]  Preferred Language: English  : 1945  Gender: male    Assessment and Plan     Mr. 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     Mr. lOson reports significant symptom relief off of chemotherapy.  Abdominal pain which patient was feeling all through last week has largely resolved.  Given these improvements, he is opting for continued symptom directed therapy with no further cancer directed care.  He wishes to continue monthly follow-ups with intermittent imaging to monitor disease status.  We have counseled that imaging monitoring without treatment implications would be of low value.  However, we will provide intermittent imaging for symptom directed management as appropriate.    Patient to return to clinic in 1 month with labs prior.  Patient to return with labs and imaging in 2 months.    45 minutes spent on the date of the encounter doing chart review, review of test results, interpretation of tests, patient visit, and documentation       Larry Bal MD, PhD   of Medicine  Division of Hematology, Oncology and Transplantation  [facility]    -----------------------------------    Oncology Summary     Cancer Staging   Malignant neoplasm of appendix (H)  Staging form: Appendix Carcinoma, AJCC 8th Edition  - Clinical stage from 2021: Stage IVC (cT4b, cN1c, pM1c, G3) - Signed by Larry Bal MD on  1/5/2022      Oncology History Overview Note   Pt in his USOH until November 2021 when he presented to care at the urging of family with eight months of abdominal pain. CT showed large, mucinous collections in R hemipelvis, prompting referral to surgery. Pt thought to have likely a low-grade mucinous neoplasm and underwent cytoreductive surgery, with incomplete (R2) resection. 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    On establishment with Med Onc, pt recommended to initiate FOLFOX therapy.      Malignant neoplasm of appendix (H)   12/7/2021 Initial Diagnosis    Malignant neoplasm of appendix (H)     12/7/2021 -  Cancer Staged    Staging form: Appendix Carcinoma, AJCC 8th Edition  - Clinical stage from 12/7/2021: Stage IVC (cT4b, cN1c, pM1c, G3)     3/23/2022 -  Chemotherapy    OP ONC Colorectal Cancer - Capecitabine  Plan Provider: Larry Bal MD  Treatment goal: Palliative  Line of treatment: First Line         Subjective/Interval Events     - patient noted pains on L side during visit last week, mostly around the ribcage and underneath. Pt also notes pain around the right side. Pain has significantly resolved since last week.   - continues to get intermittent cramps in his abdomen, associated with bowel movement requirement. Some significant intensity.   - eating well; bowel movements have improved. No nausea.     Review of Systems: 14 point ROS negative other than the symptoms noted above in the HPI.      Objective Data     Lab data:  I have personally reviewed the lab data, notable for:    - Hgb 12.8    Radiology data:  I have personally reviewed the radiology data, notable for:  - no new data    Pathology and other data:  I have personally reviewed and interpreted the pathology data, notable for:    - no new data    Billing Stuff     Virtual Visit Details    Type of service:  Video Visit   Video  Start Time:  3:25 PM  Video End Time: 3:45 PM    Originating Location (pt. Location): Home    Distant Location (provider location):  On-site  Platform used for Video Visit: Kyrie Bal MD

## 2023-08-07 NOTE — NURSING NOTE
Is the patient currently in the state of [place]? YES    Visit mode:VIDEO    If the visit is dropped, the patient can be reconnected by: VIDEO VISIT: Text to cell phone: [phone]    Will anyone else be joining the visit? NO      How would you like to obtain your AVS? MyChart    Are changes needed to the allergy or medication list? NO    Yee CROWE    Reason for visit: RECHECK

## 2023-08-07 NOTE — PROGRESS NOTES
[facility] - Medical Oncology TeleHealth Consult Note  2023    Patient Identifiers     Name: Rod Olson  Preferred Address: [place]  Preferred Language: English  : 1945  Gender: male    Assessment and Plan     Mr. 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     Mr. Olson reports significant symptom relief off of chemotherapy.  Abdominal pain which patient was feeling all through last week has largely resolved.  Given these improvements, he is opting for continued symptom directed therapy with no further cancer directed care.  He wishes to continue monthly follow-ups with intermittent imaging to monitor disease status.  We have counseled that imaging monitoring without treatment implications would be of low value.  However, we will provide intermittent imaging for symptom directed management as appropriate.    Patient to return to clinic in 1 month with labs prior.  Patient to return with labs and imaging in 2 months.    45 minutes spent on the date of the encounter doing chart review, review of test results, interpretation of tests, patient visit, and documentation       Larry Bal MD, PhD   of Medicine  Division of Hematology, Oncology and Transplantation  [facility]    -----------------------------------    Oncology Summary     Cancer Staging   Malignant neoplasm of appendix (H)  Staging form: Appendix Carcinoma, AJCC 8th Edition  - Clinical stage from 2021: Stage IVC (cT4b, cN1c, pM1c, G3) - Signed by Larry Bal MD on 2022      Oncology History Overview Note   Pt in his USOH until 2021 when he presented to care at the urging of family with eight months of abdominal pain. CT showed large, mucinous collections in R hemipelvis, prompting referral to surgery. Pt thought  to have likely a low-grade mucinous neoplasm and underwent cytoreductive surgery, with incomplete (R2) resection. 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    On establishment with Med Onc, pt recommended to initiate FOLFOX therapy.      Malignant neoplasm of appendix (H)   12/7/2021 Initial Diagnosis    Malignant neoplasm of appendix (H)     12/7/2021 -  Cancer Staged    Staging form: Appendix Carcinoma, AJCC 8th Edition  - Clinical stage from 12/7/2021: Stage IVC (cT4b, cN1c, pM1c, G3)     3/23/2022 -  Chemotherapy    OP ONC Colorectal Cancer - Capecitabine  Plan Provider: Larry Bal MD  Treatment goal: Palliative  Line of treatment: First Line         Subjective/Interval Events     - patient noted pains on L side during visit last week, mostly around the ribcage and underneath. Pt also notes pain around the right side. Pain has significantly resolved since last week.   - continues to get intermittent cramps in his abdomen, associated with bowel movement requirement. Some significant intensity.   - eating well; bowel movements have improved. No nausea.     Review of Systems: 14 point ROS negative other than the symptoms noted above in the HPI.      Objective Data     Lab data:  I have personally reviewed the lab data, notable for:    - Hgb 12.8    Radiology data:  I have personally reviewed the radiology data, notable for:  - no new data    Pathology and other data:  I have personally reviewed and interpreted the pathology data, notable for:    - no new data    Billing Stuff     Virtual Visit Details    Type of service:  Video Visit   Video Start Time:  3:25 PM  Video End Time: 3:45 PM    Originating Location (pt. Location): Home    Distant Location (provider location):  On-site  Platform used for Video Visit: [employer]

## 2023-08-08 NOTE — PROGRESS NOTES
Social Work - Intervention  [facility]  Data/Intervention:    Patient Name: Rod Olson Goes By: [place]    JACK/Age: 1945 (78 year old)     Visit Type: MyChart  Referral Source: [facility]  Reason for Referral: Supportive Resources    Collaborated With:    -Patients daughter, Eowyn (Consent on file)     Psychosocial Information/Concerns:  Patient daughter, Eowyn (consent on file) requested SW to connect via [employer] message in regards to supportive resources for patient.     Intervention/Education/Resources Provided:  SW sent [employer] message introducing self and provided resources for Eowyn to review. SW will communicate with Eowyn as they identified that the best method of communication.     Assessment/Plan:  SW will continue to touch base with Eowyn and answer any additional question they have. SW will continue to remain available as needed.  Provided patient/family with contact information and availability.    MIKE Escalante,SW  Hematology/Oncology Social Worker  Phone:[phone] Pager: [phone]

## 2023-08-23 NOTE — PROGRESS NOTES
Received call from [facility] that pt and daughter had their informational meeting and has agreed to enroll in hospice. Will need a referral entered in Epic, this was placed.

## 2023-08-25 NOTE — TELEPHONE ENCOUNTER
[facility] - Medical Oncology Update/Event Note  8/25/2023    CC: Called pt re: hospice transition    - pt had the hospice nurse come by today and was told that pt should wait to enroll in hospice until he is sicker. Pt and daughter were curious about this recommendation in contrast to Onc referral    Assessment\Recommendations:  Mr. Olson was called to follow-up on hospice recommendation.  Patient counseled that hospice is reimbursed only for limited time periods, dictated by insurance mandates.  Given he is unlikely to make significant use of this resource at this time, it is reasonable to defer initiation until his needs are greater.  Until that time, patient may follow-up with palliative care and medical oncology for medication management and symptom monitoring.  Patient already with labs and CT scan scheduled for October with return visit with author.  He will follow-up with palliative care for symptomatic management until that time.    Larry Bal MD, PhD   of Medicine  Division of Hematology, Oncology and Transplantation  [facility]

## 2023-08-31 NOTE — PATIENT INSTRUCTIONS
"It was good to see you today, Bill and Thuan and cat.    Here are the things we talked about:  Try OTC loratadine for your nasal drip.  If that doesn't help please reach out to your pcp.    You will NOT become an addict so please use your oxycodone for your abdominal pain.  You can use up to 8 total senna a day if needed for constipation--start though with one a day and gradually increase to 2 tablets up to four times a day. If you get to needing 4 or more senna in a day then 1 capful of Miralax with a goal of a easy to pass BM at least every other day without triggering diarrhea.    Someone from the team will reach out to schedule a follow up appointment in 2 months or reach out sooner for new problems.     We talked about starting to look at completing a healthcare directive.  I recommend you check out- [email or web address]     They have several tools and some advice about getting started.  Under the \"How do I document my choices\" section, I am a fan of the full length healthcare directive (for adults with serious illness) because I think it has a good combination of the medical questions that are important as well as the social and personal questions that allow healthcare providers to get to know you as a person if you are unable to speak for yourself.  You do not have to complete every question on the document.  I generally recommend patients start with 3 or 4 questions on the goals page and work from there.     There is also a Short Form, which primarily serves to designate health care agents. These are people who can speak on your behalf about your healthcare wishes/goals if you are not able to speak for yourself.    How to get a hold of us:  For non-urgent matters, MyChart works best.    For more urgent matters, or if you prefer not to use MyChart, call our clinic nurse coordinator Verona Shen RN at [phone]    We have an on-call number for " evenings and weekends. Please call this only if you are having uncontrolled symptoms or serious side effects from your medicines: [phone].     For refills, please give us a week (5 working days) notice. We don't always have providers available everyday to do refills. If you call the day you run out of your medicine, we may not be able to refill it in time, so call 5 days in advance!    Teo Mayfield MD MS FAAFP CAQHPM  [facility]  Office [phone]  Fax [phone]

## 2023-08-31 NOTE — PROGRESS NOTES
Virtual Visit Details    Type of service:  Video Visit   Video Start Time: 10:24 AM  Video End Time: 1145    Originating Location (pt. Location): Home    Distant Location (provider location):  On-site  Platform used for Video Visit: [facility]    Palliative Care Outpatient Clinic Consultation Note    Patient:  Rod Olson    Chief Complaint:   Rod Olson 78 year old male who is presenting to the palliative medicine clinic today at the request of Dr. Bal for a palliative care consultation secondary to high grade mucinous appendix cancer.   The patient's primary care provider is:  [facility]. He is joined by his daughter Thuan.    History of Present Illness:  This is copied from Dr. Bal's recent note:      Oncology Summary      Cancer Staging   Malignant neoplasm of appendix (H)  Staging form: Appendix Carcinoma, AJCC 8th Edition  - Clinical stage from 12/7/2021: Stage IVC (cT4b, cN1c, pM1c, G3) - Signed by Larry Bal MD on 1/5/2022             Oncology History Overview Note    Pt in his USOH until November 2021 [facility] months of abdominal pain. CT showed large, mucinous collections in R hemipelvis, prompting referral to surgery. Pt thought to have likely a low-grade mucinous neoplasm and underwent cytoreductive surgery, with incomplete (R2) resection. 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     On establishment with Med Onc, pt recommended to initiate FOLFOX therapy.       Malignant neoplasm of appendix (H)    12/7/2021 Initial Diagnosis      Malignant neoplasm of appendix (H)       12/7/2021 -  Cancer Staged      Staging form: Appendix Carcinoma, AJCC 8th Edition  - Clinical stage from 12/7/2021: Stage IVC (cT4b, cN1c, pM1c, G3)       3/23/2022 -  Chemotherapy      OP ONC Colorectal Cancer -  "Capecitabine  Plan Provider: Larry Bal MD  Treatment goal: Palliative  Line of treatment: First Line           Distressing Symptom/s: diarrhea when on chemo--currently not a problem at this time  Abdominal distension likely secondary to disease progression; he uses oxycodone sporadically and reluctantly;   Post nasal drip persisted on Flonase (which he has stopped) which has been present for years; clear and runny discharge;     Patient's Disease Understanding: good, \"I'm dying\"    Coping:  overall OK    Social History  Born: [place], [place]  Education: graduated from high school in [place]; attended college for 1+ years;    Living Situation: 55+ independent building; alone;   Relationships: got  at age 18 with a son resulting from that--they are estranged; second marriage last for 13 years of marriage-- this resulted in Thuan's birth; she had mental health issues;   Children: two children:  Carlyle (they are estranged) and Tuhan--she is very involved in his life;   Actual/Potential Caregiver(s): Thuan who lives ten minutes away  Support System: Thuan and her  and her two daughters (ages 3 and 5)  Occupation: factory work; his profession is remodeling -carpentry, electrical and plumbing  Hobbies: making musical instruments (though he doesn't play any of them)  Patient is 'famous for being to fix anything; being stubborn, and a great '  Substance Use/History of misuse: some difficulties with alcohol in the past; sporadic glass of wine now and then;   Financial Concerns: none--subsidized housing helps him a lot  Spiritual Background: raised Baptism; spiritual but not Denominational  Spiritual Concerns/Needs: none  Sources of meaning include:  his spiritual work; working his way through some deferred projects;     Social History     Tobacco Use    Smoking status: Former     Years: 3.00     Types: Cigars, Cigarettes    Smokeless tobacco: Never   Vaping Use    Vaping Use: Never used   Substance Use " Topics    Alcohol use: Not Currently    Drug use: Never       Family History  Family History   Problem Relation Age of Onset    Cataracts Mother     Hypertension Father     Prostate Cancer Brother     Lung Cancer Brother     Myocardial Infarction Brother        Advance Care Planning:  Advance Directive:    written but needs updated  Where is written copy located: n/a  Health Care Agent Contact Information: will be daughter Eowyn  POLST:   n/a  CODE STATUS: FULL at this time;    Allergies   Allergen Reactions    Clindamycin Other (See Comments)     PN: psychotic     Current Outpatient Medications   Medication Sig Dispense Refill    bismuth subsalicylate (PEPTO BISMOL) 262 MG/15ML suspension Take 15 mLs by mouth every 6 hours as needed for indigestion      capecitabine (XELODA) 500 MG tablet Take 4 tabs (2,000mg) by mouth in the morning and in the evening, daily. Take for 7 days on, then 7 days off. (Patient not taking: Reported on 7/10/2023) 112 tablet 11    lisinopril (ZESTRIL) 40 MG tablet Take 1 tablet (40 mg) by mouth daily Hold until patient calls for refill. 90 tablet 3    loperamide (IMODIUM A-D) 2 MG tablet Take 1 tablet (2 mg) by mouth 4 times daily as needed for diarrhea (Patient taking differently: Take 2 mg by mouth daily as needed for diarrhea) 60 tablet 1    oxyCODONE (ROXICODONE) 5 MG tablet Take 1 tablet (5 mg) by mouth every 6 hours as needed for severe pain !!Do NOT take together with Flexeril!! 30 tablet 0     Past Medical History:   Diagnosis Date    Former smoker     Hypertension     Mucinous cystadenoma of appendix     Obese     Obesity (BMI 30.0-34.9)      Past Surgical History:   Procedure Laterality Date    COLECTOMY RIGHT N/A 12/7/2021    Procedure: Right Hemicolectomy open;  Surgeon: Peng Donald MD;  Location: [facility]    COLONOSCOPY N/A 12/3/2021    Procedure: COLONOSCOPY;  Surgeon: Gia Victoria MD;  [facility]    EXPLORE GROIN N/A 1/27/2022    Procedure: Evacuation  Scrotal Hematoma;  Surgeon: Darnell Lopez MD;  Location: [facility]    HERNIORRHAPHY INGUINAL Right 2022    Procedure: OPEN  transinguinaL  HYDROCELE REPAIR.;  Surgeon: Darnell Lopez MD;  Location: [facility]    LAPAROTOMY EXPLORATORY N/A 2021    Procedure: EXPLORATORY LAPAROTOMY;  Surgeon: Peng Donald MD;  Location: [facility] OR       REVIEW OF SYSTEMS:   ROS: 10 point ROS neg other than the symptoms noted above in the HPI and here:  Palliative Symptom Review (0=no symptom/no concern, 1=mild, 2=moderate, 3=severe):      Pain: 1      Fatigue: 1      Nausea: 0      Constipation: 0      Diarrhea: 0      Depressive Symptoms: 0      Anxiety: 0      Drowsiness: 0      Poor Appetite: 1      Shortness of Breath: 0      Insomnia: 1      Other: 0      Overall (0 good/no concerns, 3 very poor):  1    GENERAL APPEARANCE: healthy, alert and no distress; neatly groomed  EYES: Eyes grossly normal to inspection, PERRLA, conjunctivae and sclerae without injection or discharge, EOM intact   RESP:  no increased work of breathing; speaks in complete sentences;   MS: No musculoskeletal defects are noted  SKIN: No suspicious lesions or rashes, hydration status appears adequate with normal skin turgor   PSYCH: Alert and oriented x3; speech- coherent , normal rate and volume; able to articulate logical thoughts, able to abstract reason, no tangential thoughts, no hallucinations or delusions, mentation appears normal, Mood is euthymic. Affect is appropriate for this mood state and bright. Thought content is free of suicidal ideation, hallucinations, and delusions.  Eye contact is good during conversation.       Data Reviewed:  LABS:last Cr 1.01; Albumin 4.2; Hgb 12.8    IMAGIN2023 CT CAP W/CONTRAST  IMPRESSION:  1.  Significant worsening of metastatic disease with numerous omental  and peritoneal implants predominantly in the right abdomen along the  right paracolic gutter and under the right hemidiaphragm along  the  liver.  2.  Stable left apical nodule.  3.  Stable mixed cystic and solid mass in the pelvis.  4.  Stable likely bone islands scattered through the visualized bones.       Impressions:  ECO-3  Decision Making Capacity:  very present  PDMP review:  yes, no concerns    High grade mucinous appendiceal carcinoma now with significant progression    Cancer associated pain  Allergic rhinitis    GOALS OF CARE:  Manas knows he is dying and he isn't ready to stop CT imaging to monitor his tumor progression.  He and his daughter (who is a medical SW at [employer]) feel that they are ready to 'pivot' to hospice quickly if needed. They have had an informational hospice consult.  He has a sense that the hospice nurse doesn't think he was 'ready' for hospice care at this time. He wants to be a FULL code at this time and wants to re-write his ACP documents.  He wants Eowyn to be his HCA.    Advance Care Planning Discussion 2023. ITeo MD met with Patient and their family today at their residence to discuss Advance Care Planning. Rod Olson does have decisional capacity and was present for this discussion.  Those present were informed of the voluntary nature of this discussion and wished to proceed.  The discussion included:  see paragraph above . This discussion began at 1110 and ended at 1130 for a total of 20 minutes.   Recommendations & Counseling:  Trial of loratadine 10 mg daily  Manas is encourage to use oxycodone as needed for his abdominal pain; we also discussed constipation mitigation/prevention strategies--see the AVS for details.  Try heat--warm shower or heating pad for abdominal pain      F/U in 8 weeks; sooner, prn.    Counseling: All of the above was explained to the patient in lay language. The patient has verbalized a clear understanding of the discussion, asked appropriate questions, which have been answered to patient's apparent satisfaction. The patient is in agreement  with the above plan.    86 minutes spent on the date of the encounter doing chart review, history and exam, patient education & counseling, documentation and other activities as noted above. 20 of the 86 minutes spent on ACP discussion    Teo Mayfield MD MS FAAFP CAQM  [facility]  Office [phone]  Fax [phone]

## 2023-08-31 NOTE — NURSING NOTE
Is the patient currently in the state of [place]? YES    Visit mode:VIDEO    If the visit is dropped, the patient can be reconnected by: VIDEO VISIT: Text to cell phone:   Telephone Information:   Mobile [phone]       Will anyone else be joining the visit? NO  (If patient encounters technical issues they should call [phone])    How would you like to obtain your AVS? MyChart    Are changes needed to the allergy or medication list? Pt declined allergy review and Pt declined med review    Reason for visit: Consult    Patricia ZARATE

## 2023-09-01 NOTE — DISCHARGE INSTRUCTIONS
Do your best to stay hydrated and eat as best you can.  You may benefit from boost or Ensure drinks which can be purchased at your local pharmacy/grocery store.  These are calorie dense meal supplement options.  Follow-up with your primary provider regarding your lisinopril.  If your blood pressure at home is less than 100 do not take your lisinopril.  Follow-up with oncology and palliative care as planned.  Return to the emergency department for any new or worsening symptoms.  Glad that you are feeling a little bit better today.

## 2023-09-01 NOTE — ED PROVIDER NOTES
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  Is a decreased appetite for the past 2 weeks.  Does not feel very hungry.  Feels lightheaded with standing.  Today he had a couple of bites of an omelette and pancake.  Yesterday he had a chicken sandwich and some taco.  Similarly the day before.  Does not drink much fluid.  Just does not feel the drive.  He is with his daughter who is at bedside and supportive.  They were in contact with palliative care yesterday and were in contact with them on the phone today.  They recommended consultation with oncology who recommended come to the emergency department for evaluation and likely IV fluids.  He is on lisinopril, Senokot and oxycodone for pain as needed.  Does not report any change in his pain.  Has some various insidiously progressive abdominal distention which daughter mentions could be fluid.  Has not required paracentesis.  In spite of this increasing abdominal girth no reported change in weight. Post nasal drip w/ throat clearing cough. Claritin advised recently.  No reported fevers vomiting or diarrhea.  Does have nausea.  Took ondansetron earlier today.  Is not taking any protein supplemental drinks like Ensure or boost.    Oncology note from 8/7/2023 reviewed.  Palliative care note from 8/31/2023 reviewed.    The patient's PMHx, Surgical Hx, Allergies, and Medications were all reviewed with the patient.    Allergies:  Allergies   Allergen Reactions    Clindamycin Other (See Comments)     PN: psychotic       Problem List:    Patient Active Problem List    Diagnosis Date Noted    Dehydration 04/22/2022     Priority: Medium    Scrotal hematoma 01/27/2022     Priority: Medium    Malignant  neoplasm of appendix (H) 12/07/2021     Priority: Medium    Elevated PSA 10/19/2017     Priority: Medium    Chronic insomnia 04/22/2014     Priority: Medium    Allergic rhinitis 04/22/2014     Priority: Medium    Hyperlipidemia 04/22/2014     Priority: Medium     Formatting of this note might be different from the original.  Other and unspecified hyperlipidemia      Fatigue 04/22/2014     Priority: Medium    Chronic cough 01/10/2012     Priority: Medium    HTN (hypertension) 01/10/2012     Priority: Medium        Past Medical History:    Past Medical History:   Diagnosis Date    Former smoker     Hypertension     Mucinous cystadenoma of appendix     Obese     Obesity (BMI 30.0-34.9)        Past Surgical History:    Past Surgical History:   Procedure Laterality Date    COLECTOMY RIGHT N/A 12/7/2021    Procedure: Right Hemicolectomy open;  Surgeon: Peng Donald MD;  Location: [facility]    COLONOSCOPY N/A 12/3/2021    Procedure: COLONOSCOPY;  Surgeon: Gia Victoria MD;  Location: [facility]    EXPLORE GROIN N/A 1/27/2022    Procedure: Evacuation Scrotal Hematoma;  Surgeon: Darnell Lopez MD;  Location: [facility]    HERNIORRHAPHY INGUINAL Right 1/18/2022    Procedure: OPEN  transinguinaL  HYDROCELE REPAIR.;  Surgeon: Darnell Lopez MD;  Location: [facility]    LAPAROTOMY EXPLORATORY N/A 12/7/2021    Procedure: EXPLORATORY LAPAROTOMY;  Surgeon: Peng Donald MD;  Location: [facility] OR       Family History:    Family History   Problem Relation Age of Onset    Cataracts Mother     Hypertension Father     Prostate Cancer Brother     Lung Cancer Brother     Myocardial Infarction Brother        Social History:  Marital Status:  Single [1]  Social History     Tobacco Use    Smoking status: Former     Years: 3.00     Types: Cigars, Cigarettes    Smokeless tobacco: Never   Vaping Use    Vaping Use: Never used   Substance Use Topics    Alcohol use: Not Currently    Drug use: Never        Medications:    bismuth  "subsalicylate (PEPTO BISMOL) 262 MG/15ML suspension  capecitabine (XELODA) 500 MG tablet  lisinopril (ZESTRIL) 40 MG tablet  loperamide (IMODIUM A-D) 2 MG tablet  oxyCODONE (ROXICODONE) 5 MG tablet  sennosides (SENOKOT) 8.6 MG tablet          Review of Systems  Pertinent positives and negatives mentioned in HPI    Physical Exam   BP: 107/68  Pulse: (!) 122  Temp: 98  F (36.7  C)  Resp: 16  Height: 180.3 cm (5' 11\")  Weight: 103.4 kg (228 lb)  SpO2: 96 %    Physical Exam  GEN: Appears distressed but nontoxic peers fatigued  HENT: Oral mucosa dry   EYES:  Conjunctiva clear.   CV : Regular rate and rhythm.  PULM: Normal effort. No wheezes, rales, or rhonchi bilaterally in anterior fields.  ABD: Soft, non-tender, mildly distended. No rebound or guarding.   NEURO: Normal speech. Following commands. CN II-XII grossly intact. Answering questions and interacting appropriately.   EXT: No gross deformity.  No pitting pedal or pretibial edema  INT: Warm. No diaphoresis. Normal color.        ED Course        Procedures         Critical Care time:  none               Results for orders placed or performed during the hospital encounter of 09/01/23 (from the past 24 hour(s))   [facility] Draw    Narrative    The following orders were created for panel order [facility] Draw.  Procedure                               Abnormality         Status                     ---------                               -----------         ------                     Extra Blue Top Tube[[id number]]                              Final result               Extra Red Top Tube[[id number]]                               Final result               Extra Green Top (Lithium...[[id number]]                      Final result               Extra Purple Top Tube[[id number]]                            Final result                 Please view results for these tests on the individual orders.   Extra Blue Top Tube   Result Value Ref Range    Hold Specimen JIC    Extra Red Top " Tube   Result Value Ref Range    Hold Specimen JIC    Extra Green Top (Lithium Heparin) Tube   Result Value Ref Range    Hold Specimen     Extra Purple Top Tube   Result Value Ref Range    Hold Specimen JIC    CBC with platelets, differential    Narrative    The following orders were created for panel order CBC with platelets, differential.  Procedure                               Abnormality         Status                     ---------                               -----------         ------                     CBC with platelets and d...[[id number]]  Abnormal            Final result                 Please view results for these tests on the individual orders.   Comprehensive metabolic panel   Result Value Ref Range    Sodium 135 (L) 136 - 145 mmol/L    Potassium 4.8 3.4 - 5.3 mmol/L    Chloride 100 98 - 107 mmol/L    Carbon Dioxide (CO2) 24 22 - 29 mmol/L    Anion Gap 11 7 - 15 mmol/L    Urea Nitrogen 16.5 8.0 - 23.0 mg/dL    Creatinine 1.01 0.67 - 1.17 mg/dL    Calcium 9.2 8.8 - 10.2 mg/dL    Glucose 151 (H) 70 - 99 mg/dL    Alkaline Phosphatase 64 40 - 129 U/L    AST 33 0 - 45 U/L    ALT 23 0 - 70 U/L    Protein Total 7.2 6.4 - 8.3 g/dL    Albumin 3.5 3.5 - 5.2 g/dL    Bilirubin Total 0.4 <=1.2 mg/dL    GFR Estimate 76 >60 mL/min/1.73m2   CBC with platelets and differential   Result Value Ref Range    WBC Count 9.2 4.0 - 11.0 10e3/uL    RBC Count 4.44 4.40 - 5.90 10e6/uL    Hemoglobin 11.7 (L) 13.3 - 17.7 g/dL    Hematocrit 35.7 (L) 40.0 - 53.0 %    MCV 80 78 - 100 fL    MCH 26.4 (L) 26.5 - 33.0 pg    MCHC 32.8 31.5 - 36.5 g/dL    RDW 14.8 10.0 - 15.0 %    Platelet Count 362 150 - 450 10e3/uL    % Neutrophils 75 %    % Lymphocytes 12 %    % Monocytes 11 %    % Eosinophils 1 %    % Basophils 1 %    % Immature Granulocytes 0 %    NRBCs per 100 WBC 0 <1 /100    Absolute Neutrophils 6.9 1.6 - 8.3 10e3/uL    Absolute Lymphocytes 1.1 0.8 - 5.3 10e3/uL    Absolute Monocytes 1.0 0.0 - 1.3 10e3/uL    Absolute Eosinophils  0.1 0.0 - 0.7 10e3/uL    Absolute Basophils 0.1 0.0 - 0.2 10e3/uL    Absolute Immature Granulocytes 0.0 <=0.4 10e3/uL    Absolute NRBCs 0.0 10e3/uL       Medications   lactated ringers BOLUS 1,000 mL (1,000 mLs Intravenous $New Bag 9/1/23 9136)       Assessments & Plan (with Medical Decision Making)   78 year old male with past medical history significant of 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  Was in contact with palliative care as well as oncology and advised to come to emergency department for evaluation and likely IV fluids.  Patient denies any fever and does not have any obvious  underlying infectious etiology.  Mild abdominal distention with no significant tenderness or signs of peritonitis.  No new abdominal pain.  Imaging not necessary today.  Bowels moving.  No vomiting.  CBC without leukocytosis .  Heme 11.7 which is down from 12.8 2 months previously.  No melena or blood in stool.  CMP grossly normal, sodium 135 and glucose 151..  1 L bolus of lactated Ringer's solution given..  On reevaluation he was feeling much improved.  Heart rate now 100 bpm.  Blood pressure low side of normal.  Is on 40 mg of lisinopril.  This may be too much for him and may need to be decreased given his current state of health.  He will follow with his primary care provider and his daughter says that they can monitor his blood pressure at home.  He has pain and antiemetics at home.  Comfortable going home.  Was able to stand without orthostasis. Will hold lisinopril for SBP < 100. Follow up with ED return precautions discussed.     I have reviewed the nursing notes.         New Prescriptions    No medications on file       Final diagnoses:   Malignant neoplasm of appendix (H)   Neoplastic malignant related fatigue   Dehydration     Twan Urena MD    9/1/2023   [facility]  EMERGENCY DEPT    Disclaimer: This note consists of words and symbols derived from keyboarding and dictation using voice recognition software.  As a result, there may be errors that have gone undetected.  Please consider this when interpreting information found in this note.               Twan Urena MD  09/01/23 1811

## 2023-09-01 NOTE — TELEPHONE ENCOUNTER
Pt daughter, Eowyn, calling to report increased fatigue, dizziness, nausea, no appetite.    Confusion, but daughter states its more like brain fog.     No physical pain, has not had to take pain medication.    Is not eating/drinking very much. She states pt has mentioned to her he forgets to drink fluids. Pt also not really taking medications. Doesn't want to.    Some chest congestion which palliative care recommended Claritin to help.     Denies F/C, SOB, chest pain, problems with bladder.    Pt is in [place].    She is wondering if pt is potentially dehydrated.     Advised Eowyn to have pt report to ED for evaluation and IVF. Pt daughter verbalized understanding and stated she will take pt to ED.

## 2023-09-01 NOTE — TELEPHONE ENCOUNTER
Patient's daughter Eowyn left voicemail reporting patient is possibly dehydrated.  He is tired and fatigued and not drinking much fluid.  He does not have any pain issues but is wondering if she should take him to the emergency room.    Writer returned call and left voicemail instructing patient's daughter that she should reach out to her father's oncology team to discuss these symptoms.    Encouraged Eowyn to call back with any further questions.    Will route message to Dr. Mayfield and oncology team.    Vanesa Bentley RN  Palliative Care Nurse Clinician    [phone] (Direct)  [phone] (Main)  [phone] (Appointment Scheduling)     parents

## 2023-09-01 NOTE — ED TRIAGE NOTES
Daughter brings pt in with increased fatigue and dehydration. Pt is in palliative care for cancer and has not had chemo for 2 months. Pt also reports nausea without vomiting or diarrhea.      Triage Assessment       Row Name 09/01/23 9752       Triage Assessment (Adult)    Airway WDL WDL       Respiratory WDL    Respiratory WDL WDL       Skin Circulation/Temperature WDL    Skin Circulation/Temperature WDL X;circulation    Skin Circulation pallor       Peripheral/Neurovascular WDL    Peripheral Neurovascular WDL WDL       Cognitive/Neuro/Behavioral WDL    Cognitive/Neuro/Behavioral WDL WDL

## 2023-09-07 NOTE — TELEPHONE ENCOUNTER
Received voicemail from patient's dtr requesting refill of oxycodone.     Last refill: 12/5/22  Last office visit: 8/31/23  Scheduled for follow up 11/13/23     Will route request to MD for review.     Reviewed [place]  Report.

## 2023-09-07 NOTE — TELEPHONE ENCOUNTER
Pt's dtr called back, asking if a prescription for MOM can be sent to the pharmacy to see if insurance would cover it.    EDGARD RoblesN, RN  Palliative Care Nurse Clinician    [phone] (Direct)  [phone] (Main)  [phone] (Appointment Scheduling)

## 2023-09-08 NOTE — ED TRIAGE NOTES
Pt here with increasing abdominal fluid and shortness of breath. Pt here with daughter. Per daughter, oncologist and palliative care doctor told them to come in for eval and possible removal of fluid and referral for home nursing care. Pt in no acute distress at this time.      Triage Assessment       Row Name 09/08/23 8514       Triage Assessment (Adult)    Airway WDL WDL       Respiratory WDL    Respiratory WDL all    Rhythm/Pattern, Respiratory shortness of breath       Skin Circulation/Temperature WDL    Skin Circulation/Temperature WDL WDL       Cardiac WDL    Cardiac WDL WDL       Peripheral/Neurovascular WDL    Peripheral Neurovascular WDL WDL       Cognitive/Neuro/Behavioral WDL    Cognitive/Neuro/Behavioral WDL WDL

## 2023-09-08 NOTE — ED PROVIDER NOTES
History     Chief Complaint   Patient presents with    Shortness of Breath     HPI  Rod Olson is a 78 year old male who has metastatic high-grade appendiceal mucosal adenocarcinoma who presents to the emergency department for evaluation of shortness of breath.  Patient states that over the last month or so his abdomen has become more swollen.  Over the last couple of days he is having trouble breathing.  He thinks it is due to fluid within his abdomen.  He states that he can feel it larger.  He denies abdominal pain.  He denies fever or chills.  Denies chest pain.  Denies cough.  Was referred here by palliative care to consider paracentesis.  He is not currently getting any chemotherapy.    Allergies:  Allergies   Allergen Reactions    Clindamycin Other (See Comments)     PN: psychotic       Problem List:    Patient Active Problem List    Diagnosis Date Noted    Dehydration 04/22/2022     Priority: Medium    Scrotal hematoma 01/27/2022     Priority: Medium    Malignant neoplasm of appendix (H) 12/07/2021     Priority: Medium    Elevated PSA 10/19/2017     Priority: Medium    Chronic insomnia 04/22/2014     Priority: Medium    Allergic rhinitis 04/22/2014     Priority: Medium    Hyperlipidemia 04/22/2014     Priority: Medium     Formatting of this note might be different from the original.  Other and unspecified hyperlipidemia      Fatigue 04/22/2014     Priority: Medium    Chronic cough 01/10/2012     Priority: Medium    HTN (hypertension) 01/10/2012     Priority: Medium        Past Medical History:    Past Medical History:   Diagnosis Date    Former smoker     Hypertension     Mucinous cystadenoma of appendix     Obese     Obesity (BMI 30.0-34.9)        Past Surgical History:    Past Surgical History:   Procedure Laterality Date    COLECTOMY RIGHT N/A 12/7/2021    Procedure: Right Hemicolectomy open;  Surgeon: Peng Donald MD;  Location: [facility]    COLONOSCOPY N/A 12/3/2021    Procedure: COLONOSCOPY;   "Surgeon: Gia Victoria MD;  Location: [facility] GI    EXPLORE GROIN N/A 1/27/2022    Procedure: Evacuation Scrotal Hematoma;  Surgeon: Darnell Lopez MD;  Location: [facility]    HERNIORRHAPHY INGUINAL Right 1/18/2022    Procedure: OPEN  transinguinaL  HYDROCELE REPAIR.;  Surgeon: Darnell Lopez MD;  Location: [facility]    LAPAROTOMY EXPLORATORY N/A 12/7/2021    Procedure: EXPLORATORY LAPAROTOMY;  Surgeon: Peng Donald MD;  Location: [facility] OR       Family History:    Family History   Problem Relation Age of Onset    Cataracts Mother     Hypertension Father     Prostate Cancer Brother     Lung Cancer Brother     Myocardial Infarction Brother        Social History:  Marital Status:  Single [1]  Social History     Tobacco Use    Smoking status: Former     Years: 3.00     Types: Cigars, Cigarettes    Smokeless tobacco: Never   Vaping Use    Vaping Use: Never used   Substance Use Topics    Alcohol use: Not Currently    Drug use: Never        Medications:    bismuth subsalicylate (PEPTO BISMOL) 262 MG/15ML suspension  capecitabine (XELODA) 500 MG tablet  lisinopril (ZESTRIL) 40 MG tablet  loperamide (IMODIUM A-D) 2 MG tablet  magnesium hydroxide (MILK OF MAGNESIA) 400 MG/5ML suspension  oxyCODONE (ROXICODONE) 5 MG tablet  sennosides (SENOKOT) 8.6 MG tablet          Review of Systems  See HPI  Physical Exam   BP: 120/80  Pulse: (!) 129  Temp: 98.1  F (36.7  C)  Resp: 18  Height: 180.3 cm (5' 11\")  Weight: 105.2 kg (232 lb)  SpO2: 95 %      Physical Exam  BP (!) 153/135   Pulse (!) 123   Temp 98.1  F (36.7  C) (Tympanic)   Resp 18   Ht 1.803 m (5' 11\")   Wt 105.2 kg (232 lb)   SpO2 93%   BMI 32.36 kg/m    General: alert, interactive, in no apparent distress  Head: atraumatic  Nose: no rhinorrhea or epistaxis  Ears: no external auditory canal discharge or bleeding.    Eyes: Sclera nonicteric. Conjunctiva noninjected. PERRL, EOMI  Mouth: no tonsillar erythema, edema, or exudate.  Moist mucous membranes  Neck: " supple, moving spontaneously no midline cervical tenderness  Lungs: No increased work of breathing.  Clear to auscultation bilaterally.  CV: Tachycardia, peripheral pulses palpable and symmetric  Abdomen: Abdomen is soft and distended with fluid wave.  No tenderness.  Extremities: Warm and well-perfused.  No edema or calf tenderness.  Skin: no rash or diaphoresis  Neuro: CN II-XII grossly intact, strength 5/5 in UE and LEs bilaterally, sensation intact to light touch in UE and LEs bilaterally;     ED Course                 [facility]    -Paracentesis    Date/Time: 9/8/2023 9:26 PM    Performed by: Rober Hernandez MD  Authorized by: Rober Hernandez MD    Risks, benefits and alternatives discussed.      PRE-PROCEDURE DETAILS     Procedure purpose:  Therapeutic    ANESTHESIA (see MAR for exact dosages):     Anesthesia method:  Local infiltration    Local anesthetic:  Lidocaine 1% w/o epi    PROCEDURE DETAILS     Ultrasound guidance: yes      Puncture site:  L lower quadrant    Fluid removed amount:  3100 mL    Fluid appearance:  Serosanguinous    Dressing:  Adhesive bandage      PROCEDURE  Describe Procedure: Initially attempted in the right lower quadrant, but I did not get any peritoneal fluid, so went to the left lower quadrant where he was able to get significant amount of fluid removed.  Patient tolerated the procedure well and his breathing improved during the procedure.  Patient Tolerance:  Patient tolerated the procedure well with no immediate complications      Results for orders placed during the hospital encounter of 09/08/23    POC US ABDOMEN LIMITED    Narrative  Limited Bedside Abdominal Ultrasound, performed and interpreted by me.    Indication: Abdominal swelling and SOB. Evaluate for Free fluid.    With the patient in Trendelenburg, the RUQ, LUQ, (including the paracolic gutters) views were examined for free fluid.    Findings: Free fluid present throughout the abdomen,  maximally in the right upper quadrant and left lower quadrant.    IMPRESSION: Free fluid present as noted above.         Critical Care time:  none         Results for orders placed or performed during the hospital encounter of 09/08/23 (from the past 24 hour(s))   [place] Draw    Narrative    The following orders were created for panel order [place] Draw.  Procedure                               Abnormality         Status                     ---------                               -----------         ------                     Extra Blue Top Tube[[id number]]                              Final result               Extra Red Top Tube[[id number]]                               Final result               Extra Green Top (Lithium...[[id number]]                      Final result               Extra Purple Top Tube[[id number]]                            Final result                 Please view results for these tests on the individual orders.   Extra Blue Top Tube   Result Value Ref Range    Hold Specimen JIC    Extra Red Top Tube   Result Value Ref Range    Hold Specimen JIC    Extra Green Top (Lithium Heparin) Tube   Result Value Ref Range    Hold Specimen JIC    Extra Purple Top Tube   Result Value Ref Range    Hold Specimen JIC    D dimer quantitative   Result Value Ref Range    D-Dimer Quantitative >20.00 (HH) 0.00 - 0.50 ug/mL FEU    Narrative    This D-dimer assay is intended for use in conjunction with a clinical pretest probability assessment model to exclude pulmonary embolism (PE) and deep venous thrombosis (DVT) in outpatients suspected of PE or DVT. The cut-off value is 0.50 ug/mL FEU.    For patients 50 years of age or older, the application of age-adjusted cut-off values for D-Dimer may increase the specificity without significant effect on sensitivity. The literature suggested calculation age adjusted cut-off in ug/L = age in years x 10 ug/L. The results in this laboratory are reported as ug/mL rather than ug/L.  The calculation for age adjusted cut off in ug/mL= age in years x 0.01 ug/mL. For example, the cut off for a 76 year old male is 76 x 0.01 ug/mL = 0.76 ug/mL (760 ug/L).    M Darcy et al. Age adjusted D-dimer cut-off levels to rule out pulmonary embolism: The ADJUST-PE Study. JERICA 2014;311:1062-6139.; HJ Ursula et al. Diagnostic accuracy of conventional or age adjusted D-dimer cutoff values in older patients with suspected venous thromboembolism. Systemic review and meta-analysis. BMJ 2013:346:f2492.   Troponin T, High Sensitivity   Result Value Ref Range    Troponin T, High Sensitivity 13 <=22 ng/L   Albumin level   Result Value Ref Range    Albumin 3.5 3.5 - 5.2 g/dL   POC US ABDOMEN LIMITED    Narrative    Limited Bedside Abdominal Ultrasound, performed and interpreted by me.     Indication: Abdominal swelling and SOB. Evaluate for Free fluid.     With the patient in Trendelenburg, the RUQ, LUQ, (including the paracolic gutters) views were examined for free fluid.     Findings: Free fluid present throughout the abdomen, maximally in the right upper quadrant and left lower quadrant.    IMPRESSION: Free fluid present as noted above.     Cell count with differential fluid    Narrative    The following orders were created for panel order Cell count with differential fluid.  Procedure                               Abnormality         Status                     ---------                               -----------         ------                     Cell Count Body Fluid[[id number]]        Abnormal            Final result               Differential Body Fluid[[id number]]                          Final result                 Please view results for these tests on the individual orders.   Ascites Fluid Aerobic Bacterial Culture Routine    Specimen: Abdomen; Ascites Fluid   Result Value Ref Range    Culture No growth, less than 1 day    Gram stain    Specimen: Abdomen; Ascites Fluid   Result Value Ref Range    Gram Stain  Result 1+ WBC seen     Gram Stain Result No organisms seen    Protein fluid   Result Value Ref Range    Protein Fluid Source Chest     Protein Total Fluid 4.8 g/dL    Narrative    No reference ranges have been established. This result should be interpreted in the context of the patient's clinical condition and compared to simultaneous measurement in the patient's blood. This is a lab developed test. It has not been cleared or approved by the FDA. FDA clearance is not required for clinical use.   Albumin fluid   Result Value Ref Range    Albumin Fluid Source Chest     Albumin fluid 2.6 g/dL    Narrative    No reference ranges have been established. This result should be interpreted in the context of the patient's clinical condition and compared to simultaneous measurement in the patient's blood. This is a lab developed test. It has not been cleared or approved by the FDA. FDA clearance is not required for clinical use.   Cell Count Body Fluid   Result Value Ref Range    Color Orange (A) Colorless, Yellow    Clarity Cloudy (A) Clear    Cell Count Fluid Source Chest     Total Nucleated Cells 1,045 /uL    Narrative    No reference ranges have been established.  This result  should be interpreted in the context of the patient's clinical condition and   compared to simultaneous measurement in the patient's blood.         Differential Body Fluid   Result Value Ref Range    % Neutrophils 17 %    % Lymphocytes 51 %    % Monocyte/Macrophages 32 %    Absolute Neutrophils, Body Fluid 177.7   /uL    Narrative    No reference ranges have been established. This result should be interpreted in the context of the patient's clinical condition and compared to simultaneous measurement in the patient's blood.   CT Chest Pulmonary Embolism w Contrast   Result Value Ref Range    Radiologist flags Pneumoperitoneum (AA)     Narrative    EXAM: CT CHEST PULMONARY EMBOLISM W CONTRAST  LOCATION: [facility]  DATE:  9/8/2023    INDICATION: Shortness of air, cancer, elevated dimer, malignant neoplasm of the appendix  COMPARISON: Report for CT from 10/21/2022. Images from comparison exams were not available at the time of this dictation.  TECHNIQUE: CT chest pulmonary angiogram during arterial phase injection of IV contrast. Multiplanar reformats and MIP reconstructions were performed. Dose reduction techniques were used.   CONTRAST: 91 ml Isovue 370    FINDINGS:  ANGIOGRAM CHEST: Pulmonary arteries are normal caliber and negative for pulmonary emboli. Dilated ascending thoracic aorta measuring 4.6 cm in diameter. No thoracic aortic dissection. No CT evidence of right heart strain.    LUNGS AND PLEURA: The central airways are clear. Moderate bronchial wall thickening. Mosaic lung attenuation. Small right-sided pleural effusion with adjacent atelectasis. Left lower lobe subsegmental atelectasis versus scarring. Left apical 9 mm solid   nodule image 48 series 6.    MEDIASTINUM/AXILLAE: No mediastinal or hilar adenopathy. Small right cardiophrenic recess lymph nodes measuring less than 1 cm short axis. Mild cardiomegaly. Trace pericardial fluid.    CORONARY ARTERY CALCIFICATION: None.    UPPER ABDOMEN: Mild to moderate pneumoperitoneum and mild ascites. Slightly nodular liver surface contour. Multiple low-attenuation hepatic lesions with the largest measuring 1.0 cm. Pancreatic atrophy. Diffuse omental/peritoneal nodules including a   dominant partially visualized 5 cm omental nodule.    MUSCULOSKELETAL: Spinal degenerative changes. Lucent lesion within the T7 vertebral body, likely an osseous hemangioma.      Impression    IMPRESSION:  1.  No pulmonary artery embolism.  2.  Small right pleural effusion with adjacent atelectasis.  3.  Mosaic lung attenuation and bronchial wall thickening which may reflect pulmonary edema or bronchiolitis.  4.  Mild to moderate pneumoperitoneum. This could be related to the patient's recent  paracentesis. As a precaution, recommend correlation/follow-up.  5.  Diffuse omental and peritoneal nodularity likely reflecting carcinomatosis.  6.  Multiple small indeterminate hepatic lesions.  7.  Dilated ascending thoracic aorta measuring 4.6 cm in diameter.  8.  Left apical 9 mm pulmonary nodule. Recommend attention on follow-up.        [Critical Result: Pneumoperitoneum]    Finding was identified on 9/8/2023 8:20 PM CDT.     Dr. Rober Hernandez was contacted by me on 9/8/2023 8:33 PM CDT and verbalized understanding of the critical result.    -Paracentesis    Narrative    Rober Hernandez MD     9/9/2023  1:20 AM  [facility]    -Paracentesis    Date/Time: 9/8/2023 9:26 PM    Performed by: Rober Hernandez MD  Authorized by: Rober Hernandez MD    Risks, benefits and alternatives discussed.      PRE-PROCEDURE DETAILS     Procedure purpose:  Therapeutic    ANESTHESIA (see MAR for exact dosages):     Anesthesia method:  Local infiltration    Local anesthetic:  Lidocaine 1% w/o epi    PROCEDURE DETAILS     Ultrasound guidance: yes      Puncture site:  L lower quadrant    Fluid removed amount:  3100 mL    Fluid appearance:  Serosanguinous    Dressing:  Adhesive bandage      PROCEDURE  Describe Procedure: Initially attempted in the right lower quadrant, but I   did not get any peritoneal fluid, so went to the left lower quadrant where   he was able to get significant amount of fluid removed.  Patient tolerated   the procedure well and his breathing improved during the procedure.  Patient Tolerance:  Patient tolerated the procedure well with no immediate   complications       Medications   HYDROmorphone (PF) (DILAUDID) injection 0.5 mg (0.5 mg Intravenous $Given 9/8/23 1827)   iopamidol (ISOVUE-370) solution 91 mL (91 mLs Intravenous $Given 9/8/23 1957)   sodium chloride 0.9 % bag 500 mL for CT scan flush use (100 mLs Intravenous $Given 9/8/23 1956)   0.9% sodium chloride BOLUS (0 mLs Intravenous  Stopped 9/8/23 5930)       Assessments & Plan (with Medical Decision Making)     I have reviewed the nursing notes.    I have reviewed the findings, diagnosis, plan and need for follow up with the patient.      Medical Decision Making  Rod Olson is a 78 year old male who has metastatic high-grade appendiceal mucosal adenocarcinoma who presents to the emergency department for evaluation of shortness of breath.  Was reviewed and are notable for tachycardia.  No fever or hypoxia.  Given the patient's history and exam, most concerned for a ascites causing his shortness of breath.  However given his tachycardia I did pursue alternative etiologies. CT scan from July of this year was reviewed by me and showed significant worsening of his cancer with metastasis to the omentum and peritoneum and a small amount of ascites.  I did perform a diagnostic/therapeutic paracentesis and removed over 3 L of serosanguineous fluid.  Procedure note for details.  Patient had improvement in his respiratory status after this.  I did send this for analysis and it was negative for SBP.  The remainder of his labs were relatively reassuring. Troponin was negative.  D-dimer was quite elevated and I did proceed with CT that was negative for PE.  CT did show pulmonary edema and a small effusion.  There was also some pneumoperitoneum, but likely secondary to the paracentesis that was performed right before the CT.  Patient had serial abdominal exams after the CT that were all benign.  I did recommend that he have close follow-up in this regard.  He has a CT scheduled for early October he is going to see about having this moved up.  Patient was able to ambulate around the department and maintain sats greater than 90%.  He is feeling much better and would like to go home.  He still slightly tachycardic, but improved from when he got here.  He was requesting an order for home health care.  I explained that this is not something I would normally  do in the ER, but patient is unable to get in with his primary for a face-to-face and I do think he would benefit from this.  I did place an order for home health care.  Strict return precautions were discussed.  Recommended close outpatient follow-up.  All questions were answered and the patient was discharged ambulatory in stable condition.        Discharge Medication List as of 9/8/2023 11:40 PM          Final diagnoses:   Shortness of breath   Malignant neoplasm of appendix (H)   Malignant ascites       9/8/2023   [facility] EMERGENCY DEPT       Rober Hernandez MD  09/09/23 0120       Rober Hernandez MD  09/09/23 [id number]

## 2023-09-08 NOTE — ED NOTES
Pt has significant distention of ABD, known cancer mets throughout ABD. Pt has recently started working with palliative care but they were unable to schedule a US or paracentesis yet. Pt reports SOB mostly with activity or sitting up right. Denies chest pain.

## 2023-09-08 NOTE — TELEPHONE ENCOUNTER
Received call back from daughter that pt is exhibiting extreme sob and discomfort from abd distention, she wonders if she should take him into the ER. Informed her at this time she should and if he needs a paracentesis and home orders, the ER physician can order both. She verbalized understanding and will drive him to [facility].

## 2023-09-08 NOTE — TELEPHONE ENCOUNTER
Received voicemail from pt's dtr that she forgot to  the prescriptions for both oxycodone and MOM that were sent to the [facility]. She is requesting they be sent to the [facility] pharmacy instead.    EDGARD RoblesN, RN  Palliative Care Nurse Clinician    [phone] (Direct)  [phone] (Main)  [phone] (Appointment Scheduling)

## 2023-09-08 NOTE — TELEPHONE ENCOUNTER
"[facility]: Cancer Care                                                                                          Received message from palliative care that daughter was requesting a call back regarding possible paracentesis for ascites or moving up pt's CT scan which is currently scheduled for 10/9/23. Called Eowyn and she stated pt has been more uncomfortable, intermittently sob and weak. He went to the ER 9/1 and did perk up after some fluids, wondering if maybe home IVF is an option. He currently does not have any home cares or home infusion services. She also wanted to switch Monday's 9/11 visit to virtual as pt can't even \"tolerate\" the hour long ride to clinic.    Informed Eowyn, if a CT shows ascites that needs draining, he would then need an US and paracentesis appointment scheduled, they do prefer the [place] location. Advised for home care and home infusion that pt see pcp for this as insurance does require a face to face visit to document need for home services, she stated they haven't seen pcp in over a year so she will call local clinic to get him in with first avail pcp appointment. Regarding the imaging, writer will discuss with Dr. Bal and follow-up with Eowyn, scheduling messaged to change 9/11 visit to virtual and cancel labs.    Signature:  Chari Knutson RN  "

## 2023-09-09 NOTE — DISCHARGE INSTRUCTIONS
Today you were seen and evaluated in the emergency department.  You had over 3 L of fluid drained off your abdomen to help with your breathing.  You also had a CAT scan that showed no blood clots.  You did have a small amount of fluid around your right lung.  There was also air seen within your abdomen.  This is likely due to the procedure that you had today, but you should have a repeat CAT scan as soon as you are able to ensure this is not getting worse.  Please talk to your oncologist or regular doctor about this.  You should also talk to them about getting regularly scheduled paracentesis.  You have also been referred to home health and they should be calling you for this.  Take all of your other medications as prescribed.  Return to the ER with any fever, worsening pain, trouble breathing or any other symptoms.

## 2023-09-09 NOTE — RESULT ENCOUNTER NOTE
[facility] Emergency Dept discharge antibiotic prescribed: None  Incision and Drainage performed in [facility] Emergency Dept [Yes or No]: No - paracentesis  Recommendations in treatment per [facility] ED Lab Result culture protocol

## 2023-09-09 NOTE — ED NOTES
Pt up and to the bathroom independently, states he feels significantly better than prior to the thoracentesis

## 2023-09-11 NOTE — TELEPHONE ENCOUNTER
Home Care is calling regarding a non-established patient with [facility]   Informed caller, patient will need to establish care with a provider by scheduling an appointment.    Nurse will offer to transfer to scheduling.    Irasema Tillman RN

## 2023-09-11 NOTE — NURSING NOTE
Is the patient currently in the state of [place]? YES    Visit mode:VIDEO    If the visit is dropped, the patient can be reconnected by: VIDEO VISIT: Text to cell phone:   Telephone Information:   Mobile [phone]       Will anyone else be joining the visit? NO  (If patient encounters technical issues they should call [phone])    How would you like to obtain your AVS? MyChart    Are changes needed to the allergy or medication list? Pt stated no changes to allergies and Pt stated no med changes    Reason for visit: RECHECK    Mee ZARATE

## 2023-09-11 NOTE — PROGRESS NOTES
[facility] - Medical Oncology TeleHealth Consult Note  2023    Patient Identifiers     Name: Rod Olson  Preferred Address: [place]  Preferred Language: English  : 1945  Gender: male    Assessment and Plan     Mr. 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    Mr. Olson returns for routine clinical follow-up and palliative counseling.  Patient's clinical course in the past month has been complicated by recent admission for malignant ascites and abdominal pain, relieved by therapeutic paracentesis.  Unfortunately, we do not have access to paracentesis fluid for potential infectious testing.  However, patient's current symptoms suggest low overall risk, so we would not request rehab at this time for testing.  We we will make arrangements for weekly therapeutic paracentesis and have appropriate testing associated with with those procedures.  We will also provide patient with gentle diuresis in order to attempt to prevent fluid accumulation.  Patient has no other symptom palliation needs at this time    Patient to return to clinic in 1 month with imaging prior for further counseling.  We will continue to review symptoms at that time.    45 minutes spent on the date of the encounter doing chart review, review of test results, interpretation of tests, patient visit, and documentation       Larry Bal MD, PhD   of Medicine  Division of Hematology, Oncology and Transplantation  [facility]    -----------------------------------    Oncology Summary     Cancer Staging   Malignant neoplasm of appendix (H)  Staging form: Appendix Carcinoma, AJCC 8th Edition  - Clinical stage from 2021: Stage IVC (cT4b, cN1c, pM1c, G3) - Signed by Larry Bal MD on 2022      Oncology History Overview Note    Pt in his USOH until November 2021 when he presented to care at the urging of family with eight months of abdominal pain. CT showed large, mucinous collections in R hemipelvis, prompting referral to surgery. Pt thought to have likely a low-grade mucinous neoplasm and underwent cytoreductive surgery, with incomplete (R2) resection. 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    On establishment with Med Onc, pt recommended to initiate FOLFOX therapy.      Malignant neoplasm of appendix (H)   12/7/2021 Initial Diagnosis    Malignant neoplasm of appendix (H)     12/7/2021 -  Cancer Staged    Staging form: Appendix Carcinoma, AJCC 8th Edition  - Clinical stage from 12/7/2021: Stage IVC (cT4b, cN1c, pM1c, G3)     3/23/2022 -  Chemotherapy    OP ONC Colorectal Cancer - Capecitabine  Plan Provider: Larry Bal MD  Treatment goal: Palliative  Line of treatment: First Line         Subjective/Interval Events     - patient underwent paracentesis in ER on 9/8; CT scan following showed some pneumoperitoneum. Swelling and pain significantly improved since then  - no other significant pain symptoms  - continues to have some shortness of breath    - naps during the day and sleeps intermittently through the night. Continues to get up intermittently through the night.    - pt somewhat anhedonic and fatigued over the past few weeks    Review of Systems: 14 point ROS negative other than the symptoms noted above in the HPI.      Objective Data     Lab data:  I have personally reviewed the lab data, notable for:    - no significant abnormalities in CBC or CMP    Radiology data:  I have personally reviewed the radiology data, notable for:  09/08/2023 CT PE  IMPRESSION:  1.  No pulmonary artery embolism.  2.  Small right pleural effusion with adjacent atelectasis.  3.  Mosaic lung attenuation and bronchial wall thickening which  may reflect pulmonary edema or bronchiolitis.  4.  Mild to moderate pneumoperitoneum. This could be related to the patient's recent paracentesis. As a precaution, recommend correlation/follow-up.  5.  Diffuse omental and peritoneal nodularity likely reflecting carcinomatosis.  6.  Multiple small indeterminate hepatic lesions.  7.  Dilated ascending thoracic aorta measuring 4.6 cm in diameter.  8.  Left apical 9 mm pulmonary nodule. Recommend attention on follow-up.    Pathology and other data:  I have personally reviewed and interpreted the pathology data, notable for:    - no new data    Billing Stuff     Virtual Visit Details    Type of service:  Video Visit   Video Start Time:  3:20 PM  Video End Time: 3:50 PM    Originating Location (pt. Location): Home    Distant Location (provider location):  On-site  Platform used for Video Visit: Kyrie

## 2023-09-11 NOTE — LETTER
2023         RE: Rod lOson  [address]  [facility] [facility] [address]        Dear Colleague,    Thank you for referring your patient, Rod Olson, to the [facility]. Please see a copy of my visit note below.    [facility] - Medical Oncology TeleHealth Consult Note  2023    Patient Identifiers     Name: Rod Olson  Preferred Address: [place]  Preferred Language: English  : 1945  Gender: male    Assessment and Plan     Mr. 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    Mr. Olson returns for routine clinical follow-up and palliative counseling.  Patient's clinical course in the past month has been complicated by recent admission for malignant ascites and abdominal pain, relieved by therapeutic paracentesis.  Unfortunately, we do not have access to paracentesis fluid for potential infectious testing.  However, patient's current symptoms suggest low overall risk, so we would not request rehab at this time for testing.  We we will make arrangements for weekly therapeutic paracentesis and have appropriate testing associated with with those procedures.  We will also provide patient with gentle diuresis in order to attempt to prevent fluid accumulation.  Patient has no other symptom palliation needs at this time    Patient to return to clinic in 1 month with imaging prior for further counseling.  We will continue to review symptoms at that time.    45 minutes spent on the date of the encounter doing chart review, review of test results, interpretation of tests, patient visit, and documentation       Larry Bal MD, PhD   of Medicine  Division of Hematology, Oncology and Transplantation  [facility]    -----------------------------------    Oncology  Summary     Cancer Staging   Malignant neoplasm of appendix (H)  Staging form: Appendix Carcinoma, AJCC 8th Edition  - Clinical stage from 12/7/2021: Stage IVC (cT4b, cN1c, pM1c, G3) - Signed by Larry Bal MD on 1/5/2022      Oncology History Overview Note   Pt in his USOH until November 2021 when he presented to care at the urging of family with eight months of abdominal pain. CT showed large, mucinous collections in R hemipelvis, prompting referral to surgery. Pt thought to have likely a low-grade mucinous neoplasm and underwent cytoreductive surgery, with incomplete (R2) resection. 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    On establishment with Med Onc, pt recommended to initiate FOLFOX therapy.      Malignant neoplasm of appendix (H)   12/7/2021 Initial Diagnosis    Malignant neoplasm of appendix (H)     12/7/2021 -  Cancer Staged    Staging form: Appendix Carcinoma, AJCC 8th Edition  - Clinical stage from 12/7/2021: Stage IVC (cT4b, cN1c, pM1c, G3)     3/23/2022 -  Chemotherapy    OP ONC Colorectal Cancer - Capecitabine  Plan Provider: Larry Bal MD  Treatment goal: Palliative  Line of treatment: First Line         Subjective/Interval Events     - patient underwent paracentesis in ER on 9/8; CT scan following showed some pneumoperitoneum. Swelling and pain significantly improved since then  - no other significant pain symptoms  - continues to have some shortness of breath    - naps during the day and sleeps intermittently through the night. Continues to get up intermittently through the night.    - pt somewhat anhedonic and fatigued over the past few weeks    Review of Systems: 14 point ROS negative other than the symptoms noted above in the HPI.      Objective Data     Lab data:  I have personally reviewed the lab data, notable for:    - no significant abnormalities in CBC or  [facility]    Radiology data:  I have personally reviewed the radiology data, notable for:  09/08/2023 CT PE  IMPRESSION:  1.  No pulmonary artery embolism.  2.  Small right pleural effusion with adjacent atelectasis.  3.  Mosaic lung attenuation and bronchial wall thickening which may reflect pulmonary edema or bronchiolitis.  4.  Mild to moderate pneumoperitoneum. This could be related to the patient's recent paracentesis. As a precaution, recommend correlation/follow-up.  5.  Diffuse omental and peritoneal nodularity likely reflecting carcinomatosis.  6.  Multiple small indeterminate hepatic lesions.  7.  Dilated ascending thoracic aorta measuring 4.6 cm in diameter.  8.  Left apical 9 mm pulmonary nodule. Recommend attention on follow-up.    Pathology and other data:  I have personally reviewed and interpreted the pathology data, notable for:    - no new data    Billing Stuff     Virtual Visit Details    Type of service:  Video Visit   Video Start Time:  3:20 PM  Video End Time: 3:50 PM    Originating Location (pt. Location): Home    Distant Location (provider location):  On-site  Platform used for Video Visit: Kyrie Bal MD

## 2023-09-13 NOTE — TELEPHONE ENCOUNTER
Social work eval     Skilled nursing visit 2x week week 1   Then 1 time a week for 3 weeks   Then 1 time week every other week for rest of certification period     Verbal orders given for above.     Patient is not taking the xeloda. Wanted to update DR Bal.   Pulse 110 with activity 120-130.   Looking for next paracentesis to be scheduled , daughter is working on this.

## 2023-09-15 NOTE — PROGRESS NOTES
Paracentesis Nursing Note  Rod Olson presents today to [facility] for a paracentesis.    During today's appointment orders from Dr. Bal were completed.    Progress Note:  Patient identification verified by name and date of birth.  Assessment completed.  Vitals monitored throughout appointment and recorded in Doc Flowsheets.  See proceduralist note in ultrasound.    Vascular Access: none  Labs: were not ordered for this appointment.    Date of consent or authorization: 9/15/23.  Invasive Procedure Safety Checklist was completed and sent for scanning.     Paracentesis performed by Dr. Motley.    The following labs were communicated to provider performing paracentesis:  Lab Results   Component Value Date     09/08/2023       Total amount of ascites fluid drained: 3 liters.  Color of ascites fluid: straw colored.  Total amount of albumin given: 0 grams    Patient tolerated procedure well.    Post procedure,denies pain or discomfort post paracentesis.    Discharge Plan:  Discharge instructions were reviewed with patient.  Patient/Representative verbalized understanding and all questions were answered.   Discharged from [facility] in stable condition.    Alba Cavazos RN      Administrations This Visit       lidocaine (PF) (XYLOCAINE) 1 % injection 20 mL       Admin Date  09/15/2023 Action  $Given by Other Dose  10 mL Route  Subcutaneous Administered By  Alba Cavazos, RN                    Resp 20   Wt 107 kg (235 lb 12.8 oz)   SpO2 95%   BMI 32.89 kg/m

## 2023-09-15 NOTE — TELEPHONE ENCOUNTER
[facility]: Cancer Care                                                                                          Returned call to daughter regarding US Para orders, pt is scheduled at [facility] on 9/15 and 0/3 but daughter wanted to schedule him at [place] or [facility] and different orders needed. Order entered, reached vm and lm that she should be able to schedule at other locations now.      Signature:  Chari Knutson RN

## 2023-09-19 NOTE — CONSULTS
Outpatient IR Drain Referral  09/19/23    Referring Provider: Dr. Bal  IR Referral Request: Tunneled peritoneal catheter.   Procedure Approved for: IR recommends once patient is ready for hospice enrollment IR is amenable to placing peritoneal catheter as catheters have a short life span, can leak, clog, be a route for infection, and IR does not typically  manage these tube once placed, IR recommends karlo as needed until patient is ready for hospice.      9/25/23 Patient now ready to enroll in hospice.     Catheter can be placed at any facility    9/25/23 Patient now ready for enrolling in hospice per referring team. 5 liters removed 9/22/23 paracentesis.  IR recommends patient learning center consultation and that hospice will manage and care for the catheter once placed.        Brief History:    Rod Olson is a 78 year old male with history of HTN, HDL, chronic cough, metastatic high-grade appendiceal adenocarcinoma 2021, malignant ascites. Paracentesis 3000 ml 9/15/23. Not enrolling in hospice at this time.     Pertinent Imaging Reviewed:  CT 9/8/23 IMPRESSION:  1.  No pulmonary artery embolism.  2.  Small right pleural effusion with adjacent atelectasis.  3.  Mosaic lung attenuation and bronchial wall thickening which may reflect pulmonary edema or bronchiolitis.  4.  Mild to moderate pneumoperitoneum. This could be related to the patient's recent paracentesis. As a precaution, recommend correlation/follow-up.  5.  Diffuse omental and peritoneal nodularity likely reflecting carcinomatosis.  6.  Multiple small indeterminate hepatic lesions.  7.  Dilated ascending thoracic aorta measuring 4.6 cm in diameter.  8.  Left apical 9 mm pulmonary nodule. Recommend attention on follow-up.    IR typically will place peritoneal catheter for patients going on hospice to manage. IR also typically will want documentation of re accumulation of fluid to warrant cathter placement.      Requesting team,   Valeriano BAHENA, made aware of IR recommendations via Epic messaging.    BARRIE Martinez CNP  Interventional Radiolog

## 2023-09-22 NOTE — TELEPHONE ENCOUNTER
[facility]: Cancer Care                                                                                          Returned call to daughter Eowyn, she believes pt is progressing faster than either of them anticipated and is on board with enrolling in hospice now. Per IR navigation, order for intraperitoneal drain declined due to pt not being on hospice, writer will message IR to schedule drain placement while hospice work to get pt enrolled by next week. If drain placement takes longer than a week to place will have schedulers try to get him another paracentesis scheduled.    LM for [facility] if they can use the old order or a new one entered.    Signature:  Chari Knutson RN

## 2023-09-25 NOTE — TELEPHONE ENCOUNTER
Received [employer] message from patient's dtr requesting refill of oxycodone. Pt is pending hospice enrollment, just waiting for a pleurex catheter to be placed.    Last refill: 9/8/23  Last office visit: 8/31/23  Scheduled for follow up 11/13/23     Will route request to NP for review.     Reviewed [place]  Report.

## 2023-09-25 NOTE — TELEPHONE ENCOUNTER
Eowyn called back to see if pt was still going to get scheduled for IR peritoneal drain, stated she was unable to schedule another paracentesis at any location this week and does not want to take pt in to ER. He received a para on Friday and 5L was taken out.    Informed her, a message from IR was received this morning that they will schedule as soon as pt has met with the [facility] for drain education. Warm transferred Eowyn to Patient [facility] to set this up and will follow-up with IR for drain placement.

## 2023-09-25 NOTE — TELEPHONE ENCOUNTER
9/27 scheduled for [facility] Learning [facility] at 11:30 am  Check in for IR drain placement at 12:30 pm.

## 2023-09-27 NOTE — DISCHARGE INSTRUCTIONS
[facility]     Interventional Radiology  Patient Instructions Following placement of abdominal tunneled Pleur-X catheter     AFTER YOU GO HOME:  If you were given sedation; we recommend that an adult stay with you for the first 24 hours. No driving or alcoholic beverages for 24 hours.  Resume previous diet and medication  Limit strenuous physical activity such as lifting (>10 lbs.), straining, or exercising for 48 hours.  You may resume normal activity in 24 hours  Change gauze at the puncture site as needed to keep site dry.  Leaking of additional fluid is not uncommon during the first 24-48 hours.    CALL THE PHYSICIAN:  If you develop a fever, severe abdominal pain or excessive bleeding from the paracentesis site within 24 hours of the procedure  If you experience severe lightheadedness or fainting, call you doctor immediately or come to the closest Emergency Department.  Do not drive yourself.  If you have questions or concerns regarding your procedure call the Radiologist      [facility] INTERVENTIONAL RADIOLOGY DEPARTMENT  Procedure Physician: Bill Johns PA-C                                       Date of procedure: 09/27/23    Telephone Numbers: [phone]      Monday-Friday 7:30 am to 4:00 pm  [phone] After 4:00 pm Monday-Friday, Weekends & Holidays.   Ask for the Interventional Radiologist on call.  Someone is on call 24 hrs/day    [facility] toll free number: [phone] Monday-Friday 8:00 am to 4:30 pm  [facility] Emergency Dept: [phone]

## 2023-09-27 NOTE — PROGRESS NOTES
Pt arrived via cart with RN from IR s/p abdominal tunnel catheter placment. VSS. RLQ site CDI, no hematoma. Patient denies pain.  daughter at bedside. Flat bedrest until 1545 per MD orders

## 2023-09-27 NOTE — CONSULTS
PleurX training done by [facility] prior to placement with patient and don Eowyn. PLC nurse demonstrated how to drain from catheter and re dress catheter site. They verbalized understanding of how to do the drainage. They also understand that 2L is typical daily drainage from an abdominal site, unless the provider states otherwise. They understand how to dispose of fluid and supplies.    Eowyn feels comfortable with the demonstration and did not feel the need to practice on the model. They left with a copy of the procedural steps and the website to watch the drainage videos at home prior to draining.    They also have a hospice nurse coming out on Friday to assist as needed.    Isaiahed patient and daugher to [facility] waiting room for procedure check in.

## 2023-09-27 NOTE — PRE-PROCEDURE
GENERAL PRE-PROCEDURE:     Verbal consent obtained?: Yes    Written consent obtained?: Yes    Risks and benefits: Risks, benefits and alternatives were discussed    Consent given by:  Patient  Patient states understanding of procedure being performed: Yes    Patient's understanding of procedure matches consent: Yes    Procedure consent matches procedure scheduled: Yes    Expected level of sedation:  Moderate  Appropriately NPO:  Yes (Ice chips prior to procedure.)  Mallampati  :  Grade 2- soft palate, base of uvula, tonsillar pillars, and portion of posterior pharyngeal wall visible  Lungs:  Lungs clear with good breath sounds bilaterally (Posterior auscultation not performed. Lungs otherwise clear.)  Heart:  Normal heart sounds and rate  History & Physical reviewed:  History and physical reviewed and no updates needed  Statement of review:  I have reviewed the lab findings, diagnostic data, medications, and the plan for sedation

## 2023-09-27 NOTE — PROGRESS NOTES
Condition is stable s/p Abdominal tunneled catheter placement. Vital Signs are stable/WNL. RLQ site clean, dry and intact, cms intact. Discharge instructions reviewed with patient and questions answered. Patient verbalizes understanding. IV removed. Pain under control. Patient is ambulating and voiding spontaneously. Patient is tolerating regular diet and denies any N/V. Patient to be discharged to home via daughter. Patient has all belongings

## 2023-09-27 NOTE — IR NOTE
Patient Name: Rod Olson  Medical Record Number: [id number]  Today's Date: 9/27/2023    Procedure: Plurex cath in ABD  Proceduralist: Anibal Swain PA-C  Pathology present: no    Procedure Start: 1410  Procedure end: 1445  Sedation medications administered: 100 mcg fentanyl and 2mg versed  4mg Zofran     Sedation Time: 7926-7311 = Total Minutes 45    Plurex Catheter in place R lower Abdomen. 3.5 L of fluid removed in IR    Report given to: 2A RN  : no    Other Notes: Pt arrived to IR room 2 from . Consent reviewed. Pt denies any questions or concerns regarding procedure. Pt positioned supine and monitored per protocol. Pt tolerated procedure without any noted complications. Pt transferred back to 2a.

## 2023-09-27 NOTE — PROCEDURES
[facility]    Procedure: IR Procedure Note    Date/Time: 9/27/2023 2:55 PM    Performed by: Bill Swain PA-C  Authorized by: Bill Swain PA-C  IR Fellow Physician:  Other(s) attending procedure: Student: REYNA Solorio      UNIVERSAL PROTOCOL   Site Marked: NA  Prior Images Obtained and Reviewed:  Yes  Required items: Required blood products, implants, devices and special equipment available    Patient identity confirmed:  Verbally with patient, arm band, provided demographic data and hospital-assigned identification number  Patient was reevaluated immediately before administering moderate or deep sedation or anesthesia  Confirmation Checklist:  Patient's identity using two indicators, relevant allergies, procedure was appropriate and matched the consent or emergent situation and correct equipment/implants were available  Time out: Immediately prior to the procedure a time out was called    Universal Protocol: the Joint Commission Universal Protocol was followed    Preparation: Patient was prepped and draped in usual sterile fashion    ESBL (mL):  1     ANESTHESIA    Anesthesia:  Local infiltration  Local Anesthetic:  Lidocaine 1% without epinephrine  Anesthetic Total (mL):  13      SEDATION  Patient Sedated: Yes    Sedation Type:  Moderate (conscious) sedation  Sedation:  Midazolam and fentanyl  Vital signs: Vital signs monitored during sedation    See dictated procedure note for full details.  Findings: Ultrasound guided placement of 16 F abdominal tunneled Pleur-X catheter. Return of 3,500 mL clear serous ascites.     Specimens: none    Complications: None    Condition: Stable    Plan: Follow up per primary team. Bed rest for 1 hour.       PROCEDURE    Patient Tolerance:  Patient tolerated the procedure well with no immediate complications  Length of time physician/provider present for 1:1 monitoring during sedation: 30

## 2023-09-28 NOTE — TELEPHONE ENCOUNTER
Spoke with: Daughter (Eowyn)  Procedure done: 9/27 pleurx catheter placement  Any pain: No  Any fever: No  Any redness/swelling/abnormal drainage around puncture site: No  Were you instructed well enough to take care of yourself at home: Yes  Are you satisfied with the care you received: Yes  Any additional concerns or questions: No      Post call completed.   September 28, 2023 9:24 AM  Yahaira Roberson RN  Interventional Radiology  [phone]

## 2024-06-05 NOTE — NURSING NOTE
Chief Complaint   Patient presents with     Consult     anal prolapse       Vitals:    01/17/22 1021   BP: (!) 141/92   BP Location: Left arm   Patient Position: Sitting   Cuff Size: Adult Large   Pulse: 73   SpO2: 100%   Weight: 235 lb   Height: 6'       Body mass index is 31.87 kg/m .    Daiana Hanikns CMA    
Diet, Renal Restrictions:   For patients receiving Renal Replacement - No Protein Restr, No Conc K, No Conc Phos, Low Sodium (06-05-24 @ 02:35)

## (undated) DEVICE — STRAP UNIVERSAL POSITIONING 2-PIECE 4X47X76" 91-287

## (undated) DEVICE — COVER ULTRASOUND PROBE W/GEL FLEXI-FEEL 6"X58" LF  25-FF658

## (undated) DEVICE — SURGICEL ABSORBABLE HEMOSTAT SNOW 4"X4" 2083

## (undated) DEVICE — SU SILK 4-0 TIE 12X30" A303H

## (undated) DEVICE — DRAPE SHEET REV FOLD 3/4 9349

## (undated) DEVICE — SPONGE LAP 8X4IN 12 PLY RADOPQ DERMACEA STRL BLU WHT

## (undated) DEVICE — DRSG GAUZE 4X4" TRAY

## (undated) DEVICE — CLIP HORIZON LG ORANGE 004200

## (undated) DEVICE — SU ETHILON 2-0 FS 18" 664H

## (undated) DEVICE — LINEN TOWEL PACK X5 5464

## (undated) DEVICE — ESU PENCIL SMOKE EVAC W/ROCKER SWITCH 0703-047-000

## (undated) DEVICE — DRAIN PENROSE 1X18" LATEX FREE GR207

## (undated) DEVICE — ESU PENCIL W/COATED BLADE E2450H

## (undated) DEVICE — SU MONOCRYL 4-0 PS-2 18" UND Y496G

## (undated) DEVICE — SOL NACL 0.9% IRRIG 1000ML BOTTLE 07138-09

## (undated) DEVICE — NDL SPINAL 22GA 3.5" QUINCKE 405181

## (undated) DEVICE — COVER CAMERA IN-LIGHT DISP LT-C02

## (undated) DEVICE — SU VICRYL 3-0 SH 27" J316H

## (undated) DEVICE — Device

## (undated) DEVICE — SU SILK 3-0 TIE 12X30" A304H

## (undated) DEVICE — DRSG MEDIPORE 3 1/2X13 3/4" 3573

## (undated) DEVICE — SU VICRYL 3-0 SH 27" UND J416H

## (undated) DEVICE — ESU GROUND PAD ADULT W/CORD E7507

## (undated) DEVICE — CATH TRAY FOLEY SURESTEP 16FR W/URNE MTR STLK LATEX A303316A

## (undated) DEVICE — LINEN TOWEL PACK X6 WHITE 5487

## (undated) DEVICE — CLIP HORIZON MED BLUE 002200

## (undated) DEVICE — SU PDS II 2-0 CT-1 27" Z339H

## (undated) DEVICE — CATH TRAY FOLEY SURESTEP 16FR W/URINE MTR STATLK LF A303416A

## (undated) DEVICE — GLOVE PROTEXIS W/NEU-THERA 7.5  2D73TE75

## (undated) DEVICE — GLOVE PROTEXIS BLUE W/NEU-THERA 7.5  2D73EB75

## (undated) DEVICE — CATH TRAY FOLEY 16FR W/URINE METER FSD MCRB STATLOCK 303416A

## (undated) DEVICE — ADH SKIN CLOSURE PREMIERPRO EXOFIN 1.0ML 3470

## (undated) DEVICE — DRAIN JACKSON PRATT RESERVOIR 100ML SU130-1305

## (undated) DEVICE — STPL LINEAR CUT 75MM TLC75

## (undated) DEVICE — SU SILK 2-0 SH 30" K833H

## (undated) DEVICE — SU SILK 3-0 SH CR 8X18" C013D

## (undated) DEVICE — SYRINGE MNJCT 12ML LF STRL LL TIP MED PP DISP

## (undated) DEVICE — STPL LINEAR 90 X 3.5MM TA9035S

## (undated) DEVICE — SU SILK 0 TIE 6X30" A306H

## (undated) DEVICE — ESU ELEC BLADE 2.75" COATED/INSULATED E1455

## (undated) DEVICE — SPONGE KITTNER 30-101

## (undated) DEVICE — PREP CHLORAPREP 26ML TINTED ORANGE  260815

## (undated) DEVICE — LINEN TOWEL PACK X30 5481

## (undated) DEVICE — ESU LIGASURE IMPACT OPEN SEALER/DVDR CVD LG JAW LF4418

## (undated) DEVICE — SOL WATER IRRIG 1000ML BOTTLE 2F7114

## (undated) DEVICE — SU MONOCRYL 4-0 PS-2 27" UND Y426H

## (undated) DEVICE — DRAIN JACKSON PRATT ROUND W/TROCAR 19FR JP-HUR195

## (undated) DEVICE — HEMOSTAT ABSORBABLE AGENT ARISTA 3GM POWDER SM0002-USA

## (undated) DEVICE — SPONGE LAP 18X18" X8435

## (undated) DEVICE — DRAIN PENROSE 0.50"X18" LATEX FREE GR203

## (undated) DEVICE — DRSG TEGADERM 4X4 3/4" 1626W

## (undated) DEVICE — STPL RELOAD LINEAR CUT 75MM TCR75

## (undated) DEVICE — DRAPE SPLIT SHEET 77X108 REINFORCED 29436

## (undated) DEVICE — DRAIN PENROSE 5/8X18" LATEX 0918040

## (undated) DEVICE — SU SILK 2-0 TIE 12X30" A305H

## (undated) DEVICE — SU PROLENE 3-0 SHDA 48" 8534H

## (undated) DEVICE — DRAPE IOBAN INCISE 23X17" 6650EZ

## (undated) DEVICE — NDL 18GA 1.5" 305196

## (undated) DEVICE — SU SILK 3-0 SH 30" K832H

## (undated) DEVICE — SUCTION MANIFOLD NEPTUNE 2 SYS 4 PORT 0702-020-000

## (undated) DEVICE — SYR 20ML SLIP TIP W/O NDL 302831

## (undated) RX ORDER — LIDOCAINE HYDROCHLORIDE 10 MG/ML
INJECTION, SOLUTION EPIDURAL; INFILTRATION; INTRACAUDAL; PERINEURAL
Status: DISPENSED
Start: 2023-01-01

## (undated) RX ORDER — CEFAZOLIN SODIUM 2 G/100ML
INJECTION, SOLUTION INTRAVENOUS
Status: DISPENSED
Start: 2023-01-01

## (undated) RX ORDER — FENTANYL CITRATE 50 UG/ML
INJECTION, SOLUTION INTRAMUSCULAR; INTRAVENOUS
Status: DISPENSED
Start: 2023-01-01

## (undated) RX ORDER — ACETAMINOPHEN 325 MG/1
TABLET ORAL
Status: DISPENSED
Start: 2022-01-18

## (undated) RX ORDER — CEFAZOLIN SODIUM 1 G/3ML
INJECTION, POWDER, FOR SOLUTION INTRAMUSCULAR; INTRAVENOUS
Status: DISPENSED
Start: 2021-12-07

## (undated) RX ORDER — FENTANYL CITRATE 50 UG/ML
INJECTION, SOLUTION INTRAMUSCULAR; INTRAVENOUS
Status: DISPENSED
Start: 2021-12-07

## (undated) RX ORDER — ONDANSETRON 2 MG/ML
INJECTION INTRAMUSCULAR; INTRAVENOUS
Status: DISPENSED
Start: 2021-12-07

## (undated) RX ORDER — GABAPENTIN 100 MG/1
CAPSULE ORAL
Status: DISPENSED
Start: 2022-01-27

## (undated) RX ORDER — OXYCODONE HYDROCHLORIDE 5 MG/1
TABLET ORAL
Status: DISPENSED
Start: 2022-01-27

## (undated) RX ORDER — FENTANYL CITRATE 50 UG/ML
INJECTION, SOLUTION INTRAMUSCULAR; INTRAVENOUS
Status: DISPENSED
Start: 2022-01-27

## (undated) RX ORDER — DEXAMETHASONE SODIUM PHOSPHATE 4 MG/ML
INJECTION, SOLUTION INTRA-ARTICULAR; INTRALESIONAL; INTRAMUSCULAR; INTRAVENOUS; SOFT TISSUE
Status: DISPENSED
Start: 2021-12-07

## (undated) RX ORDER — BUPIVACAINE HYDROCHLORIDE AND EPINEPHRINE 5; 5 MG/ML; UG/ML
INJECTION, SOLUTION EPIDURAL; INTRACAUDAL; PERINEURAL
Status: DISPENSED
Start: 2022-01-18

## (undated) RX ORDER — CEFAZOLIN SODIUM 1 G/50ML
SOLUTION INTRAVENOUS
Status: DISPENSED
Start: 2022-01-27

## (undated) RX ORDER — CEFAZOLIN SODIUM 1 G/50ML
SOLUTION INTRAVENOUS
Status: DISPENSED
Start: 2021-12-07

## (undated) RX ORDER — FENTANYL CITRATE-0.9 % NACL/PF 10 MCG/ML
PLASTIC BAG, INJECTION (ML) INTRAVENOUS
Status: DISPENSED
Start: 2021-12-07

## (undated) RX ORDER — SODIUM CHLORIDE, SODIUM LACTATE, POTASSIUM CHLORIDE, CALCIUM CHLORIDE 600; 310; 30; 20 MG/100ML; MG/100ML; MG/100ML; MG/100ML
INJECTION, SOLUTION INTRAVENOUS
Status: DISPENSED
Start: 2021-12-07

## (undated) RX ORDER — CEFAZOLIN SODIUM 2 G/100ML
INJECTION, SOLUTION INTRAVENOUS
Status: DISPENSED
Start: 2022-01-18

## (undated) RX ORDER — ALBUMIN, HUMAN INJ 5% 5 %
SOLUTION INTRAVENOUS
Status: DISPENSED
Start: 2021-12-07

## (undated) RX ORDER — ACETAMINOPHEN 325 MG/1
TABLET ORAL
Status: DISPENSED
Start: 2022-01-27

## (undated) RX ORDER — ONDANSETRON 2 MG/ML
INJECTION INTRAMUSCULAR; INTRAVENOUS
Status: DISPENSED
Start: 2023-01-01

## (undated) RX ORDER — CEFAZOLIN SODIUM 1 G/3ML
INJECTION, POWDER, FOR SOLUTION INTRAMUSCULAR; INTRAVENOUS
Status: DISPENSED
Start: 2022-01-27

## (undated) RX ORDER — ESMOLOL HYDROCHLORIDE 10 MG/ML
INJECTION INTRAVENOUS
Status: DISPENSED
Start: 2021-12-07

## (undated) RX ORDER — SODIUM CHLORIDE 9 MG/ML
INJECTION, SOLUTION INTRAVENOUS
Status: DISPENSED
Start: 2023-01-01

## (undated) RX ORDER — PROPOFOL 10 MG/ML
INJECTION, EMULSION INTRAVENOUS
Status: DISPENSED
Start: 2021-12-07

## (undated) RX ORDER — FENTANYL CITRATE 50 UG/ML
INJECTION, SOLUTION INTRAMUSCULAR; INTRAVENOUS
Status: DISPENSED
Start: 2021-12-03

## (undated) RX ORDER — LIDOCAINE HYDROCHLORIDE 20 MG/ML
INJECTION, SOLUTION EPIDURAL; INFILTRATION; INTRACAUDAL; PERINEURAL
Status: DISPENSED
Start: 2021-12-07

## (undated) RX ORDER — FENTANYL CITRATE 50 UG/ML
INJECTION, SOLUTION INTRAMUSCULAR; INTRAVENOUS
Status: DISPENSED
Start: 2022-01-18

## (undated) RX ORDER — ONDANSETRON 2 MG/ML
INJECTION INTRAMUSCULAR; INTRAVENOUS
Status: DISPENSED
Start: 2022-01-18

## (undated) RX ORDER — HYDROMORPHONE HYDROCHLORIDE 1 MG/ML
INJECTION, SOLUTION INTRAMUSCULAR; INTRAVENOUS; SUBCUTANEOUS
Status: DISPENSED
Start: 2022-01-27

## (undated) RX ORDER — BUPIVACAINE HYDROCHLORIDE AND EPINEPHRINE 5; 5 MG/ML; UG/ML
INJECTION, SOLUTION EPIDURAL; INTRACAUDAL; PERINEURAL
Status: DISPENSED
Start: 2022-01-27

## (undated) RX ORDER — LIDOCAINE HYDROCHLORIDE 10 MG/ML
INJECTION, SOLUTION EPIDURAL; INFILTRATION; INTRACAUDAL; PERINEURAL
Status: DISPENSED
Start: 2022-01-27

## (undated) RX ORDER — HYDROMORPHONE HYDROCHLORIDE 1 MG/ML
INJECTION, SOLUTION INTRAMUSCULAR; INTRAVENOUS; SUBCUTANEOUS
Status: DISPENSED
Start: 2021-12-07

## (undated) RX ORDER — HYDRALAZINE HYDROCHLORIDE 20 MG/ML
INJECTION INTRAMUSCULAR; INTRAVENOUS
Status: DISPENSED
Start: 2021-12-07

## (undated) RX ORDER — KETOROLAC TROMETHAMINE 30 MG/ML
INJECTION, SOLUTION INTRAMUSCULAR; INTRAVENOUS
Status: DISPENSED
Start: 2022-01-18

## (undated) RX ORDER — EPHEDRINE SULFATE 50 MG/ML
INJECTION, SOLUTION INTRAMUSCULAR; INTRAVENOUS; SUBCUTANEOUS
Status: DISPENSED
Start: 2021-12-07

## (undated) RX ORDER — LIDOCAINE HYDROCHLORIDE 10 MG/ML
INJECTION, SOLUTION EPIDURAL; INFILTRATION; INTRACAUDAL; PERINEURAL
Status: DISPENSED
Start: 2022-01-18